# Patient Record
Sex: MALE | Race: WHITE | Employment: OTHER | URBAN - METROPOLITAN AREA
[De-identification: names, ages, dates, MRNs, and addresses within clinical notes are randomized per-mention and may not be internally consistent; named-entity substitution may affect disease eponyms.]

---

## 2019-01-08 ENCOUNTER — APPOINTMENT (OUTPATIENT)
Dept: GENERAL RADIOLOGY | Age: 64
End: 2019-01-08
Payer: COMMERCIAL

## 2019-01-08 ENCOUNTER — HOSPITAL ENCOUNTER (EMERGENCY)
Age: 64
Discharge: LEFT BEFORE DISCHARGE | End: 2019-01-08
Attending: EMERGENCY MEDICINE
Payer: COMMERCIAL

## 2019-01-08 VITALS
TEMPERATURE: 97.9 F | HEART RATE: 67 BPM | SYSTOLIC BLOOD PRESSURE: 103 MMHG | HEIGHT: 73 IN | BODY MASS INDEX: 23.33 KG/M2 | WEIGHT: 176 LBS | RESPIRATION RATE: 15 BRPM | OXYGEN SATURATION: 99 % | DIASTOLIC BLOOD PRESSURE: 77 MMHG

## 2019-01-08 DIAGNOSIS — R07.9 CHEST PAIN, UNSPECIFIED TYPE: Primary | ICD-10-CM

## 2019-01-08 LAB
A/G RATIO: 1.5 (ref 1.1–2.2)
ALBUMIN SERPL-MCNC: 4.5 G/DL (ref 3.4–5)
ALP BLD-CCNC: 52 U/L (ref 40–129)
ALT SERPL-CCNC: 20 U/L (ref 10–40)
ANION GAP SERPL CALCULATED.3IONS-SCNC: 13 MMOL/L (ref 3–16)
AST SERPL-CCNC: 21 U/L (ref 15–37)
BASOPHILS ABSOLUTE: 0 K/UL (ref 0–0.2)
BASOPHILS RELATIVE PERCENT: 0.8 %
BILIRUB SERPL-MCNC: 0.4 MG/DL (ref 0–1)
BUN BLDV-MCNC: 13 MG/DL (ref 7–20)
CALCIUM SERPL-MCNC: 9.5 MG/DL (ref 8.3–10.6)
CHLORIDE BLD-SCNC: 97 MMOL/L (ref 99–110)
CO2: 27 MMOL/L (ref 21–32)
CREAT SERPL-MCNC: 0.6 MG/DL (ref 0.8–1.3)
D DIMER: <200 NG/ML DDU (ref 0–229)
EKG ATRIAL RATE: 68 BPM
EKG DIAGNOSIS: NORMAL
EKG P AXIS: 63 DEGREES
EKG P-R INTERVAL: 136 MS
EKG Q-T INTERVAL: 368 MS
EKG QRS DURATION: 90 MS
EKG QTC CALCULATION (BAZETT): 391 MS
EKG R AXIS: 9 DEGREES
EKG T AXIS: 53 DEGREES
EKG VENTRICULAR RATE: 68 BPM
EOSINOPHILS ABSOLUTE: 0.1 K/UL (ref 0–0.6)
EOSINOPHILS RELATIVE PERCENT: 1 %
GFR AFRICAN AMERICAN: >60
GFR NON-AFRICAN AMERICAN: >60
GLOBULIN: 3.1 G/DL
GLUCOSE BLD-MCNC: 97 MG/DL (ref 70–99)
HCT VFR BLD CALC: 46 % (ref 40.5–52.5)
HEMOGLOBIN: 15.5 G/DL (ref 13.5–17.5)
INR BLD: 1.05 (ref 0.86–1.14)
LYMPHOCYTES ABSOLUTE: 2.2 K/UL (ref 1–5.1)
LYMPHOCYTES RELATIVE PERCENT: 37.3 %
MCH RBC QN AUTO: 32.3 PG (ref 26–34)
MCHC RBC AUTO-ENTMCNC: 33.7 G/DL (ref 31–36)
MCV RBC AUTO: 95.9 FL (ref 80–100)
MONOCYTES ABSOLUTE: 0.6 K/UL (ref 0–1.3)
MONOCYTES RELATIVE PERCENT: 9.4 %
NEUTROPHILS ABSOLUTE: 3.1 K/UL (ref 1.7–7.7)
NEUTROPHILS RELATIVE PERCENT: 51.5 %
PDW BLD-RTO: 12.9 % (ref 12.4–15.4)
PLATELET # BLD: 276 K/UL (ref 135–450)
PMV BLD AUTO: 7.1 FL (ref 5–10.5)
POTASSIUM SERPL-SCNC: 4.2 MMOL/L (ref 3.5–5.1)
PRO-BNP: 10 PG/ML (ref 0–124)
PROTHROMBIN TIME: 12 SEC (ref 9.8–13)
RBC # BLD: 4.8 M/UL (ref 4.2–5.9)
SODIUM BLD-SCNC: 137 MMOL/L (ref 136–145)
TOTAL PROTEIN: 7.6 G/DL (ref 6.4–8.2)
TROPONIN: <0.01 NG/ML
WBC # BLD: 6 K/UL (ref 4–11)

## 2019-01-08 PROCEDURE — 84484 ASSAY OF TROPONIN QUANT: CPT

## 2019-01-08 PROCEDURE — 85025 COMPLETE CBC W/AUTO DIFF WBC: CPT

## 2019-01-08 PROCEDURE — 71046 X-RAY EXAM CHEST 2 VIEWS: CPT

## 2019-01-08 PROCEDURE — 99285 EMERGENCY DEPT VISIT HI MDM: CPT

## 2019-01-08 PROCEDURE — 6370000000 HC RX 637 (ALT 250 FOR IP): Performed by: EMERGENCY MEDICINE

## 2019-01-08 PROCEDURE — 80053 COMPREHEN METABOLIC PANEL: CPT

## 2019-01-08 PROCEDURE — 93010 ELECTROCARDIOGRAM REPORT: CPT | Performed by: INTERNAL MEDICINE

## 2019-01-08 PROCEDURE — 83880 ASSAY OF NATRIURETIC PEPTIDE: CPT

## 2019-01-08 PROCEDURE — 93005 ELECTROCARDIOGRAM TRACING: CPT | Performed by: EMERGENCY MEDICINE

## 2019-01-08 PROCEDURE — 85379 FIBRIN DEGRADATION QUANT: CPT

## 2019-01-08 PROCEDURE — 85610 PROTHROMBIN TIME: CPT

## 2019-01-08 RX ORDER — ASPIRIN 325 MG
325 TABLET ORAL ONCE
Status: COMPLETED | OUTPATIENT
Start: 2019-01-08 | End: 2019-01-08

## 2019-01-08 RX ADMIN — ASPIRIN 325 MG: 325 TABLET ORAL at 11:00

## 2019-01-08 ASSESSMENT — HEART SCORE: ECG: 1

## 2019-01-08 ASSESSMENT — PAIN DESCRIPTION - PAIN TYPE: TYPE: ACUTE PAIN

## 2019-01-08 ASSESSMENT — PAIN SCALES - GENERAL: PAINLEVEL_OUTOF10: 3

## 2019-04-01 ENCOUNTER — APPOINTMENT (OUTPATIENT)
Dept: GENERAL RADIOLOGY | Age: 64
End: 2019-04-01
Payer: COMMERCIAL

## 2019-04-01 ENCOUNTER — HOSPITAL ENCOUNTER (EMERGENCY)
Age: 64
Discharge: HOME | End: 2019-04-01
Attending: EMERGENCY MEDICINE
Payer: COMMERCIAL

## 2019-04-01 VITALS
HEIGHT: 73 IN | HEART RATE: 80 BPM | RESPIRATION RATE: 14 BRPM | WEIGHT: 173 LBS | TEMPERATURE: 98.1 F | OXYGEN SATURATION: 99 % | DIASTOLIC BLOOD PRESSURE: 88 MMHG | BODY MASS INDEX: 22.93 KG/M2 | SYSTOLIC BLOOD PRESSURE: 119 MMHG

## 2019-04-01 DIAGNOSIS — S43.51XA ACROMIOCLAVICULAR SPRAIN, RIGHT, INITIAL ENCOUNTER: Primary | ICD-10-CM

## 2019-04-01 PROCEDURE — 99283 EMERGENCY DEPT VISIT LOW MDM: CPT

## 2019-04-01 PROCEDURE — 6370000000 HC RX 637 (ALT 250 FOR IP): Performed by: EMERGENCY MEDICINE

## 2019-04-01 PROCEDURE — 73030 X-RAY EXAM OF SHOULDER: CPT

## 2019-04-01 RX ORDER — METHOCARBAMOL 750 MG/1
750 TABLET, FILM COATED ORAL 3 TIMES DAILY
Qty: 15 TABLET | Refills: 0 | Status: SHIPPED | OUTPATIENT
Start: 2019-04-01 | End: 2019-04-06

## 2019-04-01 RX ORDER — CLOPIDOGREL BISULFATE 75 MG/1
75 TABLET ORAL DAILY
COMMUNITY
End: 2020-06-30

## 2019-04-01 RX ORDER — MELOXICAM 7.5 MG/1
7.5 TABLET ORAL DAILY
Qty: 14 TABLET | Refills: 1 | Status: SHIPPED | OUTPATIENT
Start: 2019-04-01 | End: 2020-06-30

## 2019-04-01 RX ORDER — METHOCARBAMOL 750 MG/1
750 TABLET, FILM COATED ORAL ONCE
Status: COMPLETED | OUTPATIENT
Start: 2019-04-01 | End: 2019-04-01

## 2019-04-01 RX ADMIN — METHOCARBAMOL TABLETS 750 MG: 750 TABLET, COATED ORAL at 08:34

## 2019-04-01 ASSESSMENT — PAIN SCALES - GENERAL
PAINLEVEL_OUTOF10: 5
PAINLEVEL_OUTOF10: 6

## 2019-04-01 ASSESSMENT — PAIN DESCRIPTION - PAIN TYPE: TYPE: ACUTE PAIN

## 2019-04-01 ASSESSMENT — PAIN DESCRIPTION - ORIENTATION: ORIENTATION: RIGHT

## 2019-04-01 ASSESSMENT — PAIN DESCRIPTION - ONSET: ONSET: ON-GOING

## 2019-04-01 ASSESSMENT — PAIN DESCRIPTION - LOCATION: LOCATION: SHOULDER

## 2019-04-01 ASSESSMENT — PAIN DESCRIPTION - PROGRESSION: CLINICAL_PROGRESSION: NOT CHANGED

## 2019-04-01 ASSESSMENT — PAIN DESCRIPTION - DESCRIPTORS: DESCRIPTORS: ACHING

## 2019-04-01 ASSESSMENT — PAIN DESCRIPTION - FREQUENCY: FREQUENCY: CONTINUOUS

## 2019-04-01 NOTE — ED PROVIDER NOTES
[facility] EMERGENCY DEPT VISIT      Patient Identification  Mora Anaya is a 66 y.o. male. Chief Complaint   Shoulder Injury (Patient fell last evening approx 8pm  stumbled getting out of a chair, no LOC, did not hit head, landed on right shoulder)      History of Present Illness: This is a  66 y.o. male who presents ambulatory  to the ED with complaints of right shoulder pain. He fell getting out of a chair last night around 8pm. Landed on his right shoulder. Did not hit head. No loc. No neck or back pain. No rib pain. Hurts to move shoulder. Feels like burning pain at [facility] joint region. No h/o shoulder problems in past. No numbness or weakness to arm or hand. Past Medical History:   Diagnosis Date    Chest pain     Hyperlipidemia     Hypertension     Wears glasses        Past Surgical History:   Procedure Laterality Date    CARDIAC CATHETERIZATION  7/2008    COLONOSCOPY  5/2009    FINGER TRIGGER RELEASE  3-    left- long finger.  WISDOM TOOTH EXTRACTION         No current facility-administered medications for this encounter. Current Outpatient Medications:     clopidogrel (PLAVIX) 75 MG tablet, Take 75 mg by mouth daily, Disp: , Rfl:     meloxicam (MOBIC) 7.5 MG tablet, Take 1 tablet by mouth daily, Disp: 14 tablet, Rfl: 1    methocarbamol (ROBAXIN-750) 750 MG tablet, Take 1 tablet by mouth 3 times daily for 5 days, Disp: 15 tablet, Rfl: 0    aspirin 325 MG EC tablet, Take 325 mg by mouth daily, Disp: , Rfl:     naproxen (NAPROSYN) 500 MG tablet, Take 1 tablet by mouth 2 times daily for 20 doses, Disp: 20 tablet, Rfl: 0    lisinopril (PRINIVIL;ZESTRIL) 5 MG tablet, Take 5 mg by mouth daily. , Disp: , Rfl:     rosuvastatin (CRESTOR) 20 MG tablet, Take 20 mg by mouth daily. , Disp: , Rfl:     Allergies   Allergen Reactions    Lipitor      myalgia    Tamiflu [Oseltamivir Phosphate] Rash       Social History     Socioeconomic History    Marital status:      Spouse name: Not on file    Number of children: Not on file    Years of education: Not on file    Highest education level: Not on file   Occupational History    Not on file   Social Needs    Financial resource strain: Not on file    Food insecurity:     Worry: Not on file     Inability: Not on file    Transportation needs:     Medical: Not on file     Non-medical: Not on file   Tobacco Use    Smoking status: Former Smoker     Last attempt to quit: 3/9/2011     Years since quittin.0    Smokeless tobacco: Never Used    Tobacco comment: occasional cigar   Substance and Sexual Activity    Alcohol use: Yes     Comment: 3 drinks 3 days per week    Drug use: No    Sexual activity: Not on file   Lifestyle    Physical activity:     Days per week: Not on file     Minutes per session: Not on file    Stress: Not on file   Relationships    Social connections:     Talks on phone: Not on file     Gets together: Not on file     Attends Oriental orthodox service: Not on file     Active member of club or organization: Not on file     Attends meetings of clubs or organizations: Not on file     Relationship status: Not on file    Intimate partner violence:     Fear of current or ex partner: Not on file     Emotionally abused: Not on file     Physically abused: Not on file     Forced sexual activity: Not on file   Other Topics Concern    Not on file   Social History Narrative    Not on file       Nursing Notes Reviewed      ROS:  General: no fever  ENT: no sinus congestion, no sore throat  RESP: no cough, no shortness of breath  GI: no abdominal pain, no vomiting, no diarrhea  Musculoskeletal: + arthralgia, no myalgia, no back pain, no neck pain, no joint swelling  NEURO: no headache, no numbness, no weakness  DERM: no rash, no erythema, no ecchymosis, no wounds      PHYSICAL EXAM:  GENERAL APPEARANCE: Conni Carrel is in no acute respiratory distress. Awake and alert.   VITAL SIGNS:   ED Triage Vitals [19 0733]   [facility] Vitals Group /86      Pulse 84      Resp 14      Temp 98.1 °F (36.7 °C)      Temp Source Oral      SpO2 99 %      Weight 173 lb (78.5 kg)      Height 6' 1\" (1.854 m)      Head Circumference       Peak Flow       Pain Score       Pain Loc       Pain Edu? Excl. in [address]? HEAD: Normocephalic, atraumatic. EYES:  Extraocular muscles are intact. Conjunctivas are pink. Negative scleral icterus. ENT:  Mucous membranes are moist.  Pharynx without erythema or exudates. NECK: Nontender and supple. No cervical spine tenderness  CHEST: Clear to auscultation bilaterally. No rales, rhonchi, or wheezing. HEART:  Regular rate and rhythm. No murmurs. Strong and equal pulses in the upper and lower extremities. ABDOMEN: Soft,  nondistended, positive bowel sounds. abdomen is nontender. MUSCULOSKELETAL:  Active range of motion of the upper and lower extremities. No edema. Tenderness to right AC joint where swelling is present. No tenderness to humeral head. Pain with ROM of shoulder passively and actively both with flexion and abduction. No tenderness to right elbow or wrist. Strong radial pulse  NEUROLOGICAL: Awake, alert and oriented x 3. Power intact in the upper and lower extremities. Sensation intact right arm  DERMATOLOGIC: No petechiae, rashes, or ecchymoses. ED COURSE AND MEDICAL DECISION MAKING:      Radiology:  All plain films have been evaluated by myself. They may have been overread by radiologist as noted in chart. Other radiologic studies (i.e. CT, MRI, ultrasounds, etc ) have been interpreted by radiologist.     XR SHOULDER RIGHT (MIN 2 VIEWS)   Final Result   Moderate degenerative changes at the [facility] joint. No acute osseous abnormality noted. Treatment in the department:  Patient received   Medications   methocarbamol (ROBAXIN) tablet 750 mg (750 mg Oral Given 4/1/19 0834)      while in the ED. Sling and swathe were placed.     Medical decision making:  Patient with right shoulder pain after fall. No signs of fracture or dislocation on xray. Pain over [facility] joint where swelling present but also arthritic change on xray. May have grade 1 separation. Neurovascularly intact. Sling and refer to ortho    I estimate there is LOW risk for FRACTURE, DISLOCATION, COMPARTMENT SYNDROME, DEEP VENOUS THROMBOSIS, SEPTIC ARTHRITIS, OSTEOMYELITIS, TENDON OR NEUROVASCULAR INJURY, thus I consider the discharge disposition reasonable. Mahi Vazquez and I have discussed the diagnosis and risks, and we agree with discharging home to follow-up with their primary doctor or the referral orthopedist. We also discussed returning to the Emergency Department immediately if new or worsening symptoms occur. Clinical Impression:  1. Acromioclavicular sprain, right, initial encounter        Dispo:  Patient will be discharged  at this time. Patient was informed of this decision and agrees with plan. I have discussed lab and xray findings with patient and they understand. Questions were answered to the best of my ability. Discharge vitals:  Blood pressure 138/86, pulse 84, temperature 98.1 °F (36.7 °C), temperature source Oral, resp. rate 14, height 6' 1\" (1.854 m), weight 173 lb (78.5 kg), SpO2 99 %. Prescriptions given:   New Prescriptions    MELOXICAM (MOBIC) 7.5 MG TABLET    Take 1 tablet by mouth daily    METHOCARBAMOL (ROBAXIN-750) 750 MG TABLET    Take 1 tablet by mouth 3 times daily for 5 days         This chart was created using dragon voice recognition software.         Alicia Calloway MD  04/01/19 1141

## 2019-04-01 NOTE — ED NOTES
Fitted and applied sling and swathe to pt's right arm. Pt tolerated well and understood instructions. Family @ bedside.  JUANITA Orourke  04/01/19 4789

## 2020-06-30 ENCOUNTER — HOSPITAL ENCOUNTER (EMERGENCY)
Age: 65
Discharge: HOME | End: 2020-06-30
Payer: COMMERCIAL

## 2020-06-30 VITALS
TEMPERATURE: 98.1 F | WEIGHT: 175 LBS | OXYGEN SATURATION: 98 % | DIASTOLIC BLOOD PRESSURE: 86 MMHG | RESPIRATION RATE: 20 BRPM | HEART RATE: 75 BPM | HEIGHT: 73 IN | SYSTOLIC BLOOD PRESSURE: 126 MMHG | BODY MASS INDEX: 23.19 KG/M2

## 2020-06-30 PROCEDURE — 99283 EMERGENCY DEPT VISIT LOW MDM: CPT

## 2020-06-30 PROCEDURE — 12013 RPR F/E/E/N/L/M 2.6-5.0 CM: CPT

## 2020-06-30 RX ORDER — ASPIRIN 81 MG/1
81 TABLET, CHEWABLE ORAL DAILY
COMMUNITY

## 2020-06-30 ASSESSMENT — PAIN DESCRIPTION - LOCATION: LOCATION: HEAD

## 2020-06-30 ASSESSMENT — PAIN SCALES - GENERAL: PAINLEVEL_OUTOF10: 3

## 2020-06-30 ASSESSMENT — ENCOUNTER SYMPTOMS
COLOR CHANGE: 0
NAUSEA: 0
BACK PAIN: 0
VOMITING: 0
ABDOMINAL PAIN: 0
COUGH: 0
DIARRHEA: 0
SHORTNESS OF BREATH: 0

## 2020-07-01 NOTE — ED PROVIDER NOTES
**[facility]  [facility]  EMERGENCY DEPARTMENT ENCOUNTER      Pt Name: Madhav Lu  WUB:[id number]  [place] 1955  Date of evaluation: 6/30/2020  Provider: LISSET Billy CNP      Chief Complaint:    Chief Complaint   Patient presents with    Laceration     Patient ambulates into ED with c/o getting tangled in some weeds and hitting his head on the corner of the deck. Nursing Notes, Past Medical Hx, Past Surgical Hx, Social Hx, Allergies, and Family Hx were all reviewed and agreed with or any disagreements were addressed in the HPI.    HPI:  (Location, Duration, Timing, Severity, Quality, Assoc Sx, Context, Modifying factors)  This is a  63 y.o. male who presents to the emergency department with a laceration right side of his forehead, approximately 2 cm in length. He states that he was doing yard work and tripped in some weeds and hit head on [employer]. He denies any lightheadedness or dizziness before after the falls, no use of blood thinners. He denies any loss of consciousness. Denies any blurred or lost vision. Patient states he has no additional injuries, reports last tetanus is about 1to 3years old. He complains of mild discomfort where the laceration is located, rates the pain a 3 out of 10. He denies any additional complaints, no additional aggravating or relieving factors. The patient presents awake, alert and in no acute respiratory distress or toxic appearance. PastMedical/Surgical History:      Diagnosis Date    Chest pain     Hyperlipidemia     Hypertension     Wears glasses          Procedure Laterality Date    CARDIAC CATHETERIZATION  7/2008    COLONOSCOPY  5/2009    FINGER TRIGGER RELEASE  3-    left- long finger.     WISDOM TOOTH EXTRACTION         Medications:  Discharge Medication List as of 6/30/2020 10:49 PM      CONTINUE these medications which have NOT CHANGED    Details   aspirin 81 MG chewable tablet Take 81 mg by mouth dailyHistorical Med      naproxen (NAPROSYN) 500 MG tablet Take 1 tablet by mouth 2 times daily for 20 doses, Disp-20 tablet, R-0Print      lisinopril (PRINIVIL;ZESTRIL) 5 MG tablet Take 5 mg by mouth daily. rosuvastatin (CRESTOR) 20 MG tablet Take 20 mg by mouth daily. Review of Systems:  Review of Systems   Constitutional: Negative for chills, fatigue and fever. HENT: Negative for congestion. Respiratory: Negative for cough and shortness of breath. Cardiovascular: Negative for chest pain. Gastrointestinal: Negative for abdominal pain, diarrhea, nausea and vomiting. Musculoskeletal: Negative for back pain. Skin: Positive for wound. Negative for color change. Small laceration to the right side of his forehead, patient states that he hit his head on the deck after falling while doing yard work. No loss of consciousness. Neurological: Negative for dizziness, syncope, speech difficulty, weakness, light-headedness, numbness and headaches. Positives and Pertinent negatives as per HPI. Except as noted above in the ROS, problem specific ROS was completed and is negative. Physical Exam:  Physical Exam  Vitals signs and nursing note reviewed. Constitutional:       Appearance: He is well-developed. He is not diaphoretic. HENT:      Head: Normocephalic. Right Ear: External ear normal.      Left Ear: External ear normal.   Eyes:      General:         Right eye: No discharge. Left eye: No discharge. Neck:      Musculoskeletal: Normal range of motion and neck supple. No pain with movement. Cardiovascular:      Rate and Rhythm: Normal rate. Pulmonary:      Effort: Pulmonary effort is normal. No respiratory distress. Musculoskeletal: Normal range of motion. Skin:     General: Skin is warm. Capillary Refill: Capillary refill takes less than 2 seconds. Coloration: Skin is not pale.       Findings: Laceration present. Comments: Patient has a 3 cm laceration to the right forehead, bleeding is controlled, no surrounding erythema edema or foreign bodies. Neurological:      General: No focal deficit present. Mental Status: He is alert and oriented to person, place, and time. GCS: GCS eye subscore is 4. GCS verbal subscore is 5. GCS motor subscore is 6. Comments: Patient is awake, alert following commands correctly   Psychiatric:         Behavior: Behavior normal.         MEDICAL DECISION MAKING    Vitals:    Vitals:    06/30/20 2130   BP: 126/86   Pulse: 75   Resp: 20   Temp: 98.1 °F (36.7 °C)   TempSrc: Oral   SpO2: 98%   Weight: 175 lb (79.4 kg)   Height: 6' 1\" (1.854 m)       LABS:Labs Reviewed - No data to display     Remainder of labs reviewed and werenegative at this time or not returned at the time of this note. RADIOLOGY:   Non-plain film images such as CT, Ultrasound and MRI are read by the radiologist. Melba VASQUEZ APRN - CNP have directly visualized the radiologic plain film image(s) with the below findings:        Interpretation per the Radiologist below, if available at the time of this note:    No orders to display        No results found. MEDICAL DECISION MAKING / ED COURSE:      PROCEDURES:   Procedures    Laceration Repair Procedure Note    Indication: Laceration    Procedure: The patient was placed in the appropriate position and anesthesia around the laceration was obtained by infiltration using 1% Lidocaine without epinephrine. The area was then cleansed with Shur-Clens and draped in a sterile fashion. The laceration was closed with 5-0 Prolene using interrupted sutures. There were no additional lacerations requiring repair. The wound area was then dressed with bacitracin, gauze and tape. Total repaired wound length: 3 cm. Other Items: Suture count: 5    The patient tolerated the procedure well.     Complications: None      Patient was given: Medications - No data to display    Patient complains of small laceration to the right side of his forehead, patient states that he hit his head on the deck after falling while doing yard work. No loss of consciousness. After evaluation and examination the patient laceration repair was completed, patient tolerated procedure well. Patient's tetanus is already updated. Educated him about wound care, educated have sutures removed in the next 5 days. I estimate there is LOW risk for COMPARTMENT SYNDROME, TENDON OR NEUROVASCULAR INJURY, FOREIGN BODY OR signs of INFECTION thus I consider the discharge disposition reasonable. Therefore, shared medical decision was made between the patient and myself and we agreed the patient could be discharged home with outpatient follow-up. Discharged home with education about wound care, avoid excessive exposure to water as able delay wound healing. Follow-up with the PCP in 5-7 days to have sutures removed. The patient tolerated their visit well. I evaluated the patient. The physician was available for consultation as needed. The patient and / or the family were informed of the results of any tests, a time was given to answer questions, a plan was proposed and they agreed with plan. Patient verbalized understanding of discharge instructions and was discharged from the department in stable condition. CLINICAL IMPRESSION:  1.  Facial laceration, initial encounter        DISPOSITION Decision To Discharge 06/30/2020 10:48:51 PM      PATIENT REFERRED TO:  Rena Aguilar 94  [facility] 19  [phone]    Schedule an appointment as soon as possible for a visit in 1 week  Follow-up with family doctor and have sutures removed in 5 days    [facility]  ED  [address]  Go to   If symptoms worsen      DISCHARGE MEDICATIONS:  Discharge Medication List as of 6/30/2020 10:49 PM DISCONTINUED MEDICATIONS:  Discharge Medication List as of 6/30/2020 10:49 PM      STOP taking these medications       clopidogrel (PLAVIX) 75 MG tablet Comments:   Reason for Stopping:         meloxicam (MOBIC) 7.5 MG tablet Comments:   Reason for Stopping:         aspirin 325 MG EC tablet Comments:   Reason for Stopping:                      (Please note the MDM and HPI sections of this note were completed with a voice recognition program.  Efforts were made to edit the dictations but occasionally words are mis-transcribed.)    Electronically signed, LISSET Fragoso CNP,          LISSET Fragoso CNP  06/30/20 3497

## 2021-01-13 ENCOUNTER — HOSPITAL ENCOUNTER (EMERGENCY)
Age: 66
Discharge: HOME | End: 2021-01-13
Payer: COMMERCIAL

## 2021-01-13 ENCOUNTER — APPOINTMENT (OUTPATIENT)
Dept: GENERAL RADIOLOGY | Age: 66
End: 2021-01-13
Payer: COMMERCIAL

## 2021-01-13 VITALS
TEMPERATURE: 98.1 F | SYSTOLIC BLOOD PRESSURE: 119 MMHG | HEIGHT: 73 IN | BODY MASS INDEX: 22.93 KG/M2 | RESPIRATION RATE: 14 BRPM | HEART RATE: 83 BPM | DIASTOLIC BLOOD PRESSURE: 77 MMHG | WEIGHT: 173 LBS | OXYGEN SATURATION: 99 %

## 2021-01-13 DIAGNOSIS — S70.11XA CONTUSION OF RIGHT THIGH, INITIAL ENCOUNTER: Primary | ICD-10-CM

## 2021-01-13 PROCEDURE — 73552 X-RAY EXAM OF FEMUR 2/>: CPT

## 2021-01-13 PROCEDURE — 99283 EMERGENCY DEPT VISIT LOW MDM: CPT

## 2021-01-13 PROCEDURE — 6370000000 HC RX 637 (ALT 250 FOR IP): Performed by: PHYSICIAN ASSISTANT

## 2021-01-13 RX ORDER — OXYCODONE HYDROCHLORIDE AND ACETAMINOPHEN 5; 325 MG/1; MG/1
1 TABLET ORAL ONCE
Status: COMPLETED | OUTPATIENT
Start: 2021-01-13 | End: 2021-01-13

## 2021-01-13 RX ORDER — OXYCODONE HYDROCHLORIDE AND ACETAMINOPHEN 5; 325 MG/1; MG/1
1 TABLET ORAL EVERY 6 HOURS PRN
Qty: 10 TABLET | Refills: 0 | Status: SHIPPED | OUTPATIENT
Start: 2021-01-13 | End: 2021-01-16

## 2021-01-13 RX ADMIN — OXYCODONE HYDROCHLORIDE AND ACETAMINOPHEN 1 TABLET: 5; 325 TABLET ORAL at 16:02

## 2021-01-13 ASSESSMENT — PAIN DESCRIPTION - FREQUENCY: FREQUENCY: CONTINUOUS

## 2021-01-13 ASSESSMENT — PAIN DESCRIPTION - LOCATION: LOCATION: LEG

## 2021-01-13 ASSESSMENT — PAIN SCALES - GENERAL: PAINLEVEL_OUTOF10: 5

## 2021-01-13 NOTE — ED PROVIDER NOTES
[facility]  ED  eMERGENCY dEPARTMENT eNCOUnter        Pt Name: Lucho Rdz  MRN: [id number]  [place] 1955  Date of evaluation: 2021  Provider: Pilar Whalen PA-C  PCP: Melissa Reddy MD  ED Attending: Viktor Staton MD      History is provided by the patient    CHIEF COMPLAINT:  Leg Injury (right leg; had heavy object drop on it)      HISTORY OF PRESENT ILLNESS:  Lucho Rdz is a 63 y.o. male who presents to the ED via private vehicle with complaints of left leg injury. This patient reports he was attempting to load a 80 to 90 pound tabletop into a dumpster. As he was lifting it up onto the edge of the dumpster it slid back and hit him to the anterior aspect of his right thigh. He states the pain was sudden and severe. He states it has been hard to bear weight since then. He is concerned about an underlying injury to the bone due to the severe pain. No other complaints, modifying factors or associated symptoms. Nursing notes reviewed. Past Medical History:   Diagnosis Date    Chest pain     Hyperlipidemia     Hypertension     Wears glasses      Past Surgical History:   Procedure Laterality Date    CARDIAC CATHETERIZATION  2008    COLONOSCOPY  2009    FINGER TRIGGER RELEASE  3-    left- long finger.  WISDOM TOOTH EXTRACTION       History reviewed. No pertinent family history.   Social History     Socioeconomic History    Marital status:      Spouse name: Not on file    Number of children: Not on file    Years of education: Not on file    Highest education level: Not on file   Occupational History    Not on file   Social Needs    Financial resource strain: Not on file    Food insecurity     Worry: Not on file     Inability: Not on file    Transportation needs     Medical: Not on file     Non-medical: Not on file   Tobacco Use    Smoking status: Former Smoker     Quit date: 3/9/2011     Years since quittin.8    Smokeless tobacco: Never Used    Tobacco comment: occasional cigar   Substance and Sexual Activity    Alcohol use: Yes     Comment: 3 drinks 3 days per week    Drug use: No    Sexual activity: Not on file   Lifestyle    Physical activity     Days per week: Not on file     Minutes per session: Not on file    Stress: Not on file   Relationships    Social connections     Talks on phone: Not on file     Gets together: Not on file     Attends Pentecostalism service: Not on file     Active member of club or organization: Not on file     Attends meetings of clubs or organizations: Not on file     Relationship status: Not on file    Intimate partner violence     Fear of current or ex partner: Not on file     Emotionally abused: Not on file     Physically abused: Not on file     Forced sexual activity: Not on file   Other Topics Concern    Not on file   Social History Narrative    Not on file     No current facility-administered medications for this encounter. Current Outpatient Medications   Medication Sig Dispense Refill    oxyCODONE-acetaminophen (PERCOCET) 5-325 MG per tablet Take 1 tablet by mouth every 6 hours as needed for Pain for up to 3 days. Intended supply: 3 days. Take lowest dose possible to manage pain 10 tablet 0    aspirin 81 MG chewable tablet Take 81 mg by mouth daily      naproxen (NAPROSYN) 500 MG tablet Take 1 tablet by mouth 2 times daily for 20 doses 20 tablet 0    lisinopril (PRINIVIL;ZESTRIL) 5 MG tablet Take 5 mg by mouth daily.  rosuvastatin (CRESTOR) 20 MG tablet Take 20 mg by mouth daily. Allergies   Allergen Reactions    Lipitor      myalgia    Tamiflu [Oseltamivir Phosphate] Rash       REVIEW OF SYSTEMS:  6 systems reviewed, pertinent positives per HPI otherwise noted to be negative. PHYSICAL EXAM:  /77   Pulse 83   Temp 98.1 °F (36.7 °C) (Oral)   Resp 14   Ht 6' 1\" (1.854 m)   Wt 173 lb (78.5 kg)   SpO2 99%   BMI 22.82 kg/m²   CONSTITUTIONAL: Awake and alert. Well-developed. Well-nourished. Non-toxic. Cooperative. No acute distress. HENT: Normocephalic. Atraumatic. External ears normal, without discharge. Nose normal. Mucous membranes moist.  EYES: Conjunctiva non-injected. No scleral icterus. PERRL. EOM's grossly intact. NECK: Supple. Normal ROM. CARDIOVASCULAR: Normal heart rate. Intact distal pulses. PULMONARY/CHEST WALL: Breathing is unlabored. Equal, symmetric chest rise. Speaking comfortably in full sentences. ABDOMEN: Nondistended  MUSKULOSKELETAL: RLE: No acute deformities. Mild swelling anterior thigh. Pain to palpate over anterior thigh/quadriceps. No crepitus. Normal range of motion of proximal hip and distal knee joints. SKIN: Warm and dry. NEUROLOGICAL: Alert and oriented x 3. Strength is 5/5 in all extremities and sensation is intact. PSYCHIATRIC: Normal affect    Labs:    None    RADIOLOGY:    All x-ray studies are viewed/reviewed by me. Formal interpretations per the radiologist are as follows:      Xr Femur Right (min 2 Views)    Result Date: 1/13/2021  EXAMINATION: 5 XRAY VIEWS OF THE RIGHT FEMUR 1/13/2021 4:13 pm COMPARISON: None. HISTORY: ORDERING SYSTEM PROVIDED HISTORY: pain, injury right anterior thigh TECHNOLOGIST PROVIDED HISTORY: Reason for exam:->pain, injury right anterior thigh FINDINGS: There is no evidence of acute fracture. There is normal alignment. No acute joint abnormality. No focal osseous lesion. No focal soft tissue abnormality. Degenerative changes seen in the knee and in the hip. No acute osseous abnormality. Degenerative changes in the knee and in the hip           ED COURSE/MDM:  Patient was given the following medications:  Medications   oxyCODONE-acetaminophen (PERCOCET) 5-325 MG per tablet 1 tablet (1 tablet Oral Given 1/13/21 1602)       I have evaluated this patient here in the ED. Patient reports dropping a tabletop onto his right thigh prior to arrival and having sudden, severe pain.   On exam there is mild swelling as well as tenderness to palpate to the quadricep. Low suspicion for bony injury though x-ray of right femur is still performed. X-ray shows no acute osseous abnormality. Patient is provided with crutches to use due to pain. He is provided with ice pack and is given a Percocet tablet in the ED for pain. Will be given a short course of Percocet upon discharge but I have encouraged frequent icing as well as elevation over the next few days. He is given information on quadricep contusions and he is educated on compartment syndrome. No evidence of that at this time. Patient was given scripts for the following medications. I counseled patient how to take these medications. Discharge Medication List as of 1/13/2021  4:37 PM      START taking these medications    Details   oxyCODONE-acetaminophen (PERCOCET) 5-325 MG per tablet Take 1 tablet by mouth every 6 hours as needed for Pain for up to 3 days. Intended supply: 3 days. Take lowest dose possible to manage pain, Disp-10 tablet, R-0Print           I estimate there is LOW risk for ACUTE FRACTURE, COMPARTMENT SYNDROME, DEEP VENOUS THROMBOSIS, SEPTIC ARTHRITIS, TENDON OR NEUROVASCULAR INJURY, thus I consider the discharge disposition reasonable. Ezequiel Vizcarra and I have discussed the diagnosis and risks, and we agree with discharging home to follow-up with their primary doctor or the referral orthopedist. We also discussed returning to the Emergency Department immediately if new or worsening symptoms occur. We have discussed the symptoms which are most concerning (e.g., changing or worsening pain, numbness, weakness) that necessitate immediate return. CLINICAL IMPRESSION:  1. Contusion of right thigh, initial encounter        Blood pressure 119/77, pulse 83, temperature 98.1 °F (36.7 °C), temperature source Oral, resp. rate 14, height 6' 1\" (1.854 m), weight 173 lb (78.5 kg), SpO2 99 %.           PATIENT REFERRED TO:  Rachel Vásquez Rashel Ewing, [address] [facility], [address]anne [address]  [phone]    Schedule an appointment as soon as possible for a visit   As needed, If symptoms worsen        DISPOSITION  Patient was discharged to home in good condition.           Stephenie Singh, [address]  01/13/21 2955

## 2021-03-16 ENCOUNTER — IMMUNIZATION (OUTPATIENT)
Dept: PRIMARY CARE CLINIC | Age: 66
End: 2021-03-16
Payer: COMMERCIAL

## 2021-03-16 PROCEDURE — 91301 COVID-19, MODERNA VACCINE 100MCG/0.5ML DOSE: CPT | Performed by: FAMILY MEDICINE

## 2021-03-16 PROCEDURE — 0011A PR IMM ADMN SARSCOV2 100 MCG/0.5 ML 1ST DOSE: CPT | Performed by: FAMILY MEDICINE

## 2021-04-13 ENCOUNTER — IMMUNIZATION (OUTPATIENT)
Dept: PRIMARY CARE CLINIC | Age: 66
End: 2021-04-13
Payer: COMMERCIAL

## 2021-04-13 PROCEDURE — 91301 COVID-19, MODERNA VACCINE 100MCG/0.5ML DOSE: CPT | Performed by: FAMILY MEDICINE

## 2021-04-13 PROCEDURE — 0012A COVID-19, MODERNA VACCINE 100MCG/0.5ML DOSE: CPT | Performed by: FAMILY MEDICINE

## 2022-01-19 ENCOUNTER — HOSPITAL ENCOUNTER (OUTPATIENT)
Age: 67
Setting detail: OBSERVATION
Discharge: HOME | End: 2022-01-20
Attending: EMERGENCY MEDICINE | Admitting: HOSPITALIST
Payer: COMMERCIAL

## 2022-01-19 ENCOUNTER — APPOINTMENT (OUTPATIENT)
Dept: GENERAL RADIOLOGY | Age: 67
End: 2022-01-19
Payer: COMMERCIAL

## 2022-01-19 DIAGNOSIS — R07.9 CHEST PAIN, UNSPECIFIED TYPE: Primary | ICD-10-CM

## 2022-01-19 LAB
A/G RATIO: 1.5 (ref 1.1–2.2)
ALBUMIN SERPL-MCNC: 4.6 G/DL (ref 3.4–5)
ALP BLD-CCNC: 59 U/L (ref 40–129)
ALT SERPL-CCNC: 21 U/L (ref 10–40)
ANION GAP SERPL CALCULATED.3IONS-SCNC: 11 MMOL/L (ref 3–16)
AST SERPL-CCNC: 24 U/L (ref 15–37)
BASOPHILS ABSOLUTE: 0.1 K/UL (ref 0–0.2)
BASOPHILS RELATIVE PERCENT: 1.2 %
BILIRUB SERPL-MCNC: 0.5 MG/DL (ref 0–1)
BUN BLDV-MCNC: 13 MG/DL (ref 7–20)
CALCIUM SERPL-MCNC: 9.3 MG/DL (ref 8.3–10.6)
CHLORIDE BLD-SCNC: 99 MMOL/L (ref 99–110)
CO2: 25 MMOL/L (ref 21–32)
CREAT SERPL-MCNC: 0.6 MG/DL (ref 0.8–1.3)
D DIMER: <200 NG/ML DDU (ref 0–229)
EKG ATRIAL RATE: 81 BPM
EKG DIAGNOSIS: NORMAL
EKG P AXIS: 65 DEGREES
EKG P-R INTERVAL: 132 MS
EKG Q-T INTERVAL: 374 MS
EKG QRS DURATION: 84 MS
EKG QTC CALCULATION (BAZETT): 434 MS
EKG R AXIS: 10 DEGREES
EKG T AXIS: 50 DEGREES
EKG VENTRICULAR RATE: 81 BPM
EOSINOPHILS ABSOLUTE: 0.1 K/UL (ref 0–0.6)
EOSINOPHILS RELATIVE PERCENT: 2.2 %
GFR AFRICAN AMERICAN: >60
GFR NON-AFRICAN AMERICAN: >60
GLUCOSE BLD-MCNC: 133 MG/DL (ref 70–99)
HCT VFR BLD CALC: 43.8 % (ref 40.5–52.5)
HEMOGLOBIN: 14.7 G/DL (ref 13.5–17.5)
LV EF: 58 %
LVEF MODALITY: NORMAL
LYMPHOCYTES ABSOLUTE: 2.3 K/UL (ref 1–5.1)
LYMPHOCYTES RELATIVE PERCENT: 38.6 %
MCH RBC QN AUTO: 31.6 PG (ref 26–34)
MCHC RBC AUTO-ENTMCNC: 33.6 G/DL (ref 31–36)
MCV RBC AUTO: 94.2 FL (ref 80–100)
MONOCYTES ABSOLUTE: 0.6 K/UL (ref 0–1.3)
MONOCYTES RELATIVE PERCENT: 9.5 %
NEUTROPHILS ABSOLUTE: 2.9 K/UL (ref 1.7–7.7)
NEUTROPHILS RELATIVE PERCENT: 48.5 %
PDW BLD-RTO: 12.6 % (ref 12.4–15.4)
PLATELET # BLD: 251 K/UL (ref 135–450)
PMV BLD AUTO: 7.1 FL (ref 5–10.5)
POTASSIUM REFLEX MAGNESIUM: 4 MMOL/L (ref 3.5–5.1)
RBC # BLD: 4.65 M/UL (ref 4.2–5.9)
SARS-COV-2, NAAT: NOT DETECTED
SODIUM BLD-SCNC: 135 MMOL/L (ref 136–145)
TOTAL PROTEIN: 7.7 G/DL (ref 6.4–8.2)
TROPONIN: <0.01 NG/ML
WBC # BLD: 5.9 K/UL (ref 4–11)

## 2022-01-19 PROCEDURE — 71045 X-RAY EXAM CHEST 1 VIEW: CPT

## 2022-01-19 PROCEDURE — 93010 ELECTROCARDIOGRAM REPORT: CPT | Performed by: INTERNAL MEDICINE

## 2022-01-19 PROCEDURE — 2580000003 HC RX 258: Performed by: PHYSICIAN ASSISTANT

## 2022-01-19 PROCEDURE — G0378 HOSPITAL OBSERVATION PER HR: HCPCS

## 2022-01-19 PROCEDURE — 96372 THER/PROPH/DIAG INJ SC/IM: CPT

## 2022-01-19 PROCEDURE — 99285 EMERGENCY DEPT VISIT HI MDM: CPT

## 2022-01-19 PROCEDURE — 6360000002 HC RX W HCPCS: Performed by: HOSPITALIST

## 2022-01-19 PROCEDURE — 87635 SARS-COV-2 COVID-19 AMP PRB: CPT

## 2022-01-19 PROCEDURE — 6370000000 HC RX 637 (ALT 250 FOR IP): Performed by: HOSPITALIST

## 2022-01-19 PROCEDURE — 85379 FIBRIN DEGRADATION QUANT: CPT

## 2022-01-19 PROCEDURE — 96374 THER/PROPH/DIAG INJ IV PUSH: CPT

## 2022-01-19 PROCEDURE — 99215 OFFICE O/P EST HI 40 MIN: CPT | Performed by: INTERNAL MEDICINE

## 2022-01-19 PROCEDURE — 6370000000 HC RX 637 (ALT 250 FOR IP): Performed by: PHYSICIAN ASSISTANT

## 2022-01-19 PROCEDURE — C8929 TTE W OR WO FOL WCON,DOPPLER: HCPCS

## 2022-01-19 PROCEDURE — 80053 COMPREHEN METABOLIC PANEL: CPT

## 2022-01-19 PROCEDURE — 36415 COLL VENOUS BLD VENIPUNCTURE: CPT

## 2022-01-19 PROCEDURE — 85025 COMPLETE CBC W/AUTO DIFF WBC: CPT

## 2022-01-19 PROCEDURE — 93005 ELECTROCARDIOGRAM TRACING: CPT | Performed by: EMERGENCY MEDICINE

## 2022-01-19 PROCEDURE — 6360000002 HC RX W HCPCS: Performed by: PHYSICIAN ASSISTANT

## 2022-01-19 PROCEDURE — 84484 ASSAY OF TROPONIN QUANT: CPT

## 2022-01-19 PROCEDURE — 2580000003 HC RX 258: Performed by: HOSPITALIST

## 2022-01-19 RX ORDER — ACETAMINOPHEN 650 MG/1
650 SUPPOSITORY RECTAL EVERY 6 HOURS PRN
Status: DISCONTINUED | OUTPATIENT
Start: 2022-01-19 | End: 2022-01-20 | Stop reason: HOSPADM

## 2022-01-19 RX ORDER — METOPROLOL SUCCINATE 25 MG/1
25 TABLET, EXTENDED RELEASE ORAL DAILY
Status: DISCONTINUED | OUTPATIENT
Start: 2022-01-19 | End: 2022-01-19

## 2022-01-19 RX ORDER — ACETAMINOPHEN 325 MG/1
650 TABLET ORAL EVERY 6 HOURS PRN
Status: DISCONTINUED | OUTPATIENT
Start: 2022-01-19 | End: 2022-01-20 | Stop reason: HOSPADM

## 2022-01-19 RX ORDER — HYDROXYZINE HYDROCHLORIDE 10 MG/1
10-20 TABLET, FILM COATED ORAL 3 TIMES DAILY PRN
COMMUNITY
Start: 2022-01-17

## 2022-01-19 RX ORDER — SODIUM CHLORIDE 9 MG/ML
25 INJECTION, SOLUTION INTRAVENOUS PRN
Status: DISCONTINUED | OUTPATIENT
Start: 2022-01-19 | End: 2022-01-20 | Stop reason: HOSPADM

## 2022-01-19 RX ORDER — EZETIMIBE 10 MG/1
TABLET ORAL
COMMUNITY
Start: 2021-12-22

## 2022-01-19 RX ORDER — ASPIRIN 81 MG/1
324 TABLET, CHEWABLE ORAL ONCE
Status: COMPLETED | OUTPATIENT
Start: 2022-01-19 | End: 2022-01-19

## 2022-01-19 RX ORDER — MORPHINE SULFATE 4 MG/ML
4 INJECTION, SOLUTION INTRAMUSCULAR; INTRAVENOUS ONCE
Status: COMPLETED | OUTPATIENT
Start: 2022-01-19 | End: 2022-01-19

## 2022-01-19 RX ORDER — ROSUVASTATIN CALCIUM 10 MG/1
20 TABLET, COATED ORAL NIGHTLY
Status: DISCONTINUED | OUTPATIENT
Start: 2022-01-19 | End: 2022-01-20 | Stop reason: HOSPADM

## 2022-01-19 RX ORDER — ONDANSETRON 4 MG/1
4 TABLET, ORALLY DISINTEGRATING ORAL EVERY 8 HOURS PRN
Status: DISCONTINUED | OUTPATIENT
Start: 2022-01-19 | End: 2022-01-20 | Stop reason: HOSPADM

## 2022-01-19 RX ORDER — SODIUM CHLORIDE 9 MG/ML
1000 INJECTION, SOLUTION INTRAVENOUS CONTINUOUS
Status: DISCONTINUED | OUTPATIENT
Start: 2022-01-19 | End: 2022-01-20 | Stop reason: HOSPADM

## 2022-01-19 RX ORDER — SODIUM CHLORIDE 0.9 % (FLUSH) 0.9 %
5-40 SYRINGE (ML) INJECTION PRN
Status: DISCONTINUED | OUTPATIENT
Start: 2022-01-19 | End: 2022-01-20 | Stop reason: HOSPADM

## 2022-01-19 RX ORDER — NITROGLYCERIN 0.4 MG/1
0.4 TABLET SUBLINGUAL EVERY 5 MIN PRN
Status: DISCONTINUED | OUTPATIENT
Start: 2022-01-19 | End: 2022-01-20 | Stop reason: HOSPADM

## 2022-01-19 RX ORDER — DIAZEPAM 2 MG/1
TABLET ORAL
COMMUNITY
Start: 2021-12-23

## 2022-01-19 RX ORDER — POLYETHYLENE GLYCOL 3350 17 G/17G
17 POWDER, FOR SOLUTION ORAL DAILY PRN
Status: DISCONTINUED | OUTPATIENT
Start: 2022-01-19 | End: 2022-01-20 | Stop reason: HOSPADM

## 2022-01-19 RX ORDER — LISINOPRIL 5 MG/1
5 TABLET ORAL DAILY
Status: DISCONTINUED | OUTPATIENT
Start: 2022-01-20 | End: 2022-01-20 | Stop reason: HOSPADM

## 2022-01-19 RX ORDER — ASPIRIN 81 MG/1
81 TABLET, CHEWABLE ORAL DAILY
Status: DISCONTINUED | OUTPATIENT
Start: 2022-01-20 | End: 2022-01-20 | Stop reason: HOSPADM

## 2022-01-19 RX ORDER — ONDANSETRON 2 MG/ML
4 INJECTION INTRAMUSCULAR; INTRAVENOUS EVERY 6 HOURS PRN
Status: DISCONTINUED | OUTPATIENT
Start: 2022-01-19 | End: 2022-01-20 | Stop reason: HOSPADM

## 2022-01-19 RX ORDER — SODIUM CHLORIDE 0.9 % (FLUSH) 0.9 %
5-40 SYRINGE (ML) INJECTION EVERY 12 HOURS SCHEDULED
Status: DISCONTINUED | OUTPATIENT
Start: 2022-01-19 | End: 2022-01-20 | Stop reason: HOSPADM

## 2022-01-19 RX ADMIN — ASPIRIN 81 MG 324 MG: 81 TABLET ORAL at 09:41

## 2022-01-19 RX ADMIN — ENOXAPARIN SODIUM 40 MG: 40 INJECTION SUBCUTANEOUS at 15:21

## 2022-01-19 RX ADMIN — NITROGLYCERIN 0.4 MG: 0.4 TABLET, ORALLY DISINTEGRATING SUBLINGUAL at 09:43

## 2022-01-19 RX ADMIN — ROSUVASTATIN CALCIUM 20 MG: 10 TABLET, COATED ORAL at 21:00

## 2022-01-19 RX ADMIN — MORPHINE SULFATE 4 MG: 4 INJECTION INTRAVENOUS at 10:59

## 2022-01-19 RX ADMIN — SODIUM CHLORIDE 1000 ML: 9 INJECTION, SOLUTION INTRAVENOUS at 09:40

## 2022-01-19 RX ADMIN — NITROGLYCERIN 0.4 MG: 0.4 TABLET, ORALLY DISINTEGRATING SUBLINGUAL at 09:50

## 2022-01-19 RX ADMIN — SODIUM CHLORIDE 1000 ML: 9 INJECTION, SOLUTION INTRAVENOUS at 18:22

## 2022-01-19 RX ADMIN — Medication 10 ML: at 21:00

## 2022-01-19 ASSESSMENT — PAIN DESCRIPTION - LOCATION
LOCATION: CHEST

## 2022-01-19 ASSESSMENT — PAIN SCALES - GENERAL
PAINLEVEL_OUTOF10: 0
PAINLEVEL_OUTOF10: 4
PAINLEVEL_OUTOF10: 0
PAINLEVEL_OUTOF10: 3
PAINLEVEL_OUTOF10: 4
PAINLEVEL_OUTOF10: 4
PAINLEVEL_OUTOF10: 3
PAINLEVEL_OUTOF10: 4
PAINLEVEL_OUTOF10: 0
PAINLEVEL_OUTOF10: 4

## 2022-01-19 ASSESSMENT — PAIN DESCRIPTION - PROGRESSION: CLINICAL_PROGRESSION: NOT CHANGED

## 2022-01-19 ASSESSMENT — PAIN DESCRIPTION - FREQUENCY
FREQUENCY: INTERMITTENT
FREQUENCY: INTERMITTENT

## 2022-01-19 ASSESSMENT — PAIN DESCRIPTION - PAIN TYPE
TYPE: ACUTE PAIN
TYPE: ACUTE PAIN

## 2022-01-19 ASSESSMENT — PAIN DESCRIPTION - DESCRIPTORS
DESCRIPTORS: SQUEEZING
DESCRIPTORS: DULL

## 2022-01-19 ASSESSMENT — PAIN DESCRIPTION - ORIENTATION: ORIENTATION: MID

## 2022-01-19 ASSESSMENT — HEART SCORE: ECG: 0

## 2022-01-19 NOTE — ED PROVIDER NOTES
[address] [facility]  ED      CHIEF COMPLAINT  Chest Pain (pain started yesterday. pt hx of one stent. pain present when taking a deep breath )      SHARED SERVICE VISIT  Evaluated by TAVO. My supervising physician was available for consultation. HISTORY OF PRESENT ILLNESS  Dileep Espinoza is a 61 y.o. male hx CAD s/p LYNNETTE LAD (2019), HTN, HLD, Family history of early CAD; presenting to ED for evaluation of chest pain. Patient reports intermittent chest pain and describes it as a squeezing pressure. Has been present over the past few weeks however worsened yesterday and since then has been constant. Patient is having active chest pain states it feels similar to when he had a stent placed back in 2019 however less severe. He also reports that he has pain with inspiration. No prior history of coagulopathy, recent flights, surgery, hemoptysis or unilateral leg swelling. No anticoagulation. Denies any new exertional dyspnea. Of note patient also reports an increased amount of stress in his life including recent death of his mother as well as changes in occupation. Patient has been seeing behavioral health in time to control distress. No other complaints, modifying factors or associated symptoms. Nursing notes reviewed. Past Medical History:   Diagnosis Date    Anxiety     Chest pain     Depression     Hyperlipidemia     Hypertension     Wears glasses      Past Surgical History:   Procedure Laterality Date    CARDIAC CATHETERIZATION  7/2008    COLONOSCOPY  5/2009    CORONARY ANGIOPLASTY WITH STENT PLACEMENT      FINGER TRIGGER RELEASE  3-    left- long finger.  WISDOM TOOTH EXTRACTION       No family history on file.   Social History     Socioeconomic History    Marital status:      Spouse name: Not on file    Number of children: Not on file    Years of education: Not on file    Highest education level: Not on file   Occupational History    Not on file Tobacco Use    Smoking status: Former Smoker     Quit date: 3/9/2011     Years since quitting: 10.8    Smokeless tobacco: Never Used    Tobacco comment: occasional cigar   Vaping Use    Vaping Use: Never used   Substance and Sexual Activity    Alcohol use: Yes     Comment: 3 drinks 3 days per week    Drug use: No    Sexual activity: Not on file   Other Topics Concern    Not on file   Social History Narrative    Not on file     Social Determinants of Health     Financial Resource Strain:     Difficulty of Paying Living Expenses: Not on file   Food Insecurity:     Worried About Running Out of Food in the Last Year: Not on file    Thalia of Food in the Last Year: Not on file   Transportation Needs:     Lack of Transportation (Medical): Not on file    Lack of Transportation (Non-Medical):  Not on file   Physical Activity:     Days of Exercise per Week: Not on file    Minutes of Exercise per Session: Not on file   Stress:     Feeling of Stress : Not on file   Social Connections:     Frequency of Communication with Friends and Family: Not on file    Frequency of Social Gatherings with Friends and Family: Not on file    Attends Christianity Services: Not on file    Active Member of [employer] or Organizations: Not on file    Attends Club or Organization Meetings: Not on file    Marital Status: Not on file   Intimate Partner Violence:     Fear of Current or Ex-Partner: Not on file    Emotionally Abused: Not on file    Physically Abused: Not on file    Sexually Abused: Not on file   Housing Stability:     Unable to Pay for Housing in the Last Year: Not on file    Number of [place] in the Last Year: Not on file    Unstable Housing in the Last Year: Not on file     Current Facility-Administered Medications   Medication Dose Route Frequency Provider Last Rate Last Admin    nitroGLYCERIN (NITROSTAT) SL tablet 0.4 mg  0.4 mg SubLINGual Q5 Min PRN Herb Jimenez PA-C   0.4 mg at 01/19/22 0950    0.9 % sodium chloride infusion  1,000 mL IntraVENous Continuous Donovan Canchola PA-C 100 mL/hr at 01/19/22 0940 1,000 mL at 01/19/22 0940    morphine sulfate (PF) injection 4 mg  4 mg IntraVENous Once Donovan Canchola PA-C         Current Outpatient Medications   Medication Sig Dispense Refill    hydrOXYzine (ATARAX) 10 MG tablet Take 10-20 mg by mouth 3 times daily as needed      aspirin 81 MG chewable tablet Take 81 mg by mouth daily      naproxen (NAPROSYN) 500 MG tablet Take 1 tablet by mouth 2 times daily for 20 doses 20 tablet 0    lisinopril (PRINIVIL;ZESTRIL) 5 MG tablet Take 5 mg by mouth daily.  rosuvastatin (CRESTOR) 20 MG tablet Take 20 mg by mouth daily. Allergies   Allergen Reactions    Lipitor      myalgia    Tamiflu [Oseltamivir Phosphate] Rash       REVIEW OF SYSTEMS  10 systems reviewed, pertinent positives per HPI otherwise noted to be negative    PHYSICAL EXAM  /81   Pulse 97   Temp 98 °F (36.7 °C) (Oral)   Resp 16   Ht 6' 1\" (1.854 m)   Wt 178 lb (80.7 kg)   SpO2 96%   BMI 23.48 kg/m²   GENERAL APPEARANCE: Awake and alert. Cooperative. Anxious and tearful  HEAD: Normocephalic. Atraumatic. EYES: EOM's grossly intact. ENT: Mucous membranes are moist.   NECK: Supple. HEART: RRR. No murmurs. LUNGS: Respirations unlabored. CTAB. Good air exchange. Speaking comfortably in full sentences. ABDOMEN: Soft. Non-distended. Non-tender. No guarding or rebound. No masses. No organomegaly. EXTREMITIES: No peripheral edema. Moves all extremities equally. All extremities neurovascularly intact. No unilateral leg swelling  SKIN: Warm and dry. No acute rashes. NEUROLOGICAL: Alert and oriented. CN's 2-12 intact. No gross facial drooping. Strength 5/5, sensation intact. PSYCHIATRIC: Normal mood and affect. RADIOLOGY  XR CHEST PORTABLE   Final Result   No radiographic evidence of acute pulmonary disease.              LABS  Labs Reviewed   COMPREHENSIVE METABOLIC PANEL W/ REFLEX TO MG FOR LOW K - Abnormal; Notable for the following components:       Result Value    Sodium 135 (*)     Glucose 133 (*)     CREATININE 0.6 (*)     All other components within normal limits    Narrative:     Performed at:  [address], [facility]   Phone [phone]   CBC WITH AUTO DIFFERENTIAL    Narrative:     Performed at:  [address], [facility]   Phone [phone]   TROPONIN    Narrative:     Performed at:  [place] [address] [employer]   Phone [phone]   D-DIMER, QUANTITATIVE    Narrative:     Performed at:  [address], [facility]   Phone [phone]       PROCEDURES  Unless otherwise noted below, none  Procedures       CRITICAL CARE TIME  The total critical care time spent while evaluating and treating this patient was 0 minutes. This excludes time spent doing separately billable procedures. This includes time at the bedside, data interpretation, medication management, obtaining critical history from collateral sources if the patient is unable to provide it directly, and physician consultation. Specifics of interventions taken and potentially life-threatening diagnostic considerations are listed above in the medical decision making. MDM  MDM  62 y/o male history CAD s/p LYNNETTE to LAD 2019, HTN, HLD, family history early cardiac disease presents emergency department for evaluation of chest pain. Intermittent over the past 2 weeks or constant as of yesterday. Patient describes pain as similar to his prior stent placement however less severe. Also does report some pleuritic chest pain with no history of coagulopathy. Patient called his cardiology who works at [employer] recommended from the emergency department.     Arrival to ED patient's blood pressure is 167/100, HR 78, afebrile saturation high percent. No unilateral leg swelling. Lungs are clear to auscultation. EKG was obtained which demonstrates normal sinus rhythm and rate of 81 bpm.  No ST elevation or T wave inversion. We will plan to obtain lab work including a troponin as well as a D-dimer. Will give patient aspirin as well as nitroglycerin tablet. Given patient's history and heart score placing him at a moderate risk for major cardiac event we will request admission to the hospital service for chest pain rule out ACS. D-dimer was negative reducing likelihood of underlying pulm apathy. Troponin within normal limits. No leukocytosis or electrolyte abnormalities. Heart score is calculated as following;  HEART Score for Major Cardiac Events from [employer]  on 1/19/2022  ** All calculations should be rechecked by clinician prior to use **    RESULT SUMMARY:  5 points  Moderate Score (4-6 points)    Risk of MACE of 12-16.6%. INPUTS:  History --> 2 = Highly suspicious  EKG --> 0 = Normal  Age --> 1 = 45-64  Risk factors --> 2 = ?3 risk factors or history of atherosclerotic disease  Initial troponin --> 0 = ?normal limit      DISPOSITION  Requesting admission for chest pain    CLINICAL IMPRESSION  1.  Chest pain, unspecified type            Jr Rizzo PA-C  01/19/22 105

## 2022-01-19 NOTE — PROGRESS NOTES
4 Eyes Skin Assessment     NAME:  Alban Nicholson  YOB: 1955  MEDICAL RECORD NUMBER:  [id number]    The patient is being assess for  Admission    I agree that 2 RN's have performed a thorough Head to Toe Skin Assessment on the patient. ALL assessment sites listed below have been assessed. Areas assessed by both nurses:    Head, Face, Ears, Shoulders, Back, Chest, Arms, Elbows, Hands, Sacrum. Buttock, Coccyx, Ischium and Legs. Feet and Heels        Does the Patient have a Wound?  No noted wound(s)       Rod Prevention initiated:  NA   Wound Care Orders initiated:  NA    Pressure Injury (Stage 3,4, Unstageable, DTI, NWPT, and Complex wounds) if present place consult order under [de-identified] NA    New and Established Ostomies if present place consult order under : NA      Nurse 1 eSignature: Electronically signed by Ashley Valiente RN on 1/19/22 at 5:04 PM EST    **SHARE this note so that the co-signing nurse is able to place an eSignature**    Nurse 2 eSignature: {Esignature:[id number]}

## 2022-01-19 NOTE — ED PROVIDER NOTES
I independently evaluated and obtained a history and physical on [employer]. I personally saw the patient and performed a substantive portion of the visit including all aspects of the medical decision making. All diagnostic, treatment, and disposition assistants were made to myself in conjunction the advanced practice provider. For further details of this patient's emergency department encounter, please see the advanced practice provider's documentation. History: Patient is a 62-year-old male with a history of coronary artery disease, hypertension, hyperlipidemia. Patient reports emergency room for evaluation of his chest pain. He reports intermittent chest squeezing pain. Patient denies any nausea vomiting or diarrhea. Physician Exam: Pleasant middle-aged male in no acute distress. Regular rate and rhythm. Intact distal pulses. MDM:    The Ekg interpreted by me shows  sinus arrhythmia with a rate of 81  Axis is   Normal  QTc is  434ms  Intervals and Durations are unremarkable. ST Segments: no acute change  No significant change from prior EKG dated 1/8/19      I personally saw the patient and performed a substantive portion of the visit including all aspects of the medical decision making. D-dimer undetectable do not suspect PE at this time. Initial troponin undetectable however patient does have coronary artery system and chest pain. He is admitted for further ACS rule out. Patient expresses understanding and agreement with this plan. FINAL IMPRESSION      1.  Chest pain, unspecified type             Rosie Santos MD  01/19/22 0152

## 2022-01-19 NOTE — PROGRESS NOTES
Patient admitted to room from ED. Bedside report received. Patient oriented to room, call light, bed rails, phone, lights and bathroom. Patient instructed about schedule of the day including:  Vital sign, frequency, lab draws, possible tests, frequency of MD and staff rounds. Patient instructed about precribed diet, how/when to call for meal service, and television. Telemetry box in place, patient aware of placement and reason. Bed locked, in lowest position, side rails up 2/4, call light within reach.

## 2022-01-19 NOTE — CONSULTS
Consult placed    [phone] [facility] Cardiology  Date:1/19/2022,  Time:12:50 PM        Electronically signed by James Saucedo on 1/19/2022 at 12:50 PM

## 2022-01-19 NOTE — CARE COORDINATION
CASE MANAGEMENT INITIAL ASSESSMENT      Reviewed chart and completed assessment with patient: and wife  Explained Case Management role/services. yes    Primary contact information:Dunia, wife      Health Care Decision Maker :   Primary Decision Maker: Dunia Hall - Spouse - [phone]          Can this person be reached and be able to respond quickly, such as within a few minutes or hours? Yes     Admit date/status:1/19/2022  Diagnosis:chest pain    Is this a Readmission?:  No      Insurance:BCBS  Precert required for SNF: Yes       3 night stay required: No    Living arrangements, Adls, care needs, prior to admission:from home with wife, independent in ADLs     Transportation:car     Durable Medical Equipment at home:  Walker__Cane__RTS__ BSC__Shower Chair__  02__ HHN__ CPAP__  BiPap__  Hospital Bed__ W/C___ Other__________    Services in the home and/or outpatient, prior to [address] (if applicable)   · Name:  · Address:  · Dialysis Schedule:  · Phone:  · Fax:    PT/OT recs:    Hospital Exemption Notification (HEN):    Barriers to discharge:medical complications     Plan/comments: Referred to pt for discharge planning. Spoke to pt and wife. Pt is a 61year old male admitted for chest pain, patient lives at home with wife. Reports he is independent in ADLs. No needs at this time. Will be d/c to home with wife.   Electronically signed by MIRA Rosas LISW-S on 1/19/2022 at 1:03 PM     ECOC on chart for MD signature

## 2022-01-19 NOTE — CONSULTS
[facility]  [phone]      Attending Physician: Latasha Hyde MD  Reason for Consultation/Chief Complaint: chest pain    Subjective   History of Present Illness:  Sunny Tariq is a 61 y.o. patient who presented to the hospital with complaints of chest pain/pressure in the middle of his chest that he describes as a squeezing sensation. This is been going on for 10 days and worsened today and as result of that, he presented emergency room was admitted due to concerns for unstable angina. Troponin levels were found to be negative. He was given nitroglycerin in the emergency room and he says this had no change on his symptoms. He says his symptoms are worse with taking a deep breath. He continues to have these symptoms after admission to the floors. He says symptoms are somewhat similar to 2019 when he underwent cardiac catheterization and had PCI of the LAD. He follows with Dr. Mirna Zee at [address] [place] [address]. His last office visit there was in June 2021 and he was felt to be stable at that time. 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 Patient does state that a family ember did recently test positive for COVID. He says he has received the COVID-vaccine including the booster and has not had any symptoms of COVID and has not had any positive COVID testing although his last testing was several months ago. Past Medical History:   has a past medical history of Anxiety, Chest pain, Depression, Hyperlipidemia, Hypertension, and Wears glasses. Surgical History:   has a past surgical history that includes Fowler tooth extraction; Colonoscopy (5/2009); Cardiac catheterization (7/2008);  Finger trigger release (3-); and Coronary angioplasty with stent. Social History:   reports that he quit smoking about 10 years ago. He has never used smokeless tobacco. He reports current alcohol use. He reports that he does not use drugs. Family History:    F: MI, exp from MI. Home Medications:  Were reviewed and are listed in nursing record and/or below  Prior to Admission medications    Medication Sig Start Date End Date Taking? Authorizing Provider   hydrOXYzine (ATARAX) 10 MG tablet Take 10-20 mg by mouth 3 times daily as needed 1/17/22  Yes Historical Provider, MD   ezetimibe (ZETIA) 10 MG tablet TAKE 1 TABLET DAILY 12/22/21  Yes Historical Provider, MD   Ascorbic Acid  MG CPCR Take by mouth daily 3/30/21  Yes Historical Provider, MD   aspirin 81 MG chewable tablet Take 81 mg by mouth daily   Yes Historical Provider, MD   lisinopril (PRINIVIL;ZESTRIL) 5 MG tablet Take 5 mg by mouth daily.    Yes Historical Provider, MD   rosuvastatin (CRESTOR) 20 MG tablet Take 40 mg by mouth daily    Yes Historical Provider, MD   diazePAM (VALIUM) 2 MG tablet  12/23/21   Historical Provider, MD   naproxen (NAPROSYN) 500 MG tablet Take 1 tablet by mouth 2 times daily for 20 doses 1/16/18 1/26/18  Shira Dupont, APRN - CNP        CURRENT Medications:  nitroGLYCERIN (NITROSTAT) SL tablet 0.4 mg, Q5 Min PRN  0.9 % sodium chloride infusion, Continuous  [START ON 1/20/2022] lisinopril (PRINIVIL;ZESTRIL) tablet 5 mg, Daily  rosuvastatin (CRESTOR) tablet 20 mg, Nightly  sodium chloride flush 0.9 % injection 5-40 mL, 2 times per day  sodium chloride flush 0.9 % injection 5-40 mL, PRN  0.9 % sodium chloride infusion, PRN  ondansetron (ZOFRAN-ODT) disintegrating tablet 4 mg, Q8H PRN   Or  ondansetron (ZOFRAN) injection 4 mg, Q6H PRN  acetaminophen (TYLENOL) tablet 650 mg, Q6H PRN   Or  acetaminophen (TYLENOL) suppository 650 mg, Q6H PRN  polyethylene glycol (GLYCOLAX) packet 17 g, Daily PRN  [START ON 1/20/2022] aspirin chewable tablet 81 mg, Daily  enoxaparin (LOVENOX) injection 40 mg, Daily  metoprolol succinate (TOPROL XL) extended release tablet 25 mg, Daily  perflutren lipid microspheres (DEFINITY) injection 1.65 mg, ONCE PRN  regadenoson (LEXISCAN) injection 0.4 mg, ONCE PRN        Allergies:  Lipitor and Tamiflu [oseltamivir phosphate]     Review of Systems:   A 14 point review of symptoms completed. Pertinent positives identified in the HPI, all other review of symptoms negative as below.       Objective   PHYSICAL EXAM:    Vitals:    01/19/22 1225   BP: (!) 140/81   Pulse: 69   Resp: 18   Temp: 98 °F (36.7 °C)   SpO2: 98%    Weight: 178 lb (80.7 kg)         General Appearance:  Alert, cooperative, no distress, appears stated age   Head:  Normocephalic, without obvious abnormality, atraumatic   Eyes:  PERRL, conjunctiva/corneas clear   Nose: Nares normal, no drainage or sinus tenderness   Throat: Lips, mucosa, and tongue normal   Neck: Supple, symmetrical, trachea midline, no adenopathy, thyroid: not enlarged, symmetric, no tenderness/mass/nodules, no carotid bruit or JVD   Lungs:   Clear to auscultation bilaterally, respirations unlabored   Chest Wall:  No deformity or tenderness   Heart:  Regular rate and rhythm, S1, S2 normal, no murmur, rub or gallop   Abdomen:   Soft, non-tender, bowel sounds active all four quadrants,  no masses, no organomegaly   Extremities: Extremities normal, atraumatic, no cyanosis or edema   Pulses: 2+ and symmetric   Skin: Skin color, texture, turgor normal, no rashes or lesions   Pysch: Normal mood and affect   Neurologic: Normal gross motor and sensory exam.         Labs   CBC:   Lab Results   Component Value Date    WBC 5.9 01/19/2022    RBC 4.65 01/19/2022    HGB 14.7 01/19/2022    HCT 43.8 01/19/2022    MCV 94.2 01/19/2022    RDW 12.6 01/19/2022     01/19/2022     CMP:  Lab Results   Component Value Date     01/19/2022    K 4.0 01/19/2022    CL 99 01/19/2022    CO2 25 01/19/2022    BUN 13 01/19/2022 CREATININE 0.6 2022    GFRAA >60 2022    AGRATIO 1.5 2022    LABGLOM >60 2022    GLUCOSE 133 2022    PROT 7.7 2022    CALCIUM 9.3 2022    BILITOT 0.5 2022    ALKPHOS 59 2022    AST 24 2022    ALT 21 2022     PT/INR:  No results found for: PTINR  HgBA1c:No results found for: LABA1C  Lab Results   Component Value Date    TROPONINI <0.01 2022         Cardiac Data     Last EKG: nsr, sa    Echo:    Stress Test:    Cath:    2019 at [facility]:    Rishabh Silva MD     2019  9:45 PM   Cardiac Catheterization Report:   [facility])Patient Name: Amor Singh   MRN: [id number] : 1955   Procedure Date: 2019   Procedures:   1) Left Heart Catheterization   2) Coronary Angiogram   3) Left Ventriculogram   4) S/P FFR of the LCX with Comet pressure wire   5) S/P FFR of the LAD with Comet pressure wire   6) S/P PTCA and Synergy 3.0x16 LYNNETTE mLAD (PD 3.5 NCB)   7) Intracoronary nitroglycerin   8) Ultrasound guided right radial artery access   9) Left subclavian angiogram     MD: King Smith MD     Indications: accelerated angina, CAD, abnormal CCTA.  Risks and   benefits explained and informed consent signed. Access: R Radial Artery 6Fr - Sonosite ultrasound guide access   Catheters: see Gabriela for catheters, wires, balloons, stent,   equipment   Estimated blood loss: < 10 ml. Complications: none. Specimens: none.    Moderate sedation given - IV medications:  Versed, Fentanyl,   Ativan, Compazine   Other medications: Radial artery cocktail (Verapamil 2.5 mg IA,   Nitrogylcerin 200 mcg IA), IV heparin 6000 units, DB IV   Integrilin, IV rosy, IC ntg, 600 mg po Plavix     Moderate sedation time supervised by MD above: 60 minutes     Findings:     Ultrasound findings:     Right radial artery: normal caliber without atherosclerosis     Left Ventricle:  EF=65%,  Wall motion: normal (hyperdynamic).     LVEDP=15 mmHg.  No aortic valve gradient seen. Coronary angiogram:   Left Main Trunk (LMT): normal   Left Anterior Descending (LAD): mildly ectatic, diffuse 70%   prox-mid stenosis, mid LAD is intramyocardial with diffuse 20-30%   stenosis and moderate systolic compression   Left Circumflex (LCX): co-dominant, mildly ectatic, diffuse   20-30% OM2 stenosis, focal 50-60% mid LCX stenosis   Right Coronary (RCA): co-dominant, mildly ectatic, diffuse 40%   prox-mid stenosis     FFR mLCX - resting ratio 0.97, dropped to 0.95 post IV adenosine   (negative ischemic response)     FFR prox-mid LAD - resting ration 0.94, dropped to 0.80 with IV   adenosine (positive ischemic response)     PCI prox-mid LAD - Synergy 3.0x16; 70%->0% (PD with 3.5 NCB   proximally); SULAIMAN 3 flow pre and post, SULAIMAN 3 flow into the   second diagonal     Left subclavian artery: patent with 40% proximal left vertebral   artery stenosis and widely patent LIMA     IMPRESSION:   1) Severe single vessel CAD, s/p Synergy LYNNETTE to the prox-mid LAD   2) Abnormal FFR evaluation of the LAD (normal FFR of dLCX)   3) Normal LV function with high normal EDP   4) Mid LAD myocardial bridge with mild atherosclerosis of the   segment and moderate systolic compression   5) Mild to moderate proximal left vertebral artery stenosis   6) Patent LSCA and LIMA   7) Normal right radial artery by ultrasound     PLAN:   1) Overnight admit (bedded opt status)   2) DAPT for a minimum of 1 year   3) Aggressive CRF modification   4) Switch simvastatin to Crestor (cannot tolerate Lipitor, but   tolerated Crestor years ago, but stopped due to cost)   5) Anticipate d/c home in am   6) FU with me as opt as scheduled     Don Abel MD, [facility], [place]       Studies:     cxxr           Impression   No radiographic evidence of acute pulmonary disease.             I have reviewed labs and imaging/xray/diagnostic testing in this note.     Assessment and Plan

## 2022-01-19 NOTE — H&P
Hospital Medicine History & Physical      PCP: Tyrese Carrillo MD    Date of Admission: 1/19/2022    Date of Service: Pt seen/examined on 1/19/21 and  Placed in Observation. Chief Complaint:  Chest pain      History Of Present Illness:      61 y.o. male with history of CAD status post stent, hypertension, hyperlipidemia presented emergency room chest pain. .  Patient reports having chest pain for the past several days, intermittent however has been persistent since yesterday night. .  Describes the pain as a squeezing pressure especially with taking a deep breath. .  Pain is nonradiating. .  Reports increasing life stress following the death of his mother 2 months ago. Jose Dewitt He has been taking Atarax for anxiety and seeing behavioral health counselor. .  In the emergency room, patient was given nitroglycerin with no improvement. .  BP was 160/100, pulse 78, respirations 20, temperature 98, oxygen saturation 90% on room air. .. Past Medical History:          Diagnosis Date    Anxiety     Chest pain     Depression     Hyperlipidemia     Hypertension     Wears glasses        Past Surgical History:          Procedure Laterality Date    CARDIAC CATHETERIZATION  7/2008    COLONOSCOPY  5/2009    CORONARY ANGIOPLASTY WITH STENT PLACEMENT      FINGER TRIGGER RELEASE  3-    left- long finger.  WISDOM TOOTH EXTRACTION         Medications Prior to Admission:      Prior to Admission medications    Medication Sig Start Date End Date Taking?  Authorizing Provider   hydrOXYzine (ATARAX) 10 MG tablet Take 10-20 mg by mouth 3 times daily as needed 1/17/22  Yes Historical Provider, MD   ezetimibe (ZETIA) 10 MG tablet TAKE 1 TABLET DAILY 12/22/21  Yes Historical Provider, MD   Ascorbic Acid  MG CPCR Take by mouth daily 3/30/21  Yes Historical Provider, MD   aspirin 81 MG chewable tablet Take 81 mg by mouth daily   Yes Historical Provider, MD   lisinopril (PRINIVIL;ZESTRIL) 5 MG tablet Take 5 mg by mouth daily. Yes Historical Provider, MD   rosuvastatin (CRESTOR) 20 MG tablet Take 40 mg by mouth daily    Yes Historical Provider, MD   diazePAM (VALIUM) 2 MG tablet  12/23/21   Historical Provider, MD   naproxen (NAPROSYN) 500 MG tablet Take 1 tablet by mouth 2 times daily for 20 doses 1/16/18 1/26/18  Miryam Baker, APRN - CNP       Allergies:  Lipitor and Tamiflu [oseltamivir phosphate]    Social History:      The patient currently lives     TOBACCO:   reports that he quit smoking about 10 years ago. He has never used smokeless tobacco.  ETOH:   reports current alcohol use. Family History:      Reviewed in detail and negative for DM, CAD, Cancer, CVA. Positive as follows:    No family history on file. REVIEW OF SYSTEMS:   Pertinent positives as noted in the HPI. All other systems reviewed and negative. PHYSICAL EXAM:    BP (!) 140/81   Pulse 69   Temp 98 °F (36.7 °C) (Oral)   Resp 18   Ht 6' 1\" (1.854 m)   Wt 180 lb 12.8 oz (82 kg)   SpO2 98%   BMI 23.85 kg/m²     General appearance:  No apparent distress, appears stated age and cooperative. HEENT:  Normal cephalic, atraumatic without obvious deformity. Pupils equal, round, and reactive to light. Extra ocular muscles intact. Conjunctivae/corneas clear. Neck: Supple, with full range of motion. No jugular venous distention. Trachea midline. Respiratory:  Normal respiratory effort. Clear to auscultation, bilaterally without Rales/Wheezes/Rhonchi. Cardiovascular:  Regular rate and rhythm with normal S1/S2 without murmurs, rubs or gallops. Abdomen: Soft, non-tender, non-distended with normal bowel sounds. Musculoskeletal:  No clubbing, cyanosis or edema bilaterally. Full range of motion without deformity. Skin: Skin color, texture, turgor normal.  No rashes or lesions. Neurologic:  Neurovascularly intact without any focal sensory/motor deficits.  Cranial nerves: II-XII intact, grossly non-focal.  Psychiatric:  Alert and oriented, thought content appropriate, normal insight  Capillary Refill: Brisk,< 3 seconds   Peripheral Pulses: +2 palpable, equal bilaterally       CXR:  I have reviewed the CXR with the following interpretation:   EKG:  I have reviewed the EKG with the following interpretation:     Labs:     Recent Labs     01/19/22  0937   WBC 5.9   HGB 14.7   HCT 43.8        Recent Labs     01/19/22  0937   *   K 4.0   CL 99   CO2 25   BUN 13   CREATININE 0.6*   CALCIUM 9.3     Recent Labs     01/19/22  0937   AST 24   ALT 21   BILITOT 0.5   ALKPHOS 59     No results for input(s): INR in the last 72 hours. Recent Labs     01/19/22  0937 01/19/22  1256   TROPONINI <0.01 <0.01       Urinalysis:      Lab Results   Component Value Date    BLOODU negative 05/12/2013    SPECGRAV 1.020 05/12/2013    GLUCOSEU negative 05/12/2013         ASSESSMENT:    -Chest pain/atypical rule out ACS  -CAD status post stent  -Essential hypertension  -Hyperlipidemia  -Anxiety and depression    PLAN:    -Cycle troponins  -Cardiology consult  -Continue antiplatelets, beta-blocker, statin,ace  -Resume chronic home medications    DVT Prophylaxis: lovenox  Diet: ADULT DIET; Regular; Low Fat/Low Chol/High Fiber/2 gm Na; No Caffeine  Diet NPO  Diet NPO  Code Status: Full Code           Darby Syed MD    Thank you Christian Patino MD for the opportunity to be involved in this patient's care. If you have any questions or concerns please feel free to contact me at [phone].

## 2022-01-19 NOTE — ED NOTES
Telephone report given to [facility] SYS WASECA RN on B-3.        Sukhi Shi RN  01/19/22 4710

## 2022-01-20 ENCOUNTER — APPOINTMENT (OUTPATIENT)
Dept: NUCLEAR MEDICINE | Age: 67
End: 2022-01-20
Payer: COMMERCIAL

## 2022-01-20 VITALS
HEIGHT: 73 IN | SYSTOLIC BLOOD PRESSURE: 118 MMHG | TEMPERATURE: 97.8 F | DIASTOLIC BLOOD PRESSURE: 74 MMHG | BODY MASS INDEX: 23.96 KG/M2 | OXYGEN SATURATION: 98 % | RESPIRATION RATE: 18 BRPM | HEART RATE: 68 BPM | WEIGHT: 180.8 LBS

## 2022-01-20 LAB
CHOLESTEROL, TOTAL: 164 MG/DL (ref 0–199)
HCT VFR BLD CALC: 41.1 % (ref 40.5–52.5)
HDLC SERPL-MCNC: 38 MG/DL (ref 40–60)
HEMOGLOBIN: 13.6 G/DL (ref 13.5–17.5)
LDL CHOLESTEROL CALCULATED: 88 MG/DL
LV EF: 60 %
LVEF MODALITY: NORMAL
MCH RBC QN AUTO: 31.7 PG (ref 26–34)
MCHC RBC AUTO-ENTMCNC: 33 G/DL (ref 31–36)
MCV RBC AUTO: 95.9 FL (ref 80–100)
PDW BLD-RTO: 12.7 % (ref 12.4–15.4)
PLATELET # BLD: 217 K/UL (ref 135–450)
PMV BLD AUTO: 6.8 FL (ref 5–10.5)
RBC # BLD: 4.29 M/UL (ref 4.2–5.9)
TRIGL SERPL-MCNC: 190 MG/DL (ref 0–150)
VLDLC SERPL CALC-MCNC: 38 MG/DL
WBC # BLD: 4.9 K/UL (ref 4–11)

## 2022-01-20 PROCEDURE — 6370000000 HC RX 637 (ALT 250 FOR IP): Performed by: HOSPITALIST

## 2022-01-20 PROCEDURE — G0378 HOSPITAL OBSERVATION PER HR: HCPCS

## 2022-01-20 PROCEDURE — 3430000000 HC RX DIAGNOSTIC RADIOPHARMACEUTICAL: Performed by: INTERNAL MEDICINE

## 2022-01-20 PROCEDURE — 93017 CV STRESS TEST TRACING ONLY: CPT

## 2022-01-20 PROCEDURE — 80061 LIPID PANEL: CPT

## 2022-01-20 PROCEDURE — 99215 OFFICE O/P EST HI 40 MIN: CPT | Performed by: INTERNAL MEDICINE

## 2022-01-20 PROCEDURE — 93005 ELECTROCARDIOGRAM TRACING: CPT | Performed by: HOSPITALIST

## 2022-01-20 PROCEDURE — 96372 THER/PROPH/DIAG INJ SC/IM: CPT

## 2022-01-20 PROCEDURE — 85027 COMPLETE CBC AUTOMATED: CPT

## 2022-01-20 PROCEDURE — 36415 COLL VENOUS BLD VENIPUNCTURE: CPT

## 2022-01-20 PROCEDURE — A9502 TC99M TETROFOSMIN: HCPCS | Performed by: INTERNAL MEDICINE

## 2022-01-20 PROCEDURE — 6360000002 HC RX W HCPCS: Performed by: HOSPITALIST

## 2022-01-20 PROCEDURE — 78452 HT MUSCLE IMAGE SPECT MULT: CPT

## 2022-01-20 RX ADMIN — ENOXAPARIN SODIUM 40 MG: 40 INJECTION SUBCUTANEOUS at 08:37

## 2022-01-20 RX ADMIN — ASPIRIN 81 MG 81 MG: 81 TABLET ORAL at 08:35

## 2022-01-20 RX ADMIN — TETROFOSMIN 10.5 MILLICURIE: 1.38 INJECTION, POWDER, LYOPHILIZED, FOR SOLUTION INTRAVENOUS at 09:55

## 2022-01-20 RX ADMIN — LISINOPRIL 5 MG: 5 TABLET ORAL at 08:35

## 2022-01-20 RX ADMIN — TETROFOSMIN 31.3 MILLICURIE: 1.38 INJECTION, POWDER, LYOPHILIZED, FOR SOLUTION INTRAVENOUS at 10:27

## 2022-01-20 ASSESSMENT — PAIN DESCRIPTION - PAIN TYPE
TYPE: ACUTE PAIN
TYPE: ACUTE PAIN;OTHER (COMMENT)

## 2022-01-20 ASSESSMENT — PAIN DESCRIPTION - PROGRESSION: CLINICAL_PROGRESSION: NOT CHANGED

## 2022-01-20 ASSESSMENT — PAIN DESCRIPTION - ORIENTATION: ORIENTATION: LEFT

## 2022-01-20 ASSESSMENT — PAIN SCALES - GENERAL
PAINLEVEL_OUTOF10: 0
PAINLEVEL_OUTOF10: 3
PAINLEVEL_OUTOF10: 3
PAINLEVEL_OUTOF10: 0

## 2022-01-20 ASSESSMENT — PAIN DESCRIPTION - LOCATION: LOCATION: CHEST

## 2022-01-20 ASSESSMENT — PAIN DESCRIPTION - FREQUENCY: FREQUENCY: INTERMITTENT

## 2022-01-20 ASSESSMENT — PAIN DESCRIPTION - DESCRIPTORS: DESCRIPTORS: PRESSURE

## 2022-01-20 NOTE — DISCHARGE SUMMARY
Hospital Discharge Summary    Patient's PCP: Uzma Cristina MD  Admit Date: 1/19/2022   Discharge Date: 1/20/2022    Admitting Physician: Dr. Summer Mendes MD  Discharge Physician: Dr. Summer Mendes MD   Consults: cardiology    Brief HPI:     61 y.o. male with history of CAD status post stent, hypertension, hyperlipidemia presented emergency room chest pain. .  Patient reports having chest pain for the past several days, intermittent however has been persistent since yesterday night. .  Describes the pain as a squeezing pressure especially with taking a deep breath. .  Pain is nonradiating. .  Reports increasing life stress following the death of his mother 2 months ago. tSacey Ortiz He has been taking Atarax for anxiety and seeing behavioral health counselor. .  In the emergency room, patient was given nitroglycerin with no improvement. .  BP was 160/100, pulse 78, respirations 20, temperature 98, oxygen saturation 90% on room air. ..     Brief hospital course:     -Chest pain/atypical--EKG and troponins negative. . Nuclear medicine stress test however showed:      Technically difficult study    Normal LVEF >60%    Difficult to evaluate inferior wall due to extracardiac uptake, cannot    exclude inferior hypokinesis    Fixed inferior defect noted which worsens on AC images. Most likely defect    is due to attenuation artifact, however, ischemia cannot be excluded. This would be considered, abnormal, lower risk, study          Cardiology discussed medical management versus left heart cath. . Patient elected to continue medical management and follow-up with his primary cardiologist      -CAD status post stent 2019-continue statin and aspirin  -Essential hypertension-continue lisinopril  -Hyperlipidemia-continue statin  -Anxiety and depression-stable-continue current treatment    Invasive procedures:  none  Discharge Diagnoses: Active Problems:    Chest pain  Resolved Problems:    * No resolved hospital problems. *      Physical Exam: /74   Pulse 68   Temp 97.8 °F (36.6 °C) (Oral)   Resp 18   Ht 6' 1\" (1.854 m)   Wt 180 lb 12.8 oz (82 kg)   SpO2 98%   BMI 23.85 kg/m²   Gen/overall appearance: Not in acute distress. Alert. Head: Normocephalic, atraumatic  Eyes: EOMI, good acuity  ENT:- Oral mucosa moist  Neck: No JVD, thyromegaly  CVS: Nml S1S2, no MRG, RRR  Pulm: Clear bilaterally. No crackles/wheezes  Gastrointestinal: Soft, NT/ND, +BS  Musculoskeletal: No edema. Warm  Neuro: No focal deficit. Moves extremity spontaneously. Psychiatry: Appropriate affect. Not agitated. Skin: Warm, dry with normal turgor. No rash        Significant Diagnostic Studies:    See above      Treatments: As above. Discharge Medications:     Medication List      CONTINUE taking these medications    Ascorbic Acid  MG Cpcr     aspirin 81 MG chewable tablet     Crestor 20 MG tablet  Generic drug: rosuvastatin     diazePAM 2 MG tablet  Commonly known as: VALIUM     ezetimibe 10 MG tablet  Commonly known as: ZETIA     hydrOXYzine 10 MG tablet  Commonly known as: ATARAX     lisinopril 5 MG tablet  Commonly known as: PRINIVIL;ZESTRIL        STOP taking these medications    naproxen 500 MG tablet  Commonly known as: Naprosyn            Activity: activity as tolerated  Diet: ADULT DIET; Regular      Disposition: home  Discharged Condition: Stable  Follow Up:   Jerson Hill MD    Schedule an appointment as soon as possible for a visit          Code status:  Full Code         Total time spent on discharge, finalizing medications, referrals and arranging outpatient follow up was more than 45 minutes      Thank you Dr. Jerson Hill MD for the opportunity to be involved in this patients care.

## 2022-01-20 NOTE — PROGRESS NOTES
Pt discharged after iv line removed and discharged instructions given. Educated on meds and need for follow up with PCP and wheeled to main entrance.

## 2022-01-20 NOTE — PROGRESS NOTES
[facility]   Daily Cardiovascular Progress Note    Admit Date: 1/19/2022    Chief complaint: Chest pain  HPI:     Patient presented with chest pain, feels better today.  Currently denies chest pain, shortness of breath, swelling, dizziness, lightheadedness or loss of conscious or palpitations      Medications/Labs all Reviewed:  Patient Active Problem List   Diagnosis    Chest pain       Medications:  nitroGLYCERIN (NITROSTAT) SL tablet 0.4 mg, Q5 Min PRN  0.9 % sodium chloride infusion, Continuous  lisinopril (PRINIVIL;ZESTRIL) tablet 5 mg, Daily  rosuvastatin (CRESTOR) tablet 20 mg, Nightly  sodium chloride flush 0.9 % injection 5-40 mL, 2 times per day  sodium chloride flush 0.9 % injection 5-40 mL, PRN  0.9 % sodium chloride infusion, PRN  ondansetron (ZOFRAN-ODT) disintegrating tablet 4 mg, Q8H PRN   Or  ondansetron (ZOFRAN) injection 4 mg, Q6H PRN  acetaminophen (TYLENOL) tablet 650 mg, Q6H PRN   Or  acetaminophen (TYLENOL) suppository 650 mg, Q6H PRN  polyethylene glycol (GLYCOLAX) packet 17 g, Daily PRN  aspirin chewable tablet 81 mg, Daily  enoxaparin (LOVENOX) injection 40 mg, Daily  perflutren lipid microspheres (DEFINITY) injection 1.65 mg, ONCE PRN           PHYSICAL EXAM   /74   Pulse 68   Temp 97.8 °F (36.6 °C) (Oral)   Resp 18   Ht 6' 1\" (1.854 m)   Wt 180 lb 12.8 oz (82 kg)   SpO2 98%   BMI 23.85 kg/m²    Vitals:    01/20/22 0040 01/20/22 0515 01/20/22 0806 01/20/22 1207   BP: 131/65 120/72 118/81 118/74   Pulse: 69 66 71 68   Resp: 18 18 18 18   Temp: 97.9 °F (36.6 °C) 97.8 °F (36.6 °C) 97.8 °F (36.6 °C) 97.8 °F (36.6 °C)   TempSrc: Oral Oral Oral Oral   SpO2: 98% 98% 98% 98%   Weight:       Height:             Intake/Output Summary (Last 24 hours) at 1/20/2022 1308  Last data filed at 1/19/2022 2100  Gross per 24 hour   Intake 973.33 ml   Output --   Net 973.33 ml     Wt Readings from Last 3 Encounters:   01/19/22 180 lb 12.8 oz (82 kg)   01/13/21 173 lb (78.5 kg) discussed with patient the abnormalities on his stress test, did discuss that we can observe this and treat medically with deferral of cardiac catheterization if he does not respond to medical therapy. He would prefer this and would like to defer cardiac catheterization and he would also like to follow-up with his primary cardiac team with Dr. Miguelina Delvalle before considering an invasive strategy. I think this is reasonable and he can be discharged today from cardiac perspective. CAD/ASHD, continue aspirin     Hypertension, continue lisinopril and metoprolol     Hypercholesterolemia, continue statin    We will sign off, please call with questions      Thank you for allowing me to participate in the care of your patient. Please call me with any questions [phone].       Jose Roberto Saavedra MD, [facility]   Interventional Cardiologist  [facility]  [phone] [facility]  [phone] [address] [place] [address]  1/20/2022 1:08 PM

## 2022-01-21 LAB
EKG ATRIAL RATE: 74 BPM
EKG DIAGNOSIS: NORMAL
EKG P AXIS: 81 DEGREES
EKG P-R INTERVAL: 144 MS
EKG Q-T INTERVAL: 360 MS
EKG QRS DURATION: 92 MS
EKG QTC CALCULATION (BAZETT): 399 MS
EKG R AXIS: 64 DEGREES
EKG T AXIS: 65 DEGREES
EKG VENTRICULAR RATE: 74 BPM

## 2022-01-21 PROCEDURE — 93010 ELECTROCARDIOGRAM REPORT: CPT | Performed by: INTERNAL MEDICINE

## 2023-01-25 ENCOUNTER — HOSPITAL ENCOUNTER (EMERGENCY)
Age: 68
Discharge: ANOTHER HOSPITAL | End: 2023-01-25
Attending: EMERGENCY MEDICINE
Payer: COMMERCIAL

## 2023-01-25 ENCOUNTER — APPOINTMENT (OUTPATIENT)
Dept: CT IMAGING | Age: 68
End: 2023-01-25
Payer: COMMERCIAL

## 2023-01-25 ENCOUNTER — APPOINTMENT (OUTPATIENT)
Dept: GENERAL RADIOLOGY | Age: 68
End: 2023-01-25
Payer: COMMERCIAL

## 2023-01-25 ENCOUNTER — HOSPITAL ENCOUNTER (INPATIENT)
Age: 68
LOS: 4 days | Discharge: NURSING, REHAB OR LONG-TERM CARE | DRG: 062 | End: 2023-01-29
Attending: NEUROLOGICAL SURGERY | Admitting: INTERNAL MEDICINE
Payer: MEDICARE

## 2023-01-25 ENCOUNTER — APPOINTMENT (OUTPATIENT)
Dept: INTERVENTIONAL RADIOLOGY/VASCULAR | Age: 68
DRG: 062 | End: 2023-01-25
Attending: NEUROLOGICAL SURGERY
Payer: MEDICARE

## 2023-01-25 VITALS
WEIGHT: 181 LBS | RESPIRATION RATE: 21 BRPM | TEMPERATURE: 98.8 F | HEART RATE: 92 BPM | HEIGHT: 73 IN | BODY MASS INDEX: 23.99 KG/M2 | OXYGEN SATURATION: 100 % | SYSTOLIC BLOOD PRESSURE: 94 MMHG | DIASTOLIC BLOOD PRESSURE: 66 MMHG

## 2023-01-25 DIAGNOSIS — R29.90 STROKE-LIKE EPISODE: ICD-10-CM

## 2023-01-25 DIAGNOSIS — R29.810 FACIAL DROOP: ICD-10-CM

## 2023-01-25 DIAGNOSIS — G83.9 PARALYSIS (HCC): ICD-10-CM

## 2023-01-25 DIAGNOSIS — I63.9 LARGE VESSEL STROKE (HCC): Primary | ICD-10-CM

## 2023-01-25 LAB
A/G RATIO: 1.8 (ref 1.1–2.2)
ALBUMIN SERPL-MCNC: 4.6 G/DL (ref 3.4–5)
ALP BLD-CCNC: 47 U/L (ref 40–129)
ALT SERPL-CCNC: 20 U/L (ref 10–40)
ANION GAP SERPL CALCULATED.3IONS-SCNC: 10 MMOL/L (ref 3–16)
AST SERPL-CCNC: 24 U/L (ref 15–37)
BASOPHILS ABSOLUTE: 0.1 K/UL (ref 0–0.2)
BASOPHILS RELATIVE PERCENT: 0.9 %
BILIRUB SERPL-MCNC: <0.2 MG/DL (ref 0–1)
BUN BLDV-MCNC: 11 MG/DL (ref 7–20)
CALCIUM SERPL-MCNC: 8.8 MG/DL (ref 8.3–10.6)
CHLORIDE BLD-SCNC: 98 MMOL/L (ref 99–110)
CO2: 25 MMOL/L (ref 21–32)
CREAT SERPL-MCNC: 0.7 MG/DL (ref 0.8–1.3)
EOSINOPHILS ABSOLUTE: 0.1 K/UL (ref 0–0.6)
EOSINOPHILS RELATIVE PERCENT: 1.7 %
ETHANOL: 155 MG/DL (ref 0–0.08)
GFR SERPL CREATININE-BSD FRML MDRD: >60 ML/MIN/{1.73_M2}
GLUCOSE BLD-MCNC: 106 MG/DL (ref 70–99)
GLUCOSE BLD-MCNC: 113 MG/DL (ref 70–99)
HCT VFR BLD CALC: 41.3 % (ref 40.5–52.5)
HEMOGLOBIN: 14 G/DL (ref 13.5–17.5)
INR BLD: 1.1 (ref 0.87–1.14)
LYMPHOCYTES ABSOLUTE: 2.8 K/UL (ref 1–5.1)
LYMPHOCYTES RELATIVE PERCENT: 41.3 %
MCH RBC QN AUTO: 32.3 PG (ref 26–34)
MCHC RBC AUTO-ENTMCNC: 33.9 G/DL (ref 31–36)
MCV RBC AUTO: 95.5 FL (ref 80–100)
MONOCYTES ABSOLUTE: 0.6 K/UL (ref 0–1.3)
MONOCYTES RELATIVE PERCENT: 9.2 %
NEUTROPHILS ABSOLUTE: 3.2 K/UL (ref 1.7–7.7)
NEUTROPHILS RELATIVE PERCENT: 46.9 %
PDW BLD-RTO: 13.3 % (ref 12.4–15.4)
PERFORMED ON: ABNORMAL
PLATELET # BLD: 210 K/UL (ref 135–450)
PMV BLD AUTO: 6.9 FL (ref 5–10.5)
POTASSIUM REFLEX MAGNESIUM: 4.1 MMOL/L (ref 3.5–5.1)
PROTHROMBIN TIME: 14.1 SEC (ref 11.7–14.5)
RBC # BLD: 4.32 M/UL (ref 4.2–5.9)
SODIUM BLD-SCNC: 133 MMOL/L (ref 136–145)
TOTAL PROTEIN: 7.2 G/DL (ref 6.4–8.2)
TROPONIN: <0.01 NG/ML
WBC # BLD: 6.8 K/UL (ref 4–11)

## 2023-01-25 PROCEDURE — C1769 GUIDE WIRE: HCPCS

## 2023-01-25 PROCEDURE — 03CG3ZZ EXTIRPATION OF MATTER FROM INTRACRANIAL ARTERY, PERCUTANEOUS APPROACH: ICD-10-PCS | Performed by: NEUROLOGICAL SURGERY

## 2023-01-25 PROCEDURE — C1757 CATH, THROMBECTOMY/EMBOLECT: HCPCS

## 2023-01-25 PROCEDURE — C1887 CATHETER, GUIDING: HCPCS

## 2023-01-25 PROCEDURE — 93005 ELECTROCARDIOGRAM TRACING: CPT | Performed by: EMERGENCY MEDICINE

## 2023-01-25 PROCEDURE — 6360000004 HC RX CONTRAST MEDICATION: Performed by: NEUROLOGICAL SURGERY

## 2023-01-25 PROCEDURE — 71045 X-RAY EXAM CHEST 1 VIEW: CPT

## 2023-01-25 PROCEDURE — B3181ZZ FLUOROSCOPY OF BILATERAL INTERNAL CAROTID ARTERIES USING LOW OSMOLAR CONTRAST: ICD-10-PCS | Performed by: NEUROLOGICAL SURGERY

## 2023-01-25 PROCEDURE — 6360000004 HC RX CONTRAST MEDICATION: Performed by: EMERGENCY MEDICINE

## 2023-01-25 PROCEDURE — 82077 ASSAY SPEC XCP UR&BREATH IA: CPT

## 2023-01-25 PROCEDURE — 36226 PLACE CATH VERTEBRAL ART: CPT

## 2023-01-25 PROCEDURE — 96374 THER/PROPH/DIAG INJ IV PUSH: CPT

## 2023-01-25 PROCEDURE — 2580000003 HC RX 258: Performed by: EMERGENCY MEDICINE

## 2023-01-25 PROCEDURE — 80053 COMPREHEN METABOLIC PANEL: CPT

## 2023-01-25 PROCEDURE — 85610 PROTHROMBIN TIME: CPT

## 2023-01-25 PROCEDURE — C1760 CLOSURE DEV, VASC: HCPCS

## 2023-01-25 PROCEDURE — 36224 PLACE CATH CAROTD ART: CPT

## 2023-01-25 PROCEDURE — B31F1ZZ FLUOROSCOPY OF LEFT VERTEBRAL ARTERY USING LOW OSMOLAR CONTRAST: ICD-10-PCS | Performed by: NEUROLOGICAL SURGERY

## 2023-01-25 PROCEDURE — C1894 INTRO/SHEATH, NON-LASER: HCPCS

## 2023-01-25 PROCEDURE — 99285 EMERGENCY DEPT VISIT HI MDM: CPT

## 2023-01-25 PROCEDURE — 70496 CT ANGIOGRAPHY HEAD: CPT

## 2023-01-25 PROCEDURE — C1725 CATH, TRANSLUMIN NON-LASER: HCPCS

## 2023-01-25 PROCEDURE — 85025 COMPLETE CBC W/AUTO DIFF WBC: CPT

## 2023-01-25 PROCEDURE — 2000000000 HC ICU R&B

## 2023-01-25 PROCEDURE — 84484 ASSAY OF TROPONIN QUANT: CPT

## 2023-01-25 PROCEDURE — 61645 PERQ ART M-THROMBECT &/NFS: CPT

## 2023-01-25 PROCEDURE — 6360000002 HC RX W HCPCS: Performed by: EMERGENCY MEDICINE

## 2023-01-25 PROCEDURE — 70450 CT HEAD/BRAIN W/O DYE: CPT

## 2023-01-25 RX ORDER — ACETAMINOPHEN 325 MG/1
650 TABLET ORAL EVERY 4 HOURS PRN
Status: DISCONTINUED | OUTPATIENT
Start: 2023-01-25 | End: 2023-01-25 | Stop reason: HOSPADM

## 2023-01-25 RX ORDER — SODIUM CHLORIDE 0.9 % (FLUSH) 0.9 %
10 SYRINGE (ML) INJECTION ONCE
Status: COMPLETED | OUTPATIENT
Start: 2023-01-25 | End: 2023-01-25

## 2023-01-25 RX ORDER — HYDROCODONE BITARTRATE AND ACETAMINOPHEN 5; 325 MG/1; MG/1
2 TABLET ORAL EVERY 4 HOURS PRN
Status: DISCONTINUED | OUTPATIENT
Start: 2023-01-25 | End: 2023-01-25 | Stop reason: HOSPADM

## 2023-01-25 RX ORDER — SODIUM CHLORIDE 9 MG/ML
INJECTION, SOLUTION INTRAVENOUS CONTINUOUS
Status: DISCONTINUED | OUTPATIENT
Start: 2023-01-25 | End: 2023-01-25 | Stop reason: HOSPADM

## 2023-01-25 RX ORDER — SODIUM CHLORIDE 0.9 % (FLUSH) 0.9 %
5-40 SYRINGE (ML) INJECTION PRN
Status: DISCONTINUED | OUTPATIENT
Start: 2023-01-25 | End: 2023-01-25 | Stop reason: HOSPADM

## 2023-01-25 RX ORDER — HYDROCODONE BITARTRATE AND ACETAMINOPHEN 5; 325 MG/1; MG/1
1 TABLET ORAL EVERY 4 HOURS PRN
Status: DISCONTINUED | OUTPATIENT
Start: 2023-01-25 | End: 2023-01-25 | Stop reason: HOSPADM

## 2023-01-25 RX ORDER — ONDANSETRON 2 MG/ML
4 INJECTION INTRAMUSCULAR; INTRAVENOUS EVERY 8 HOURS PRN
Status: DISCONTINUED | OUTPATIENT
Start: 2023-01-25 | End: 2023-01-25 | Stop reason: HOSPADM

## 2023-01-25 RX ORDER — DEXTROSE MONOHYDRATE 25 G/50ML
12.5 INJECTION, SOLUTION INTRAVENOUS
Status: DISCONTINUED | OUTPATIENT
Start: 2023-01-25 | End: 2023-01-25 | Stop reason: HOSPADM

## 2023-01-25 RX ORDER — SODIUM CHLORIDE 9 MG/ML
INJECTION, SOLUTION INTRAVENOUS PRN
Status: DISCONTINUED | OUTPATIENT
Start: 2023-01-25 | End: 2023-01-25 | Stop reason: HOSPADM

## 2023-01-25 RX ORDER — SODIUM CHLORIDE 0.9 % (FLUSH) 0.9 %
5-40 SYRINGE (ML) INJECTION EVERY 12 HOURS SCHEDULED
Status: DISCONTINUED | OUTPATIENT
Start: 2023-01-25 | End: 2023-01-25 | Stop reason: HOSPADM

## 2023-01-25 RX ADMIN — IOHEXOL 50 ML: 350 INJECTION, SOLUTION INTRAVENOUS at 22:03

## 2023-01-25 RX ADMIN — SODIUM CHLORIDE, PRESERVATIVE FREE 10 ML: 5 INJECTION INTRAVENOUS at 19:58

## 2023-01-25 RX ADMIN — Medication 21 MG: at 19:51

## 2023-01-25 RX ADMIN — IOPAMIDOL 75 ML: 755 INJECTION, SOLUTION INTRAVENOUS at 19:31

## 2023-01-25 ASSESSMENT — PAIN SCALES - GENERAL
PAINLEVEL_OUTOF10: 0
PAINLEVEL_OUTOF10: 0

## 2023-01-26 ENCOUNTER — APPOINTMENT (OUTPATIENT)
Dept: GENERAL RADIOLOGY | Age: 68
DRG: 062 | End: 2023-01-26
Attending: NEUROLOGICAL SURGERY
Payer: MEDICARE

## 2023-01-26 ENCOUNTER — APPOINTMENT (OUTPATIENT)
Dept: MRI IMAGING | Age: 68
DRG: 062 | End: 2023-01-26
Attending: NEUROLOGICAL SURGERY
Payer: MEDICARE

## 2023-01-26 PROBLEM — I63.9 ISCHEMIC STROKE (HCC): Status: ACTIVE | Noted: 2023-01-26

## 2023-01-26 PROBLEM — I63.411 CEREBROVASCULAR ACCIDENT (CVA) DUE TO EMBOLISM OF RIGHT MIDDLE CEREBRAL ARTERY (HCC): Status: ACTIVE | Noted: 2023-01-25

## 2023-01-26 LAB
ANION GAP SERPL CALCULATED.3IONS-SCNC: 14 MMOL/L (ref 3–16)
BASOPHILS ABSOLUTE: 0 K/UL (ref 0–0.2)
BASOPHILS ABSOLUTE: 0.1 K/UL (ref 0–0.2)
BASOPHILS RELATIVE PERCENT: 0.5 %
BASOPHILS RELATIVE PERCENT: 0.5 %
BUN BLDV-MCNC: 11 MG/DL (ref 7–20)
CALCIUM SERPL-MCNC: 9.2 MG/DL (ref 8.3–10.6)
CHLORIDE BLD-SCNC: 101 MMOL/L (ref 99–110)
CO2: 21 MMOL/L (ref 21–32)
CREAT SERPL-MCNC: 0.6 MG/DL (ref 0.8–1.3)
EKG ATRIAL RATE: 85 BPM
EKG DIAGNOSIS: NORMAL
EKG P AXIS: 71 DEGREES
EKG P-R INTERVAL: 142 MS
EKG Q-T INTERVAL: 380 MS
EKG QRS DURATION: 94 MS
EKG QTC CALCULATION (BAZETT): 452 MS
EKG R AXIS: 13 DEGREES
EKG T AXIS: 56 DEGREES
EKG VENTRICULAR RATE: 85 BPM
EOSINOPHILS ABSOLUTE: 0 K/UL (ref 0–0.6)
EOSINOPHILS ABSOLUTE: 0.1 K/UL (ref 0–0.6)
EOSINOPHILS RELATIVE PERCENT: 0.2 %
EOSINOPHILS RELATIVE PERCENT: 0.7 %
GFR SERPL CREATININE-BSD FRML MDRD: >60 ML/MIN/{1.73_M2}
GLUCOSE BLD-MCNC: 119 MG/DL (ref 70–99)
HCT VFR BLD CALC: 40.7 % (ref 40.5–52.5)
HCT VFR BLD CALC: 41.7 % (ref 40.5–52.5)
HEMOGLOBIN: 13.8 G/DL (ref 13.5–17.5)
HEMOGLOBIN: 14.1 G/DL (ref 13.5–17.5)
LV EF: 58 %
LVEF MODALITY: NORMAL
LYMPHOCYTES ABSOLUTE: 1.8 K/UL (ref 1–5.1)
LYMPHOCYTES ABSOLUTE: 2 K/UL (ref 1–5.1)
LYMPHOCYTES RELATIVE PERCENT: 15.6 %
LYMPHOCYTES RELATIVE PERCENT: 20.6 %
MAGNESIUM: 1.9 MG/DL (ref 1.8–2.4)
MCH RBC QN AUTO: 32.1 PG (ref 26–34)
MCH RBC QN AUTO: 32.7 PG (ref 26–34)
MCHC RBC AUTO-ENTMCNC: 33.8 G/DL (ref 31–36)
MCHC RBC AUTO-ENTMCNC: 34 G/DL (ref 31–36)
MCV RBC AUTO: 94.6 FL (ref 80–100)
MCV RBC AUTO: 96.7 FL (ref 80–100)
MONOCYTES ABSOLUTE: 0.8 K/UL (ref 0–1.3)
MONOCYTES ABSOLUTE: 0.9 K/UL (ref 0–1.3)
MONOCYTES RELATIVE PERCENT: 6.6 %
MONOCYTES RELATIVE PERCENT: 8.8 %
NEUTROPHILS ABSOLUTE: 6.9 K/UL (ref 1.7–7.7)
NEUTROPHILS ABSOLUTE: 8.9 K/UL (ref 1.7–7.7)
NEUTROPHILS RELATIVE PERCENT: 69.4 %
NEUTROPHILS RELATIVE PERCENT: 77.1 %
PDW BLD-RTO: 13.1 % (ref 12.4–15.4)
PDW BLD-RTO: 13.6 % (ref 12.4–15.4)
PLATELET # BLD: 174 K/UL (ref 135–450)
PLATELET # BLD: 204 K/UL (ref 135–450)
PMV BLD AUTO: 7.6 FL (ref 5–10.5)
PMV BLD AUTO: 8 FL (ref 5–10.5)
POTASSIUM SERPL-SCNC: 4.4 MMOL/L (ref 3.5–5.1)
RBC # BLD: 4.3 M/UL (ref 4.2–5.9)
RBC # BLD: 4.32 M/UL (ref 4.2–5.9)
SODIUM BLD-SCNC: 136 MMOL/L (ref 136–145)
WBC # BLD: 11.5 K/UL (ref 4–11)
WBC # BLD: 9.9 K/UL (ref 4–11)

## 2023-01-26 PROCEDURE — 86147 CARDIOLIPIN ANTIBODY EA IG: CPT

## 2023-01-26 PROCEDURE — 85610 PROTHROMBIN TIME: CPT

## 2023-01-26 PROCEDURE — 92610 EVALUATE SWALLOWING FUNCTION: CPT

## 2023-01-26 PROCEDURE — 93010 ELECTROCARDIOGRAM REPORT: CPT | Performed by: INTERNAL MEDICINE

## 2023-01-26 PROCEDURE — 85025 COMPLETE CBC W/AUTO DIFF WBC: CPT

## 2023-01-26 PROCEDURE — 81291 MTHFR GENE: CPT

## 2023-01-26 PROCEDURE — 99223 1ST HOSP IP/OBS HIGH 75: CPT | Performed by: PSYCHIATRY & NEUROLOGY

## 2023-01-26 PROCEDURE — 97162 PT EVAL MOD COMPLEX 30 MIN: CPT

## 2023-01-26 PROCEDURE — 92526 ORAL FUNCTION THERAPY: CPT

## 2023-01-26 PROCEDURE — 4A03X5D MEASUREMENT OF ARTERIAL FLOW, INTRACRANIAL, EXTERNAL APPROACH: ICD-10-PCS

## 2023-01-26 PROCEDURE — 85303 CLOT INHIBIT PROT C ACTIVITY: CPT

## 2023-01-26 PROCEDURE — 3E03317 INTRODUCTION OF OTHER THROMBOLYTIC INTO PERIPHERAL VEIN, PERCUTANEOUS APPROACH: ICD-10-PCS | Performed by: NEUROLOGICAL SURGERY

## 2023-01-26 PROCEDURE — 4A03X5D MEASUREMENT OF ARTERIAL FLOW, INTRACRANIAL, EXTERNAL APPROACH: ICD-10-PCS | Performed by: NEUROLOGICAL SURGERY

## 2023-01-26 PROCEDURE — 74230 X-RAY XM SWLNG FUNCJ C+: CPT

## 2023-01-26 PROCEDURE — 2580000003 HC RX 258

## 2023-01-26 PROCEDURE — 97530 THERAPEUTIC ACTIVITIES: CPT

## 2023-01-26 PROCEDURE — 85306 CLOT INHIBIT PROT S FREE: CPT

## 2023-01-26 PROCEDURE — 85301 ANTITHROMBIN III ANTIGEN: CPT

## 2023-01-26 PROCEDURE — 85613 RUSSELL VIPER VENOM DILUTED: CPT

## 2023-01-26 PROCEDURE — 99222 1ST HOSP IP/OBS MODERATE 55: CPT | Performed by: INTERNAL MEDICINE

## 2023-01-26 PROCEDURE — 83735 ASSAY OF MAGNESIUM: CPT

## 2023-01-26 PROCEDURE — 36415 COLL VENOUS BLD VENIPUNCTURE: CPT

## 2023-01-26 PROCEDURE — 97166 OT EVAL MOD COMPLEX 45 MIN: CPT

## 2023-01-26 PROCEDURE — 85240 CLOT FACTOR VIII AHG 1 STAGE: CPT

## 2023-01-26 PROCEDURE — 80048 BASIC METABOLIC PNL TOTAL CA: CPT

## 2023-01-26 PROCEDURE — 85300 ANTITHROMBIN III ACTIVITY: CPT

## 2023-01-26 PROCEDURE — 99291 CRITICAL CARE FIRST HOUR: CPT

## 2023-01-26 PROCEDURE — 85730 THROMBOPLASTIN TIME PARTIAL: CPT

## 2023-01-26 PROCEDURE — 81241 F5 GENE: CPT

## 2023-01-26 PROCEDURE — 94150 VITAL CAPACITY TEST: CPT

## 2023-01-26 PROCEDURE — 93306 TTE W/DOPPLER COMPLETE: CPT

## 2023-01-26 PROCEDURE — 81240 F2 GENE: CPT

## 2023-01-26 PROCEDURE — 3E03317 INTRODUCTION OF OTHER THROMBOLYTIC INTO PERIPHERAL VEIN, PERCUTANEOUS APPROACH: ICD-10-PCS

## 2023-01-26 PROCEDURE — 97112 NEUROMUSCULAR REEDUCATION: CPT

## 2023-01-26 PROCEDURE — 70551 MRI BRAIN STEM W/O DYE: CPT

## 2023-01-26 PROCEDURE — 2000000000 HC ICU R&B

## 2023-01-26 PROCEDURE — 6370000000 HC RX 637 (ALT 250 FOR IP)

## 2023-01-26 PROCEDURE — 92523 SPEECH SOUND LANG COMPREHEN: CPT

## 2023-01-26 PROCEDURE — 94761 N-INVAS EAR/PLS OXIMETRY MLT: CPT

## 2023-01-26 PROCEDURE — 86146 BETA-2 GLYCOPROTEIN ANTIBODY: CPT

## 2023-01-26 PROCEDURE — 92611 MOTION FLUOROSCOPY/SWALLOW: CPT

## 2023-01-26 RX ORDER — SODIUM CHLORIDE 9 MG/ML
INJECTION, SOLUTION INTRAVENOUS PRN
Status: DISCONTINUED | OUTPATIENT
Start: 2023-01-26 | End: 2023-01-29 | Stop reason: HOSPADM

## 2023-01-26 RX ORDER — POLYETHYLENE GLYCOL 3350 17 G/17G
17 POWDER, FOR SOLUTION ORAL DAILY PRN
Status: DISCONTINUED | OUTPATIENT
Start: 2023-01-26 | End: 2023-01-29 | Stop reason: HOSPADM

## 2023-01-26 RX ORDER — ONDANSETRON 2 MG/ML
4 INJECTION INTRAMUSCULAR; INTRAVENOUS EVERY 6 HOURS PRN
Status: DISCONTINUED | OUTPATIENT
Start: 2023-01-26 | End: 2023-01-29 | Stop reason: HOSPADM

## 2023-01-26 RX ORDER — MULTIVITAMIN WITH IRON
1 TABLET ORAL DAILY
Status: DISCONTINUED | OUTPATIENT
Start: 2023-01-26 | End: 2023-01-26

## 2023-01-26 RX ORDER — ACETAMINOPHEN 650 MG/1
650 SUPPOSITORY RECTAL ONCE
Status: COMPLETED | OUTPATIENT
Start: 2023-01-26 | End: 2023-01-26

## 2023-01-26 RX ORDER — ROSUVASTATIN CALCIUM 20 MG/1
40 TABLET, COATED ORAL DAILY
Status: DISCONTINUED | OUTPATIENT
Start: 2023-01-26 | End: 2023-01-29 | Stop reason: HOSPADM

## 2023-01-26 RX ORDER — SODIUM CHLORIDE 0.9 % (FLUSH) 0.9 %
5-40 SYRINGE (ML) INJECTION PRN
Status: DISCONTINUED | OUTPATIENT
Start: 2023-01-26 | End: 2023-01-29 | Stop reason: HOSPADM

## 2023-01-26 RX ORDER — SODIUM CHLORIDE 0.9 % (FLUSH) 0.9 %
5-40 SYRINGE (ML) INJECTION EVERY 12 HOURS SCHEDULED
Status: DISCONTINUED | OUTPATIENT
Start: 2023-01-26 | End: 2023-01-29 | Stop reason: HOSPADM

## 2023-01-26 RX ORDER — ONDANSETRON 4 MG/1
4 TABLET, ORALLY DISINTEGRATING ORAL EVERY 8 HOURS PRN
Status: DISCONTINUED | OUTPATIENT
Start: 2023-01-26 | End: 2023-01-29 | Stop reason: HOSPADM

## 2023-01-26 RX ORDER — EZETIMIBE 10 MG/1
10 TABLET ORAL DAILY
Status: DISCONTINUED | OUTPATIENT
Start: 2023-01-26 | End: 2023-01-29 | Stop reason: HOSPADM

## 2023-01-26 RX ORDER — GAUZE BANDAGE 2" X 2"
100 BANDAGE TOPICAL DAILY
Status: DISCONTINUED | OUTPATIENT
Start: 2023-01-26 | End: 2023-01-26

## 2023-01-26 RX ADMIN — SODIUM CHLORIDE, PRESERVATIVE FREE 10 ML: 5 INJECTION INTRAVENOUS at 07:45

## 2023-01-26 RX ADMIN — ACETAMINOPHEN 650 MG: 650 SUPPOSITORY RECTAL at 22:04

## 2023-01-26 RX ADMIN — SODIUM CHLORIDE, PRESERVATIVE FREE 10 ML: 5 INJECTION INTRAVENOUS at 19:31

## 2023-01-26 ASSESSMENT — PAIN DESCRIPTION - LOCATION
LOCATION: NOSE
LOCATION: NOSE
LOCATION: HEAD
LOCATION: HEAD
LOCATION: NOSE

## 2023-01-26 ASSESSMENT — PAIN SCALES - GENERAL
PAINLEVEL_OUTOF10: 0
PAINLEVEL_OUTOF10: 3
PAINLEVEL_OUTOF10: 0
PAINLEVEL_OUTOF10: 3
PAINLEVEL_OUTOF10: 4
PAINLEVEL_OUTOF10: 0

## 2023-01-26 ASSESSMENT — PAIN DESCRIPTION - DESCRIPTORS
DESCRIPTORS: ACHING

## 2023-01-26 ASSESSMENT — PAIN DESCRIPTION - ORIENTATION
ORIENTATION: RIGHT
ORIENTATION: RIGHT
ORIENTATION: MID
ORIENTATION: RIGHT
ORIENTATION: MID

## 2023-01-26 ASSESSMENT — PAIN - FUNCTIONAL ASSESSMENT
PAIN_FUNCTIONAL_ASSESSMENT: ACTIVITIES ARE NOT PREVENTED

## 2023-01-26 ASSESSMENT — ENCOUNTER SYMPTOMS
CHEST TIGHTNESS: 0
ABDOMINAL PAIN: 0
SHORTNESS OF BREATH: 0

## 2023-01-26 ASSESSMENT — PAIN DESCRIPTION - PAIN TYPE: TYPE: ACUTE PAIN

## 2023-01-26 NOTE — H&P
ICU HISTORY AND PHYSICAL       Hospital Day: 1  ICU Day: 1                                                         Code:Full Code  Admit Date: 1/25/2023  PCP: Monika Rodriguez MD                                  CC: Left sided weakness    HISTORY OF PRESENT ILLNESS:   Cindy Barbour is a 74year old male with a PMHx of anxiety, CAD (s/p stent 2019), HTN, HLD who presented to [facility] ED with complete left-sided paralysis, left-sided extinction, slurred speech following a fall. Per patient, he was at a bar in the evening and had about 2 pints of beer. After arriving home patient states that he tripped on a rug and hit his head on a bathtub during the fall and began having left sided weakness and facial droop. Per patient, he did not lose consciousness and had no other symptoms. However per chart review patient was vomiting prior to falling. Patient denied any other symptoms. Last known normal was around 6 PM. Patient otherwise denied any headaches, changes in vision, chest pain, shortness of breath, abdominal pain, or dysuria. Patient states that he drinks about 1 bottle of beer daily. He denies any tobacco or drug use. In the ED, was stable and afebrile. Labs were largely unremarkable. Ethanol level of 155. Patient with NIH stroke scale 23. CT head without acute abnormalities. CTA showed right middle cerebral artery M1 segment severe stenosis/occlusion with some reconstitution of flow more distally.  Patient and wife consented to [facility] given at 7:53PM. Patient was transferred to the [facility], [facility]. and underwent emergent cerebral angiogram which showed occlusion of the right middle cerebral artery origin with nearly complete restoration of flow after thrombectomy, but otherwise normal 3 vessel cerebral angiogram    PAST HISTORY:     Past Medical History:   Diagnosis Date    Anxiety     Chest pain     Depression     Hyperlipidemia     Hypertension     Wears glasses      Past Surgical History: Procedure Laterality Date    CARDIAC CATHETERIZATION  7/2008    COLONOSCOPY  5/2009    CORONARY ANGIOPLASTY WITH STENT PLACEMENT      FINGER TRIGGER RELEASE  3-    left- long finger. WISDOM TOOTH EXTRACTION       SocialHistory:   The patient lives at home with his wife. Worked as an . Alcohol: Daily alcohol use, 1 beer per day per patient. Illicit drugs: no use  Tobacco: Denies use    Family History: Mother with prior stroke and history of A. Fib per patient. MEDICATIONS:     No current facility-administered medications on file prior to encounter. Current Outpatient Medications on File Prior to Encounter   Medication Sig Dispense Refill    hydrOXYzine (ATARAX) 10 MG tablet Take 10-20 mg by mouth 3 times daily as needed      ezetimibe (ZETIA) 10 MG tablet TAKE 1 TABLET DAILY      Ascorbic Acid  MG CPCR Take by mouth daily      diazePAM (VALIUM) 2 MG tablet       aspirin 81 MG chewable tablet Take 81 mg by mouth daily      lisinopril (PRINIVIL;ZESTRIL) 5 MG tablet Take 5 mg by mouth daily. rosuvastatin (CRESTOR) 20 MG tablet Take 40 mg by mouth daily        Scheduled Meds:   Continuous Infusions:  PRN Meds:    Allergies: Allergies   Allergen Reactions    Lipitor      myalgia    Tamiflu [Oseltamivir Phosphate] Rash       REVIEW OF SYSTEMS:       History obtained from chart review and the patient    Review of Systems   Constitutional:  Negative for chills and fever. HENT:  Negative for nosebleeds. Nasal pain (s/p fall today)   Eyes:  Positive for visual disturbance. Respiratory:  Negative for chest tightness and shortness of breath. Cardiovascular:  Negative for chest pain, palpitations and leg swelling. Gastrointestinal:  Negative for abdominal pain. Neurological:  Positive for weakness. Negative for dizziness and seizures.      PHYSICAL EXAM:       Vitals: /81   Pulse 79   Temp 97.4 °F (36.3 °C) (Axillary)   Resp 17   Ht 6' 1\" (1.854 m) SpO2 99%   BMI 23.88 kg/m²     I/O:    Intake/Output Summary (Last 24 hours) at 1/26/2023 0223  Last data filed at 1/26/2023 0100  Gross per 24 hour   Intake 0 ml   Output 410 ml   Net -410 ml     I/O this shift: In: 0   Out: 410 [Urine:410]  No intake/output data recorded. Physical Examination:     Physical Exam  Vitals and nursing note reviewed. Constitutional:       General: He is awake. He is not in acute distress. Appearance: Normal appearance. He is not toxic-appearing. HENT:      Head: Normocephalic. Abrasion (Left temple, R forehead) present. Nose: Signs of injury present. No septal deviation or rhinorrhea. Eyes:      General: Lids are normal. Visual field deficit (Left homonymous hemianopsia) present. Cardiovascular:      Rate and Rhythm: Normal rate and regular rhythm. Pulses: Normal pulses. Heart sounds: Normal heart sounds. Pulmonary:      Effort: Pulmonary effort is normal.      Breath sounds: Normal breath sounds and air entry. Abdominal:      General: Abdomen is flat. Bowel sounds are normal.      Palpations: Abdomen is soft. Musculoskeletal:      Right lower leg: No edema. Left lower leg: No edema. Neurological:      Mental Status: He is alert and oriented to person, place, and time. Cranial Nerves: Facial asymmetry (Left sided nasolabial droop) present. Sensory: Sensory deficit (Loss of sensation in LUE, LLE to touch) present. Motor: Weakness (Left hemiplegia) present. Psychiatric:         Behavior: Behavior is cooperative. Access:                                 -Peripheral Access Day#:1                    [place]urst Day#:1    No results for input(s): PHART, CHV6VCT, PO2ART in the last 72 hours.     DATA:         Recent Labs     01/25/23 1939   WBC 6.8   HGB 14.0   HCT 41.3            BMP:   Recent Labs     01/25/23 1939   *   K 4.1   CL 98*   CO2 25   BUN 11   CREATININE 0.7*   GLUCOSE 113*       LFT's:   Recent Labs 01/25/23 1939   AST 24   ALT 20   BILITOT <0.2   ALKPHOS 47       Troponin:   Recent Labs     01/25/23 1939   TROPONINI <0.01       BNP:No results for input(s): BNP in the last 72 hours. ABGs: No results for input(s): PHART, ANN7KQE, PO2ART in the last 72 hours. INR:   Recent Labs     01/25/23 1939   INR 1.10       U/A:No results for input(s): NITRITE, COLORU, PHUR, LABCAST, WBCUA, RBCUA, MUCUS, TRICHOMONAS, YEAST, BACTERIA, CLARITYU, SPECGRAV, LEUKOCYTESUR, UROBILINOGEN, BILIRUBINUR, BLOODU, GLUCOSEU, AMORPHOUS in the last 72 hours. Invalid input(s): KETONESU    IR ANGIOGRAM CAROTID CEREBRAL RIGHT    (Results Pending)   CT head without contrast    (Results Pending)     EKG:   Normal Sinus Rhythm    IMAGING:     CT Head W/O Contrast 1/25/2023  No acute intracranial findings. CT Head Neck W Contrast 1/25/2023  Right middle cerebral artery M1 segment severe stenosis/occlusion with some   reconstitution of flow more distally but overall decreased number of   enhancing right middle cerebral artery branches in comparison to the left. Apparent thrombus in the right middle cerebral artery M1 segment and M2   branches. Critical results were called by Dr. Leon Heart Sessions to Dr. Izabella Noland   on 1/25/2023 at 19:48. This scan was analyzed using Viz. ai contact LVO. Identification of suspected   findings is not for diagnostic use beyond notification. Viz LVO is limited to   analysis of imaging data and should not be used in-lieu of full patient   evaluation or relied upon to make or confirm diagnosis     CXR 1/25/2023  Unremarkable portable exam    ASSESSMENT AND PLAN:   Anabel Hooks is a 74 y.o. male, PMH of previous LAD stent, HTN, anxiety, found to have a R M1 occlusion via CTA on 11/26, now post-op from diagnostic cerebral angiogram, mechanical thrombectomy.     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 Patient received TNK at [facility], transferred to [facility] and underwent emergent cerebral angiogram which showed occlusion of the right middle cerebral artery origin with nearly complete restoration of flow after thrombectomy, but otherwise normal 3 vessel cerebral angiogram  -Consult Neuro-critical care  -Consult neuro-surgery  [facility] neuro checks  -Check lipid Panel  and HbA1c   -24-hour head CT/MRI  -Echo  -Start aspirin and DVT prophylaxis 24 hours after TNK  -Keep SBP <160    Chronic Medical Conditions:  CAD   S/p stent 2019.  Patient is on aspirin at home  -Hold aspirin now, restart 24 hours after TNK    HTN  Patient is on lisinopril 5mg daily at home  -Hold home meds as patient currently normotensive    HLD:  Patient is on Crestor 20mg and ezetimibe 10mg at home daily  -Continue home meds    Anxiety  Per chart, patient is on Valium 2mg at home  -Hold home meds    Code Status:Full Code  FEN: NPO  PPX:  SCDs  DISPO: ICU    This patient has been staffed and discussed with Brittany Schmidt MD.   -----------------------------  Rasta Craig, MS4  1/25/2023  11:10 PM    Dinorah Ventura MD, PGY1  1/26/2023  2:23 AM

## 2023-01-26 NOTE — PROGRESS NOTES
Occupational Therapy  Facility/Department: [facility] ICU  Occupational Therapy Initial Assessment/Treatment    Name: Luke Turner  : 1955  MRN: [id number]  Date of Service: 2023    Discharge Recommendations:   Luke Turner scored a 12/24 on the AM-PAC ADL Inpatient form. Current research shows that an AM-PAC score of 17 or less is typically not associated with a discharge to the patient's home setting. Based on the patient's AM-PAC score and their current ADL deficits, it is recommended that the patient have 5-7 sessions per week of Occupational Therapy at d/c to increase the patient's independence. At this time, this patient demonstrates complex nursing, medical, and rehabilitative needs, and would benefit from intensive rehabilitation services upon discharge from the Inpatient setting. This patient demonstrates the ability to participate in and benefit from an intensive therapy program with a coordinated interdisciplinary team approach to foster frequent, structured, and documented communication among disciplines, who will work together to establish, prioritize, and achieve treatment goals. Please see assessment section for further patient specific details. If patient discharges prior to next session this note will serve as a discharge summary. Please see below for the latest assessment towards goals. OT Equipment Recommendations  Equipment Needed: No  Other: Defer to next level of care. Patient Diagnosis(es): There were no encounter diagnoses. Past Medical History:  has a past medical history of Anxiety, Chest pain, Depression, Encounter for imaging to screen for metal prior to MRI, Hyperlipidemia, Hypertension, and Wears glasses. Past Surgical History:  has a past surgical history that includes [place] tooth extraction; Colonoscopy (2009); Cardiac catheterization (2008); Finger trigger release (3-); and Coronary angioplasty with stent.     Treatment Diagnosis: Impaired ADL, Lft UE function,functional mobility      Assessment   Performance deficits / Impairments: Decreased functional mobility ; Decreased endurance;Decreased ADL status; Decreased ROM; Decreased strength;Decreased sensation;Decreased balance;Decreased vision/visual deficit; Decreased coordination;Decreased posture  Assessment: Pt demonstrates a decline in functional independence. Pt is from home with wife and independent and working. Pt with dense hemiplegia on left side. Pt is currently requiring assist of 2 for mobilityand dependent to max assist with most ADL. Feel pt will benefit from further intense IP OT services to maximize functional independence. Treatment Diagnosis: Impaired ADL, Lft UE function,functional mobility  Prognosis: Good  Decision Making: Medium Complexity  REQUIRES OT FOLLOW-UP: Yes  Activity Tolerance  Activity Tolerance: Patient Tolerated treatment well        Based upon information gathered in this visit, the patient's preadmission Modified Lawrence Score is: 0- No symptoms at all      Plan   Occupational Therapy Plan  Times Per Week: 5-7  Current Treatment Recommendations: Strengthening, ROM, Balance training, Functional mobility training, Endurance training, Self-Care / ADL, Patient/Caregiver education & training, Neuromuscular re-education, Cognitive/Perceptual training     Restrictions  Position Activity Restriction  Other position/activity restrictions: \"bedrest until seen by PT/OT\", seizure precautions    Subjective   General  Chart Reviewed: Yes  Additional Pertinent Hx: Pt [facility] to [facility]25[facility] with complete left-sided paralysis, left-sided extinction, slurred speech following a fall. He had been vomiting after drinking at a bar, and all of a sudden his friends say he slumped over and fell down. He hit his left side of his head on the bathtub. Pt transferred to [facility].        1/25 Diagnostic cerebral angiogram 2. Mechanical thrombectomy for stroke 3. Right common femoral artery Angio-Seal closure arteriotomy. Family / Caregiver Present: Yes (wife, brother)  Referring Practitioner: Dr. Cassandra Pena  Diagnosis: Acute ischemic stroke  Subjective  Subjective: Pt in bed upon entry. Pt agreeable to activity. Social/Functional History  Social/Functional History  Lives With: Spouse  Type of Home: House  Home Layout: Able to Live on Main level with bedroom/bathroom, Multi-level  Home Access: Stairs to enter with rails (2 LYNN)  Bathroom Shower/Tub: Walk-in shower  Bathroom Toilet: Standard (sink next to toilet)  Bathroom Equipment: Built-in shower seat, Hand-held shower (built in shower seat not functional)  Home Equipment: Crutches  Has the patient had two or more falls in the past year or any fall with injury in the past year?:  (had fall PTA but otherwise no other falls)  ADL Assistance: Independent  Ambulation Assistance: Independent  Transfer Assistance: Independent  Active : Yes  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   Leans post and to the Lft. Tends to push with Rt UE. Able to initiate trunk toward midline with cues, but unable to maintain without max assist except for 1 brief period maintained with CGA)  Standing: Impaired  Standing - Static:  (Pt stood (partial stand) 10 sec with max assist of 2 with Lt LE blocked and Rt UE support.   Leans post and to Lft.)     AROM: Grossly decreased, non-functional (Lft, WFL Rt)  PROM: Within functional limits  Strength: Grossly decreased, non-functional (Lft, WFL Rt)  Coordination: Grossly decreased, non-functional (Lft, WFL Rt)  Tone: Abnormal  Sensation: Impaired (Lft)  ADL  LE Dressing: Dependent/Total  Toileting:  (Condom cath in place)        Bed mobility  Supine to Sit: Dependent/Total (max assist x 2)  Sit to Supine: Dependent/Total (max assist x 2)  Transfers  Sit to stand: 2 Person assistance (max eloy of 2)  Stand to sit: 2 Person assistance (max assist of 2)  Vision  Vision: Impaired  Vision Exceptions: Wears glasses at all times (decreased tracking toward L side)  Hearing  Hearing: Within functional limits  Cognition  Overall Cognitive Status: Exceptions  Arousal/Alertness: Appropriate responses to stimuli  Following Commands: Inconsistently follows commands  Insights: Decreased awareness of deficits  Orientation  Overall Orientation Status: Within Functional Limits  Perception  Overall Perceptual Status: Impaired  Unilateral Attention: Cues to attend to left side of body (Pt holds gaze to Rt, but is able to gaze Lft at times)               Education Given To: Patient; Family  Education Provided: Role of Therapy;Plan of Care;Family Education  Education Method: Verbal  Education Outcome: Verbalized understanding;Continued education needed           Hand Dominance  Hand Dominance: Right        Treatment included ADL sitting/standing balance/tolerance. AM-PAC Score        AM-[facility] Inpatient Daily Activity Raw Score: 12 (01/26/23 1341)  AM-PAC Inpatient ADL T-Scale Score : 30.6 (01/26/23 1341)  ADL Inpatient CMS 0-100% Score: 66.57 (01/26/23 1341)  ADL Inpatient CMS G-Code Modifier : CL (01/26/23 1341)      Goals                          No goals met  Short Term Goals  Time Frame for Short Term Goals: Discharge  Short Term Goal 1: Stance with mod assist of 1 for 30 sec x3 to assist with ADL/transfers  Short Term Goal 2: Sit edge of bed with CG x10 min while engaging in simple grooming/hygiene  Short Term Goal 3: Transfer bed to/from 3 in 1 commode/chair with mod assist of 2  Short Term Goal 4: Pt will attend to left visual field with no more than 2 cues.   Patient Goals   Patient goals : Go home.  Be independent.        Therapy Time   Individual Concurrent Group Co-treatment   Time In 2694         Time Out 1255         Minutes 61         Timed Code Treatment Minutes: [address], OTR/L [address]

## 2023-01-26 NOTE — CONSULTS
All IVs in Normal Saline  Drips will be adjusted to normal saline as appropriate based on compatibility, in an effort to avoid fluid shifts, as D5W is osmotically active. The following intermittent IV drips / infusions have been adjusted to saline:  None  If ordered, the following medications must remain in D5W due to incompatibility with normal saline:  Amiodarone Infusion  Amphotericin  Mycophenolate  Nitroprusside  Penicillin G Potassium  Note: Patient may have dextrose ordered as part of hypoglycemia treatment protocol. Total IV fluid delivered to patient over last 24 hrs: 0 ml  Pharmacist will follow daily to ensure all IVPBs / drips are in NS where possible. Thanks for consulting pharmacy!   Deyanira Perales, PharmD  PGY-1 Pharmacy Resident  [facility]  [id number]  1/26/2023   10:37 AM

## 2023-01-26 NOTE — PROGRESS NOTES
Physical Therapy  Facility/Department: [facility] ICU  Physical Therapy Initial Assessment and Treatment    Name: Armin Ramos  : 1955  MRN: [id number]  Date of Service: 2023    Discharge Recommendations:    Armin Ramos scored a 9/24 on the AM-PAC short mobility form. Current research shows that an AM-PAC score of 17 or less is typically not associated with a discharge to the patient's home setting. Based on the patient's AM-PAC score and their current functional mobility deficits, it is recommended that the patient have 5-7 sessions per week of Physical Therapy at d/c to increase the patient's independence. At this time, this patient demonstrates complex nursing, medical, and rehabilitative needs, and would benefit from intensive rehabilitation services upon discharge from the Inpatient setting. This patient demonstrates the ability to participate in and benefit from an intensive therapy program with a coordinated interdisciplinary team approach to foster frequent, structured, and documented communication among disciplines, who will work together to establish, prioritize, and achieve treatment goals. Please see assessment section for further patient specific details. If patient discharges prior to next session this note will serve as a discharge summary. Please see below for the latest assessment towards goals. PT Equipment Recommendations  Equipment Needed:  (defer)      Patient Diagnosis(es): There were no encounter diagnoses. Past Medical History:  has a past medical history of Anxiety, Chest pain, Depression, Encounter for imaging to screen for metal prior to MRI, Hyperlipidemia, Hypertension, and Wears glasses. Past Surgical History:  has a past surgical history that includes [place] tooth extraction; Colonoscopy (2009); Cardiac catheterization (2008); Finger trigger release (3-); and Coronary angioplasty with stent.     Assessment   Body Structures, Functions, Activity Limitations Requiring Skilled Therapeutic Intervention: Decreased functional mobility ; Decreased ROM; Decreased strength;Decreased balance;Decreased coordination;Decreased posture;Decreased sensation  Assessment: Pt is a 74 y.o. male admitted with L sided weakness s/p fall. Found to have multiple large acute ischemic insults in the right MCA territory, most pronounced in the basal ganglia, posterior insula, and temporal lobe with additional scattered areas in the right frontal and parietal lobes. Pt normally independent with mobility, works and drives. Currently with impaired strength and sensation L side. No AROM observed in LUE, minimal AROM in L knee. Needing max assist x 2 for bed mobility and transfers, max assist x 1 to sit EOB and max assist x 2 to maintain partial standing. Pt significantly below baseline but motivated to improve function. Rec cont intensive skilled IP PT to maximize mobility and independence. Treatment Diagnosis: impaired functional mobility 2/2 L sided weakness  Decision Making: Medium Complexity  Requires PT Follow-Up: Yes   Based upon information gathered in this visit, the patient's preadmission Modified [place] Score is: 0- No symptoms at all    [address] [facility] [address]:  (5-7)  Current Treatment Recommendations: Strengthening, Balance training, ROM, Functional mobility training, Neuromuscular re-education, Safety education & training, Patient/Caregiver education & training  Safety Devices  Type of Devices: Call light within reach, Nurse notified, Bed alarm in place, Left in bed     Restrictions  Position Activity Restriction  Other position/activity restrictions: \"bedrest until seen by PT/OT\", seizure precautions     Subjective   General  Chart Reviewed: Yes  Additional Pertinent Hx: Pt is a 74 y.o. male adm 1/25 with acute ischemic stroke. Pt presented to [facility] ED after fall.   Pt with complete left-sided paralysis, left-sided extinction, slurred speech. Head CT: neg. CTA head: Apparent thrombus in the right middle cerebral artery M1 segment and M2  branches. Pt s/p Mechanical thrombectomy for stroke on 1/25. MRI brain:   Multiple large acute ischemic insults in the right MCA territory, most pronounced in the basal ganglia, posterior insula, and temporal lobe with additional scattered areas in the right frontal and parietal lobes. PMH:  HTN, alcohol abuse, anxiety  Family / Caregiver Present: Yes (wife and brother)  Referring Practitioner: Dago Parker MD  Referral Date : 01/26/23  Subjective  Subjective: Pt found supine. Slurred speech. Reports pain in nose where he cut it. Agreeable to PT. \"I think I can stand. \"         Social/Functional History  Social/Functional History  Lives With: Spouse  Type of Home: House  Home Layout: Able to Live on Main level with bedroom/bathroom, Multi-level  Home Access: Stairs to enter with rails (2 LYNN)  Bathroom Shower/Tub: Walk-in shower  Bathroom Toilet: Standard (sink next to toilet)  Bathroom Equipment: Built-in shower seat, Hand-held shower (built in shower seat not functional)  Home Equipment: Crutches  Has the patient had two or more falls in the past year or any fall with injury in the past year?:  (had fall PTA but otherwise no other falls)  ADL Assistance: Independent  Ambulation Assistance: Independent  Transfer Assistance: Independent  Active : Yes  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, construction, repairs  Leisure & Hobbies: projects around house, music  Additional Comments: Wife works outside the home  [address]: Impaired  Vision Exceptions: Wears glasses at all times (decreased tracking toward L side)  Hearing  Hearing: Within functional limits    Cognition   Orientation  Overall Orientation Status: Within Functional Limits     Objective           Gross Assessment  Sensation: Impaired (L side - unable to feel light touch)     AROM RLE (degrees)  RLE AROM: WFL  PROM LLE (degrees)  LLE PROM: WFL  AROM LLE (degrees)  LLE AROM :  (minimal AROM noted in knee, no AROM in hip/ankle)  Strength RLE  Strength RLE: WFL  Strength LLE  Strength LLE:  (hip flex 1/5, knee flex 2/5, knee ext 2/5, DF 0/5)           Bed mobility  Supine to Sit: Dependent/Total (max assist x 2)  Sit to Supine: Dependent/Total (max assist x 2)  Transfers  Sit to Stand: Dependent/Total (max assist x 2, L knee blocked)  Stand to Sit: Dependent/Total (max assist x 2)        Balance  Sitting - Static:  (Sat EOB x 10 minutes with max assist.   Leans post and to the L. Tends to push with RUE. Able to initiate trunk toward midline with cues but unable to maintain without max assist except for 1 brief period maintained with CGA.)  Standing - Static:  (Pt stood (partial stand) with max assist x 2 with LLE blocked and RUE support. Leans post and to L.   Stood for approximately 10 seconds)  Comments: Pt performed weightshifting onto R elbow sittting EOB and came back up to midline with min assist.       Treatment included: bed mobility, transfers, sitting/standing balance, pt education      OutComes Score                                                  AM-PAC Score  -PAC Inpatient Mobility Raw Score : 9 (01/26/23 1304)  AM-PAC Inpatient T-Scale Score : 30.55 (01/26/23 1304)  Mobility Inpatient CMS 0-100% Score: 81.38 (01/26/23 1304)  Mobility Inpatient CMS G-Code Modifier : CM (01/26/23 1304)          [place] Score       Goals  Short Term Goals  Time Frame for Short Term Goals: By discharge  Short Term Goal 1: Sup to sit with mod assist x 1  Short Term Goal 2: Pt will sit EOB with consistent CGA  Short Term Goal 3: Pt will transfer sit to stand with mod assist x 1  Short Term Goal 4: Pt will stand for 1 minute with mod assist x 1 with LRAD       Education  Patient Education  Education Given To: Patient  Education Provided: Role of Therapy;Plan of Care  Education Provided Comments: educated pt on standing on L side to improve pt's attention to L side  Education Method: Verbal  Education Outcome: Verbalized understanding;Continued education needed      Therapy Time   Individual Concurrent Group Co-treatment   Time In 1153         Time Out 1253         Minutes 60             Timed Code Treatment Minutes:  45     Total Treatment Minutes:  [address], PT

## 2023-01-26 NOTE — PROGRESS NOTES
4 Eyes Admission Assessment     I agree as the admission nurse that 2 RN's have performed a thorough Head to Toe Skin Assessment on the patient. ALL assessment sites listed below have been assessed on admission. Areas assessed by both nurses: ***  [x]   Head, Face, and Ears   [x]   Shoulders, Back, and Chest  [x]   Arms, Elbows, and Hands   [x]   Coccyx, Sacrum, and Ischium  [x]   Legs, Feet, and Heels        Does the Patient have Skin Breakdown? No  Abrasion on nose due to fall at home.          Rod Prevention initiated: Yes  Wound Care Orders initiated:  No      [facility] nurse consulted for Pressure Injury (Stage 3,4, Unstageable, DTI, NWPT, and Complex wounds) or Rod score 18 or lower:  No    Nurse 1 eSignature: Electronically signed by Rodríguez Mar RN on 1/26/23 at 12:53 AM EST    **SHARE this note so that the co-signing nurse is able to place an eSignature**    Nurse 2 eSignature: {Esignature:[id number]}

## 2023-01-26 NOTE — ED PROVIDER NOTES
Emergency Department Attending Physician Note  Location: [address] [place] [address]  ED  1/25/2023       Pt Name: Jacky Kay  MRN: [id number]  [place] 1955    Date of evaluation: 1/25/2023  Provider: Brigid Jerry DO  PCP: Leigh Trejo MD    Note Started: 7:18 PM EST 1/25/23    CHIEF COMPLAINT  Chief Complaint   Patient presents with    Extremity Weakness     Last seen normal was 1810; patient was out drinking. Came home and went to the bathroom. Family checked on him and found him down in the bathroom. Patient did fall and hit his head. Patient admits to slipping on rug. Left side flaccid and left side facial droop. BS for EMS was 157. VSS per EMS. Dr. Rafiq Mendez saw patient at  desk immediately upon arrival and patient taken directly to CT. HISTORY OF PRESENT ILLNESS:  History obtained by patient. Limitations to history : Intoxication and Dysarthria. Jacky Kay is a 74 y.o. male with a significant PMHx of anxiety, on no oral anticoagulation, alcohol use, hypertension, presents emergency department today with complete left-sided paralysis, left-sided extinction, slurred speech, and a fall. Around 6:00, he had been vomiting for drinking at a bar, and all of a sudden his friends say he slumped over and fell down. He hit his left side of his head on the bathtub. Then, they could not wake him up. They called EMS, and EMS got him to wake up, but he has complete left-sided paralysis, does not even respond to pain. On my initial evaluation on the EMS cot in the hallway for a stroke eval, patient also has a significant extinction, mostly on the left. I am yelling at him and talking to him, and he has his eyes open but he cannot find to be in space. Slurred speech. No active motion on the left. Is moving his right arm. Stroke alert called mostly for rule out intracranial hemorrhage as he had been vomiting and slumped over and then hit his head.   Otherwise, history is significantly limited secondary to acuity of condition. Nursing Notes were all reviewed and agreed with or any disagreements were addressed in the HPI. MEDICAL HISTORY  Past Medical History:   Diagnosis Date    Anxiety     Chest pain     Depression     Hyperlipidemia     Hypertension     Wears glasses         SURGICAL HISTORY  Past Surgical History:   Procedure Laterality Date    CARDIAC CATHETERIZATION  2008    COLONOSCOPY  2009    CORONARY ANGIOPLASTY WITH STENT PLACEMENT      FINGER TRIGGER RELEASE  3-    left- long finger. WISDOM TOOTH EXTRACTION         CURRENT MEDICATIONS  Previous Medications    ASCORBIC ACID  MG CPCR    Take by mouth daily    ASPIRIN 81 MG CHEWABLE TABLET    Take 81 mg by mouth daily    DIAZEPAM (VALIUM) 2 MG TABLET        EZETIMIBE (ZETIA) 10 MG TABLET    TAKE 1 TABLET DAILY    HYDROXYZINE (ATARAX) 10 MG TABLET    Take 10-20 mg by mouth 3 times daily as needed    LISINOPRIL (PRINIVIL;ZESTRIL) 5 MG TABLET    Take 5 mg by mouth daily. ROSUVASTATIN (CRESTOR) 20 MG TABLET    Take 40 mg by mouth daily        ALLERGIES  Lipitor and Tamiflu [oseltamivir phosphate]    FAMILYHISTORY  History reviewed. No pertinent family history. SOCIAL HISTORY  Social History     Tobacco Use    Smoking status: Former     Types: Cigarettes     Quit date: 3/9/2011     Years since quittin.8    Smokeless tobacco: Never    Tobacco comments:     occasional cigar   Vaping Use    Vaping Use: Never used   Substance Use Topics    Alcohol use: Yes     Comment: 3 drinks 3 days per week    Drug use: No       SCREENINGS  NIH Stroke Scale  Interval: Reassessment  Level of Consciousness (1a): Alert  LOC Questions (1b):  Answers both correctly  LOC Commands (1c): Performs both tasks correctly  Best Gaze (2): (!) Partial gaze palsy  Visual (3): (!) Partial hemianopia  Facial Palsy (4): (!) Complete paralysis of one or both sides  Motor Arm, Left (5a): No movement  Motor Arm, Right (5b): No drift  Motor Leg, Left (6a): No movement  Motor Leg, Right (6b): No drift  Limb Ataxia (7): (!) Present in two limbs  Sensory (8): (!) Mild to Moderate  Best Language (9): Mild to moderate aphasia  Dysarthria (10): Mild to moderate, slurs some words  Extinction and Inattention (11): (!) Visual, tactile, auditory, spatial, or personal inattention  Total: 19 [place] Coma Scale  Eye Opening: Spontaneous  Best Verbal Response: Oriented  Best Motor Response: Obeys commands  [place] Coma Scale Score: 15       CIWA Assessment  BP: 94/66  Heart Rate: 92             REVIEW OF SYSTEMS:    Review of Systems  Positives and Pertinent negatives as per HPI. PHYSICAL EXAM:     Vitals:    01/25/23 1932 01/25/23 1951 01/25/23 2016   BP: 121/84 103/74 94/66   Pulse: 88  92   Resp: 16  21   Temp: 98.8 °F (37.1 °C)     TempSrc: Oral     SpO2: 100%  100%   Weight: 181 lb (82.1 kg)     Height: 6' 1\" (1.854 m)         Physical Exam  Constitutional:       Appearance: Normal appearance. He is normal weight. He is ill-appearing. He is not diaphoretic. Comments: Focal neurological deficits on left face, complete loss of left nasolabial fold, left-sided extinction, left upper and lower extremity paralysis. Slurred speech. HENT:      Head: Normocephalic. Comments: Small abrasion to left cheek. Small abrasion to left chest wall. No significant tenderness to palpation to face, scalp, neck. Right Ear: External ear normal.      Left Ear: External ear normal.      Nose: Nose normal.      Mouth/Throat:      Mouth: Mucous membranes are moist.      Pharynx: Oropharynx is clear. Eyes:      General:         Right eye: No discharge. Left eye: No discharge. Conjunctiva/sclera: Conjunctivae normal.      Pupils: Pupils are equal, round, and reactive to light. Cardiovascular:      Rate and Rhythm: Normal rate and regular rhythm. Pulmonary:      Effort: Pulmonary effort is normal. No respiratory distress.       Breath sounds: Normal breath sounds. No wheezing. Abdominal:      General: Abdomen is flat. Palpations: Abdomen is soft. Musculoskeletal:         General: No swelling or tenderness. Cervical back: Normal range of motion and neck supple. No tenderness. Skin:     General: Skin is warm and dry. Findings: Lesion present. No rash. Neurological:      General: No focal deficit present. Mental Status: He is alert and oriented to person, place, and time. Cranial Nerves: Cranial nerve deficit present. Sensory: Sensory deficit present. Motor: Weakness present. Coordination: Coordination abnormal.   Psychiatric:         Mood and Affect: Mood normal.         Thought Content: Thought content normal.         Kodi Gibson Huseyin's NIH Stroke Scale on initial arrival   Level of Consciousness:  1 - not alert but arousable by minor stimulation to obey, answer or respond    LOC Questions:  0 - answers both questions correctly    LOC Commands:  1 - performs one task correctly    Best Gaze:  2 - forced deviation, or total gaze paresis not overcome by oculocephalic maneuver    Visual Fields:  0 - no visual loss    Facial Palsy:  2 - partial paralysis (total or near total paralysis of the lower face)    Motor-Arm-Left:  4 - no movement    Motor-Leg-Left:  4 - no movement    Motor-Arm-Right:  0 - no drift, limb holds 90 (or 45) degrees for full 10 seconds    Motor-Leg-Right:  0 - no drift; leg holds 30 degree position for full 5 seconds    Limb Ataxia:  1 - present in one limb    Sensory:  2 - severe to total sensory loss; patient is not aware of being touched in face, arm, leg    Best Language:  1 - mild to moderate aphasia; some obvious loss of fluency or facility of comprehension without significant limitation on ideas expressed or form of expression. Reduction of speech and/or comprehension, however, makes conversation about provided materials difficult or impossible.   For example, in conversation about provided materials, examiner can identify picture or naming card content from patient's response. Dysarthria:  1 - mild to moderate, patient slurs at least some words and at worst, can be understood with some difficulty    Extinction and Inattention:  2 - profound florence-inattention or florence- inattention to more than one modality. Does not recognize own hand or orients only to one side of space       DIAGNOSTIC RESULTS:  LABS:    Labs Reviewed   COMPREHENSIVE METABOLIC PANEL W/ REFLEX TO MG FOR LOW K - Abnormal; Notable for the following components:       Result Value    Sodium 133 (*)     Chloride 98 (*)     Glucose 113 (*)     Creatinine 0.7 (*)     All other components within normal limits   POCT GLUCOSE - Abnormal; Notable for the following components:    POC Glucose 106 (*)     All other components within normal limits   CBC WITH AUTO DIFFERENTIAL   TROPONIN   PROTIME-INR   ETHANOL   POCT GLUCOSE       When ordered, only abnormal lab results are displayed. All other labs were within normal range or not returned as of this dictation. EKG:     RADIOLOGY:      Non-plain film images such as CT, Ultrasound and MRI are read by the radiologist. Interpretation per the Radiologist below, if available at the time of this note:  XR CHEST PORTABLE   Final Result   Unremarkable portable exam         CTA HEAD NECK W CONTRAST   Final Result   Right middle cerebral artery M1 segment severe stenosis/occlusion with some   reconstitution of flow more distally but overall decreased number of   enhancing right middle cerebral artery branches in comparison to the left. Apparent thrombus in the right middle cerebral artery M1 segment and M2   branches. Critical results were called by Dr. James Peter to Dr. Noah Goldberg   on 1/25/2023 at 19:48. This scan was analyzed using Viz. ai contact LVO. Identification of suspected   findings is not for diagnostic use beyond notification.  Viz LVO is limited to analysis of imaging data and should not be used in-lieu of full patient   evaluation or relied upon to make or confirm diagnosis. CT HEAD WO CONTRAST   Final Result   No acute intracranial findings. PROCEDURES:  Unless otherwise noted below, none. Procedures      MEDICATIONS:   Medications   sodium chloride flush 0.9 % injection 5-40 mL (has no administration in time range)   sodium chloride flush 0.9 % injection 5-40 mL (has no administration in time range)   0.9 % sodium chloride infusion (has no administration in time range)   dextrose 50 % IV solution (has no administration in time range)   iopamidol (ISOVUE-370) 76 % injection 75 mL (75 mLs IntraVENous Given 1/25/23 1931)   sodium chloride flush 0.9 % injection 10 mL (10 mLs IntraVENous Given 1/25/23 1958)     Followed by   tenecteplase (TNKASE) injection 21 mg (21 mg IntraVENous Given 1/25/23 1951)     Followed by   sodium chloride flush 0.9 % injection 10 mL (10 mLs IntraVENous Given 1/25/23 1958)     _____________________________________    MEDICAL DECISION MAKING:     Patient is a 74 y.o. male with a significant PMHx of previous LAD stent, presenting emergency department today with significant strokelike symptoms; initial NIHSS is at least 21, nursing got 23. Left-sided nasolabial fold flattening, left-sided extinction, left-sided facial droop, left-sided paralysis in both the upper and lower extremity. No response to pain. Has been drinking, some slurred speech, unable to discern most language on initial arrival, however after CT scan, patient is talking better in full sentences. Much more clarity. Still facial droop. Call from radiology, CT head is without intracranial hemorrhage. 7:34 PM EST  Concern for M1 LVO. We will set up telemetry visit at this time, patient does go back from CT scan. Call from radiology, CTA is with M1 occlusion.     7:53 PM EST  Verbal informed consent performed with wife, who is bedside, and patient is a alert and oriented, more conversational.  Both agreed to thrombolytics. TNKase given at minute 38. Dr. Gela Gunter, stroke team  on the line through encounter. Will transfer to [facility] for neurosurgical evaluation and management. Communicating with  stroke team Dr. Tila Barone, and Neurosurgery at [facility] Dr. Chapito Doyle. [facility] for taking [facility]..  They are not flying at this time, will call 911 for Faizan Romp transfer. Otherwise, laboratory evaluation today reveals reassuring CBC, no thrombocytopenia or anemia, CMP without significant renal impairment, reassuring electrolytes. INR normal.  Troponin normal.  Ethanol 155. At the time of transfer for [facility] patient's extinction is improving, he is looking at me and talking to me on the left-hand side. Has not moved left upper extremity or left lower extremity. Speech is somewhat more clear, still remains nasolabial fold flattening on the left. I have done my best to answer questions from the patient's family to the best my ability at this time. Daughter, who is bedside, is quite concerned about the clot being tested for pathology about making sure it is \"natural in origin,\" referencing the COVID-19 vaccine. I have answered all of wife's questions at this time, and she feels very comfortable with her decision. Son is also bedside, who is agreeable with plan as well. Patient is leaving the emergency department after 1 hour and 31 minutes. CONSULTS:   IP CONSULT TO PHARMACY  PHARMACY TO CHANGE BASE FLUIDS  IP CONSULT TO PHARMACY  PHARMACY TO CHANGE BASE FLUIDS  IP CONSULT TO NEUROSURGERY      Is this patient to be included in the SEP-1 Core Measure due to severe sepsis or septic shock? No   Exclusion criteria - the patient is NOT to be included for SEP-1 Core Measure due to:   Infection is not suspected      CLINICAL IMPRESSION:     ICD-10-CM    1. Large vessel stroke (HCC)  I63.9 M1      2. Stroke-like episode  R29.90       3. Facial droop  R29.810       4. Paralysis ([address]  G83.9           DISPOSITION:  I discussed the results, including any incidental findings, with patient and through shared decision making. Disposition today from the ED will be: Transfer to [facility] in guarded condition. Questions answered. They are agreeable to plan and express understanding of plan. Social Determinants : None      CRITICAL CARE:   Total critical care time is 65 minutes, which excludes separately billable procedures and updating family. Time spent is specifically for management of the presenting complaint and symptoms initially, direct bedside care, reevaluation, review of records, and consultation. There was a high probability of clinically significant life-threatening deterioration in the patient's condition, which required my urgent intervention. _____________________________________      Sandra Dumont DO, am the primary attending of record and contributed the majority of evaluation and treatment of emergent care for this encounter. This chart was generated in part by using Dragon Dictation system and may contain errors related to that system including errors in grammar, punctuation, and spelling, as well as words and phrases that may be inappropriate. If there are any questions or concerns please feel free to contact the dictating provider for clarification.      MARVA DUMONT DO (electronically signed)   [address] [facility]  01/25/23 9219

## 2023-01-26 NOTE — PROGRESS NOTES
NAME:  Claudia Webster OF BIRTH:  1955  MEDICAL RECORD NUMBER:  [id number]    TODAYS DATE:  1/25/2023      Last Known Well (date/time): 1800    Consent Obtained: Emergent Consent     Pre-procedure NIHSS: 23    Endovascular Surgeon: Stanton Hardy    Final TICI score: TICI 2C     Post-procedure NIHSS: 17      Patient transported to ICU for post-procedural care and handoff completed, including NIHSS exam at bedside in ICU. Hand off report given to ICU JUANITA Call.       Nurse eSignature: Electronically signed by Kavita Pritchett on 1/25/2023 at 9:45 PM

## 2023-01-26 NOTE — PLAN OF CARE
Problem: Discharge Planning  Goal: Discharge to home or other facility with appropriate resources  Outcome: Progressing  Flowsheets  Taken 1/26/2023 0000  Discharge to home or other facility with appropriate resources: Identify barriers to discharge with patient and caregiver  Taken 1/25/2023 2251  Discharge to home or other facility with appropriate resources: Identify barriers to discharge with patient and caregiver     Problem: Pain  Goal: Verbalizes/displays adequate comfort level or baseline comfort level  Outcome: Progressing  Flowsheets (Taken 1/26/2023 0200)  Verbalizes/displays adequate comfort level or baseline comfort level: Encourage patient to monitor pain and request assistance     Problem: Skin/Tissue Integrity  Goal: Absence of new skin breakdown  Description: 1. Monitor for areas of redness and/or skin breakdown  2. Assess vascular access sites hourly  3. Every 4-6 hours minimum:  Change oxygen saturation probe site  4. Every 4-6 hours:  If on nasal continuous positive airway pressure, respiratory therapy assess nares and determine need for appliance change or resting period.   Outcome: Progressing     Problem: Safety - Adult  Goal: Free from fall injury  Outcome: Progressing     Problem: ABCDS Injury Assessment  Goal: Absence of physical injury  Outcome: Progressing

## 2023-01-26 NOTE — PROGRESS NOTES
NIH Stroke Scale      Interval: post procedure  Time: 10:22 PM  Person Administering Scale: Konstantin Hardy MD        1a  Level of consciousness: 0=alert; keenly responsive   1b. LOC questions:  0=Performs both tasks correctly   1c. LOC commands: 0=Performs both tasks correctly   2. Best Gaze: 1=partial gaze palsy   3. Visual: 2=Complete hemianopia   4. Facial Palsy: 2=Partial paralysis (total or near total paralysis of the lower face)   5a. Motor left arm: 4=No movement   5b. Motor right arm: 0=No drift, limb holds 90 (or 45) degrees for full 10 seconds   6a. motor left leg: 3=No effort against gravity, limb falls   6b  Motor right le=No drift, limb holds 90 (or 45) degrees for full 10 seconds   7. Limb Ataxia: 0=Absent   8. Sensory: 2=Severe to total sensory loss; patient is not aware of being touched in face, arm, leg   9. Best Language:  0=No aphasia, normal   10. Dysarthria: 1=Mild to moderate, patient slurs at least some words and at worst, can be understood with some difficulty   11. Extinction and Inattention: 2=Profound florence-inattention or florence-inattention to more than one modality. Does not recognize own hand or orients only to one side of space   12.  Distal motor function: 0=Normal    Total:   17

## 2023-01-26 NOTE — ED NOTES
Pt transferred to [facility] with [facility] RN en route. EMS was given report.       Moriah Garcia RN  01/25/23 8887

## 2023-01-26 NOTE — CONSULTS
Pharmacy Consult: [address] [place] [address] has been asked by Dr. Peg Herndon to review this patient's medication profile to evaluate IV medications and change all base solutions to 0.9% sodium chloride if possible based on compatibility and product availability. This profile review will include an assessment of total IV fluids being administered to the patient. If a current order exists for continuous infusion of non-hypertonic plain IV fluid, the pharmacist will contact the prescriber to recommend the discontinuation of the IV fluid order. The following intermittent IV drips/infusions have been adjusted to 0.9% sodium chloride:   None    The following medications must remain in D5W due to incompatibility with 0.9% sodium chloride:        Amphotericin    Mycophenolate    Nitroprusside                Penicillin G Potassium    Please be aware that the patient may have Dextrose 10% ordered as part of hypoglycemia orderset. Pharmacy will follow daily to ensure all new IVPBs + infusions are in 0.9% sodium chloride. Please call with questions.   Thank you,    Satya Harper PharmLAVELLE    1/25/2023 7:23 PM

## 2023-01-26 NOTE — PROGRESS NOTES
Progress Note  Admit Date: 1/25/2023       Overnight Events: admitted to ICU      ezetimibe  10 mg Oral Daily    rosuvastatin  40 mg Oral Daily    sodium chloride flush  5-40 mL IntraVENous 2 times per day      PRN Medications: ondansetron **OR** ondansetron, polyethylene glycol, sodium chloride flush, sodium chloride, perflutren lipid microspheres  Diet: Diet NPO  Continuous Infusions:   sodium chloride       PHYSICAL EXAM:  /80   Pulse 80   Temp 98.5 °F (36.9 °C) (Axillary)   Resp 14   Ht 6' 1\" (1.854 m)   Wt 164 lb 14.5 oz (74.8 kg)   SpO2 99%   BMI 21.76 kg/m²   Recent Labs     01/25/23 1948   POCGLU 106*       Intake/Output Summary (Last 24 hours) at 1/26/2023 1537  Last data filed at 1/26/2023 1400  Gross per 24 hour   Intake 25 ml   Output 1060 ml   Net -1035 ml       Review of Systems   Constitutional:  Negative for chills and fever. HENT:  Negative for nosebleeds. Nasal pain (s/p fall today)   Eyes:  Positive for visual disturbance. Respiratory:  Negative for chest tightness and shortness of breath. Cardiovascular:  Negative for chest pain, palpitations and leg swelling. Gastrointestinal:  Negative for abdominal pain. Neurological:  Positive for weakness. Negative for dizziness and seizures. LABS:  Recent Labs     01/25/23 1939 01/26/23  0530   WBC 6.8 11.5*   HGB 14.0 14.1   HCT 41.3 41.7    204                                                                    Recent Labs     01/25/23 1939 01/26/23  0530   * 136   K 4.1 4.4   CL 98* 101   CO2 25 21   BUN 11 11   CREATININE 0.7* 0.6*   GLUCOSE 113* 119*     Recent Labs     01/25/23 1939   AST 24   ALT 20   BILITOT <0.2   ALKPHOS 47     Recent Labs     01/25/23 1939   TROPONINI <0.01     No results for input(s): BNP in the last 72 hours. No results for input(s): CHOL, HDL in the last 72 hours.     Invalid input(s): LDLCALCU  Recent Labs     01/25/23 1939   INR 1.10     Assessment & Plan:    Patient Active Problem List:    Arielle Vela is a 74 y.o. male, PMH of previous LAD stent, HTN, anxiety, found to have a R M1 occlusion via CTA on 11/26, now post-op from diagnostic cerebral angiogram, mechanical thrombectomy. 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 Patient received TNK at [facility], transferred to [facility] and underwent emergent cerebral angiogram which showed occlusion of the right middle cerebral artery origin with nearly complete restoration of flow after thrombectomy, but otherwise normal 3 vessel cerebral angiogram  -Consult Neuro-critical care  -Consult neuro-surgery  [facility] neuro checks  -Check lipid Panel  and HbA1c   -24-hour head CT/MRI  -Echo  -Start aspirin and DVT prophylaxis 24 hours after TNK  -Keep SBP <160     Chronic Medical Conditions:  CAD   S/p stent 2019. Patient is on aspirin at home  -Hold aspirin now, restart 24 hours after TNK     HTN  Patient is on lisinopril 5mg daily at home  -Hold home meds as patient currently normotensive     HLD:  Patient is on Crestor 20mg and ezetimibe 10mg at home daily  -Continue home meds     Anxiety  Per chart, patient is on Valium 2mg at home  -Hold home meds     Code Status:Full Code  FEN: NPO  PPX:  SCDs  DISPO: ICU               Chest pain     Cerebrovascular accident (CVA) due to embolism of right middle cerebral artery ([address]        The patient and / or the family were informed of the results of any tests, a time was given to answer questions, a plan was proposed and they agreed with plan.   Disposition: inpt ICU  Full Code

## 2023-01-26 NOTE — CONSULTS
Neurology / [facility] Consult Note      Chris Price MD is requesting this consult. Reason for Consult: Ischemic stroke   Admission Chief Complaint: Stroke    History of Present Illness     Jarek Mojica is a 74 y.o. y/o male with history significant for HLD, HTN, alcohol abuse, depression and anxiety who presented to [facility] ED  with complete left sided paralysis, left sided extinction and slurred speech after a fall. History provided by: the patient and chart review     Around 1810 yesterday, he was out drinking alcohol with his friends at a [employer], this was his last known well. Patient states he was okay until he got home which is where he went into the bathroom and started vomiting. He fell onto the ground striking the front of his head on the bathtub. He does not know why he fell. His friends/family came to check on him and noticed he was not moving the left side of his body, that his speech was slurred and he was having difficulty staying awake. They called EMS. When EMS arrived they were able to wake him up confirmed that he had complete left sided paralysis and was unresponsive to pain on that side. Upon arrival to the ED around 1915, is initial evaluation showed complete left sided plegia with extinction, slurred speech and decreased LOC. His initial NIH was a 23. A stroke alert was called and he was immediately sent for a head CT and CTA head and neck. The CT head was unremarkable however the CTA showed an apparent thrombus in the Right MCA in the M1 and M2 branches consistent with and LVO. Stroke team recommenced TNK which patient received at 19:51. Dr. Hanna Moon with neuro vascular surgery was contacted who recommenced patient be flown to [facility] for endovascular intervention. Patient arrived to [facility] around 21:23 and immediately underwent a diagnostic cerebral angiogram and thrombectomy.  Nearly complete restoration of flow in the right MCA was obtained and considered to be a TICI 2c. The patient was admitted to the ICU postoperatively in stable condition. He does not smoke. He has been a chronic alcohol use, states he drinks about 5 days out of the week, anywhere to one drink a night to occasionally binging on the weekends and special occasions. He takes Lisinopril for his blood pressure and Crestor for high cholesterol. He takes an 81mg aspirin daily. He has a family history of A-fib and stroke in his mother. He does report remote history of palpitations. Currently, he is sleepy but easy to arouse. His current NIH is 19. He has dense plegia in the left extremities and some profound left sided extinction/neglect. He has limited insight to what has happened to him. REVIEW OF SYSTEMS:   Constitutional- No weight loss or fevers   Neurologic- Denies headache. Denies lateralizing weakness. Denies vision changes. Denies numbness or tingling. Denies trouble swallowing. Past Medical, Surgical, Family, and Social History   PAST MEDICAL HISTORY:  Past Medical History:   Diagnosis Date    Anxiety     Chest pain     Depression     Hyperlipidemia     Hypertension     Wears glasses      SURGICAL HISTORY:  Past Surgical History:   Procedure Laterality Date    CARDIAC CATHETERIZATION  2008    COLONOSCOPY  2009    CORONARY ANGIOPLASTY WITH STENT PLACEMENT      FINGER TRIGGER RELEASE  3-    left- long finger. WISDOM TOOTH EXTRACTION       FAMILY HISTORY & SOCIAL HISTORY:  Family history non-contributory  No family history on file.   Social History     Tobacco Use    Smoking status: Former     Types: Cigarettes     Quit date: 3/9/2011     Years since quittin.8    Smokeless tobacco: Never    Tobacco comments:     occasional cigar   Vaping Use    Vaping Use: Never used   Substance Use Topics    Alcohol use: Yes     Comment: 3 drinks 3 days per week    Drug use: No       Allergies & Outpatient Medications   ALLERGIES:  Allergies   Allergen Reactions Lipitor      myalgia    Tamiflu [Oseltamivir Phosphate] Rash     HOME MEDICATIONS: Zetia 10mg daily, Aspiring 81mg daily, Lisinopril 5mg daily, Crestor 40mg daily     Physical Exam   PHYSICAL EXAM:  Vitals:    01/26/23 0000 01/26/23 0015 01/26/23 0030 01/26/23 0045   BP: 106/78 107/79 109/80 122/81   Pulse: 78 76 75 79   Resp: 19 20 14 17   Temp:       TempSrc:       SpO2: 100% 100% 100% 99%   Height:             General: drowsy, no distress, well-nourished  Neurologic  Mental status:   Orientation: to person. Knows he is at [address] the year is 2023. He knows the president. Attention: intact as able to attend well to the exam    Language: fluent in conversation, no aphasia   Speech: moderate dysarthria and slowing  Comprehension: intact; follows simple commands. Able to do cross body commands. Able to calculate complex coins. GCS:14  NIH: 19    Cranial nerves:   CN2: Left sided hemianopia   CN 3,4,6: Pupils equal and reactive to light. Unable to cross midline to the left   CN5: Facial sensation symmetric   CN7: Left facial weakness  CN8: Hearing symmetric to spoken voice  CN9: Palate elevated symmetrically  CN11: no movement on left side  CN12: Tongue midline with protrusion    Motor Exam:   R  L    Deltoid 5  0   Biceps 5 0   Triceps 5 0   Wrist extension  5 0   Interossei 5 0      R  L    Hip flexion  5  0   Hip extension  5 0   Knee flexion  5 1   Knee extension  5 0   Ankle dorsiflexion  5 0   Ankle plantar flexion  5 0     Deep tendon reflexes:    R  L    Biceps  2  0        Brachioradialis  2 0   Patellar  2 0               Sensory: complete sensory loss in the LUE and LLE extremity, cannot sense being touched. Significant left florence-neglect. Cerebellar/coordination: finger nose finger normal in RUE. Unable to perform with the left. Tone: Flaccid paralysis of the LUE. Normal tone in LLE, RLE and RUE. Gait: did not test for patient safety.     Diagnostic Testing Results   IMAGES:  Images personally reviewed and agree w/ radiology interpretation. Head CT w/o Contrast: 1/25/23; 19:31  Impression   No acute intracranial findings. CTA of Head / Neck w/ Contrast: 1/25/23; 19:22  Impression   Right middle cerebral artery M1 segment severe stenosis/occlusion with some   reconstitution of flow more distally but overall decreased number of   enhancing right middle cerebral artery branches in comparison to the left. Apparent thrombus in the right middle cerebral artery M1 segment and M2   branches. MRI Brain w/o Contrast: ordered  Pending     ECHO with Bubble: ordered  Pending     LABS:  All results below personally reviewed. Pertinent positives & negatives are addressed in Impression & Recommendations below. LABS   Metabolic Panel Recent Labs     01/25/23 1939   *   K 4.1   CL 98*   CO2 25   BUN 11   CREATININE 0.7*   GLUCOSE 113*   CALCIUM 8.8   LABALBU 4.6   ALKPHOS 47   ALT 20   AST 24      CBC / Coags Recent Labs     01/25/23 1939   WBC 6.8   RBC 4.32   HGB 14.0   HCT 41.3      INR 1.10      Other No results for input(s): LABA1C, LDLCALC, TRIG, TSH, IJENVWJO27, FOLATE, LABSALI, COVID19 in the last 72 hours. No results for input(s): PHENYTOIN, KEPPRA, LACOSA, LAMO, VALPROATE, LACTSEPSIS, LACTA in the last 72 hours. CURRENT SCHEDULED MEDICATIONS   Inpatient Medications     ezetimibe, 10 mg, Oral, Daily    rosuvastatin, 40 mg, Oral, Daily    sodium chloride flush, 5-40 mL, IntraVENous, 2 times per day    thiamine, 100 mg, Oral, Daily    multivitamin, 1 tablet, Oral, Daily   Infusions    sodium chloride        Antibiotics   Recent Abx Admin        No antibiotic orders with administrations found.                        IMPRESSION & RECOMMENDATIONS     IMPRESSION:  -This is a 74year old male with a significant history of alcohol abuse, HTN and HLD with a last know well of approximately 18:00 on 1/25 who presented to [facility] ED with a full right MCA syndrome and was found to have an LVO of the right MCA. He received TNK @ 19:51 and underwent thrombectomy with repoerfusion at 21:54 (TICI 2c). The etiology of his stroke is likely cardio embolic in absence of significant carotid stenosis.      -He reports a history of a-fib and stroke in his mother. He does not think he has a-fib but does report a history of palpitations in the past but cannot elaborate. He is currently in NSR on telemetry.      RECOMMENDATIONS:  Stroke Plan s/p IV Tenecteplase @ 19:51 & Thrombectomy @ 21:54  Imaging / Labs:  -Need 24 hour follow up imaging (Safety Scan) - If MRI scheduled +/- 6 hours can discontinue head CT  -Obtain MRI of the brain w/o contrast to evaluate stroke  -Echocardiogram with bubble  -Check lipid panel and hemoglobin A1C if not already completed     Medications:  -High-intensity statin   -Start ASA 81mg 24 hours after TNK/EVT  -SCDs for DVT prophylaxis  -Start DVT chemoprophylaxis 24 after TNK/EVT    Consults / Nursing Care:  -HOB elevated to help prevent aspiration  [facility] Neurologic Exams & Vitals  -NIHSS per guidelines   -Telemetry to monitor for arrhyhtmia   -PT/OT: eval and treat  -PMR consult if rehab recommended   -ST: eval and treat and dysphagia screen  -Nursing bedside swallow prior to any PO intake   -Groin checks per post procedure guidelines  -Blood Pressure Management   -s/p IV Tenecteplase alone - Keep SBP <180   -s/p Thrombectomy - Keep SBP <160    Follow up / Discharge Recommendations:  -If no obvious source of stroke identified will need 30 day event monitor arranged at discharge  -Stroke Education at Discharge  -Follow up w/ Neurology in 3 months      Management and plan discussed with: the patient, bedside nurse and ICU residents     LISSET Davis - CNP   Neurology & Neurocritical Care   1/26/2023 2:07 AM    ICU Patients:   Neurocritical Care Line: [phone]  PerfectServe: [facility] Neurocritical Care  Floor / PCU Patients:  Neurology Line: [phone]  [facility]: [facility] Neurology    Critical Care:  Due to the immediate potential for life-threatening deterioration due to neurological failure, I spent 45 minutes providing critical care. This time excludes time spent performing procedures but includes time spent on direct patient care, history retrieval, review of the chart, and discussions with patient, family, and consultant(s).

## 2023-01-26 NOTE — PROGRESS NOTES
Physical Therapy/Occupational Therapy    Orders received and chart reviewed. Attempted to see pt for PT/OT tyron.  RN present in room asking pt questions for MRI and reports pt will be going to MRI soon. Will f/u later this date as schedule allows. Tony Gomes, PT [id number], [address] [place] [address]

## 2023-01-26 NOTE — H&P
ICU HISTORY AND PHYSICAL       Hospital Day: 1  ICU Day: 1                                                         Code:Full Code  Admit Date: 1/25/2023         PCP: Saúl Ingram MD                                  CC: Left sided weakness    HISTORY OF PRESENT ILLNESS:   Mitzi Navarro is a 74year old male with a PMHx of anxiety, CAD (s/p stent 2019), HTN, HLD who presented to [facility] ED with complete left-sided paralysis, left-sided extinction, slurred speech following a fall. Per patient, he was at a bar in the evening and had about 2 pints of beer. After arriving home patient states that he tripped on a rug and hit his head on a bathtub during the fall and began having left sided weakness and facial droop. Per patient, he did not lose consciousness and had no other symptoms. However per chart review patient was vomiting prior to falling. Patient denied any other symptoms. Last known normal was around 6 PM. Patient otherwise denied any headaches, changes in vision, chest pain, shortness of breath, abdominal pain, or dysuria. Patient states that he drinks about 1 bottle of beer daily. He denies any tobacco or drug use. In the ED, was stable and afebrile. Labs were largely unremarkable. Ethanol level of 155. Patient with NIH stroke scale 23. CT head without acute abnormalities. CTA showed right middle cerebral artery M1 segment severe stenosis/occlusion with some reconstitution of flow more distally.  Patient and wife consented to [facility] given at 7:53PM. Patient was transferred to the [facility], [facility]. and underwent emergent cerebral angiogram which showed occlusion of the right middle cerebral artery origin with nearly complete restoration of flow after thrombectomy, but otherwise normal 3 vessel cerebral angiogram    PAST HISTORY:     Past Medical History:   Diagnosis Date    Anxiety     Chest pain     Depression     Hyperlipidemia     Hypertension     Wears glasses      Past Surgical History: Procedure Laterality Date    CARDIAC CATHETERIZATION  7/2008    COLONOSCOPY  5/2009    CORONARY ANGIOPLASTY WITH STENT PLACEMENT      FINGER TRIGGER RELEASE  3-    left- long finger. WISDOM TOOTH EXTRACTION       SocialHistory:   The patient lives at home with his wife. Worked as an . Alcohol: Daily alcohol use, 1 beer per day per patient. Illicit drugs: no use  Tobacco: Denies use    Family History: Mother with prior stroke and history of A. Fib per patient. MEDICATIONS:     No current facility-administered medications on file prior to encounter. Current Outpatient Medications on File Prior to Encounter   Medication Sig Dispense Refill    hydrOXYzine (ATARAX) 10 MG tablet Take 10-20 mg by mouth 3 times daily as needed      ezetimibe (ZETIA) 10 MG tablet TAKE 1 TABLET DAILY      Ascorbic Acid  MG CPCR Take by mouth daily      diazePAM (VALIUM) 2 MG tablet       aspirin 81 MG chewable tablet Take 81 mg by mouth daily      lisinopril (PRINIVIL;ZESTRIL) 5 MG tablet Take 5 mg by mouth daily. rosuvastatin (CRESTOR) 20 MG tablet Take 40 mg by mouth daily        Scheduled Meds:   Continuous Infusions:  PRN Meds:    Allergies: Allergies   Allergen Reactions    Lipitor      myalgia    Tamiflu [Oseltamivir Phosphate] Rash       REVIEW OF SYSTEMS:       History obtained from chart review and the patient    Review of Systems   Constitutional:  Negative for chills and fever. HENT:  Negative for nosebleeds. Nasal pain (s/p fall today)   Eyes:  Positive for visual disturbance. Respiratory:  Negative for chest tightness and shortness of breath. Cardiovascular:  Negative for chest pain, palpitations and leg swelling. Gastrointestinal:  Negative for abdominal pain. Neurological:  Positive for weakness. Negative for dizziness and seizures.      PHYSICAL EXAM:       Vitals: /78   Pulse 74   Temp 97.6 °F (36.4 °C) (Axillary)   Resp 14   Ht 6' 1\" (1.854 m) Wt 164 lb 14.5 oz (74.8 kg)   SpO2 99%   BMI 21.76 kg/m²     I/O:    Intake/Output Summary (Last 24 hours) at 1/26/2023 0705  Last data filed at 1/26/2023 0600  Gross per 24 hour   Intake 0 ml   Output 610 ml   Net -610 ml     No intake/output data recorded. I/O last 3 completed shifts: In: 0   Out: 56 [Urine:610]    Physical Exam  Vitals and nursing note reviewed. Constitutional:       General: He is awake. He is not in acute distress. Appearance: Normal appearance. He is not toxic-appearing. HENT:      Head: Normocephalic. Abrasion (Left temple, R forehead) present. Nose: Signs of injury present. No septal deviation or rhinorrhea. Eyes:      General: Lids are normal. Visual field deficit (Left homonymous hemianopsia) present. Cardiovascular:      Rate and Rhythm: Normal rate and regular rhythm. Pulses: Normal pulses. Heart sounds: Normal heart sounds. Pulmonary:      Effort: Pulmonary effort is normal.      Breath sounds: Normal breath sounds and air entry. Abdominal:      General: Abdomen is flat. Bowel sounds are normal.      Palpations: Abdomen is soft. Musculoskeletal:      Right lower leg: No edema. Left lower leg: No edema. Neurological:      Mental Status: He is alert and oriented to person, place, and time. Cranial Nerves: Facial asymmetry (Left sided nasolabial droop) present. Sensory: Sensory deficit (Loss of sensation in LUE, LLE to touch) present. Motor: Weakness (Left hemiplegia) present. Psychiatric:         Behavior: Behavior is cooperative. Access:                                 -Peripheral Access Day#:1                    [place] Day#:1    No results for input(s): PHART, UXD7RGO, PO2ART in the last 72 hours.     DATA:         Recent Labs     01/25/23 1939 01/26/23  0530   WBC 6.8 11.5*   HGB 14.0 14.1   HCT 41.3 41.7    204       BMP:   Recent Labs     01/25/23 1939 01/26/23  0530   * 136   K 4.1 4.4   CL 98* 101   CO2 25 21   BUN 11 11   CREATININE 0.7* 0.6*   GLUCOSE 113* 119*     LFT's:   Recent Labs     01/25/23 1939   AST 24   ALT 20   BILITOT <0.2   ALKPHOS 47     Troponin:   Recent Labs     01/25/23 1939   TROPONINI <0.01     BNP:No results for input(s): BNP in the last 72 hours. ABGs: No results for input(s): PHART, VCL8LGU, PO2ART in the last 72 hours. INR:   Recent Labs     01/25/23 1939   INR 1.10     U/A:No results for input(s): NITRITE, COLORU, PHUR, LABCAST, WBCUA, RBCUA, MUCUS, TRICHOMONAS, YEAST, BACTERIA, CLARITYU, SPECGRAV, LEUKOCYTESUR, UROBILINOGEN, BILIRUBINUR, BLOODU, GLUCOSEU, AMORPHOUS in the last 72 hours. Invalid input(s): KETONESU    IR ANGIOGRAM CAROTID CEREBRAL RIGHT    (Results Pending)   CT head without contrast    (Results Pending)   MRI BRAIN WO CONTRAST    (Results Pending)     EKG:   Normal Sinus Rhythm    IMAGING:     CT Head W/O Contrast 1/25/2023  No acute intracranial findings. CT Head Neck W Contrast 1/25/2023  Right middle cerebral artery M1 segment severe stenosis/occlusion with some   reconstitution of flow more distally but overall decreased number of   enhancing right middle cerebral artery branches in comparison to the left. Apparent thrombus in the right middle cerebral artery M1 segment and M2   branches. Critical results were called by Dr. Olmedo Carrier Sessions to Dr. Sariah Alvares   on 1/25/2023 at 19:48. This scan was analyzed using Viz. ai contact LVO. Identification of suspected   findings is not for diagnostic use beyond notification. Viz LVO is limited to   analysis of imaging data and should not be used in-lieu of full patient   evaluation or relied upon to make or confirm diagnosis     CXR 1/25/2023  Unremarkable portable exam    ASSESSMENT AND PLAN:   Brett Downs is a 74 y.o. male, PMH of previous LAD stent, HTN, anxiety, found to have a R M1 occlusion via CTA on 1/26, now post-op from diagnostic cerebral angiogram, mechanical thrombectomy.     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 Patient received TNK at [facility], transferred to [facility] and underwent emergent cerebral angiogram which showed occlusion of the right middle cerebral artery origin with nearly complete restoration of flow after thrombectomy, but otherwise normal 3 vessel cerebral angiogram  -Consult Neuro-critical care  -Consult neuro-surgery  [facility] neuro checks  -Check lipid Panel  and HbA1c   -24-hour head CT/MRI  -Echo  -Start aspirin and DVT prophylaxis 24 hours after TNK  -Keep SBP <160    Chronic Medical Conditions:  CAD   S/p stent 2019.  Patient is on aspirin at home  -Hold aspirin now, restart 24 hours after TNK    HTN  Patient is on lisinopril 5mg daily at home  -Hold home meds as patient currently normotensive    HLD:  Patient is on Crestor 20mg and ezetimibe 10mg at home daily  -Continue home meds    Anxiety  Per chart, patient is on Valium 2mg at home  -Hold home meds    Code Status:Full Code  FEN: NPO  PPX:  SCDs  DISPO: ICU    This patient has been staffed and discussed with Chris Price MD;K*.   -----------------------------  Aan Zuluaga, MS4  1/25/2023  11:10 PM

## 2023-01-26 NOTE — PROGRESS NOTES
RN screened patient's swallowing by administering the [facility] Protocol. The patient consumed 3 ounces of water by cup in sequential swallows with signs/symptoms of aspiration.

## 2023-01-26 NOTE — PROGRESS NOTES
Clinical Pharmacy Consult Note    Pharmacy has been asked by Dr. Ihsan Rojas to adjust all drips to normal saline as appropriate based on compatibility to avoid fluid shifts since D5 is osmotically active. The following intermittent IV drips/infusions have been adjusted to saline:  None to adjust at this time     RPh will follow daily to ensure all new IVPBs + drips are in NS. Please call with questions.   James Davis [facility], PharmD, MS  1/26/2023 1:01 AM

## 2023-01-26 NOTE — PROCEDURES
INSTRUMENTAL SWALLOW REPORT  MODIFIED BARIUM SWALLOW  & Dysphagia TX Note    NAME: Collette Bari   : 1955  MRN: [id number]       Date of Eval: 2023     Ordering Physician: Dr. Nicolas Currie  Radiologist: Dr. Sofiya Dickens     Referring Diagnosis: Dysphagia    Past Medical History:  has a past medical history of Anxiety, Chest pain, Depression, Encounter for imaging to screen for metal prior to MRI, Hyperlipidemia, Hypertension, and Wears glasses. Past Surgical History:  has a past surgical history that includes [place] tooth extraction; Colonoscopy (2009); Cardiac catheterization (2008); Finger trigger release (3-); and Coronary angioplasty with stent. Date of Prior Study: NA  Type of Study: Initial MBS    Recent CXR: 2023  1. Multiple large acute ischemic insults in the right MCA territory, most pronounced in the basal ganglia, posterior insula, and temporal lobe with additional scattered areas in the right frontal and parietal lobes. 2.  Abnormal flow signal in the right M1 and M2 segments. MRI Brain: 2023    Patient Complaints/Reason for Referral:  Collette Bari was referred for a MBS to assess the efficiency of his/her swallow function, assess for aspiration, and to make recommendations regarding safe dietary consistencies, effective compensatory strategies, and safe eating environment. Patient complaints: Did not state    Onset of problem:   Date of Onset: 2023    Per admitting H&P (2022): 'Collette Bari is a 74year old male with a PMHx of anxiety, CAD (s/p stent ), HTN, HLD who presented to [facility] ED with complete left-sided paralysis, left-sided extinction, slurred speech following a fall. Per patient, he was at a bar in the evening and had about 2 pints of beer. After arriving home patient states that he tripped on a rug and hit his head on a bathtub during the fall and began having left sided weakness and facial droop.  Per patient, he did not lose consciousness and had no other symptoms. However per chart review patient was vomiting prior to falling. Patient denied any other symptoms. Last known normal was around 6 PM. Patient otherwise denied any headaches, changes in vision, chest pain, shortness of breath, abdominal pain, or dysuria. Patient states that he drinks about 1 bottle of beer daily. He denies any tobacco or drug use. In the ED, was stable and afebrile. Labs were largely unremarkable. Ethanol level of 155. Patient with NIH stroke scale 23. CT head without acute abnormalities. CTA showed right middle cerebral artery M1 segment severe stenosis/occlusion with some reconstitution of flow more distally. Patient and wife consented to [facility] given at 7:53PM. Patient was transferred to the [facility] and underwent emergent cerebral angiogram which showed occlusion of the right middle cerebral artery origin with nearly complete restoration of flow after thrombectomy, but otherwise normal 3 vessel cerebral angiogram'    Subjective: Received pt awake but with some lethargy, seated up in bed, on room air. RN present. Behavior/Cognition/Vision/Hearing:  Behavior/Cognition: Alert; Cooperative;Pleasant mood  Hearing: Within functional limits    Impressions:  Oral Phase  Mild-moderate dysfunction characterized by incomplete labial seal with anterior spillage, slowed/reduced mastication, mild stasis. Pharyngeal Phase  Moderate dysfunction characterized by impairments in timing, strength and coordination with premature bolus loss, reduced base of tongue retraction, delayed/reduced hyolaryngeal mechanics, and reduced pharyngeal constriction. Resulted in vallecular/pyriform/pharyngeal residue, as well as compromised airway protection with penetration and gross tracheal aspiration of thin and mildly thick liquids; strong reactive cough present but did not fully clear.  Chin tuck strategy did not eliminate aspiration, however cued head turn LEFT with small straw sips was effective . Double swallow helped clear residual. Of note, throughout study pt with tendency to tilt head posteriorly which further exacerbated bolus control; benefited from cues for neutral positioning. Upper Esophageal Screen  Indirectly assessed; appeared unremarkable. Treatment Dx and ICD 10: dysphagia   Patient Position: Lateral and upright    Consistencies Administered: Pureed; Thin teaspoon; Thin straw; Moderately Thickteaspoon; Moderately Thickstraw;Mildly Thick teaspoon;Mildly Thick straw    Dysphagia Outcome Severity Scale: Level 4: Mild moderate dysphagia- Intermittent supervision/cueing. One - two diet consistencies restricted  Penetration-Aspiration Scale (PAS): 7 - Material enters the airway, passes below the vocal folds, and is not ejected from the trachea despite effort    Recommended Diet:  Solid consistency: Pureed  Liquid consistency: Thin  Liquid administration via: Straw    Medication administration: Meds in puree    Safe Swallow Protocol:  Compensatory Swallowing Strategies : Small bites/sips; Alternate solids and liquids    Recommendations/Treatment  Requires SLP Intervention: Yes     D/C Recommendations: Ongoing speech therapy is recommended at next level of care  Postural Changes and/or Swallow Maneuvers: Head turn left;Upright    Recommended Exercises:    Therapeutic Interventions: Therapeutic PO trials with SLP;Oral care;Oral motor exercises; Patient/Family education;Diet tolerance monitoring;Pharyngeal exercises    Education: Images and recommendations were reviewed with the patient following this exam.   Patient Education: Educated pt to purpose of visit, swallow function, diet recommendations. Patient Education Response: Verbalizes understanding    Duration/Frequency of Treatment  Duration of Treatment: 1-2 wks or LOS  Frequency of Treatment: 3-5x/wk for LOS  Safety Devices  Safety Devices in place: Yes  Type of devices:  All fall risk precautions in place; Other (comment) (RN bedside)    Goals:    Dysphagia  Goal 1: Patient will participate in further assessment of swallow function as appropriate. Goal 2: Patient/caregiver will demonstrate understanding of swallowing concerns/recommendations. 1/26: Trained patient re: swallow strategies (namely head turn left, small sips); pt able to verbally recall head turn direction with mod cues. Recommend ongoing direct supervision however with all PO. Cont goal    Pain   Pain Level: 0  Pain Type: Acute pain  Pain Location: Nose    Therapy Time:  0045-3606 (25 minutes)    Plan  Diet Recommendations: Puree / thin liquids via small straw sips + head turn LEFT / meds in puree  - 1:1 assist feed  - upright position  - head turn LEFT  - small bites / sips  - slow rate  - check for pocketing on left  *oral hygiene 2-3x daily*    Discharge Plan:  TBD  Discussed with RNAntwon. Needs within reach. Electronically Signed by:  Brenda Pablo  Pager #[phone]    This document will serve as a discharge summary if pt discharges before next treatment.

## 2023-01-26 NOTE — PROGRESS NOTES
Speech Language Pathology  Facility/Department: [facility] ICU   Clinical Bedside Swallow Evaluation  & Speech-Language Assessment    NAME: Luke Turner  : 1955  MRN: [id number]    ADMISSION DATE: 2023  ADMITTING DIAGNOSIS: has Chest pain; Acute ischemic stroke [facility]); and Ischemic stroke ([facility] [address] on their problem list.  ONSET DATE: 2023    Recent Chest Xray 2023  Unremarkable portable exam    MRI Brain 2023  1. Multiple large acute ischemic insults in the right MCA territory, most pronounced in the basal ganglia, posterior insula, and temporal lobe with additional scattered areas in the right frontal and parietal lobes. 2.  Abnormal flow signal in the right M1 and M2 segments. Date of Eval: 2023  Evaluating Therapist: TIGRE Ochoa    Current Diet level:  Current Diet : NPO    Primary Complaint  Patient Complaint: Thirsty    Pain:  Pain Assessment  Pain Assessment: 0-10  Pain Level: 0  Patient's Stated Pain Goal: 3  Pain Location: Nose  Pain Orientation: Mid  Pain Descriptors: Aching  Functional Pain Assessment: Activities are not prevented  Pain Type: Acute pain  Non-Pharmaceutical Pain Intervention(s): Emotional support  Response to Pain Intervention: Patient satisfied  Side Effects: No reported side effects    Reason for Referral  Luke Turner was referred for a bedside swallow evaluation to assess the efficiency of his swallow function, identify signs and symptoms of aspiration and make recommendations regarding safe dietary consistencies, effective compensatory strategies, and safe eating environment. Dysphagia Impression  Dysphagia Diagnosis: Suspected needs further assessment  Dysphagia Impression : Suspect oropharyngeal dysphagia with aspiration; needs further assessment. Oral motor exam significant for left sided facial droop and lingual/labial weakness.  With patient seated up in bed and on room air, trialed ice chips, thins via tsp/straw; prolonged reactive cough. Recommend continue NPO, with further assessment via instrumental.  Dysphagia Outcome Severity Scale: Level 1: Severe dysphagia- NPO. Unable to tolerate any PO safely     Speech/Language Impression  Mild-moderate dysarthria (~70% intelligibility) characterized by imprecise articulation with blended word boundaries, and increased rate. No apraxia or aphasia appreciated. Treatment Plan  Requires SLP Intervention: Yes  Duration of Treatment: 1-2 wks or LOS  D/C Recommendations: Ongoing speech therapy is recommended at next level of care    Recommended Diet and Intervention  Recommended Form of Meds: Via alternative means of nutrition  Recommendations: NPO;Consider ice chips PRN;Consider alternative nutrition;Dysphagia treatment  Therapeutic Interventions: Therapeutic PO trials with SLP;Oral care;Oral motor exercises; Patient/Family education;Diet tolerance monitoring    Compensatory Swallowing Strategies  TBD    Treatment/Goals  Short-term Goals  Timeframe for Short-term Goals: 1-2 wks or LOS  Dysphagia  Goal 1: Patient will participate in further assessment of swallow function as appropriate. Goal 2: Patient/caregiver will demonstrate understanding of swallowing concerns/recommendations. Speech-Language  Goal 1: Patient will recall and utilize strategies to improve speech clarity to 90% at the conversation level with min cues. Goal 2: Patient will participate in further assessment of cognitive-communication skills as appropriate. General  Chart Reviewed: Yes    Per admitting H&P (01/25/2023): Mattie Nuñez is a 74year old male with a PMHx of anxiety, CAD (s/p stent 2019), HTN, HLD who presented to [facility] ED with complete left-sided paralysis, left-sided extinction, slurred speech following a fall. Per patient, he was at a bar in the evening and had about 2 pints of beer.  After arriving home patient states that he tripped on a rug and hit his head on a bathtub during the fall and began having left sided weakness and facial droop. Per patient, he did not lose consciousness and had no other symptoms. However per chart review patient was vomiting prior to falling. Patient denied any other symptoms. Last known normal was around 6 PM. Patient otherwise denied any headaches, changes in vision, chest pain, shortness of breath, abdominal pain, or dysuria. Patient states that he drinks about 1 bottle of beer daily. He denies any tobacco or drug use. In the ED, was stable and afebrile. Labs were largely unremarkable. Ethanol level of 155. Patient with NIH stroke scale 23. CT head without acute abnormalities. CTA showed right middle cerebral artery M1 segment severe stenosis/occlusion with some reconstitution of flow more distally. Patient and wife consented to [facility] given at 7:53PM. Patient was transferred to the [facility] and underwent emergent cerebral angiogram which showed occlusion of the right middle cerebral artery origin with nearly complete restoration of flow after thrombectomy, but otherwise normal 3 vessel cerebral angiogram.'    Subjective: Received pt awake, alert, resting in bed, on room air. Behavior/Cognition: Alert; Cooperative;Pleasant mood  Respiratory Status: O2 via nasual cannula  O2 Device: Nasal cannula  Liters of Oxygen: 2 L  Communication Observation: Functional  Follows Directions: Simple  Dentition: Adequate  Patient Positioning: Upright in bed  Baseline Vocal Quality: Normal  Volitional Cough: Strong  Prior Dysphagia History: None found in chart review    Vision/Hearing  Hearing  Hearing: Within functional limits    Oral Motor Deficits  Dentition / oral hygiene WNL  Left-sided facial droop, and lingual/labial weakness    Prognosis  Individuals consulted  Consulted and agree with results and recommendations: RN;Family member  Family member consulted: wife and son    Education  Patient Education: Educated pt to purpose of visit, swallow function, diet recommendations. Patient Education Response: Verbalizes understanding  Safety Devices in place: Yes  Type of devices: All fall risk precautions in place; Left in bed;Bed alarm in place;Call light within reach;Nurse notified       Therapy Time  SLP Individual Minutes  Time In: 1000  Time Out: 1030  Minutes: 30     SLP Total Treatment Time  Timed Code Treatment Minutes: 0 Minutes  Total Treatment Time: 30    Plan  Diet Recommendations: NPO; MBS planned for today    Ongoing speech therapy to address dysphagia/dysarthria  Discharge Plan:  TBD  Discussed with RN, Han Hancock. Needs within reach. Electronically Signed by:  Brenda Nino  Pager #[phone]    This document will serve as a discharge summary if pt discharges before next treatment.

## 2023-01-26 NOTE — PLAN OF CARE
Brief Note    Mr. Eliza Goodwin is a 73 y/o man who presented overnight with right MCA syndrome (dense hemiparesis and dysarthria) found to have RM1 occlusion. Given TNK at 19:51 1/25, with subsequent thrombectomy with TICI2c reperfusion. Patient seen and examined. No complaints overnight.  mmHg. Exam improved since examined by [facility] NP overnight - more movement in left leg. Alert, oriented fully. Follows commands briskly. EOMI - no forced gaze   Right side full strength  LUE with minimal movement  LLE flexes at the knee with encouragement  No ataxia in RUE, NGUYỄN in LUE given weakness    Plan  Imaging / Labs:  - Stability scan ordered for 00:15 1/27  - MRI brain ordered to determine pattern of ischemia  - Echocardiogram, lipids and A1c pending   - Continue Crestor for LDL goal less than 70 mg/dL  - Start ASA 81mg 24 hours after TNK/EVT if head CT shows no hemorrhage   - SCDs for DVT prophylaxis. Start DVT chemoprophylaxis 24 after TNK/EVT if head CT shows no hemorrhage  -HOB elevated to help prevent aspiration  [facility] Neurologic Exams & Vitals  -NIHSS per guidelines   -Telemetry to monitor for arrhythmia.  Called cardiac nurse navigator to set up 30 day event monitor on 1/26  -PT/OT/SLP/Rehab   -Nursing bedside swallow prior to any PO intake   -Groin checks per post procedure guidelines  -Blood Pressure Management              -s/p Thrombectomy - Keep SBP <160  -Stroke Education at Discharge  -Follow up w/ Neurology in 3 months      Heide Becerra NP  Neurology

## 2023-01-26 NOTE — CARE COORDINATION
Case management is following for needs at discharge. The chart was reviewed. Mr. Davin Barton is from home with his spouse. He is independent with self care and functional mobility at baseline. Will reassess for disposition and service needs once he is more medically stable.     Electronically signed by CORINA Dubois RN-[facility]  Case Management  [phone]

## 2023-01-26 NOTE — PROGRESS NOTES
Stroke Admission    I agree as the admission nurse that I have completed a thorough stroke assessment and completed the admission on the patient. ALL assessment areas listed below have been addressed and completed. Presentation: Ischemic    Handoff assessment completed with Cath Lab nurse [address] [facility] ,RN. Current NIHSS 18.     [x]   Education Assessment  [x]   Individualized Stroke/TIA Education template added, including patient specific risk factors: High Cholesterol, Excessive use of alcohol, and Family history of heart disease  [x]   Individualized Stroke/TIA Care Plan template added  [x]   Bedside swallow screen completed using the [employer] Protocol, and documented PRIOR to any PO meds, food or drink: Fail  [x]   VTE Prophylaxis: SCDs ordered/addressed; SCDs: On           (As a reminder, ASA, Plavix and TPA are not VTE prophylaxis.)  [x]   Stroke education booklet given, and education initiated with patient and/or caregiver      Nurse eSignature: Electronically signed by Sera Griggs RN on 1/26/23 at 12:49 AM EST

## 2023-01-26 NOTE — PLAN OF CARE
Problem: SLP Adult - Impaired Swallowing  Goal: By Discharge: Advance to least restrictive diet without signs or symptoms of aspiration for planned discharge setting. See evaluation for individualized goals. Outcome: Progressing     Problem: SLP Adult - Impaired Communication  Goal: By Discharge: Demonstrates communication skills at highest level of function for planned discharge setting. See evaluation for individualized goals.   Outcome: Progressing

## 2023-01-26 NOTE — PLAN OF CARE
Problem: Discharge Planning  Goal: Discharge to home or other facility with appropriate resources  1/26/2023 1244 by Gab Tejada RN  Outcome: Progressing  Flowsheets  Taken 1/26/2023 0600 by Tiesha Harris RN  Discharge to home or other facility with appropriate resources: Identify barriers to discharge with patient and caregiver  Taken 1/26/2023 0400 by Tiesha Harris RN  Discharge to home or other facility with appropriate resources: Identify barriers to discharge with patient and caregiver     Problem: Pain  Goal: Verbalizes/displays adequate comfort level or baseline comfort level  1/26/2023 1244 by Gab Tejada RN  Outcome: Progressing  Flowsheets  Taken 1/26/2023 0600 by Tiesha Harris RN  Verbalizes/displays adequate comfort level or baseline comfort level: Encourage patient to monitor pain and request assistance  Taken 1/26/2023 0400 by Tiesha Harris RN  Verbalizes/displays adequate comfort level or baseline comfort level: Encourage patient to monitor pain and request assistance     Problem: Skin/Tissue Integrity  Goal: Absence of new skin breakdown  Description: 1. Monitor for areas of redness and/or skin breakdown  2. Assess vascular access sites hourly  3. Every 4-6 hours minimum:  Change oxygen saturation probe site  4. Every 4-6 hours:  If on nasal continuous positive airway pressure, respiratory therapy assess nares and determine need for appliance change or resting period.   1/26/2023 1244 by Gab Tejada RN  Outcome: Progressing     Problem: Safety - Adult  Goal: Free from fall injury  1/26/2023 1244 by Gab Tejada RN  Outcome: Progressing     Problem: ABCDS Injury Assessment  Goal: Absence of physical injury  1/26/2023 1244 by Gab Tejada RN  Outcome: Progressing     Problem: Chronic Conditions and Co-morbidities  Goal: Patient's chronic conditions and co-morbidity symptoms are monitored and maintained or improved  Outcome: Progressing

## 2023-01-26 NOTE — CONSULTS
ICU HISTORY AND PHYSICAL       Hospital Day: 1  ICU Day: 1                                                         Code:Full Code  Admit Date: 1/25/2023  PCP: Darlene Milligan MD                                  CC: Left sided Weakness    HISTORY OF PRESENT ILLNESS:   Christopher Hi is a 74year old male with a PMHx of anxiety, CAD (s/p stent 2019), HTN, HLD who presented to [facility] ED with complete left-sided paralysis, left-sided extinction, slurred speech following a fall. Per patient, he was at a bar in the evening and had about 2 pints of beer. After arriving home patient states that he tripped on a rug and hit his head on a bathtub during the fall and began having left sided weakness and facial droop. Per patient, he did not lose consciousness and had no other symptoms. However per chart review patient was vomiting prior to falling. Patient denied any other symptoms. Last known normal was around 6 PM. Patient otherwise denied any headaches, changes in vision, chest pain, shortness of breath, abdominal pain, or dysuria. Patient states that he drinks about 1 bottle of beer daily. He denies any tobacco or drug use. In the ED, was stable and afebrile. Labs were largely unremarkable. Ethanol level of 155. Patient with NIH stroke scale 23. CT head without acute abnormalities. CTA showed right middle cerebral artery M1 segment severe stenosis/occlusion with some reconstitution of flow more distally.  Patient and wife consented to [facility] given at 7:53PM. Patient was transferred to the [facility]. and underwent emergent cerebral angiogram which showed occlusion of the right middle cerebral artery origin with nearly complete restoration of flow after thrombectomy, but otherwise normal 3 vessel cerebral angiogram    PAST HISTORY:     Past Medical History:   Diagnosis Date    Anxiety     Chest pain     Depression     Hyperlipidemia     Hypertension     Wears glasses        Past Surgical History: Procedure Laterality Date    CARDIAC CATHETERIZATION  7/2008    COLONOSCOPY  5/2009    CORONARY ANGIOPLASTY WITH STENT PLACEMENT      FINGER TRIGGER RELEASE  3-    left- long finger. WISDOM TOOTH EXTRACTION       SocialHistory:   The patient lives at home with his wife. Worked as an . Alcohol: Daily alcohol use, 1 beer per day per patient. Illicit drugs: no use  Tobacco: Denies use     Family History: Mother with prior stroke and history of A. Fib per patient. MEDICATIONS:     No current facility-administered medications on file prior to encounter. Current Outpatient Medications on File Prior to Encounter   Medication Sig Dispense Refill    hydrOXYzine (ATARAX) 10 MG tablet Take 10-20 mg by mouth 3 times daily as needed      ezetimibe (ZETIA) 10 MG tablet TAKE 1 TABLET DAILY      Ascorbic Acid  MG CPCR Take by mouth daily      diazePAM (VALIUM) 2 MG tablet       aspirin 81 MG chewable tablet Take 81 mg by mouth daily      lisinopril (PRINIVIL;ZESTRIL) 5 MG tablet Take 5 mg by mouth daily. rosuvastatin (CRESTOR) 20 MG tablet Take 40 mg by mouth daily            Scheduled Meds:   ezetimibe  10 mg Oral Daily    rosuvastatin  40 mg Oral Daily    sodium chloride flush  5-40 mL IntraVENous 2 times per day      Continuous Infusions:   sodium chloride       PRN Meds:ondansetron **OR** ondansetron, polyethylene glycol, sodium chloride flush, sodium chloride, perflutren lipid microspheres    Allergies:    Allergies   Allergen Reactions    Lipitor      myalgia    Tamiflu [Oseltamivir Phosphate] Rash       REVIEW OF SYSTEMS:       History obtained from chart review and the patient    Review of Systems    PHYSICAL EXAM:       Vitals: BP (!) 142/72   Pulse 82   Temp 97.6 °F (36.4 °C) (Axillary)   Resp 24   Ht 6' 1\" (1.854 m)   Wt 164 lb 14.5 oz (74.8 kg)   SpO2 100%   BMI 21.76 kg/m²     I/O:    Intake/Output Summary (Last 24 hours) at 1/26/2023 7746  Last data filed at 1/26/2023 0600  Gross per 24 hour   Intake 0 ml   Output 610 ml   Net -610 ml     No intake/output data recorded. I/O last 3 completed shifts: In: 0   Out: 56 [Urine:610]    Physical Examination:     Physical Exam    Access:                               -Peripheral Access Day#:1                            Hurst Day#:1    No results for input(s): PHART, TJS6VGE, PO2ART in the last 72 hours. DATA:       Labs:  CBC:   Recent Labs     01/25/23 1939 01/26/23  0530   WBC 6.8 11.5*   HGB 14.0 14.1   HCT 41.3 41.7    204       BMP:   Recent Labs     01/25/23 1939 01/26/23  0530   * 136   K 4.1 4.4   CL 98* 101   CO2 25 21   BUN 11 11   CREATININE 0.7* 0.6*   GLUCOSE 113* 119*     LFT's:   Recent Labs     01/25/23 1939   AST 24   ALT 20   BILITOT <0.2   ALKPHOS 47     Troponin:   Recent Labs     01/25/23 1939   TROPONINI <0.01     BNP:No results for input(s): BNP in the last 72 hours. ABGs: No results for input(s): PHART, RWD3UGP, PO2ART in the last 72 hours. INR:   Recent Labs     01/25/23 1939   INR 1.10       U/A:No results for input(s): NITRITE, COLORU, PHUR, LABCAST, WBCUA, RBCUA, MUCUS, TRICHOMONAS, YEAST, BACTERIA, CLARITYU, SPECGRAV, LEUKOCYTESUR, UROBILINOGEN, BILIRUBINUR, BLOODU, GLUCOSEU, AMORPHOUS in the last 72 hours. Invalid input(s): Yuval Wu    IR ANGIOGRAM CAROTID CEREBRAL RIGHT    (Results Pending)   CT head without contrast    (Results Pending)   MRI BRAIN WO CONTRAST    (Results Pending)     IMAGING:      CT Head W/O Contrast 1/25/2023  No acute intracranial findings. CT Head Neck W Contrast 1/25/2023  Right middle cerebral artery M1 segment severe stenosis/occlusion with some   reconstitution of flow more distally but overall decreased number of   enhancing right middle cerebral artery branches in comparison to the left. Apparent thrombus in the right middle cerebral artery M1 segment and M2   branches.        Critical results were called by Dr. Estela Kennedy Sessions to  Ricky Chirinos   on 1/25/2023 at 19:48. This scan was analyzed using Viz. ai contact LVO. Identification of suspected   findings is not for diagnostic use beyond notification. Viz LVO is limited to   analysis of imaging data and should not be used in-lieu of full patient   evaluation or relied upon to make or confirm diagnosis      CXR 1/25/2023  Unremarkable portable exam      ASSESSMENT AND PLAN:   Jarek Mojica is a 74 y.o. male, PMH of previous LAD stent, HTN, anxiety, found to have a R M1 occlusion via CTA on 11/26, now post-op from diagnostic cerebral angiogram, mechanical thrombectomy. 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 Patient received TNK at [facility], transferred to [facility] and underwent emergent cerebral angiogram which showed occlusion of the right middle cerebral artery origin with nearly complete restoration of flow after thrombectomy, but otherwise normal 3 vessel cerebral angiogram  -Consult Neuro-critical care  -Consult neuro-surgery  [facility] neuro checks  -Check lipid Panel  and HbA1c   -24-hour head CT/MRI  -Echo  -Start aspirin and DVT prophylaxis 24 hours after TNK  -Keep SBP <160     Chronic Medical Conditions:  CAD   S/p stent 2019.  Patient is on aspirin at home  -Hold aspirin now, restart 24 hours after TNK     HTN  Patient is on lisinopril 5mg daily at home  -Hold home meds as patient currently normotensive     HLD:  Patient is on Crestor 20mg and ezetimibe 10mg at home daily  -Continue home meds     Anxiety  Per chart, patient is on Valium 2mg at home  -Hold home meds     Code Status:Full Code  FEN: NPO  PPX:  SCDs  DISPO: ICU    This patient has been staffed and discussed with Rupal Aragon Md  -----------------------------  Ana Zuluaga, MS4  1/26/2023  8:26 Issa Graham MD, PGY1    Patient seen, examined and discussed with the resident and I agree with the assessment and plan. Briefly, this is a 74 y.o. male with acute right sided CVA from L MCA thrombus. Patient received both TNK and thrombectomy yesterday but still has significant left sided deficits. Received his interventions at 745 pm.  Plan for CT and MRI today. SLP today. Will get PT/OT to see. Echo with bubble.     Will stay in ICU until he has a stable safety head CT      Bhavik Sprague MD

## 2023-01-26 NOTE — CONSULTS
1397- CT called and ready  1915- Called  stroke team  Per Gil Lee  RE complete left side paralysis [address] returned page

## 2023-01-26 NOTE — CONSULTS
Vascular Neurology Telemedicine Consult    Date of visit: 2023  Patient Name: Maurice Johnson  MRN:  [id number]  :  1955     STROKE TIMELINE  Last Known Well: 18  ED arrival time:   Thrombolytic bolus time:   DTN (minutes): 40  Most Recent NIH Stroke Scale: 23    Abbreviated History of Present Illness     Briefly, Maurice Johnson is a 74 y.o. male w/ pertinent pmhx of CAD, HLD, HTN who was out drinking with friends when he started to vomit and then collapsed and developed left sided plegia and neglect at ~1800. Home Medications      No anticoagulation per wife or chart review    Allergies     Lipitor and Tamiflu [oseltamivir phosphate]      Vitals     Vitals:    23   BP: 121/84 103/74   Pulse: 88    Resp: 16    Temp: 98.8 °F (37.1 °C)    TempSrc: Oral    SpO2: 100%    Weight: 181 lb (82.1 kg)    Height: 6' 1\" (1.854 m)        Neurologic Exam     NIH Stroke Scale:                                 Time of Exam:     1a  Level of consciousness: 2=not alert, requires repeated stimulation to attend, or is obtunded and requires strong or painful stimulation to make movements (not stereotyped)   1b. LOC questions:  1=Performs one task correctly   1c. LOC commands: 1=Performs one task correctly   2. Best Gaze: 2=forced deviation, or total gaze paresis not overcome by oculocephalic maneuver   3. Visual: 2=Complete hemianopia   4. Facial Palsy: 2=Partial paralysis (total or near total paralysis of the lower face)   5a. Motor left arm: 4=No movement   5b. Motor right arm: 0=No drift, limb holds 90 (or 45) degrees for full 10 seconds   6a. motor left le=No movement   6b  Motor right le=No drift, limb holds 90 (or 45) degrees for full 10 seconds   7. Limb Ataxia: 0=Absent   8. Sensory: 2=Severe to total sensory loss; patient is not aware of being touched in face, arm, leg   9. Best Language:  0=No aphasia, normal   10.  Dysarthria: 1=Mild to moderate, patient slurs at least some words and at worst, can be understood with some difficulty   11. Extinction and Inattention: 2=Profound florence-inattention or florence-inattention to more than one modality. Does not recognize own hand or orients only to one side of space    Total:   23       Labs     I personally reviewed all recent labs. The pertinent labs which related to this visit are as follows:     Glu - 106      Imaging and Diagnostic Studies     I personally reviewed the following imaging - my impression is as follows:    CTH demonstrates no hemorrhage or obvious acute ischemia (ASPECTS 10). Notably there is a hyperdense R MCA  CTA demonstrates an occlusion vs near occlusion of R M1    Assessment & Plan/Recommendations      This is an overall healthy 75 yo male who collapsed and developed a R MCA syndrome at 1800. He has occlusion vs near occlusion of the R M1 branch which correlates well w/ his sx. In this setting I discussed the risks and benefits of both thrombolysis w/ tenecteplase and endovascular therapy with his wife who agreed with both. Recommendations:  - S/p TNK at [address] discussed with Vin Esqueda - Dr. Angelita Ramires.  Plan is to fly to [facility] for anticipated endovascular therapy  - Please keep blood pressure <180/105 though will defer to KAMILA for ultimate BP goals  - Do not give antiplatelets or anticoagulants until 24 hours and a safety scan has been completed  - Recommend ICU level of care, ideally with q1h neuro checks x 24 hours  - Please keep patient NPO until passing bedside dysphagia screen or formal swallow evaluation  - Recommend MRI/CT at 24 hours as a safety scan  - Local Neurology consult    Signed,  Kelvin Sidhu,    Stroke Team

## 2023-01-26 NOTE — PROCEDURES
Date of Procedure: 1/25/2023    History and indications: The patient is a 72-year-old man who presented to [facility] with acute onset stroke symptoms. The was shown on CT angiography to have evidence of right MCA occlusion consistent with the stroke symptoms. The patient is transferred as an emergency for interventional stroke management. Patient last seen normal: 1800  Patient Arrived to [facility]: 2123  Arrival to angiography: 2123  Time of groin puncture: 2134  Time of first thrombectomy attempt:2151  Time of Reperfusion: 2154  Reperfusion grade: TICI 2c    Procedure:  1. Diagnostic cerebral angiogram  2. Mechanical thrombectomy for stroke  3. Right common femoral artery Angio-Seal closure arteriotomy    Vessels catheterized  1. Superselective catheterization of right middle cerebral artery for thrombectomy   2. Right internal carotid artery  3. Left internal carotid artery  4. Left vertebral artery      Consent: The risks and benefits of the procedure were discussed with the patient's family who understands and agrees to proceed. Attending physician: Chanelle Hardy      Anesthesia: Prior to the procedure, the patient's personal and family history were reviewed for any adverse reactions to anesthesia and no pertinent concerns were raised. Local with conscious sedation administered by the nursing staff under the supervision of the attending physician for 45 minutes. Medications given: Fentanyl and Versed, heparin    ASA thgthrthathdtheth:th th5th Mallampati: II  EBL: 50 mL    Devices used:  1. 5 Brenda Cordial Select catheter  2. 7 Thai shuttle sheath  3. Penumbra ACE 60 aspiration catheter  4. 3 Max microcatheter  5. Transcend 0.014 inch wire  6. 8 Thai AngioSeal closure device    Details of procedure: The patient was brought to the neuro angiography suite where the right groin was shaved prepped and draped in the usual sterile fashion.  The right common femoral artery was accessed percutaneously under local anesthetic using a  modified Seldinger technique. A 7 Western Yajaira Shuttle sheath was inserted over a wire. A 5 Western Yajaira selection catheter was advanced over a Glidewire into the aortic arch under fluoroscopic guidance and used to selectively catheterized the target vessel listed above. The vessel origin was visualized fluoroscopically by injection of contrast prior to selective catheterization. After reviewing the images the slip catheter was removed. We chose to proceed with mechanical thrombectomy. MECHANICAL THROMBECTOMY: After securing the guidecatheter position proximally, the delivery catheter was advanced over Transcend wire beyond the level of occlusion in coaxial fashion thru the Penumbra catheter. I positioned the aspiration catheter just proximal to the occlusion site and the microcatheter just distal to the occlusion site. The aspiration catheter was then advanced over the microcatheter while under suction and deployed within the occluded segment of the artery. The microcatheter was immediately removed. The device was left in place for 5 minutes to engage the clot prior to retrieval. Aspiration to the Penumbra catheter was maintained continuously during withdrawal until the aspiration catheter showed rapid blood return. The Penumbra catheter was continually aspirated afterwards to ensure no clot was retained within the catheter. Control angiography was then completed. This showed nearly complete restoration of normal perfusion, but the parietal M4 branches remained occluded. I felt this was excellent reperfusion and chose not to risk further intervention. After completion of the revascularization procedure, I completed diagnostic cerebral angiography using the Select catheter. The vessels listed above were selected under fluoroscopic guidance taking care to visualize the origins with contrast prior to final catheter placement. Upon completion of the imaging, the catheter was removed.     The groin sheath was exchanged over a wire for an AngioSeal closure device and the arteriotomy was closed without difficulty. Procedure Time: 45  minutes  Fluoroscopy Time: 7.5 minutes  Contrast: 50  mL Optiray 320    Supervision and interpretation:  performed the entire procedure. Dr. Luis Angel Cabrales then reviewed all films in the neuroradiology reading room at a later time. Comparison: CTA 1/25/23    RIGHT CAROTID, INTRACRANIAL: The right internal carotid artery has normal caliber over its entire course. The fetal posterior and anterior cerebral arteries fill normally. The middle cerebral artery is occluded immediately after its origin     Thrombectomy:  First control angiogram demonstrates the microcatheter beyond the occlusion. Second control angiogram was performed after withdrawal of the aspiration catheter. There has been nearly complete restoration of flow. Third control angiogram demonstrates persistent occlusion of the posterior parietal M4 branches with retrograde filling. Followup angiogram after mechanical thrombectomy demonstrates TICI 2c reperfusion    LEFT CAROTID, INTRACRANIAL:  Adequate contrast enhancement of the carotid artery is seen. The ophthalmic artery is adequately patent. The carotid terminus is normal with adequate caliber on both A1 and M1 segments. The capillary and venous phases are within normal limits. There are no signs of fistulas, dissections or de sidney aneurysms. LEFT VERTEBRAL, INTRACRANIAL: Normal contrast enhancement of the vertebral artery is noted. It demonstrates a normal contour and caliber. The left posterior inferior cerebellar artery is adequately seen. The basilar artery appears normal course and caliber. There is normal filling of bilateral superior cerebellar arteries and bilateral posterior cerebral arteries. Complications: None    IMPRESSION:  1. Occlusion of the right middle cerebral artery origin, consistent with his stroke syndrome.   2. Nearly complete restoration of flow after thrombectomy, TICI 2c.  3. Otherwise normal 3 vessel cerebral angiogram

## 2023-01-26 NOTE — PROCEDURES
Date of Procedure: 1/25/2023    History and indications: The patient is a 82-year-old man who presented to [facility] with acute onset stroke symptoms. The was shown on CT angiography to have evidence of right MCA occlusion consistent with the stroke symptoms. The patient is transferred as an emergency for interventional stroke management. Patient last seen normal: 1800  Patient Arrived to [facility]: 2123  Arrival to angiography: 2123  Time of groin puncture: 2134  Time of first thrombectomy attempt:2151  Time of Reperfusion: 2154  Reperfusion grade: TICI 2c    Procedure:  1. Diagnostic cerebral angiogram  2. Mechanical thrombectomy for stroke  3. Right common femoral artery Angio-Seal closure arteriotomy    Vessels catheterized  1. Superselective catheterization of right middle cerebral artery for thrombectomy   2. Right internal carotid artery  3. Left internal carotid artery  4. Left vertebral artery      Consent: The risks and benefits of the procedure were discussed with the patient's family who understands and agrees to proceed. Attending physician: Aniket Hardy      Anesthesia: Prior to the procedure, the patient's personal and family history were reviewed for any adverse reactions to anesthesia and no pertinent concerns were raised. Local with conscious sedation administered by the nursing staff under the supervision of the attending physician for 45 minutes. Medications given: Fentanyl and Versed, heparin    ASA thgthrthathdtheth:th th5th Mallampati: II  EBL: 50 mL    Devices used:  1. 5 Western Yajaira Select catheter  2. 7 Armenian shuttle sheath  3. Penumbra ACE 60 aspiration catheter  4. 3 Max microcatheter  5. Transcend 0.014 inch wire  6. 8 Armenian AngioSeal closure device    Details of procedure: The patient was brought to the neuro angiography suite where the right groin was shaved prepped and draped in the usual sterile fashion.  The right common femoral artery was accessed percutaneously under local anesthetic using a  modified Seldinger technique. A 7 Western Yajaira Shuttle sheath was inserted over a wire. A 5 Western Yajaira selection catheter was advanced over a Glidewire into the aortic arch under fluoroscopic guidance and used to selectively catheterized the target vessel listed above. The vessel origin was visualized fluoroscopically by injection of contrast prior to selective catheterization. After reviewing the images the slip catheter was removed. We chose to proceed with mechanical thrombectomy. MECHANICAL THROMBECTOMY: After securing the guidecatheter position proximally, the delivery catheter was advanced over Transcend wire beyond the level of occlusion in coaxial fashion thru the Penumbra catheter. I positioned the aspiration catheter just proximal to the occlusion site and the microcatheter just distal to the occlusion site. The aspiration catheter was then advanced over the microcatheter while under suction and deployed within the occluded segment of the artery. The microcatheter was immediately removed. The device was left in place for 5 minutes to engage the clot prior to retrieval. Aspiration to the Penumbra catheter was maintained continuously during withdrawal until the aspiration catheter showed rapid blood return. The Penumbra catheter was continually aspirated afterwards to ensure no clot was retained within the catheter. Control angiography was then completed. This showed nearly complete restoration of normal perfusion, but the parietal M4 branches remained occluded. I felt this was excellent reperfusion and chose not to risk further intervention. After completion of the revascularization procedure, I completed diagnostic cerebral angiography using the Select catheter. The vessels listed above were selected under fluoroscopic guidance taking care to visualize the origins with contrast prior to final catheter placement. Upon completion of the imaging, the catheter was removed.     The groin sheath was exchanged over a wire for an AngioSeal closure device and the arteriotomy was closed without difficulty. Procedure Time: 45  minutes  Fluoroscopy Time: 7.5 minutes  Contrast: 50  mL Optiray 320    Supervision and interpretation:  performed the entire procedure. Dr. Anselmo Miranda then reviewed all films in the neuroradiology reading room at a later time. Comparison: CTA 1/25/23    RIGHT CAROTID, INTRACRANIAL: The right internal carotid artery has normal caliber over its entire course. The fetal posterior and anterior cerebral arteries fill normally. The middle cerebral artery is occluded immediately after its origin     Thrombectomy:  First control angiogram demonstrates the microcatheter beyond the occlusion. Second control angiogram was performed after withdrawal of the aspiration catheter. There has been nearly complete restoration of flow. Third control angiogram demonstrates persistent occlusion of the posterior parietal M4 branches with retrograde filling. Followup angiogram after mechanical thrombectomy demonstrates TICI 2c reperfusion    LEFT CAROTID, INTRACRANIAL:  Adequate contrast enhancement of the carotid artery is seen. The ophthalmic artery is adequately patent. The carotid terminus is normal with adequate caliber on both A1 and M1 segments. The capillary and venous phases are within normal limits. There are no signs of fistulas, dissections or de sidney aneurysms. LEFT VERTEBRAL, INTRACRANIAL: Normal contrast enhancement of the vertebral artery is noted. It demonstrates a normal contour and caliber. The left posterior inferior cerebellar artery is adequately seen. The basilar artery appears normal course and caliber. There is normal filling of bilateral superior cerebellar arteries and bilateral posterior cerebral arteries. Complications: None    IMPRESSION:  1. Occlusion of the right middle cerebral artery origin, consistent with his stroke syndrome.   2. Nearly complete restoration of flow after thrombectomy, TICI 2c.  3. Otherwise normal 3 vessel cerebral angiogram

## 2023-01-26 NOTE — CONSULTS
1956- Alayna from [facility] started transfer to [facility] for Neurosurgery  [facility] spoke to Dr. Stanton Hardy ( Neurosurgery) Per Neuro surgeon, patient needs to go to CATH LAB, Will be ICU admit later,  (Hospitalist ) made aware.  2015- Per Brittani RN, AIR CARE/ FLIGHT not available due to weather. Soonest Ground transport 1 hour or more. Call 911 for stat transport to [facility].  2016- 911 Called, [facility] jakub.  2024- [facility] arrived to transport patient.

## 2023-01-26 NOTE — PROGRESS NOTES
Pt to and from [facility]. .. pt also to and from [address] [place] [address]. Uneventful transfer. All safety precautions in place per unit protocol. Will monitor for changes.

## 2023-01-26 NOTE — CONSULTS
STROKE / INTRACRANIAL HEMORRHAGE HISTORY and PHYSICAL    Admitting Physician:   Primary Care Physician: Main Thornton MD    Chief Complaint: Acute left hemiplegia    HPI: 74 y.o. male w/ pertinent pmhx of CAD, HLD, HTN who was out drinking with friends when he started to vomit and then collapsed and developed left sided plegia and neglect at ~1800    Time last seen well: 1800    Time of arrival:     PMHx:  Past Medical History:   Diagnosis Date    Anxiety     Chest pain     Depression     Hyperlipidemia     Hypertension     Wears glasses         SURGHx:  Past Surgical History:   Procedure Laterality Date    CARDIAC CATHETERIZATION  2008    COLONOSCOPY  2009    CORONARY ANGIOPLASTY WITH STENT PLACEMENT      FINGER TRIGGER RELEASE  3-    left- long finger. WISDOM TOOTH EXTRACTION          FAMHx: History reviewed. No pertinent family history. SOCHx:   Social History     Tobacco Use    Smoking status: Former     Types: Cigarettes     Quit date: 3/9/2011     Years since quittin.8    Smokeless tobacco: Never    Tobacco comments:     occasional cigar   Substance Use Topics    Alcohol use: Yes     Comment: 3 drinks 3 days per week       ALLERGIES:Lipitor and Tamiflu [oseltamivir phosphate]     MEDS:  Prior to Admission medications    Medication Sig Start Date End Date Taking? Authorizing Provider   hydrOXYzine (ATARAX) 10 MG tablet Take 10-20 mg by mouth 3 times daily as needed 22   Historical Provider, MD   ezetimibe (ZETIA) 10 MG tablet TAKE 1 TABLET DAILY 21   Historical Provider, MD   Ascorbic Acid  MG CPCR Take by mouth daily 3/30/21   Historical Provider, MD   diazePAM (VALIUM) 2 MG tablet  21   Historical Provider, MD   aspirin 81 MG chewable tablet Take 81 mg by mouth daily    Historical Provider, MD   lisinopril (PRINIVIL;ZESTRIL) 5 MG tablet Take 5 mg by mouth daily.     Historical Provider, MD   rosuvastatin (CRESTOR) 20 MG tablet Take 40 mg by mouth daily     Historical Provider, MD        ROS:  pertinent findings in HPI    EXAM       GEN: lying on stretcher, cooperative    HEENT: NCAT    MUSCULOSKELETAL: normal bulk and tone       NIH Stroke Scale    Interval: pre-procedure  HOSP [facility] Stroke Scale:                                 Time of Exam:      1a  Level of consciousness: 2=not alert, requires repeated stimulation to attend, or is obtunded and requires strong or painful stimulation to make movements (not stereotyped)   1b. LOC questions:  1=Performs one task correctly   1c. LOC commands: 1=Performs one task correctly   2. Best Gaze: 2=forced deviation, or total gaze paresis not overcome by oculocephalic maneuver   3. Visual: 2=Complete hemianopia   4. Facial Palsy: 2=Partial paralysis (total or near total paralysis of the lower face)   5a. Motor left arm: 4=No movement   5b. Motor right arm: 0=No drift, limb holds 90 (or 45) degrees for full 10 seconds   6a. motor left le=No movement   6b  Motor right le=No drift, limb holds 90 (or 45) degrees for full 10 seconds   7. Limb Ataxia: 0=Absent   8. Sensory: 2=Severe to total sensory loss; patient is not aware of being touched in face, arm, leg   9. Best Language:  0=No aphasia, normal   10. Dysarthria: 1=Mild to moderate, patient slurs at least some words and at worst, can be understood with some difficulty   11. Extinction and Inattention: 2=Profound florence-inattention or florence-inattention to more than one modality.  Does not recognize own hand or orients only to one side of space     Total:   23       ICH Score:   Age (>80 =1)   GCS (13-14=1; 5-12=2; 3-4=3)   Post Fossa (yes=1)   Vol (>30 mL =1)   IVH? (yes=1)    WFNS:            IMAGING: Time CT obtained  and time initially reviewed   XR CHEST PORTABLE   Final Result   Unremarkable portable exam         CTA HEAD NECK W CONTRAST   Final Result   Right middle cerebral artery M1 segment severe stenosis/occlusion with some   reconstitution of flow more distally but overall decreased number of   enhancing right middle cerebral artery branches in comparison to the left. Apparent thrombus in the right middle cerebral artery M1 segment and M2   branches. Critical results were called by Dr. Sabino Cody Sessions to Dr. Analy An   on 1/25/2023 at 19:48. This scan was analyzed using Viz. ai contact LVO. Identification of suspected   findings is not for diagnostic use beyond notification. Viz LVO is limited to   analysis of imaging data and should not be used in-lieu of full patient   evaluation or relied upon to make or confirm diagnosis. CT HEAD WO CONTRAST   Final Result   Addendum (preliminary) 1 of 1   ADDENDUM:   Findings were communicated to Dr. Jennifer Gallegso by the CORE team on    1/25/2023   at 7:27 pm.         Final   No acute intracranial findings.                 A/P: 74year old man with acute onset stroke symptoms, hi NIHSS and LVO on CTA   -TNK candidate as in window, received   -thrombectomy candidate as hi NIHSS, LVO on imaging and in window    Trista Doe MD

## 2023-01-27 ENCOUNTER — TELEPHONE (OUTPATIENT)
Dept: CARDIOLOGY CLINIC | Age: 68
End: 2023-01-27

## 2023-01-27 ENCOUNTER — APPOINTMENT (OUTPATIENT)
Dept: CT IMAGING | Age: 68
DRG: 062 | End: 2023-01-27
Attending: NEUROLOGICAL SURGERY
Payer: MEDICARE

## 2023-01-27 PROBLEM — R79.89 ELEVATED TROPONIN: Status: ACTIVE | Noted: 2023-01-27

## 2023-01-27 PROBLEM — R77.8 ELEVATED TROPONIN: Status: ACTIVE | Noted: 2023-01-27

## 2023-01-27 PROBLEM — I25.10 CORONARY ARTERY DISEASE INVOLVING NATIVE CORONARY ARTERY OF NATIVE HEART WITHOUT ANGINA PECTORIS: Status: ACTIVE | Noted: 2023-01-27

## 2023-01-27 LAB
ANION GAP SERPL CALCULATED.3IONS-SCNC: 9 MMOL/L (ref 3–16)
BASOPHILS ABSOLUTE: 0 K/UL (ref 0–0.2)
BASOPHILS RELATIVE PERCENT: 0.4 %
BUN BLDV-MCNC: 13 MG/DL (ref 7–20)
CALCIUM SERPL-MCNC: 9.1 MG/DL (ref 8.3–10.6)
CHLORIDE BLD-SCNC: 102 MMOL/L (ref 99–110)
CHOLESTEROL, TOTAL: 140 MG/DL (ref 0–199)
CO2: 27 MMOL/L (ref 21–32)
CREAT SERPL-MCNC: 0.6 MG/DL (ref 0.8–1.3)
EKG ATRIAL RATE: 83 BPM
EKG DIAGNOSIS: NORMAL
EKG P AXIS: 73 DEGREES
EKG P-R INTERVAL: 130 MS
EKG Q-T INTERVAL: 378 MS
EKG QRS DURATION: 92 MS
EKG QTC CALCULATION (BAZETT): 444 MS
EKG R AXIS: 14 DEGREES
EKG T AXIS: 56 DEGREES
EKG VENTRICULAR RATE: 83 BPM
EOSINOPHILS ABSOLUTE: 0.1 K/UL (ref 0–0.6)
EOSINOPHILS RELATIVE PERCENT: 0.7 %
FACTOR VIII ACTIVITY: 158 % (ref 50–150)
GFR SERPL CREATININE-BSD FRML MDRD: >60 ML/MIN/{1.73_M2}
GLUCOSE BLD-MCNC: 123 MG/DL (ref 70–99)
HCT VFR BLD CALC: 39.6 % (ref 40.5–52.5)
HDLC SERPL-MCNC: 50 MG/DL (ref 40–60)
HEMOGLOBIN: 13.4 G/DL (ref 13.5–17.5)
LDL CHOLESTEROL CALCULATED: 71 MG/DL
LYMPHOCYTES ABSOLUTE: 1.3 K/UL (ref 1–5.1)
LYMPHOCYTES RELATIVE PERCENT: 15.6 %
MAGNESIUM: 2.1 MG/DL (ref 1.8–2.4)
MCH RBC QN AUTO: 32.5 PG (ref 26–34)
MCHC RBC AUTO-ENTMCNC: 33.9 G/DL (ref 31–36)
MCV RBC AUTO: 95.8 FL (ref 80–100)
MONOCYTES ABSOLUTE: 0.7 K/UL (ref 0–1.3)
MONOCYTES RELATIVE PERCENT: 8.6 %
NEUTROPHILS ABSOLUTE: 6.4 K/UL (ref 1.7–7.7)
NEUTROPHILS RELATIVE PERCENT: 74.7 %
PDW BLD-RTO: 13.2 % (ref 12.4–15.4)
PLATELET # BLD: 213 K/UL (ref 135–450)
PMV BLD AUTO: 7.6 FL (ref 5–10.5)
POTASSIUM SERPL-SCNC: 3.9 MMOL/L (ref 3.5–5.1)
RBC # BLD: 4.13 M/UL (ref 4.2–5.9)
SODIUM BLD-SCNC: 138 MMOL/L (ref 136–145)
TRIGL SERPL-MCNC: 97 MG/DL (ref 0–150)
TROPONIN: 0.02 NG/ML
TROPONIN: 0.05 NG/ML
VLDLC SERPL CALC-MCNC: 19 MG/DL
WBC # BLD: 8.6 K/UL (ref 4–11)

## 2023-01-27 PROCEDURE — 99222 1ST HOSP IP/OBS MODERATE 55: CPT | Performed by: INTERNAL MEDICINE

## 2023-01-27 PROCEDURE — 6360000002 HC RX W HCPCS

## 2023-01-27 PROCEDURE — 93005 ELECTROCARDIOGRAM TRACING: CPT

## 2023-01-27 PROCEDURE — 80061 LIPID PANEL: CPT

## 2023-01-27 PROCEDURE — 2580000003 HC RX 258

## 2023-01-27 PROCEDURE — 92526 ORAL FUNCTION THERAPY: CPT

## 2023-01-27 PROCEDURE — 36415 COLL VENOUS BLD VENIPUNCTURE: CPT

## 2023-01-27 PROCEDURE — 6370000000 HC RX 637 (ALT 250 FOR IP)

## 2023-01-27 PROCEDURE — 97112 NEUROMUSCULAR REEDUCATION: CPT

## 2023-01-27 PROCEDURE — 97530 THERAPEUTIC ACTIVITIES: CPT

## 2023-01-27 PROCEDURE — 99223 1ST HOSP IP/OBS HIGH 75: CPT | Performed by: PHYSICAL MEDICINE & REHABILITATION

## 2023-01-27 PROCEDURE — 70450 CT HEAD/BRAIN W/O DYE: CPT

## 2023-01-27 PROCEDURE — 94761 N-INVAS EAR/PLS OXIMETRY MLT: CPT

## 2023-01-27 PROCEDURE — 97110 THERAPEUTIC EXERCISES: CPT

## 2023-01-27 PROCEDURE — 80048 BASIC METABOLIC PNL TOTAL CA: CPT

## 2023-01-27 PROCEDURE — 84484 ASSAY OF TROPONIN QUANT: CPT

## 2023-01-27 PROCEDURE — 83036 HEMOGLOBIN GLYCOSYLATED A1C: CPT

## 2023-01-27 PROCEDURE — 99231 SBSQ HOSP IP/OBS SF/LOW 25: CPT

## 2023-01-27 PROCEDURE — 83735 ASSAY OF MAGNESIUM: CPT

## 2023-01-27 PROCEDURE — 2060000000 HC ICU INTERMEDIATE R&B

## 2023-01-27 PROCEDURE — 99232 SBSQ HOSP IP/OBS MODERATE 35: CPT | Performed by: INTERNAL MEDICINE

## 2023-01-27 PROCEDURE — 85025 COMPLETE CBC W/AUTO DIFF WBC: CPT

## 2023-01-27 PROCEDURE — 93010 ELECTROCARDIOGRAM REPORT: CPT | Performed by: INTERNAL MEDICINE

## 2023-01-27 PROCEDURE — 92507 TX SP LANG VOICE COMM INDIV: CPT

## 2023-01-27 RX ORDER — ACETAMINOPHEN 325 MG/1
650 TABLET ORAL EVERY 4 HOURS PRN
Status: CANCELLED | OUTPATIENT
Start: 2023-01-27

## 2023-01-27 RX ORDER — ASPIRIN 81 MG/1
81 TABLET, CHEWABLE ORAL DAILY
Status: DISCONTINUED | OUTPATIENT
Start: 2023-01-27 | End: 2023-01-29 | Stop reason: HOSPADM

## 2023-01-27 RX ORDER — ENOXAPARIN SODIUM 100 MG/ML
40 INJECTION SUBCUTANEOUS DAILY
Status: CANCELLED | OUTPATIENT
Start: 2023-01-27

## 2023-01-27 RX ORDER — ONDANSETRON 2 MG/ML
4 INJECTION INTRAMUSCULAR; INTRAVENOUS EVERY 6 HOURS PRN
Status: CANCELLED | OUTPATIENT
Start: 2023-01-27

## 2023-01-27 RX ORDER — ROSUVASTATIN CALCIUM 20 MG/1
40 TABLET, COATED ORAL DAILY
Status: CANCELLED | OUTPATIENT
Start: 2023-01-28

## 2023-01-27 RX ORDER — SODIUM CHLORIDE 0.9 % (FLUSH) 0.9 %
5-40 SYRINGE (ML) INJECTION EVERY 12 HOURS SCHEDULED
Status: CANCELLED | OUTPATIENT
Start: 2023-01-27

## 2023-01-27 RX ORDER — POLYETHYLENE GLYCOL 3350 17 G/17G
17 POWDER, FOR SOLUTION ORAL DAILY PRN
Status: CANCELLED | OUTPATIENT
Start: 2023-01-27

## 2023-01-27 RX ORDER — ASPIRIN 81 MG/1
81 TABLET, CHEWABLE ORAL DAILY
Status: CANCELLED | OUTPATIENT
Start: 2023-01-28

## 2023-01-27 RX ORDER — LORAZEPAM 2 MG/ML
0.5 INJECTION INTRAMUSCULAR ONCE
Status: COMPLETED | OUTPATIENT
Start: 2023-01-27 | End: 2023-01-27

## 2023-01-27 RX ORDER — EZETIMIBE 10 MG/1
10 TABLET ORAL DAILY
Status: CANCELLED | OUTPATIENT
Start: 2023-01-28

## 2023-01-27 RX ORDER — ONDANSETRON 4 MG/1
4 TABLET, ORALLY DISINTEGRATING ORAL EVERY 8 HOURS PRN
Status: CANCELLED | OUTPATIENT
Start: 2023-01-27

## 2023-01-27 RX ORDER — SODIUM CHLORIDE 0.9 % (FLUSH) 0.9 %
5-40 SYRINGE (ML) INJECTION PRN
Status: CANCELLED | OUTPATIENT
Start: 2023-01-27

## 2023-01-27 RX ORDER — ENOXAPARIN SODIUM 100 MG/ML
40 INJECTION SUBCUTANEOUS DAILY
Status: DISCONTINUED | OUTPATIENT
Start: 2023-01-27 | End: 2023-01-29 | Stop reason: HOSPADM

## 2023-01-27 RX ADMIN — SODIUM CHLORIDE, PRESERVATIVE FREE 10 ML: 5 INJECTION INTRAVENOUS at 08:52

## 2023-01-27 RX ADMIN — ENOXAPARIN SODIUM 40 MG: 100 INJECTION SUBCUTANEOUS at 12:55

## 2023-01-27 RX ADMIN — ROSUVASTATIN CALCIUM 40 MG: 20 TABLET, COATED ORAL at 12:54

## 2023-01-27 RX ADMIN — SODIUM CHLORIDE, PRESERVATIVE FREE 10 ML: 5 INJECTION INTRAVENOUS at 20:58

## 2023-01-27 RX ADMIN — EZETIMIBE 10 MG: 10 TABLET ORAL at 12:55

## 2023-01-27 RX ADMIN — LORAZEPAM 0.5 MG: 2 INJECTION INTRAMUSCULAR; INTRAVENOUS at 04:29

## 2023-01-27 RX ADMIN — ASPIRIN 81 MG 81 MG: 81 TABLET ORAL at 12:55

## 2023-01-27 ASSESSMENT — ENCOUNTER SYMPTOMS
SHORTNESS OF BREATH: 0
WHEEZING: 0
NAUSEA: 0
DIARRHEA: 0
ABDOMINAL PAIN: 0
CONSTIPATION: 0
CHEST TIGHTNESS: 1
VOMITING: 0
RHINORRHEA: 0
COUGH: 0

## 2023-01-27 ASSESSMENT — PAIN SCALES - GENERAL
PAINLEVEL_OUTOF10: 0

## 2023-01-27 NOTE — DISCHARGE INSTRUCTIONS
Patient instructions for CAM monitor: You will need to wear 2 monitors, each for 2 weeks, for a total of 28 days. We will place your initial 14 day/2 week monitor today, Friday January 27th before you leave the hospital.      On February 10,  REMOVE the monitor and return it the same day, if possible, to the [employer]  between 8:30 am and 4:30 pm.     ON February 10TH, PLACE THE 2ND 14 day/2 week monitor . On  February 24th, REMOVE the 2nd monitor and return it the same day, if possible, to the [employer] [address] [address]  between 8:30 am and 4:30 pm.     Remove date: 1st monitor February 10  Remove date: 2nd monitor February 24      [address]  [facility] [employer] [id number] [facility], [address] [place] [address]  PHONE: [phone]         Make sure to save box as you will place monitor back in the box when you return it to the office. After removing monitor stick it to template provided, place both your log and monitor in box . If monitor comes off but has been in place at least 7 days place in box return it to the office. If it comes off sooner than 7 days you will have to call office and return to have it replaced. Avoid excess sweating to maximize wear time. You are able to shower after 24 hours, however have majority of water hitting back and not directly on monitor. Do not submerge in bath. If you experience any symptoms while wearing monitor push button and record in booklet. For questions about monitor call: Customer Service [phone]      Secondary Stroke Prevention Goals:      Blood Pressure:  Goal - BP < 140/90  Take your medication as prescribed. Take your blood pressure at home regularly (at least once a day for the first few weeks). Write the numbers down so your primary care provider can adjust medications as needed. Cholesterol:   Continue to take your cholesterol medication to help prevent a stroke.       Diabetes Mellitus:   Goal - HbA1c < 7%  Visit this web page for more information on managing diabetes:   [employer]     Alcohol Consumption:  Men - two or fewer drinks per day    Physical Activity:  Goal - at least 30 minutes of moderate intensity physical exercise 1-3 times per week  Okay to start at any level and work your way up to goal. Walking even 5-10 minutes a day is better than no walking. Starting with small goals and working your way up is the best way to be successful. Visit this web page for more information on moving more and living healthy:   [employer]     Diet:  Low-fat, low-sodium, and Mediterranean or Dietary Approaches to Stop Hypertension (DASH) or Diabetic Diet when applicable.    Visit this web page for more information on ways to improve your diet:   http://www.[employer].com/     Obesity:  Goal BMI 18.5-25 kg/m2

## 2023-01-27 NOTE — PROGRESS NOTES
Occupational Therapy  Daily Treatment Note  Patient Name: Christopher Hi  MRN: [id number]    Chart Reviewed: Yes     Assessment: Pt s/p acute stroke. Pt presents significantly below his independent baseline. Pt demo demo significantly impaired sitting and standing balance with L and posterior lean, pushing with R UE. Pt demo L neglect and L UE hemiparesis, some flexor tone emerging in L UE. Pt requires assist of 2 for transfers and for brief standing in [place]. Requires assist of 1-2 for most ADLs. Recommend intensive OT tx at d/c      Discharge Recommendations:   Christopher Hi scored a 11/24 on the AM-PAC ADL Inpatient form. Current research shows that an AM-PAC score of 17 or less is typically not associated with a discharge to the patient's home setting. Based on the patient's AM-PAC score and their current ADL deficits, it is recommended that the patient have 5-7 sessions per week of Occupational Therapy at d/c to increase the patient's independence. At this time, this patient demonstrates complex nursing, medical, and rehabilitative needs, and would benefit from intensive rehabilitation services upon discharge from the Inpatient setting. This patient demonstrates the ability to participate in and benefit from an intensive therapy program with a coordinated interdisciplinary team approach to foster frequent, structured, and documented communication among disciplines, who will work together to establish, prioritize, and achieve treatment goals. Please see assessment section for further patient specific details. If patient discharges prior to next session this note will serve as a discharge summary. Please see below for the latest assessment towards goals. Equipment Needs:  defer to d/c setting      Other position/activity restrictions: \"bedrest until seen by PT/OT\", seizure precautions     Additional Pertinent Hx: Pt is a 74 y.o. male adm 1/25 with acute ischemic stroke.   Pt presented to [address] [place] [address] [facility] after fall. Pt with complete left-sided paralysis, left-sided extinction, slurred speech. Head CT: neg. CTA head: Apparent thrombus in the right middle cerebral artery M1 segment and M2  branches. Pt s/p Mechanical thrombectomy for stroke on 1/25. MRI brain:   Multiple large acute ischemic insults in the right MCA territory, most pronounced in the basal ganglia, posterior insula, and temporal lobe with additional scattered areas in the right frontal and parietal lobes. PMH:  HTN, alcohol abuse, anxiety      Diagnosis: Acute ischemic stroke  Treatment Diagnosis: Impaired ADL, Lft UE function,functional mobility    Subjective: Pt in bed upon entry, agreeable to treatment    Pain: Denied      Objective:    Cognition/Orientation: oriented x4. Demo emerging insight into current deficits. Bed mobility   Rolling Left: Minimal assist  Rolling Right: Maximum assist  Supine to sit: Dependent (max A of 2)  Sit to Supine: dependent (Max A of 2)  Scooting: Dependent (Max A of 2)    Functional Mobility   Sit to Stand: Dependent (Maximum assist of 2 from bed to [facility])  Stand to Sit: Dependent (Maximum assist of 2 to bed from stedy)  Bed to Chair Transfer: Dependent via ceiling lift  Comment: Pt not able to safely sit in LAHTI stedy seat for safe transfer to chair. Pt returned to supine and used ceiling lift for transfer    Balance  Sitting: Max assist at EOB (heavy L and posterior lean , pushes from R)  Standing Balance: Dependent (max A of 2 static stance in LAHTI stedy x3 reps. Stood 1 min each rep. Facilitation provided to L knee and hip to improve WB through L LE. Pt primarily WB through R with L lean). Support provided to L UE 2/2 weakness. Neuromuscular Ed  Completed supine bridges x10 with mod A to stabilize LEs; supine trunk rotation L/R with mod A of 2 x2 reps each direction. Sat EOB x25 minutes working on balance and midline awareness.  Completed propping on L elbow with facilitation for WB through L LE. WB on R forearm x2 reps. Pt progressed to maintain midline for 2-3 sec with cga before loosing balance L again    ADLs   Feeding: pt too fatigued to eat breakfast end of session. SLP to address  LB Dressing: dependent (don socks)  Toileting: condom catheter    Perception: L visual neglect. Unable to track L of midline or locate items on L without significant VC    UE Exercises : Completed PROM L UE all joints. Instructed wife on PROM for pt with direction to avoid L shoulder flexion overhead to prevent injury. Noted mild flexor tone at elbow and trace movement shoulder. No active movement L hand. Activity Tolerance: Pt tolerated session without negative event. Pt fatigued following      Patient Education: d/c planning, safe transfers, UE ROM, visual scanning strategies    Safety Devices in Place: Pt in recliner with LEs elevated, needs in reach, alarm engaged, RN aware and wife present      Goals:  Short Term Goals  Time Frame for Short Term Goals: Discharge- goals ongoing  Short Term Goal 1: Stance with mod assist of 1 for 30 sec x3 to assist with ADL/transfers  Short Term Goal 2: Sit edge of bed with CG x10 min while engaging in simple grooming/hygiene  Short Term Goal 3: Transfer bed to/from 3 in 1 commode/chair with mod assist of 2  Short Term Goal 4: Pt will attend to left visual field with no more than 2 cues.          Plan:      Times Per Week: 5-7          Therapy Time   Individual Concurrent Group Co-treatment   Time In 7677         Time Out 1100         Minutes 70            Timed Code Treatment Minutes:   Kana LALR/L, 1005

## 2023-01-27 NOTE — PROGRESS NOTES
NEUROLOGY / NEUROCRITICAL CARE PROGRESS NOTE       Patient Name: Anton Coker YOB: 1955   Sex: Male Age: 74 yrs     CC / Reason for Consult: ischemic stroke    Interval Hx / Changes over last 24 hours:   BP well controlled  Safety CT scan completed overnight, read as having no hemorrhage  Incidence of chest pain overnight, EKG normal, troponin . 05 but wife reports history of anxiety attacks leading to some chest pain and this improved after ativan. Repeating troponin level this morning    ROS: continues to have L sided weakness, but significant neglect and reports feeling well    HISTORY   Admission HPI:   Anton Coker is a 74 y.o. y/o male with history significant for HLD, HTN, alcohol abuse, depression and anxiety who presented to [facility] ED  with complete left sided paralysis, left sided extinction and slurred speech after a fall. History provided by: the patient and chart review      Around 1810 yesterday, he was out drinking alcohol with his friends at a [employer], this was his last known well. Patient states he was okay until he got home which is where he went into the bathroom and started vomiting. He fell onto the ground striking the front of his head on the bathtub. He does not know why he fell. His friends/family came to check on him and noticed he was not moving the left side of his body, that his speech was slurred and he was having difficulty staying awake. They called EMS. When EMS arrived they were able to wake him up confirmed that he had complete left sided paralysis and was unresponsive to pain on that side. Upon arrival to the ED around 1915, is initial evaluation showed complete left sided plegia with extinction, slurred speech and decreased LOC. His initial NIH was a 23. A stroke alert was called and he was immediately sent for a head CT and CTA head and neck.  The CT head was unremarkable however the CTA showed an apparent thrombus in the Right MCA in the M1 and M2 branches consistent with and LVO. Stroke team recommenced TNK which patient received at 19:51. Dr. Johan Franks with neuro vascular surgery was contacted who recommenced patient be flown to [facility] for endovascular intervention. Patient arrived to [facility] around 21:23 and immediately underwent a diagnostic cerebral angiogram and thrombectomy. Nearly complete restoration of flow in the right MCA was obtained and considered to be a TICI 2c. The patient was admitted to the ICU postoperatively in stable condition. He does not smoke. He has been a chronic alcohol use, states he drinks about 5 days out of the week, anywhere to one drink a night to occasionally binging on the weekends and special occasions. He takes Lisinopril for his blood pressure and Crestor for high cholesterol. He takes an 81mg aspirin daily. He has a family history of A-fib and stroke in his mother. He does report remote history of palpitations. Currently, he is sleepy but easy to arouse. His current NIH is 19. He has dense plegia in the left extremities and some profound left sided extinction/neglect. He has limited insight to what has happened to him. [facility] Past Medical History:   Diagnosis Date    Anxiety     Chest pain     Depression     Encounter for imaging to screen for metal prior to MRI 01/26/2023    CT Head cleared for metal, no foreign body--ok for MRI per     Hyperlipidemia     Hypertension     Wears glasses       Allergies Allergies   Allergen Reactions    Lipitor      myalgia    Tamiflu [Oseltamivir Phosphate] Rash      Diet ADULT DIET;  Dysphagia - Pureed   Isolation No active isolations     CURRENT SCHEDULED MEDICATIONS   Inpatient Medications     aspirin, 81 mg, Oral, Daily    enoxaparin, 30 mg, SubCUTAneous, Daily    ezetimibe, 10 mg, Oral, Daily    rosuvastatin, 40 mg, Oral, Daily    sodium chloride flush, 5-40 mL, IntraVENous, 2 times per day   Infusions    sodium chloride Antibiotics   Recent Abx Admin        No antibiotic orders with administrations found. LABS   Metabolic Panel Recent Labs     01/25/23 1939 01/26/23  0530 01/27/23  0552   * 136 138   K 4.1 4.4 3.9   CL 98* 101 102   CO2 25 21 27   BUN 11 11 13   CREATININE 0.7* 0.6* 0.6*   GLUCOSE 113* 119* 123*   CALCIUM 8.8 9.2 9.1   LABALBU 4.6  --   --    MG  --  1.90 2.10   ALKPHOS 47  --   --    ALT 20  --   --    AST 24  --   --       CBC / Coags Recent Labs     01/25/23 1939 01/26/23  0530 01/26/23  1519 01/27/23  0552   WBC 6.8 11.5* 9.9 8.6   RBC 4.32 4.32 4.30 4.13*   HGB 14.0 14.1 13.8 13.4*   HCT 41.3 41.7 40.7 39.6*    204 174 213   INR 1.10  --   --   --       Other Antithrombin panel - inprocess  Factor 8: in process  Lupus anticoagulant: in process  Protein C - in process  Protein S - in process  MTHFR - in process  Prothrombin gene mutation - in process  Beta - 2 glycoprotein - in process  Cardiolipin antibodies - in process  Factor 5 - in process     DIAGNOSTICS   IMAGES:  Images personally reviewed and agree w/ radiology interpretation. Head CT w/o Contrast:  Impression       1. No intracranial hemorrhage status post treatment   2. Acute infarctions involving the right cerebral hemisphere corresponding to the areas of acute infarction demonstrated on MRI yesterday. My read: areas of hypodensity consistent with areas of stroke seen on MRI completed prior to safety scan. No definite areas of hemorrhage seen    ECHO with bubble study:  Conclusions      Summary   Normal left ventricle size, wall thickness, and systolic function with an   estimated ejection fraction of 55-60%. No regional wall motion abnormalities   are seen. The right ventricle is normal in size and function   Trivial mitral regurgitation. There is trace tricuspid valve regurgitation. A bubble study was performed and fails to show evidence of shunting.    IVC size is normal (<2.1cm) and collapses > 50% with respiration consistent   with normal RA pressure (3mmHg). Estimated pulmonary artery systolic pressure is normal at 21 mmHg assuming a   right atrial pressure of 3 mmHg. Previous Imaging:  Head CT w/o Contrast: 1/25/23; 19:31  Impression   No acute intracranial findings. CTA of Head / Neck w/ Contrast: 1/25/23; 19:22  Impression   Right middle cerebral artery M1 segment severe stenosis/occlusion with some   reconstitution of flow more distally but overall decreased number of   enhancing right middle cerebral artery branches in comparison to the left. Apparent thrombus in the right middle cerebral artery M1 segment and M2   branches. MRI Brain WO Contrast:  Final Read:   1. Multiple large acute ischemic insults in the right MCA territory, most pronounced in the basal ganglia, posterior insula, and temporal lobe with additional scattered areas in the right frontal and parietal lobes. 2.  Abnormal flow signal in the right M1 and M2 segments.        PHYSICAL EXAMINATION     PHYSICAL EXAM:  Vitals:    01/27/23 0600 01/27/23 0700 01/27/23 0800 01/27/23 0900   BP: 122/81 121/72 125/78 125/78   Pulse: 67 61 80 66   Resp: 18 11 17 15   Temp:   (P) 98.5 °F (36.9 °C)    TempSrc:   Oral    SpO2: 99% 99% 99% 100%   Weight:       Height:             General: Alert, no distress, well-nourished  Neurologic  Mental status:   orientation to person, place, time, situation   Attention intact as able to attend well to the exam     Language fluent in conversation, mild dysarthria   Comprehension intact; follows simple commands    Cranial nerves:   CN2: L hemianopia  Fundoscopic Exam: unable to visualize fundi  CN 3,4,6: Pupils equal and reactive to light, R gaze preference but able to overcome and look to L  CN5: Facial sensation symmetric, but L side with extinction to bilateral stim  CN7: L facial weakness  CN8: Hearing symmetric to spoken voice  CN9: Palate elevated symmetrically  CN11: Shoulder shrug intact on R, no movement on the L  CN12: Tongue midline with protrusion    Motor Exam:   R  L    Deltoid 5  1   Biceps 5 1   Triceps 5 0   Wrist extension  5 0   Interossei 5 0      R  L    Hip flexion  5  1   Hip extension  5 1   Knee flexion  5 2   Knee extension  5 2   Ankle dorsiflexion  5 0   Ankle plantar flexion  5 0       Sensory: Unable to feel light touch on L side, intact on R side  Cerebellar/coordination: finger nose finger normal without ataxia on R, unable to perform on L  Tone: decreased in LUE  Gait: held for patient safety    OTHER SYSTEMS:  Cardiovascular: Warm, appears well perfused, NSR on monitor  Respiratory: Easy, non-labored respiratory pattern   Abdominal: Abdomen is without distention   Extremities: Upper and lower extremities are atraumatic in appearance without deformity. No swelling or erythema. ASSESSMENT & RECOMMENDATIONS   Assessment:  -This is a 74year old male with a significant history of alcohol abuse, HTN and HLD with a last know well of approximately 18:00 on 1/25 who presented to [facility] ED with a full right MCA syndrome and was found to have an LVO of the right MCA. He received TNK @ 19:51 and underwent thrombectomy with reperfusion at 21:54 (TICI 2c). The etiology of his stroke is likely cardio embolic given LVO in absence of significant carotid stenosis.       -He reports a history of a-fib and stroke in his mother. He does not think he has a-fib but does report a history of palpitations in the past but cannot elaborate. He is currently in NSR on telemetry.         Plan:  Imaging / Labs:  -CT head reviewed  -Echocardiogram with bubble  -Check lipid panel and hemoglobin A1C if not already completed      Medications:  -Continue statin  -Initiate ASA 81mg 24 hours after TNK/EVT  -SCDs for DVT prophylaxis  -Start DVT chemoprophylaxis 24 after TNK/EVT     Consults / Nursing Care:  -HOB elevated to help prevent aspiration  -Q4H Neurologic Exams & Vitals  -NIHSS per guidelines -Telemetry to monitor for arrhythmia   -PT/OT/ST  -PMR consult if rehab recommended   -Nursing bedside swallow prior to any PO intake   -Groin checks per post procedure guidelines  - Trending troponin, management per primary team  -Blood Pressure Management              -s/p Thrombectomy - Keep SBP <160     Follow up / Discharge Recommendations:  -Will need 30 day event monitor arranged at discharge, discussed with nurse navigator. If monitor unrevealing, may need loop recorder  - If origin of stroke remains unclear may need to consider JESUS, follow up with his established cardiologist placed in discharge instructions  -Stroke Education at Discharge  -Follow up w/ Neurology in 1 month  - If he discharges prior to hypercoagulable work up fully resulting, follow up with PCP to see if outpatient hematology follow up is necessary    Case discussed with Dr. Shanon Meneses, hospitalist, and ICU team    LISSET Menendez - CNP   Neurology & Neurocritical Care   1/27/2023 9:31 AM      ICU Patients:   Neurocritical Care Line: [phone]  [employer]: [address]      I spent 25 minutes in the care of this patient. Over 50% of that time was in face-to-face counseling regarding disease process, diagnostic testing, preventative measures, and answering patient and family questions.

## 2023-01-27 NOTE — TELEPHONE ENCOUNTER
Per TRAVIS Valdez, Neuro, 30 day CAM at discharge, stroke, ck for arrhythmia. All CAM Instructions reviewed with patient and wife. WifeTavon given 2nd 14 day monitor to place as below. Initial 14 Day CAM ID#: [id number]    2nd 14 Day CAM ID#: [id number] (wife Tavon Geller will return 1st monitor to office and place 2nd monitor, then will return 2nd monitor to office as well)      On February 10,  REMOVE the initial monitor   (placed 1/27/23) and return it the same day, if possible, to the [employer]  between 8:30 am and 4:30 pm.     ON February 10TH, PLACE THE 2ND 14 day/2 week monitor . On  February 24th, REMOVE the 2nd monitor and return it the same day, if possible, to the [employer]  between 8:30 am and 4:30 pm.     Remove date: 1st monitor February 10  Remove date: 2nd monitor February 24    Copy instructions given to wifeTavon. Verb understanding of all.

## 2023-01-27 NOTE — CONSULTS
[facility]  Cardiology Inpatient Consult Service                                                                                          Pt Name: Blanca Wayne  Age: 74 y.o. Sex: male  : 1955  Location: [facility]3/4503-01    Referring Physician: Melina Vargas MD    Reason for Consult:     Reason for Consultation/Chief Complaint: chest pressure with mild troponin elevation    HPI:      Blanca Wayne is a 74 y.o. male with a past medical history of anxiety, CAD (s/p stent ), HTN, HLD who presented to [facility] ED with complete left-sided paralysis, left-sided extinction, slurred speech following a fall. CTA in the ED showed right middle cerebral artery M1 segment severe stenosis/occlusion with some reconstitution of flow more distally. Pt got TNKase on 23, tx to [facility], underwent emergent cerebral angiogram which showed occlusion of the right middle cerebral artery origin with nearly complete restoration of flow after thrombectomy. Presented on 22 with similar chest pain. At that time cardiology offered heart cath vs conservative management, pt chose medical management. Saw pt at bedside, resting comfortable. No acute complaints. Reports of squeezing like sensation on his chest which started yesterday night  Pt was resting in his bed at time of onset  No radiation  Episode lasted for 1 hour  Pt was given ativan which resolved the pain  Per patient, he has anxiety, and he does get similar sx when anxiety is acting up   Pt is anxious about his paralyzed left side  In , his symptoms of CP with exertion  His stress test in 2022 was unequivocal    Histories     Past Medical History:   has a past medical history of Anxiety, Chest pain, Depression, Encounter for imaging to screen for metal prior to MRI, Hyperlipidemia, Hypertension, and Wears glasses.     Surgical History:   has a past surgical history that includes [place] tooth extraction; Colonoscopy (5/2009); Cardiac catheterization (7/2008); Finger trigger release (3-); and Coronary angioplasty with stent. Social History:   reports that he quit smoking about 11 years ago. He has never used smokeless tobacco. He reports current alcohol use. He reports that he does not use drugs. Family History:  No evidence for sudden cardiac death or premature CAD    Medications:     Home Medications  Were reviewed and are listed in nursing record. and/or listed below  Prior to Admission medications    Medication Sig Start Date End Date Taking? Authorizing Provider   hydrOXYzine (ATARAX) 10 MG tablet Take 10-20 mg by mouth 3 times daily as needed 1/17/22   Historical Provider, MD   ezetimibe (ZETIA) 10 MG tablet TAKE 1 TABLET DAILY 12/22/21   Historical Provider, MD   Ascorbic Acid  MG CPCR Take by mouth daily 3/30/21   Historical Provider, MD   diazePAM (VALIUM) 2 MG tablet  12/23/21   Historical Provider, MD   aspirin 81 MG chewable tablet Take 81 mg by mouth daily    Historical Provider, MD   lisinopril (PRINIVIL;ZESTRIL) 5 MG tablet Take 5 mg by mouth daily. Historical Provider, MD   rosuvastatin (CRESTOR) 20 MG tablet Take 40 mg by mouth daily     Historical Provider, MD      Inpatient Medications:   aspirin  81 mg Oral Daily    enoxaparin  40 mg SubCUTAneous Daily    ezetimibe  10 mg Oral Daily    rosuvastatin  40 mg Oral Daily    sodium chloride flush  5-40 mL IntraVENous 2 times per day     IV drips:   sodium chloride       PRN:  ondansetron **OR** ondansetron, polyethylene glycol, sodium chloride flush, sodium chloride, perflutren lipid microspheres    Allergy:     Lipitor and Tamiflu [oseltamivir phosphate]     Review of Systems:     All 12 point review of symptoms completed. Pertinent positives identified in the HPI, all other review of symptoms negative as below.     Review of Systems   Constitutional:  Negative for activity change, appetite change, chills, fatigue, fever and unexpected weight change. HENT:  Negative for congestion and rhinorrhea. Respiratory:  Positive for chest tightness. Negative for cough, shortness of breath and wheezing. Cardiovascular:  Negative for chest pain, palpitations and leg swelling. Gastrointestinal:  Negative for abdominal pain, constipation, diarrhea, nausea and vomiting. Endocrine: Negative for polydipsia and polyuria. Genitourinary:  Negative for difficulty urinating, dysuria and frequency. Neurological:  Positive for weakness. Negative for dizziness, tremors, seizures and headaches. Physical Examination:     Vitals:    01/27/23 1200 01/27/23 1300 01/27/23 1400 01/27/23 1530   BP: 125/75  125/86 132/79   Pulse: 75 80 79 88   Resp: 17 17 20 16   Temp: 98.6 °F (37 °C)   98 °F (36.7 °C)   TempSrc: Oral      SpO2: 99%  99% 100%   Weight:       Height:         Wt Readings from Last 3 Encounters:   01/26/23 164 lb 14.5 oz (74.8 kg)   01/25/23 181 lb (82.1 kg)   01/19/22 180 lb 12.8 oz (82 kg)     Objective:  General Appearance:  Comfortable. Vital signs: (most recent): Blood pressure 132/79, pulse 88, temperature 98 °F (36.7 °C), resp. rate 16, height 6' 1\" (1.854 m), weight 164 lb 14.5 oz (74.8 kg), SpO2 100 %. No fever. Lungs:  Normal effort. He is not in respiratory distress. Heart: Normal rate. Regular rhythm. S1 normal and S2 normal.  No murmur. Chest: Symmetric chest wall expansion. Abdomen: Abdomen is non-distended. There is no abdominal tenderness. Extremities: Normal range of motion. Pulses: Distal pulses are intact. Neurological: Patient is oriented to person, place and time. Pupils:  Pupils are equal, round, and reactive to light. Skin:  Warm and dry.       Labs:     Recent Labs     01/25/23  1939 01/26/23  0530 01/27/23  0552   * 136 138   K 4.1 4.4 3.9   BUN 11 11 13   CREATININE 0.7* 0.6* 0.6*   CL 98* 101 102   CO2 25 21 27   GLUCOSE 113* 119* 123*   CALCIUM 8.8 9.2 9.1   MG  --  1.90 2.10     Recent Labs     01/25/23 1939 01/26/23  0530 01/26/23  1519 01/27/23  0552   WBC 6.8 11.5* 9.9 8.6   HGB 14.0 14.1 13.8 13.4*   HCT 41.3 41.7 40.7 39.6*    204 174 213   MCV 95.5 96.7 94.6 95.8     No results for input(s): CHOLTOT, TRIG, HDL, CHOLHDL, LDL in the last 72 hours. Invalid input(s): [address] [facility] [address] [address]  Recent Labs     01/25/23 1939   INR 1.10     Recent Labs     01/25/23 1939 01/27/23  0356 01/27/23  1123   TROPONINI <0.01 0.05* 0.02*     No results for input(s): BNP in the last 72 hours. No results for input(s): TSH in the last 72 hours. No results for input(s): CHOL, HDL, LDLCALC, TRIG in the last 72 hours.]    Lab Results   Component Value Date    TROPONINI 0.02 (H) 01/27/2023     Imaging:     I personally reviewed imaging studies including CXR, Stress test, TTE/JESUS. Last ECG (if available) -personally interpreted EKG:  I have reviewed EKG with the following interpretation  Incomplete RBBB, NSR, no ST-T changes. Telemetry: Personally interpreted  NSR    Last Stress (if available):  01/20/22 Stress test  Technically difficult study    Normal LVEF >60%    Difficult to evaluate inferior wall due to extracardiac uptake, cannot    exclude inferior hypokinesis    Fixed inferior defect noted which worsens on AC images. Most likely defect    is due to attenuation artifact, however, ischemia cannot be excluded     Last TTE/JESUS(if available):  01/25/23 TTE   Normal left ventricle size, wall thickness, and systolic function with an   estimated ejection fraction of 55-60%. No regional wall motion abnormalities   are seen. The right ventricle is normal in size and function   Trivial mitral regurgitation. There is trace tricuspid valve regurgitation. A bubble study was performed and fails to show evidence of shunting. IVC size is normal (<2.1cm) and collapses > 50% with respiration consistent   with normal RA pressure (3mmHg).    Estimated pulmonary artery systolic pressure is normal at 21 mmHg assuming a   right atrial pressure of 3 mmHg. Last Cath (if available):  Procedure Date: February 22, 2019   IMPRESSION:   1) Severe single vessel CAD, s/p Synergy LYNNETTE to the prox-mid LAD   2) Abnormal FFR evaluation of the LAD (normal FFR of dLCX)   3) Normal LV function with high normal EDP   4) Mid LAD myocardial bridge with mild atherosclerosis of the   segment and moderate systolic compression   5) Mild to moderate proximal left vertebral artery stenosis   6) Patent LSCA and LIMA   7) Normal right radial artery by ultrasound     Last CMR  (if available):    Assessment / Plan:     Atypical CP  Symptoms sounds more akin to anxiety. Troponin trended down. EKG non ischemic. Symptoms resolved with ativan   Will not pursue any invasive cardiac interventions for now  Continue to monitor    Tobacco use was discussed with the patient and educated on the negative effects. I have asked the patient to not utilize these agents. Thank you for allowing to us to participate in the care or [facility]. Further evaluation will be based upon the patient's clinical course and testing results.     Will discuss with Dr Taryn Penaloza MD  [id number]

## 2023-01-27 NOTE — PROGRESS NOTES
ICU Progress Note    Admit Date: 1/25/2023  Day: 2  Vent Day: None  IV Access:Peripheral  IV Fluids:None  Vasopressors:None                Antibiotics: None  Diet: Diet NPO    CC: Left sided weakness    Interval history: Patient reported having chest pain onset around 3 am last night. He describes the pain as a pressure sensation to the left side of his chest, unable to rate the pain. He reports associated dyspnea with the pain. Lasted 30 minutes before dissipating after he received 0.5mg Ativan. He denies any complaints this morning. EKG overnight without ST changes. HPI: Anton Coker is a 74year old male with a PMHx of anxiety, CAD (s/p stent 2019), HTN, HLD who presented to [facility] ED with complete left-sided paralysis, left-sided extinction, slurred speech following a fall. Per patient, he was at a bar in the evening and had about 2 pints of beer. After arriving home patient states that he tripped on a rug and hit his head on a bathtub during the fall and began having left sided weakness and facial droop. Per patient, he did not lose consciousness and had no other symptoms. However per chart review patient was vomiting prior to falling. Patient denied any other symptoms. Last known normal was around 6 PM. Patient otherwise denied any headaches, changes in vision, chest pain, shortness of breath, abdominal pain, or dysuria. Patient states that he drinks about 1 bottle of beer daily. He denies any tobacco or drug use. In the ED, was stable and afebrile. Labs were largely unremarkable. Ethanol level of 155. Patient with NIH stroke scale 23. CT head without acute abnormalities. CTA showed right middle cerebral artery M1 segment severe stenosis/occlusion with some reconstitution of flow more distally.  Patient and wife consented to [facility] given at 7:53PM. Patient was transferred to the [facility]. and underwent emergent cerebral angiogram which showed occlusion of the right middle cerebral artery origin with nearly complete restoration of flow after thrombectomy, but otherwise normal 3 vessel cerebral angiogram    Medications:     Scheduled Meds:   ezetimibe  10 mg Oral Daily    rosuvastatin  40 mg Oral Daily    sodium chloride flush  5-40 mL IntraVENous 2 times per day     Continuous Infusions:   sodium chloride       PRN Meds:ondansetron **OR** ondansetron, polyethylene glycol, sodium chloride flush, sodium chloride, perflutren lipid microspheres    Objective:   Vitals:   T-max:  Patient Vitals for the past 8 hrs:   BP Temp Temp src Pulse Resp SpO2   01/27/23 0700 121/72 -- -- 61 11 99 %   01/27/23 0600 122/81 -- -- 67 18 99 %   01/27/23 0500 133/80 -- -- 73 16 96 %   01/27/23 0400 (!) 151/101 97.8 °F (36.6 °C) Oral 83 29 97 %   01/27/23 0300 132/85 -- -- 66 18 98 %   01/27/23 0200 113/74 -- -- 63 13 97 %   01/27/23 0100 122/81 -- -- 73 16 99 %       Intake/Output Summary (Last 24 hours) at 1/27/2023 0800  Last data filed at 1/27/2023 0600  Gross per 24 hour   Intake 475 ml   Output 790 ml   Net -315 ml       Review of Systems   Constitutional:  Negative for chills and fever. HENT:  Negative for nosebleeds. Nasal pain   Respiratory:  Negative for shortness of breath. Cardiovascular:  Negative for chest pain and palpitations. Gastrointestinal:  Negative for abdominal pain. Neurological:  Positive for weakness (Left arm, left leg (improved)). Psychiatric/Behavioral:  Negative for agitation. Physical Exam  Constitutional:       Appearance: Normal appearance. HENT:      Head: Normocephalic and atraumatic. Nose: Signs of injury present. No septal deviation or laceration. Eyes:      General: Visual field deficit (Left peripheral vision) present. Cardiovascular:      Rate and Rhythm: Normal rate and regular rhythm.       Heart sounds: Normal heart sounds, S1 normal and S2 normal.   Pulmonary:      Effort: Pulmonary effort is normal.      Breath sounds: Normal breath sounds and air entry. Chest:      Chest wall: No tenderness. Abdominal:      General: Abdomen is flat. Palpations: Abdomen is soft. Neurological:      Mental Status: He is alert and oriented to person, place, and time. Cranial Nerves: Facial asymmetry (Left facial droop) present. Motor: Weakness (0/5 Strength LUE. 4/5 L knee flexion. 3/5 L hip flexion. Unable to wiggle left toes. ) present. LABS:  CBC:   Recent Labs     01/26/23 0530 01/26/23  1519 01/27/23  0552   WBC 11.5* 9.9 8.6   HGB 14.1 13.8 13.4*   HCT 41.7 40.7 39.6*    174 213   MCV 96.7 94.6 95.8     Renal:    Recent Labs     01/25/23 1939 01/26/23  0530 01/27/23  0552   * 136 138   K 4.1 4.4 3.9   CL 98* 101 102   CO2 25 21 27   BUN 11 11 13   CREATININE 0.7* 0.6* 0.6*   GLUCOSE 113* 119* 123*   CALCIUM 8.8 9.2 9.1   MG  --  1.90 2.10   ANIONGAP 10 14 9     Hepatic:   Recent Labs     01/25/23 1939   AST 24   ALT 20   BILITOT <0.2   PROT 7.2   LABALBU 4.6   ALKPHOS 47     Troponin:   Recent Labs     01/25/23 1939 01/27/23  0356   TROPONINI <0.01 0.05*     BNP: No results for input(s): BNP in the last 72 hours. Lipids: No results for input(s): CHOL, HDL in the last 72 hours. Invalid input(s): LDLCALCU, TRIGLYCERIDE  ABGs:  No results for input(s): PHART, OJY5WJT, PO2ART, CUJ1RHE, BEART, THGBART, O3DSAXBV, KTB9KPE in the last 72 hours. INR:   Recent Labs     01/25/23 1939   INR 1.10     Lactate: No results for input(s): LACTATE in the last 72 hours. Cultures:  -----------------------------------------------------------------  RAD:   CT head without contrast   Final Result      1. No intracranial hemorrhage status post treatment   2. Acute infarctions involving the right cerebral hemisphere corresponding to the areas of acute infarction demonstrated on MRI yesterday. FL MODIFIED BARIUM SWALLOW W VIDEO   Final Result      1. Laryngeal penetration with thin liquid consistency by spoon and straw.    2. Aspiration with nectar consistency by spoon and straw. MRI BRAIN WO CONTRAST   Final Result      1. Multiple large acute ischemic insults in the right MCA territory, most pronounced in the basal ganglia, posterior insula, and temporal lobe with additional scattered areas in the right frontal and parietal lobes. 2.  Abnormal flow signal in the right M1 and M2 segments. IR ANGIOGRAM CAROTID CEREBRAL RIGHT    (Results Pending)     Echo w/ bubble study:   Normal left ventricle size, wall thickness, and systolic function with an   estimated ejection fraction of 55-60%. No regional wall motion abnormalities   are seen. The right ventricle is normal in size and function   Trivial mitral regurgitation. There is trace tricuspid valve regurgitation. A bubble study was performed and fails to show evidence of shunting. IVC size is normal (<2.1cm) and collapses > 50% with respiration consistent   with normal RA pressure (3mmHg). Estimated pulmonary artery systolic pressure is normal at 21 mmHg assuming a   right atrial pressure of 3 mmHg. Assessment/Plan:   Wayne Varner is a 74 y.o. male, PMH of previous LAD stent, HTN, anxiety, found to have a R M1 occlusion via CTA on 11/26, now post-op from diagnostic cerebral angiogram, mechanical thrombectomy. 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 Patient received TNK at [place], transferred to [facility] and underwent emergent cerebral angiogram which showed occlusion of the right middle cerebral artery origin with nearly complete restoration of flow after thrombectomy, but otherwise normal 3 vessel cerebral angiogram. Echo w/ bubble performed yesterday showing no evidence of shunting with EF 55-60%. CT head w/o contrast without intracranial hemorrhage post TNKase.  MRI showing multiple large ischemic insults in the R MCA territory.  -Neuro-critical care following  -Neuro-surgery consulted  -Q1H neuro checks  -Awaiting lipid Panel  and HbA1c   -Check Anti-phospholipid/protein C&S antibodies  -Likely start Aspirin and DVT prophylaxis today  -Keep SBP <160    Chest Pain  Patient with chest pain overnight. EKG order which was unremarkable without ST changes. Troponin 0.05. No new oxygen requirement. Pain now resolved this AM.   -Awaiting f/u tropon     Chronic Medical Conditions:  CAD   S/p stent 2019. Patient is on aspirin at home  -Hold aspirin now, restart 24 hours after TNK     HTN  Patient is on lisinopril 5mg daily at home  -Hold home meds as patient currently normotensive     HLD:  Patient is on Crestor 20mg and ezetimibe 10mg at home daily  -Continue home meds     Anxiety  Per chart, patient is on Valium 2mg at home. Possible could be contributor to his chest pain yesterday, notes he felt anxious. Denies taking Valium at home, however it is listed as a home med. Ativan 0.5mg once overnight   -Hold home meds  -Pharmacy med recc     Code Status:Full Code  FEN: NPO  PPX:  SCDs  DISPO: ICU    [facility], MS4  01/27/23  8:00 AM    This patient has been staffed and discussed with Dr Mary Ann Patterson MD, PGY1     Patient seen, examined and discussed with the resident and I agree with the assessment and plan as edited above. Transitioning more to the rehab side of his stroke. CT and MRI were stable, so can likely back off of the neurochecks. Consulting ARU. If neurology is agreeable he can Can transfer out of ICU today to  or ARU.      Cande Angulo MD

## 2023-01-27 NOTE — PLAN OF CARE
Care plan reviewed and implemented during shift  Problem: Discharge Planning  Goal: Discharge to home or other facility with appropriate resources  Outcome: Progressing  Flowsheets  Taken 1/27/2023 0800 by Zaynab Barnard RN  Discharge to home or other facility with appropriate resources:   Identify barriers to discharge with patient and caregiver   Arrange for needed discharge resources and transportation as appropriate   Identify discharge learning needs (meds, wound care, etc)  Taken 1/27/2023 0400 by Apolonia Fletcher RN  Discharge to home or other facility with appropriate resources: Identify barriers to discharge with patient and caregiver     Problem: Pain  Goal: Verbalizes/displays adequate comfort level or baseline comfort level  Outcome: Progressing  Flowsheets  Taken 1/27/2023 0800 by Zaynab Barnard RN  Verbalizes/displays adequate comfort level or baseline comfort level:   Encourage patient to monitor pain and request assistance   Assess pain using appropriate pain scale   Implement non-pharmacological measures as appropriate and evaluate response  Taken 1/27/2023 0600 by Apolonia Fletcher RN  Verbalizes/displays adequate comfort level or baseline comfort level: Encourage patient to monitor pain and request assistance     Problem: Chronic Conditions and Co-morbidities  Goal: Patient's chronic conditions and co-morbidity symptoms are monitored and maintained or improved  Outcome: Progressing  Flowsheets  Taken 1/27/2023 0800 by Zaynab Barnard RN  Care Plan - Patient's Chronic Conditions and Co-Morbidity Symptoms are Monitored and Maintained or Improved:   Monitor and assess patient's chronic conditions and comorbid symptoms for stability, deterioration, or improvement   Collaborate with multidisciplinary team to address chronic and comorbid conditions and prevent exacerbation or deterioration   Update acute care plan with appropriate goals if chronic or comorbid symptoms are exacerbated and prevent overall improvement and discharge  Taken 1/27/2023 0400 by Mike Rao RN  Care Plan - Patient's Chronic Conditions and Co-Morbidity Symptoms are Monitored and Maintained or Improved: Monitor and assess patient's chronic conditions and comorbid symptoms for stability, deterioration, or improvement     Problem: Neurosensory - Adult  Goal: Achieves stable or improved neurological status  Outcome: Progressing  Flowsheets  Taken 1/27/2023 0800 by Charles Porras RN  Achieves stable or improved neurological status:   Assess for and report changes in neurological status   Initiate measures to prevent increased intracranial pressure   Maintain blood pressure and fluid volume within ordered parameters to optimize cerebral perfusion and minimize risk of hemorrhage   Monitor temperature, glucose, and sodium.  Initiate appropriate interventions as ordered  Taken 1/27/2023 0400 by Mike Rao RN  Achieves stable or improved neurological status: Assess for and report changes in neurological status  Taken 1/27/2023 0200 by Mike Rao RN  Achieves stable or improved neurological status: Assess for and report changes in neurological status  Taken 1/27/2023 0100 by Mike Rao RN  Achieves stable or improved neurological status: Assess for and report changes in neurological status     Problem: Neurosensory - Adult  Goal: Achieves maximal functionality and self care  Outcome: Progressing  Flowsheets  Taken 1/27/2023 0800 by Charles Porras RN  Achieves maximal functionality and self care:   Monitor swallowing and airway patency with patient fatigue and changes in neurological status   Encourage and assist patient to increase activity and self care with guidance from physical therapy/occupational therapy   Encourage visually impaired, hearing impaired and aphasic patients to use assistive/communication devices  Taken 1/27/2023 0400 by Mike Rao RN  Achieves maximal functionality and self care: Monitor swallowing and airway patency with patient fatigue and changes in neurological status  Taken 1/27/2023 0200 by Aris De Leon RN  Achieves maximal functionality and self care: Monitor swallowing and airway patency with patient fatigue and changes in neurological status  Taken 1/27/2023 0100 by Aris De Leon RN  Achieves maximal functionality and self care: Monitor swallowing and airway patency with patient fatigue and changes in neurological status

## 2023-01-27 NOTE — CONSULTS
Consult Note  Physical Medicine and Rehabilitation    Patient: Luke Turner  [id number]  Date: 2023      Chief Complaint:     History of Present Illness/Hospital Course:  Luke Turner is a 74year old male with a PMHx of anxiety, CAD (s/p stent ), HTN, HLD who presented to [place] ED with complete left-sided paralysis, left-sided extinction, slurred speech following a fall. CTA showed right middle cerebral artery M1 segment severe stenosis/occlusion with some reconstitution of flow more distally. He underwent emergent cerebral angiogram which showed occlusion of the right middle cerebral artery origin with nearly complete restoration of flow after thrombectomy, but otherwise normal 3 vessel cerebral angiogram.     Prior Level of Function:  Independent for mobility, ADLs, and IADLs    Current Level of Function:  Mod assist     Pertinent Social History:  Support: Wife at home      Past Medical History:   Diagnosis Date    Anxiety     Chest pain     Depression     Encounter for imaging to screen for metal prior to MRI 2023    CT Head cleared for metal, no foreign body--ok for MRI per     Hyperlipidemia     Hypertension     Wears glasses        Past Surgical History:   Procedure Laterality Date    CARDIAC CATHETERIZATION  2008    COLONOSCOPY  2009    CORONARY ANGIOPLASTY WITH STENT PLACEMENT      FINGER TRIGGER RELEASE  3-    left- long finger. WISDOM TOOTH EXTRACTION         History reviewed. No pertinent family history.     Social History     Socioeconomic History    Marital status:      Spouse name: None    Number of children: None    Years of education: None    Highest education level: None   Tobacco Use    Smoking status: Former     Types: Cigarettes     Quit date: 3/9/2011     Years since quittin.8    Smokeless tobacco: Never    Tobacco comments:     occasional cigar   Vaping Use    Vaping Use: Never used   Substance and Sexual Activity    Alcohol use: Yes     Comment: 3 drinks 3 days per week    Drug use: No           REVIEW OF SYSTEMS:   CONSTITUTIONAL: negative for fevers, chills, diaphoresis, appetite change, night sweats, unexpected weight change, fatigue  EYES: negative for blurred vision, eye discharge, visual disturbance and icterus  HEENT: negative for hearing loss, tinnitus, ear drainage, sinus pressure, nasal congestion, epistaxis and snoring  RESPIRATORY: Negative for hemoptysis, cough, sputum production  CARDIOVASCULAR: negative for chest pain, palpitations, exertional chest pressure/discomfort, syncope, edema  GASTROINTESTINAL: negative for nausea, vomiting, diarrhea, blood in stool, abdominal pain, constipation  GENITOURINARY: negative for frequency, dysuria, urinary incontinence, decreased urine volume, and hematuria  HEMATOLOGIC/LYMPHATIC: negative for easy bruising, bleeding and lymphadenopathy  ALLERGIC/IMMUNOLOGIC: negative for recurrent infections, angioedema, anaphylaxis and drug reactions  ENDOCRINE: negative for weight changes and diabetic symptoms including polyuria, polydipsia and polyphagia  MUSCULOSKELETAL: negative for pain, joint swelling, decreased range of motion  NEUROLOGICAL: negative for headaches, Positive for slurred speech and unilateral weakness  PSYCHIATRIC/BEHAVIORAL: negative for hallucinations, behavioral problems, confusion and agitation.      Physical Examination:  Vitals: Patient Vitals for the past 24 hrs:   BP Temp Temp src Pulse Resp SpO2   01/27/23 1000 (!) 141/117 -- -- 81 17 100 %   01/27/23 0900 125/78 -- -- 66 15 100 %   01/27/23 0800 125/78 (P) 98.5 °F (36.9 °C) Oral 80 17 99 %   01/27/23 0700 121/72 -- -- 61 11 99 %   01/27/23 0600 122/81 -- -- 67 18 99 %   01/27/23 0500 133/80 -- -- 73 16 96 %   01/27/23 0400 (!) 151/101 97.8 °F (36.6 °C) Oral 83 29 97 %   01/27/23 0300 132/85 -- -- 66 18 98 %   01/27/23 0200 113/74 -- -- 63 13 97 %   01/27/23 0100 122/81 -- -- 73 16 99 %   01/27/23 0000 120/82 98.1 °F (36.7 °C) Oral 70 13 97 %   01/26/23 2300 139/89 -- -- 99 19 98 %   01/26/23 2200 133/86 -- -- 79 30 98 %   01/26/23 2100 125/75 -- -- 74 16 93 %   01/26/23 2000 134/80 98 °F (36.7 °C) Oral 83 21 97 %   01/26/23 1900 133/83 -- -- 75 19 98 %   01/26/23 1800 130/81 -- -- 70 16 99 %   01/26/23 1700 124/80 -- -- 71 17 98 %   01/26/23 1600 121/82 99 °F (37.2 °C) Axillary 87 17 100 %   01/26/23 1530 123/76 -- -- 75 18 100 %   01/26/23 1527 -- -- -- 80 17 100 %   01/26/23 1500 121/80 -- -- 88 21 --   01/26/23 1400 124/80 -- -- 80 14 99 %   01/26/23 1300 138/83 -- -- 67 16 100 %   01/26/23 1250 -- -- -- 75 -- 100 %   01/26/23 1200 135/76 98.5 °F (36.9 °C) Axillary 69 16 100 %   01/26/23 1100 124/77 -- -- 73 21 99 %     Const: Alert. WDWN. No distress  Eyes: Conjunctiva noninjected, no icterus noted; pupils equal, round, and reactive to light. HENT: Atraumatic, normocephalic; Oral mucosa moist  Neck: Trachea midline, neck supple. No thyromegaly noted. CV: Regular rate and rhythm, no murmur rub or gallop noted  Resp: Lungs clear to auscultation bilaterally, no rales wheezes or ronchi, no retractions. Respirations unlabored. GI: Soft, nontender, nondistended. Normal bowel sounds. No palpable masses. Skin: Normal temperature and turgor. No rashes or breakdown noted. Ext: No significant edema appreciated. No varicosities. MSK: No joint tenderness, erythema, warmth noted. AROM intact. Neuro: Alert, oriented, appropriate. No cranial nerve deficits appreciated. Sensation intact to light touch. Motor examination reveals normal strength in right side with 0/5 strength in left UE and 3/5 strength in left LE.    Psych: Stable mood, normal judgement, normal affect     Lab Results   Component Value Date    WBC 8.6 01/27/2023    HGB 13.4 (L) 01/27/2023    HCT 39.6 (L) 01/27/2023    MCV 95.8 01/27/2023     01/27/2023     Lab Results   Component Value Date    INR 1.10 01/25/2023    INR 1.05 01/08/2019    PROTIME 14.1 01/25/2023    PROTIME 12.0 01/08/2019     Lab Results   Component Value Date    CREATININE 0.6 (L) 01/27/2023    BUN 13 01/27/2023     01/27/2023    K 3.9 01/27/2023     01/27/2023    CO2 27 01/27/2023     Lab Results   Component Value Date    ALT 20 01/25/2023    AST 24 01/25/2023    ALKPHOS 47 01/25/2023    BILITOT <0.2 01/25/2023           CT head without contrast   Final Result      1. No intracranial hemorrhage status post treatment   2. Acute infarctions involving the right cerebral hemisphere corresponding to the areas of acute infarction demonstrated on MRI yesterday. FL MODIFIED BARIUM SWALLOW W VIDEO   Final Result      1. Laryngeal penetration with thin liquid consistency by spoon and straw. 2. Aspiration with nectar consistency by spoon and straw. MRI BRAIN WO CONTRAST   Final Result      1. Multiple large acute ischemic insults in the right MCA territory, most pronounced in the basal ganglia, posterior insula, and temporal lobe with additional scattered areas in the right frontal and parietal lobes. 2.  Abnormal flow signal in the right M1 and M2 segments. IR ANGIOGRAM CAROTID CEREBRAL RIGHT    (Results Pending)         Assessment:  R MCA Occlusion s/p TNK and thrombectomy  - PT/OT/SLP  -Keep SBP <160  - Will require ARU admit        CAD   S/p stent 2019. Patient is on aspirin at home       HTN  Patient is on lisinopril 5mg daily at home  - monitor      HLD:  Patient is on Crestor 20mg and ezetimibe 10mg at home daily  -Continue home meds     Anxiety  Per chart, patient is on Valium 2mg at home. Possible could be contributor to his chest pain yesterday, notes he felt anxious. Denies taking Valium at home, however it is listed as a home med. Ativan 0.5mg once overnight   -Hold home meds  -Pharmacy med recc     Impairments- Decreased functional mobility, Decreased ADLs    Recommendations:  ARU Admit when medically ready.     Patient with new functional deficits and ongoing medical complexity. Demonstrates ability to tolerate 3 hours therapy/day. He is a good candidate for acute inpatient rehab when medically appropriate. Thank you for this consult. Please contact me with any questions or concerns.      Eriberto Zelaya D.O. M.P.H  PM&R  1/27/2023  10:55 AM

## 2023-01-27 NOTE — PROGRESS NOTES
Cardiology consulted/ signing off/no intervention / ok for rehab/ per Keith Jose RN Charge/ notified Guicho Norman NP/ neuro/ ok to proceed with Rehab orders/ Perfect serve attending Dr Debbie Oviedo with this information pending response    1800 DC orders/ pending bed from ARU

## 2023-01-27 NOTE — DISCHARGE INSTR - COC
Continuity of Care Form    Patient Name: Brett Downs   :  1955  MRN:  [id number]    Admit date:  2023  Discharge date:  2023    Code Status Order: Full Code   Advance Directives:     Admitting Physician:  Gail Proctor MD  PCP: Chencho Dong MD    Discharging Nurse: [facility]/Room#: 5569/6735-22  Discharging Unit Phone Number:     Emergency Contact:   Extended Emergency Contact Information  Primary Emergency Contact: Dunia Hall  Address: [address] [facility] [address] Phone: [phone]  Relation: Spouse  Secondary Emergency Contact: [facility] [address] Phone: [phone]  Relation: Child    Past Surgical History:  Past Surgical History:   Procedure Laterality Date    CARDIAC CATHETERIZATION  2008    COLONOSCOPY  2009    CORONARY ANGIOPLASTY WITH STENT PLACEMENT      FINGER TRIGGER RELEASE  3-    left- long finger.     WISDOM TOOTH EXTRACTION         Immunization History:   Immunization History   Administered Date(s) Administered    COVID-19, MODERNA BLUE border, Primary or Immunocompromised, (age 12y+), IM, 100 mcg/0.5mL 2021, 2021    Tdap (Boostrix, Adacel) 2016       Active Problems:  Patient Active Problem List   Diagnosis Code    Chest pain R07.9    Cerebrovascular accident (CVA) due to embolism of right middle cerebral artery ([facility] [address] I63.411    Elevated troponin R77.8    Coronary artery disease involving native coronary artery of native heart without angina pectoris I25.10       Isolation/Infection:   Isolation            No Isolation          Patient Infection Status       Infection Onset Added Last Indicated Last Indicated By Review Planned Expiration Resolved Resolved By    None active    Resolved    COVID-19 (Rule Out) 22 COVID-19, Rapid (Ordered)   22 Rule-Out Test Resulted            Nurse Assessment:  Last Vital Signs: /79   Pulse 88   Temp 98 °F (36.7 °C)   Resp 16   Ht 6' 1\" (1.854 m)   Wt 164 lb 14.5 oz (74.8 kg)   SpO2 100%   BMI 21.76 kg/m²     Last documented pain score (0-10 scale): Pain Level: 0  Last Weight:   Wt Readings from Last 1 Encounters:   01/26/23 164 lb 14.5 oz (74.8 kg)     Mental Status:  oriented and alert    IV Access:  - None    Nursing Mobility/ADLs:  Walking   Assisted  Transfer  Assisted  Bathing  Assisted  Dressing  Assisted  Toileting  Assisted  Feeding  Assisted  Med Admin  Assisted  Med Delivery   whole    Wound Care Documentation and Therapy:  Incision 03/14/11 Finger (Comment which one) (Active)   Number of days: 8909        Elimination:  Continence: Bowel: Yes  Bladder: intermittent incontinence  Urinary Catheter: None   Colostomy/Ileostomy/Ileal Conduit: n/a       Date of Last BM: 1/25/2023    Intake/Output Summary (Last 24 hours) at 1/27/2023 1746  Last data filed at 1/27/2023 1400  Gross per 24 hour   Intake 570 ml   Output 690 ml   Net -120 ml     I/O last 3 completed shifts: In: 1 [P.O.:475]  Out: 1400 [Urine:1400]    Safety Concerns:     History of Falls (last 30 days)    Impairments/Disabilities:      Left sided impairment/ acute from stroke R MCA/ received TPA protocol + thrombectomy    Nutrition Therapy:  Current Nutrition Therapy:   - Oral Diet:  General    Routes of Feeding: Oral  Liquids: Thin Liquids  Daily Fluid Restriction: no  Last Modified Barium Swallow with Video (Video Swallowing Test): not done    Treatments at the Time of Hospital Discharge:   Respiratory Treatments: n/a    Oxygen Therapy:  is not on home oxygen therapy.   Ventilator:    - No ventilator support    Rehab Therapies: Physical Therapy, Occupational Therapy, and Speech/Language Therapy  Weight Bearing Status/Restrictions: No weight bearing restrictions  Other Medical Equipment (for information only, NOT a DME order):  to be determined by ARU  Other Treatments: ARU Patient's personal belongings (please select all that are sent with patient):      RN SIGNATURE:  waleska      CASE MANAGEMENT/SOCIAL WORK SECTION    Inpatient Status Date: ***    Readmission Risk Assessment Score:  Readmission Risk              Risk of Unplanned Readmission:  9           Discharging to Facility/ Agency   Name:   Address:  Phone:  Fax:    Dialysis Facility (if applicable)   Name:  Address:  Dialysis Schedule:  Phone:  Fax:    / signature: {Esignature:[id number]}    PHYSICIAN SECTION    Prognosis: Good    Condition at Discharge: Stable    Rehab Potential (if transferring to Rehab): Good    Recommended Labs or Other Treatments After Discharge: PT/OT/St    Physician Certification: I certify the above information and transfer of Rubens Snow  is necessary for the continuing treatment of the diagnosis listed and that he requires Acute Rehab for greater 30 days. Update Admission H&P: Changes in H&P as follows -      R MCA Occlusion s/p TNK and thrombectomy  Patient presented with complete left-sided paralysis, left-sided extinction, slurred speech. Patient received TNK at [facility], transferred to [facility] and underwent emergent cerebral angiogram which showed occlusion of the right middle cerebral artery origin with nearly complete restoration of flow after thrombectomy, but otherwise normal 3 vessel cerebral angiogram  -Started on aspirin, continue zetic and crestor. Follow with neurology outpatient.  -discharge with cardiac monitor.   -follow with PCP for hypercoag workup result. If needed, will need a referral to hematology     Chronic Medical Conditions:  CAD   S/p stent 2019. Patient is on aspirin at home  -resume aspirin and crestor      HTN  Patient was on lisinopril 5mg daily at home. Hold home meds as patient currently normotensive. Need to follow with PCP outpatient to monitor BP.      HLD:  - on Crestor 20mg and ezetimibe 10mg at home daily     Anxiety  -patient is not on Valium 2mg at home.  Follow with PCP         PHYSICIAN SIGNATURE:  Electronically signed by Wilma Maradiaga MD on 1/27/23 at 5:48 PM EST

## 2023-01-27 NOTE — PROGRESS NOTES
Speech Language Pathology  Facility/Department: [facility] ICU  Dysphagia & Speech Daily Treatment Note    NAME: Alicia Lambert  : 1955  MRN: [id number]    Patient Diagnosis(es):   Patient Active Problem List    Diagnosis Date Noted    Cerebrovascular accident (CVA) due to embolism of right middle cerebral artery ([facility] [address] 2023    Chest pain 2022     Allergies: Allergies   Allergen Reactions    Lipitor      myalgia    Tamiflu [Oseltamivir Phosphate] Rash     Onset Date: 2023    CXR 2023  Unremarkable portable exam    Recent MRI Brain 2023  1. Multiple large acute ischemic insults in the right MCA territory, most pronounced in the basal ganglia, posterior insula, and temporal lobe with additional scattered areas in the right frontal and parietal lobes. 2.  Abnormal flow signal in the right M1 and M2 segments. Chart reviewed. Medical Diagnosis: Acute ischemic stroke [facility]) [I63.9]  Ischemic stroke ([facility] [I63.9]   Treatment Diagnosis: Dysphagia    BSE Impression (2023)-  Dysphagia Impression : Suspect oropharyngeal dysphagia with aspiration; needs further assessment. Oral motor exam significant for left sided facial droop and lingual/labial weakness. With patient seated up in bed and on room air, trialed ice chips, thins via tsp/straw; prolonged reactive cough. Recommend continue NPO, with further assessment via instrumental.  Dysphagia Diagnosis: Suspected needs further assessment    MBS results (2023)-  Oral Phase  Mild-moderate dysfunction characterized by incomplete labial seal with anterior spillage, slowed/reduced mastication, mild stasis. Pharyngeal Phase  Moderate dysfunction characterized by impairments in timing, strength and coordination with premature bolus loss, reduced base of tongue retraction, delayed/reduced hyolaryngeal mechanics, and reduced pharyngeal constriction.  Resulted in vallecular/pyriform/pharyngeal residue, as well as compromised airway protection with penetration and gross tracheal aspiration of thin and mildly thick liquids; strong reactive cough present but did not fully clear. Chin tuck strategy did not eliminate aspiration, however cued head turn LEFT with small straw sips was effective . Double swallow helped clear residual. Of note, throughout study pt with tendency to tilt head posteriorly which further exacerbated bolus control; benefited from cues for neutral positioning. Upper Esophageal Screen  Indirectly assessed; appeared unremarkable. Initial Speech Eval (01/26/2023)  Mild-moderate dysarthria (~70% intelligibility) characterized by imprecise articulation with blended word boundaries, and increased rate. No apraxia or aphasia appreciated. Pain: none indicated by any means    Current Diet : ADULT DIET; Dysphagia - Pureed   Recommended Form of Meds: Via alternative means of nutrition  Compensatory Swallowing Strategies : Small bites/sips, Alternate solids and liquids     Treatment/Goals  Short-term Goals  Timeframe for Short-term Goals: 1-2 wks or LOS  Dysphagia  Goal 1: Patient will participate in further assessment of swallow function as appropriate. 1/27: Goal met via MBS  Goal 2: Patient/caregiver will demonstrate understanding of swallowing concerns/recommendations. 1/26: Trained patient re: swallow strategies (namely head turn left, small sips); pt able to verbally recall head turn direction with mod cues. Recommend ongoing direct supervision however with all PO. Cont goal  1/27: Reviewed results of yesterday's MBS and recommendations with patient and family. Pt required intermittent mod cues for left head turn during meal. Family stated good understanding. Cont goal  Goal 3: Patient will tolerate least restrictive diet with adequate oral prep and without overt signs of aspiration or associated decline in respiratory status.   1/27: Patient seen awake and alert but fatigued (finished PT/OT session earlier). Family bedside. Assessed tolerance thins via straw and puree; pt requiring intermittent mod cues for left head turn, min cues for bolus size. Intermittent cough occurred when pt incorrectly coordinated left head turn. Also attempted advanced solid (minced/moist); pt with slowed oral prep and increased left sided stasis; cued lingual sweep/re-swallow to clear. Recommend continue puree this date. Cont goal    Speech-Language  Goal 1: Patient will recall and utilize strategies to improve speech clarity to 90% at the conversation level with min cues. 1/27: Speech intelligibility somewhat improved this date (~80%). Demonstrating good volume, but ongoing imprecise articulation. Reviewed speech strategies (loud, slow, overarticulate). Cont goal  Goal 2: Patient will participate in further assessment of cognitive-communication skills as appropriate. Patient/Family/Caregiver Education:  Educated patient and family to [facility] results, recommendations, strategies, ongoing therapy. Compensatory Strategies:  Compensatory Swallowing Strategies : Small bites/sips, Alternate solids and liquids      Plan:  Continue dysphagia treatment with goals per plan of care. Diet Recommendations: Puree / thin liquids via small straw sips + head turn LEFT / meds in puree  - 1:1 assist feed  - upright position  - head turn LEFT  - small bites / sips  - slow rate  - check for pocketing on left  *oral hygiene 2-3x daily*    DC recommendation: TBD  Treatment: 35 minutes  D/W nursing, Quinn  Needs met prior to leaving room, call button in reach. Sullivan Krabbe, [place], [id number]  Pg.  # [phone]    If patient is discharged prior to next treatment, this note will serve as the discharge summary

## 2023-01-27 NOTE — PROGRESS NOTES
Pt had C/O of numbness on right and left side of his face w/ chest tightness without pain. EKG and troponin order and neuro consulted. Residents and neuro bedside. EKG showed NSR. Pt expressed concern over having another stroke. 0.5 mg of  Ativan given. Pt has calmed is now sleeping.

## 2023-01-27 NOTE — PROGRESS NOTES
Latest Reference Range & Units 1/27/23 03:56   Troponin <0.01 ng/mL 0.05 (H)   (H): Data is abnormally high

## 2023-01-27 NOTE — CARE COORDINATION
Case Management Assessment            Discharge Note                    Date / Time of Note: 1/27/2023 11:35 AM                  Discharge Note Completed by: Yumiko Doe RN    Patient Name: Alicia Lambert   YOB: 1955  Diagnosis: Acute ischemic stroke [facility]) [I63.9]  Ischemic stroke [facility]) [I63.9]   Date / Time: 1/25/2023  9:03 PM    Current PCP: Jackie Lema MD    Advance Directives:  FULL CODE    Financial:  Payor: Mg Come / Plan: MEDICARE PART A AND B / Product Type: *No Product type* /      Pharmacy:    [id number] [facility], [address] [place] [address] [address]  Phone: [phone] Fax: [phone]    ADLS:  Current PT AM-PAC Score: 9 /24  Current OT AM-PAC Score: 12 /24    [facility]    IMM Completed:   Not Indicated    Transportation:  Intrahospital transportation    The Patient and/or patient representative Jada Zamora and his family were provided with a choice of provider and agrees with the discharge plan Yes    Freedom of choice list was provided with basic dialogue that supports the patient's individualized plan of care/goals and shares the quality data associated with the providers.  Yes      Yumiko Doe RN  [facility]  Case Management Department  [phone]

## 2023-01-27 NOTE — CARE COORDINATION
Case management is following for discharge planning. The chart was reviewed. Therapy worked with Mr. Lloyd Files yesterday. They have recommended acute rehab. Me has traditional Medicare, so pre-cert would be waived. PMR consult needed.     PT AM-PAC: 9 / 24 per last evaluation on: 1/26    OT AM-PAC: 12 / 24 per last evaluation on: 1/26    Tabitha Zamorano RN  [facility]  Case Management Department  [phone]

## 2023-01-27 NOTE — PROGRESS NOTES
Physical Therapy  Facility/Department: [facility] ICU  Physical Therapy Treatment    Name: Farzana Colindres  : 1955  MRN: [id number]  Date of Service: 2023    Discharge Recommendations: Farzana Colindres scored a 9/24 on the AM-PAC short mobility form. Current research shows that an AM-PAC score of 17 or less is typically not associated with a discharge to the patient's home setting. Based on the patient's AM-PAC score and their current functional mobility deficits, it is recommended that the patient have 5-7 sessions per week of Physical Therapy at d/c to increase the patient's independence. At this time, this patient demonstrates complex nursing, medical, and rehabilitative needs, and would benefit from intensive rehabilitation services upon discharge from the Inpatient setting. This patient demonstrates the ability to participate in and benefit from an intensive therapy program with a coordinated interdisciplinary team approach to foster frequent, structured, and documented communication among disciplines, who will work together to establish, prioritize, and achieve treatment goals. Please see assessment section for further patient specific details. If patient discharges prior to next session this note will serve as a discharge summary. Please see below for the latest assessment towards goals. PT Equipment Recommendations  Equipment Needed:  (defer)      Patient Diagnosis(es): There were no encounter diagnoses. Past Medical History:  has a past medical history of Anxiety, Chest pain, Depression, Encounter for imaging to screen for metal prior to MRI, Hyperlipidemia, Hypertension, and Wears glasses. Past Surgical History:  has a past surgical history that includes [place] tooth extraction; Colonoscopy (2009); Cardiac catheterization (2008); Finger trigger release (3-); and Coronary angioplasty with stent.     Assessment   Assessment: Pt is a 74 y.o. male admitted with L sided weakness s/p fall. Found to have multiple large acute ischemic insults in the right MCA territory, most pronounced in the basal ganglia, posterior insula, and temporal lobe with additional scattered areas in the right frontal and parietal lobes. Pt alert & cooperative in therapy today. Demo significantly impaired sitting & standing balance with notable L & post lean. Tends to push with RUE in sitting & standing. Currently requiring max A of 2 for all mobility & is well below his independent baseline. LLE is weak & demo no active movement in L ankle. [facility] Pt hopes to transfer to ARU soon. Treatment Diagnosis: impaired functional mobility 2/2 L sided weakness  Therapy Prognosis: Guarded  Requires PT Follow-Up: Yes  Activity Tolerance  Activity Tolerance: Patient limited by fatigue;Patient tolerated treatment well  Activity Tolerance Comments: closing eyes towards end of session- reports tired     Plan   Physcial Therapy Plan  General Plan:  (5-7)  Current Treatment Recommendations: Strengthening, Balance training, ROM, Functional mobility training, Neuromuscular re-education, Safety education & training, Patient/Caregiver education & training  Safety Devices  Type of Devices: Call light within reach, Chair alarm in place, Left in chair, Nurse notified (pillows propped on L side & under LUE)     Restrictions  Position Activity Restriction  Other position/activity restrictions: \"bedrest until seen by PT/OT\", seizure precautions     Subjective   General  Chart Reviewed: Yes  Additional Pertinent Hx: Pt is a 74 y.o. male adm 1/25 with acute ischemic stroke. Pt presented to [facility] ED after fall. Pt with complete left-sided paralysis, left-sided extinction, slurred speech. Head CT: neg. CTA head: Apparent thrombus in the right middle cerebral artery M1 segment and M2  branches. Pt s/p Mechanical thrombectomy for stroke on 1/25.    MRI brain:   Multiple large acute ischemic insults in the right MCA territory, most pronounced in the basal ganglia, posterior insula, and temporal lobe with additional scattered areas in the right frontal and parietal lobes. PMH:  HTN, alcohol abuse, anxiety  Family / Caregiver Present: Yes  Referring Practitioner: Gilbert Ha MD  Follows Commands: Within Functional Limits  Subjective  Subjective: Found in bed, agreeable to PT. Alert & oriented. Making jokes at times. Family present & very attentive.          Social/Functional History  Social/Functional History  Lives With: Spouse  Type of Home: House  Home Layout: Able to Live on Main level with bedroom/bathroom, Multi-level  Home Access: Stairs to enter with rails (2 LYNN)  Bathroom Shower/Tub: Walk-in shower  Bathroom Toilet: Standard (sink next to toilet)  Bathroom Equipment: Built-in shower seat, Hand-held shower (built in shower seat not functional)  Home Equipment: Crutches  Has the patient had two or more falls in the past year or any fall with injury in the past year?:  (had fall PTA but otherwise no other falls)  ADL Assistance: Independent  Ambulation Assistance: Independent  Transfer Assistance: Independent  Active : Yes  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, construction, repairs  Leisure & Hobbies: projects around house, music  Additional Comments: Wife works outside the home       Cognition -WFL        Objective                          Bed mobility  Bridgin Person assistance (MOD + MIN A for LE positioning only; pt able to lift buttocks off bed on his own x 10 reps)  Rolling to Left: Minimal assistance  Rolling to Right: Maximum assistance  Supine to Sit: 2 Person assistance;Maximum assistance  Sit to Supine: 2 Person assistance;Maximum assistance  Scootin Person assistance;Maximal assistance  Transfers  Sit to Stand: 2 Person Assistance;Maximum Assistance (within [address])  Stand to Sit: 2 Person Assistance;Maximum Assistance  Bed to Chair: Dependent/Total (via ceiling lift)        Balance  Standing - Static:  (Stood 3x within riky stedy with max A of 2 for approx 1 min each time. Worked on lateral weight shifting. PT had to facilitate L knee & hip into extension. Pt placing majority of weight on RLE but leaning hard to the L.)  Comments: EOB sitting x 25 min with max A majority of time due to post & L lean. Worked continuously on weight shifting R, ant lean, shoulder retraction. Trunk elongation performed 3x on R forearm with pt needing min/mod A to push up to midline. Trunk elongation to L 1x with max A to return to midline. Head turns indep while sitting. Pt wanting to push with RUE & had to keep in his lap most of time. Able to improve balance towards end to very brief CGA periods (2-3 sec at a time) before needing increased A & leaning L/post.  Exercise Treatment: Educated wife on passive L ankle ROM- wife verbalized & demo understanding. PT performed PROM to L ankle with HC stretch. AAROM to L hip & knee. Pt able to assist with knee-chest exercises in supine. Attempted LAQ sitting EOB but pt showing only trace movement. Bridging x 10 supine with mod A for LLE placement & min A for RLE. Trunk rotation with knees flexed in supine with mod A.                                                   AM-PAC Score  AM-PAC Inpatient Mobility Raw Score : 9 (01/27/23 1212)  AM-PAC Inpatient T-Scale Score : 30.55 (01/27/23 1212)  Mobility Inpatient CMS 0-100% Score: 81.38 (01/27/23 1212)  Mobility Inpatient CMS G-Code Modifier : CM (01/27/23 1212)           Goals  Short Term Goals  Time Frame for Short Term Goals: By discharge  Short Term Goal 1: Sup to sit with mod assist x 1  Short Term Goal 2: Pt will sit EOB with consistent CGA  Short Term Goal 3: Pt will transfer sit to stand with mod assist x 1  Short Term Goal 4: Pt will stand for 1 minute with mod assist x 1 with LRAD       Education  Patient Education  Education Given To: Patient; Family  Education Provided: Role of Therapy;Plan of Care  Education Provided Comments: family to passively stretch L ankle  Education Method: Demonstration;Verbal  Barriers to Learning: None  Education Outcome: Verbalized understanding;Continued education needed;Demonstrated understanding      Therapy Time   Individual Concurrent Group Co-treatment   Time In 0950         Time Out 1100         Minutes 70          Timed Code Treatment Minutes:   70    Total Treatment Minutes:  [address], [address] [facility]

## 2023-01-27 NOTE — PROGRESS NOTES
Hospitalist Progress Note      PCP: Saúl Ingram MD    Date of Admission: 1/25/2023    Chief Complaint: left side weakness    Hospital Course:     Mitzi Navarro is a 74 y.o. male, PMH of previous LAD stent, HTN, anxiety, found to have a R M1 occlusion via CTA on 11/26, now post-op from diagnostic cerebral angiogram and mechanical thrombectomy. Subjective:     Patient was seen with family at bedside. He reports feeling ok. Family states he worked with PT earlier. Patient reports chest pressure earlier lasting for 30 min accompanied by SOB. Trop early in the morning was 0.05 and then 0.02. Medications:  Reviewed    Infusion Medications    sodium chloride       Scheduled Medications    aspirin  81 mg Oral Daily    enoxaparin  40 mg SubCUTAneous Daily    ezetimibe  10 mg Oral Daily    rosuvastatin  40 mg Oral Daily    sodium chloride flush  5-40 mL IntraVENous 2 times per day     PRN Meds: ondansetron **OR** ondansetron, polyethylene glycol, sodium chloride flush, sodium chloride, perflutren lipid microspheres      Intake/Output Summary (Last 24 hours) at 1/27/2023 1700  Last data filed at 1/27/2023 1400  Gross per 24 hour   Intake 570 ml   Output 690 ml   Net -120 ml       Physical Exam Performed:    /79   Pulse 88   Temp 98 °F (36.7 °C)   Resp 16   Ht 6' 1\" (1.854 m)   Wt 164 lb 14.5 oz (74.8 kg)   SpO2 100%   BMI 21.76 kg/m²     General appearance: No apparent distress, appears stated age and cooperative. HEENT: left facial droop  Respiratory:  Normal respiratory effort. Clear to auscultation, bilaterally without Rales/Wheezes/Rhonchi. Cardiovascular: Regular rate and rhythm with normal S1/S2 without murmurs, rubs or gallops. Abdomen: Soft, non-tender, non-distended with normal bowel sounds.   Musculoskeletal: no edema  Neurologic: left side hemiparesis   Psychiatric: Alert and oriented, thought content appropriate, normal insight         Labs:   Recent Labs 01/26/23  0530 01/26/23  1519 01/27/23  0552   WBC 11.5* 9.9 8.6   HGB 14.1 13.8 13.4*   HCT 41.7 40.7 39.6*    174 213     Recent Labs     01/25/23  1939 01/26/23  0530 01/27/23  0552   * 136 138   K 4.1 4.4 3.9   CL 98* 101 102   CO2 25 21 27   BUN 11 11 13   CREATININE 0.7* 0.6* 0.6*   CALCIUM 8.8 9.2 9.1     Recent Labs     01/25/23 1939   AST 24   ALT 20   BILITOT <0.2   ALKPHOS 47     Recent Labs     01/25/23 1939   INR 1.10     Recent Labs     01/25/23  1939 01/27/23  0356 01/27/23  1123   TROPONINI <0.01 0.05* 0.02*       Urinalysis:      Lab Results   Component Value Date/Time    BLOODU negative 05/12/2013 04:37 PM    SPECGRAV 1.020 05/12/2013 04:37 PM    GLUCOSEU negative 05/12/2013 04:37 PM       Radiology:  CT head without contrast   Final Result      1. No intracranial hemorrhage status post treatment   2. Acute infarctions involving the right cerebral hemisphere corresponding to the areas of acute infarction demonstrated on MRI yesterday. FL MODIFIED BARIUM SWALLOW W VIDEO   Final Result      1. Laryngeal penetration with thin liquid consistency by spoon and straw. 2. Aspiration with nectar consistency by spoon and straw. MRI BRAIN WO CONTRAST   Final Result      1. Multiple large acute ischemic insults in the right MCA territory, most pronounced in the basal ganglia, posterior insula, and temporal lobe with additional scattered areas in the right frontal and parietal lobes. 2.  Abnormal flow signal in the right M1 and M2 segments.       IR ANGIOGRAM CAROTID CEREBRAL RIGHT    (Results Pending)       IP CONSULT TO PHARMACY  PHARMACY TO CHANGE BASE FLUIDS  IP CONSULT TO NEUROCRITICAL CARE  IP CONSULT TO NEUROSURGERY  IP CONSULT TO CRITICAL CARE  IP CONSULT TO PHYSICAL MEDICINE REHAB  IP CONSULT TO CARDIOLOGY  IP CONSULT TO CASE MANAGEMENT  IP CONSULT TO DIETITIAN    Assessment/Plan:    Active Hospital Problems    Diagnosis     Cerebrovascular accident (CVA) due to embolism of right middle cerebral artery ([facility] [address] [I63.411]      Priority: Medium     R MCA Occlusion s/p TNK and thrombectomy  Patient presented with complete left-sided paralysis, left-sided extinction, slurred speech. Patient received TNK at [facility], transferred to [facility] and underwent emergent cerebral angiogram which showed occlusion of the right middle cerebral artery origin with nearly complete restoration of flow after thrombectomy, but otherwise normal 3 vessel cerebral angiogram  [facility] neuro checks  -echo showed normal EF no shunting.   -Initiate ASA 81mg and DVT ppx when ok with neurosurgery     Chest pressure with elevated troponin   Hx of CAD s/p stent in 2019  -echo showed normal EF. Trop trending down. Neurosurgery is concerned. Consult Cardiology      HTN  Patient is on lisinopril 5mg daily at home  -Hold home meds as patient currently normotensive     HLD:  Patient is on Crestor 20mg and ezetimibe 10mg at home daily  -Continue home meds     Anxiety  Per chart, patient is on Valium 2mg at home  -Hold home meds      DVT Prophylaxis: SCD  Diet: ADULT DIET;  Dysphagia - Pureed  Code Status: Full Code  PT/OT Eval Status: ordered    Dispo - AR when accepted    Kenrick Goodwin MD

## 2023-01-27 NOTE — PROGRESS NOTES
Addend 1/29 : Pt to admit to ARU today. DC order in. CM aware. Yvette Miller M.A., [place]   [facility]- [facility]   (X): [phone]  Brijesh Camila): [phone]    [facility] [employer] [facility]   After review, this patient is felt to be:       [x]  Appropriate for Acute Inpatient Rehab- Admit today pending neurology medical clearance        []  Appropriate for Acute Inpatient Rehab Pending Insurance Authorization    []  Not appropriate for Acute Inpatient Rehab    []  Referral received and ARU reviewing patient       [facility] evaluated pt for ARU admission. Will notify CM/FUAD with further updates. Thank you for the referral.      Simona Roman RN, BSN.   ARU Clinical Liaison  [facility]  Phone: [phone]  Fax: [phone]

## 2023-01-27 NOTE — PLAN OF CARE
Problem: Discharge Planning  Goal: Discharge to home or other facility with appropriate resources  1/27/2023 0003 by Fely Karimi RN  Outcome: Progressing  Flowsheets  Taken 1/27/2023 0000  Discharge to home or other facility with appropriate resources: Identify barriers to discharge with patient and caregiver  Taken 1/26/2023 2000  Discharge to home or other facility with appropriate resources: Identify barriers to discharge with patient and caregiver  1/26/2023 1244 by Clary Medeiros RN  Outcome: Progressing  Flowsheets  Taken 1/26/2023 0600 by Fely Karimi RN  Discharge to home or other facility with appropriate resources: Identify barriers to discharge with patient and caregiver  Taken 1/26/2023 0400 by Fely Karimi RN  Discharge to home or other facility with appropriate resources: Identify barriers to discharge with patient and caregiver     Problem: Pain  Goal: Verbalizes/displays adequate comfort level or baseline comfort level  1/27/2023 0003 by Fley Karimi RN  Outcome: Progressing  Flowsheets (Taken 1/26/2023 2000)  Verbalizes/displays adequate comfort level or baseline comfort level: Encourage patient to monitor pain and request assistance  1/26/2023 1244 by Clary Medeiros RN  Outcome: Progressing  Flowsheets  Taken 1/26/2023 0600 by Fely Karimi RN  Verbalizes/displays adequate comfort level or baseline comfort level: Encourage patient to monitor pain and request assistance  Taken 1/26/2023 0400 by Fely Karimi RN  Verbalizes/displays adequate comfort level or baseline comfort level: Encourage patient to monitor pain and request assistance     Problem: Skin/Tissue Integrity  Goal: Absence of new skin breakdown  Description: 1. Monitor for areas of redness and/or skin breakdown  2. Assess vascular access sites hourly  3. Every 4-6 hours minimum:  Change oxygen saturation probe site  4.   Every 4-6 hours:  If on nasal continuous positive airway pressure, respiratory therapy assess nares and determine need for appliance change or resting period. 1/27/2023 0003 by Maxx Weiss RN  Outcome: Progressing  1/26/2023 1244 by Nola Erazo RN  Outcome: Progressing     Problem: Safety - Adult  Goal: Free from fall injury  1/27/2023 0003 by Maxx Weiss RN  Outcome: Progressing  1/26/2023 1244 by Nola Erazo RN  Outcome: Progressing     Problem: ABCDS Injury Assessment  Goal: Absence of physical injury  1/27/2023 0003 by Maxx Weiss RN  Outcome: Progressing  1/26/2023 1244 by Nola Erazo RN  Outcome: Progressing     Problem: Chronic Conditions and Co-morbidities  Goal: Patient's chronic conditions and co-morbidity symptoms are monitored and maintained or improved  1/27/2023 0003 by Maxx Weiss RN  Outcome: Progressing  Flowsheets  Taken 1/27/2023 0000  Care Plan - Patient's Chronic Conditions and Co-Morbidity Symptoms are Monitored and Maintained or Improved: Monitor and assess patient's chronic conditions and comorbid symptoms for stability, deterioration, or improvement  Taken 1/26/2023 2000  Care Plan - Patient's Chronic Conditions and Co-Morbidity Symptoms are Monitored and Maintained or Improved: Monitor and assess patient's chronic conditions and comorbid symptoms for stability, deterioration, or improvement  1/26/2023 1244 by Nola Erazo RN  Outcome: Progressing     Problem: SLP Adult - Impaired Swallowing  Goal: By Discharge: Advance to least restrictive diet without signs or symptoms of aspiration for planned discharge setting. See evaluation for individualized goals. 1/26/2023 1100 by TIGRE Goff  Outcome: Progressing     Problem: SLP Adult - Impaired Communication  Goal: By Discharge: Demonstrates communication skills at highest level of function for planned discharge setting. See evaluation for individualized goals.   1/26/2023 1100 by TIGRE Goff  Outcome: Progressing     Problem: Neurosensory - Adult  Goal: Achieves stable or improved neurological status  Outcome: Progressing  Flowsheets  Taken 1/27/2023 0000  Achieves stable or improved neurological status: Assess for and report changes in neurological status  Taken 1/26/2023 2000  Achieves stable or improved neurological status: Assess for and report changes in neurological status  Goal: Achieves maximal functionality and self care  Outcome: Progressing  Flowsheets  Taken 1/27/2023 0000  Achieves maximal functionality and self care: Monitor swallowing and airway patency with patient fatigue and changes in neurological status  Taken 1/26/2023 2000  Achieves maximal functionality and self care: Monitor swallowing and airway patency with patient fatigue and changes in neurological status

## 2023-01-28 LAB
ANION GAP SERPL CALCULATED.3IONS-SCNC: 12 MMOL/L (ref 3–16)
ANTICARDIOLIPIN IGA ANTIBODY: <10 APL
ANTICARDIOLIPIN IGG ANTIBODY: <10 GPL
BASOPHILS ABSOLUTE: 0.1 K/UL (ref 0–0.2)
BASOPHILS RELATIVE PERCENT: 0.7 %
BETA-2 GLYCOPROTEIN 1 IGG ANTIBODY: <10 SGU
BETA-2 GLYCOPROTEIN 1 IGM ANTIBODY: <10 SMU
BUN BLDV-MCNC: 11 MG/DL (ref 7–20)
CALCIUM SERPL-MCNC: 9.4 MG/DL (ref 8.3–10.6)
CARDIOLIPIN AB IGM: <10 MPL
CHLORIDE BLD-SCNC: 99 MMOL/L (ref 99–110)
CO2: 26 MMOL/L (ref 21–32)
CREAT SERPL-MCNC: 0.7 MG/DL (ref 0.8–1.3)
EOSINOPHILS ABSOLUTE: 0 K/UL (ref 0–0.6)
EOSINOPHILS RELATIVE PERCENT: 0.2 %
ESTIMATED AVERAGE GLUCOSE: 111.2 MG/DL
GFR SERPL CREATININE-BSD FRML MDRD: >60 ML/MIN/{1.73_M2}
GLUCOSE BLD-MCNC: 132 MG/DL (ref 70–99)
HBA1C MFR BLD: 5.5 %
HCT VFR BLD CALC: 41.8 % (ref 40.5–52.5)
HEMOGLOBIN: 14 G/DL (ref 13.5–17.5)
LYMPHOCYTES ABSOLUTE: 2.1 K/UL (ref 1–5.1)
LYMPHOCYTES RELATIVE PERCENT: 19 %
MAGNESIUM: 1.9 MG/DL (ref 1.8–2.4)
MCH RBC QN AUTO: 32 PG (ref 26–34)
MCHC RBC AUTO-ENTMCNC: 33.4 G/DL (ref 31–36)
MCV RBC AUTO: 95.9 FL (ref 80–100)
MONOCYTES ABSOLUTE: 0.9 K/UL (ref 0–1.3)
MONOCYTES RELATIVE PERCENT: 8.2 %
NEUTROPHILS ABSOLUTE: 7.8 K/UL (ref 1.7–7.7)
NEUTROPHILS RELATIVE PERCENT: 71.9 %
PDW BLD-RTO: 13.1 % (ref 12.4–15.4)
PLATELET # BLD: 223 K/UL (ref 135–450)
PMV BLD AUTO: 7.7 FL (ref 5–10.5)
POTASSIUM SERPL-SCNC: 4.1 MMOL/L (ref 3.5–5.1)
RBC # BLD: 4.36 M/UL (ref 4.2–5.9)
SODIUM BLD-SCNC: 137 MMOL/L (ref 136–145)
WBC # BLD: 10.9 K/UL (ref 4–11)

## 2023-01-28 PROCEDURE — 2580000003 HC RX 258

## 2023-01-28 PROCEDURE — 83735 ASSAY OF MAGNESIUM: CPT

## 2023-01-28 PROCEDURE — 2060000000 HC ICU INTERMEDIATE R&B

## 2023-01-28 PROCEDURE — 99231 SBSQ HOSP IP/OBS SF/LOW 25: CPT | Performed by: PHYSICAL MEDICINE & REHABILITATION

## 2023-01-28 PROCEDURE — 92526 ORAL FUNCTION THERAPY: CPT

## 2023-01-28 PROCEDURE — 36415 COLL VENOUS BLD VENIPUNCTURE: CPT

## 2023-01-28 PROCEDURE — 6360000002 HC RX W HCPCS

## 2023-01-28 PROCEDURE — 6370000000 HC RX 637 (ALT 250 FOR IP)

## 2023-01-28 PROCEDURE — 94761 N-INVAS EAR/PLS OXIMETRY MLT: CPT

## 2023-01-28 PROCEDURE — 6370000000 HC RX 637 (ALT 250 FOR IP): Performed by: INTERNAL MEDICINE

## 2023-01-28 PROCEDURE — 92507 TX SP LANG VOICE COMM INDIV: CPT

## 2023-01-28 PROCEDURE — 80048 BASIC METABOLIC PNL TOTAL CA: CPT

## 2023-01-28 PROCEDURE — 85025 COMPLETE CBC W/AUTO DIFF WBC: CPT

## 2023-01-28 RX ORDER — MECOBALAMIN 5000 MCG
5 TABLET,DISINTEGRATING ORAL NIGHTLY
Status: DISCONTINUED | OUTPATIENT
Start: 2023-01-28 | End: 2023-01-29 | Stop reason: HOSPADM

## 2023-01-28 RX ORDER — ACETAMINOPHEN 325 MG/1
650 TABLET ORAL EVERY 6 HOURS PRN
Status: DISCONTINUED | OUTPATIENT
Start: 2023-01-28 | End: 2023-01-29 | Stop reason: HOSPADM

## 2023-01-28 RX ADMIN — SODIUM CHLORIDE, PRESERVATIVE FREE 10 ML: 5 INJECTION INTRAVENOUS at 21:00

## 2023-01-28 RX ADMIN — ROSUVASTATIN CALCIUM 40 MG: 20 TABLET, COATED ORAL at 08:12

## 2023-01-28 RX ADMIN — ACETAMINOPHEN 650 MG: 325 TABLET ORAL at 22:04

## 2023-01-28 RX ADMIN — EZETIMIBE 10 MG: 10 TABLET ORAL at 08:13

## 2023-01-28 RX ADMIN — ACETAMINOPHEN 650 MG: 325 TABLET ORAL at 12:09

## 2023-01-28 RX ADMIN — Medication 5 MG: at 22:05

## 2023-01-28 RX ADMIN — ASPIRIN 81 MG 81 MG: 81 TABLET ORAL at 08:13

## 2023-01-28 RX ADMIN — ONDANSETRON 4 MG: 2 INJECTION INTRAMUSCULAR; INTRAVENOUS at 03:41

## 2023-01-28 RX ADMIN — SODIUM CHLORIDE, PRESERVATIVE FREE 10 ML: 5 INJECTION INTRAVENOUS at 08:13

## 2023-01-28 RX ADMIN — ENOXAPARIN SODIUM 40 MG: 100 INJECTION SUBCUTANEOUS at 08:12

## 2023-01-28 ASSESSMENT — PAIN - FUNCTIONAL ASSESSMENT: PAIN_FUNCTIONAL_ASSESSMENT: ACTIVITIES ARE NOT PREVENTED

## 2023-01-28 ASSESSMENT — PAIN SCALES - GENERAL
PAINLEVEL_OUTOF10: 3
PAINLEVEL_OUTOF10: 0
PAINLEVEL_OUTOF10: 0
PAINLEVEL_OUTOF10: 3

## 2023-01-28 ASSESSMENT — PAIN DESCRIPTION - LOCATION
LOCATION: FACE;NOSE
LOCATION: HEAD

## 2023-01-28 ASSESSMENT — PAIN DESCRIPTION - DESCRIPTORS: DESCRIPTORS: ACHING

## 2023-01-28 NOTE — PROGRESS NOTES
Family at bedside looking forward to rehab placement/ pt has been DCd/ pending ARU  bed/ Neuro unchanged/ hemodynamically stable/ pt OOB to chair using lift/ see flowsheet    1230 ARU charge nurse called plan for pt to move to ARU tomorrow per Dr Maldonado/ relayed to family

## 2023-01-28 NOTE — PROGRESS NOTES
Progress Note  Physical Medicine and Rehabilitation    Patient: Rubens Snow  [id number]  Date: 2023      Chief Complaint:     History of Present Illness/Hospital Course:  Rubens Snow is a 74year old male with a PMHx of anxiety, CAD (s/p stent ), HTN, HLD who presented to [facility] ED with complete left-sided paralysis, left-sided extinction, slurred speech following a fall. CTA showed right middle cerebral artery M1 segment severe stenosis/occlusion with some reconstitution of flow more distally. He underwent emergent cerebral angiogram which showed occlusion of the right middle cerebral artery origin with nearly complete restoration of flow after thrombectomy, but otherwise normal 3 vessel cerebral angiogram.     Interval history: Held from ARU yesterday due to chest pain. Cardiology has cleared. Will plan for admit to ARU tomorrow. Prior Level of Function:  Independent for mobility, ADLs, and IADLs    Current Level of Function:  Mod assist     Pertinent Social History:  Support: Wife at home      Past Medical History:   Diagnosis Date    Anxiety     Chest pain     Depression     Encounter for imaging to screen for metal prior to MRI 2023    CT Head cleared for metal, no foreign body--ok for MRI per     Hyperlipidemia     Hypertension     Wears glasses        Past Surgical History:   Procedure Laterality Date    CARDIAC CATHETERIZATION  2008    COLONOSCOPY  2009    CORONARY ANGIOPLASTY WITH STENT PLACEMENT      FINGER TRIGGER RELEASE  3-    left- long finger. WISDOM TOOTH EXTRACTION         History reviewed. No pertinent family history.     Social History     Socioeconomic History    Marital status:      Spouse name: None    Number of children: None    Years of education: None    Highest education level: None   Tobacco Use    Smoking status: Former     Types: Cigarettes     Quit date: 3/9/2011     Years since quittin.8    Smokeless tobacco: Never    Tobacco comments:     occasional cigar   Vaping Use    Vaping Use: Never used   Substance and Sexual Activity    Alcohol use: Yes     Comment: 3 drinks 3 days per week    Drug use: No           REVIEW OF SYSTEMS:   CONSTITUTIONAL: negative for fevers, chills, diaphoresis, appetite change, night sweats, unexpected weight change, fatigue  EYES: negative for blurred vision, eye discharge, visual disturbance and icterus  HEENT: negative for hearing loss, tinnitus, ear drainage, sinus pressure, nasal congestion, epistaxis and snoring  RESPIRATORY: Negative for hemoptysis, cough, sputum production  CARDIOVASCULAR: negative for chest pain, palpitations, exertional chest pressure/discomfort, syncope, edema  GASTROINTESTINAL: negative for nausea, vomiting, diarrhea, blood in stool, abdominal pain, constipation  GENITOURINARY: negative for frequency, dysuria, urinary incontinence, decreased urine volume, and hematuria  HEMATOLOGIC/LYMPHATIC: negative for easy bruising, bleeding and lymphadenopathy  ALLERGIC/IMMUNOLOGIC: negative for recurrent infections, angioedema, anaphylaxis and drug reactions  ENDOCRINE: negative for weight changes and diabetic symptoms including polyuria, polydipsia and polyphagia  MUSCULOSKELETAL: negative for pain, joint swelling, decreased range of motion  NEUROLOGICAL: negative for headaches, Positive for slurred speech and unilateral weakness  PSYCHIATRIC/BEHAVIORAL: negative for hallucinations, behavioral problems, confusion and agitation.      Physical Examination:  Vitals: Patient Vitals for the past 24 hrs:   BP Temp Temp src Pulse Resp SpO2   01/28/23 1159 -- -- -- 78 20 97 %   01/28/23 0700 139/84 98 °F (36.7 °C) Oral 83 14 96 %   01/28/23 0400 126/79 98.1 °F (36.7 °C) Oral 75 16 96 %   01/28/23 0300 (!) 133/92 -- -- 86 20 97 %   01/28/23 0200 136/86 -- -- 84 27 98 %   01/28/23 0100 133/84 -- -- 89 23 99 %   01/28/23 0000 125/78 98.2 °F (36.8 °C) Oral 75 19 98 %   01/27/23 2300 127/85 -- -- 84 23 99 %   01/27/23 2200 122/85 -- -- 72 17 98 %   01/27/23 2100 (!) 143/89 -- -- 88 29 98 %   01/27/23 2000 136/86 98.1 °F (36.7 °C) Oral 83 21 99 %   01/27/23 1900 135/79 -- -- 76 18 97 %   01/27/23 1600 117/78 -- -- 74 16 --   01/27/23 1530 132/79 98 °F (36.7 °C) -- 88 16 100 %   01/27/23 1400 125/86 -- -- 79 20 99 %   01/27/23 1300 -- -- -- 80 17 --       Const: Alert. WDWN. No distress  Eyes: Conjunctiva noninjected, no icterus noted; pupils equal, round, and reactive to light. HENT: Atraumatic, normocephalic; Oral mucosa moist  Neck: Trachea midline, neck supple. No thyromegaly noted. CV: Regular rate and rhythm, no murmur rub or gallop noted  Resp: Lungs clear to auscultation bilaterally, no rales wheezes or ronchi, no retractions. Respirations unlabored. GI: Soft, nontender, nondistended. Normal bowel sounds. No palpable masses. Skin: Normal temperature and turgor. No rashes or breakdown noted. Ext: No significant edema appreciated. No varicosities. MSK: No joint tenderness, erythema, warmth noted. AROM intact. Neuro: Alert, oriented, appropriate. No cranial nerve deficits appreciated. Sensation intact to light touch. Motor examination reveals normal strength in right side with 0/5 strength in left UE and 3/5 strength in left LE.    Psych: Stable mood, normal judgement, normal affect     Lab Results   Component Value Date    WBC 10.9 01/28/2023    HGB 14.0 01/28/2023    HCT 41.8 01/28/2023    MCV 95.9 01/28/2023     01/28/2023     Lab Results   Component Value Date    INR 1.10 01/25/2023    INR 1.05 01/08/2019    PROTIME 14.1 01/25/2023    PROTIME 12.0 01/08/2019     Lab Results   Component Value Date    CREATININE 0.7 (L) 01/28/2023    BUN 11 01/28/2023     01/28/2023    K 4.1 01/28/2023    CL 99 01/28/2023    CO2 26 01/28/2023     Lab Results   Component Value Date    ALT 20 01/25/2023    AST 24 01/25/2023    ALKPHOS 47 01/25/2023    BILITOT <0.2 01/25/2023           CT head without contrast   Final Result      1. No intracranial hemorrhage status post treatment   2. Acute infarctions involving the right cerebral hemisphere corresponding to the areas of acute infarction demonstrated on MRI yesterday. FL MODIFIED BARIUM SWALLOW W VIDEO   Final Result      1. Laryngeal penetration with thin liquid consistency by spoon and straw. 2. Aspiration with nectar consistency by spoon and straw. MRI BRAIN WO CONTRAST   Final Result      1. Multiple large acute ischemic insults in the right MCA territory, most pronounced in the basal ganglia, posterior insula, and temporal lobe with additional scattered areas in the right frontal and parietal lobes. 2.  Abnormal flow signal in the right M1 and M2 segments. IR ANGIOGRAM CAROTID CEREBRAL RIGHT    (Results Pending)         Assessment:  R MCA Occlusion s/p TNK and thrombectomy  - PT/OT/SLP  -Keep SBP <160  - Will require ARU admit        CAD   S/p stent 2019. Patient is on aspirin at home       HTN  Patient is on lisinopril 5mg daily at home  - monitor      HLD:  Patient is on Crestor 20mg and ezetimibe 10mg at home daily  -Continue home meds     Anxiety  Per chart, patient is on Valium 2mg at home. Possible could be contributor to his chest pain yesterday, notes he felt anxious. Denies taking Valium at home, however it is listed as a home med. Ativan 0.5mg once overnight   -Hold home meds  -Pharmacy med recc     Impairments- Decreased functional mobility, Decreased ADLs    Recommendations:  Plan for ARU admit on Sunday     Patient with new functional deficits and ongoing medical complexity. Demonstrates ability to tolerate 3 hours therapy/day. He is a good candidate for acute inpatient rehab when medically appropriate. Thank you for this consult. Please contact me with any questions or concerns.      BRAN Austin.P.H  PM&R  1/28/2023  12:27 PM

## 2023-01-28 NOTE — PLAN OF CARE
Problem: Discharge Planning  Goal: Discharge to home or other facility with appropriate resources  Outcome: Progressing     Problem: Skin/Tissue Integrity  Goal: Absence of new skin breakdown  Description: 1. Monitor for areas of redness and/or skin breakdown  2. Assess vascular access sites hourly  3. Every 4-6 hours minimum:  Change oxygen saturation probe site  4. Every 4-6 hours:  If on nasal continuous positive airway pressure, respiratory therapy assess nares and determine need for appliance change or resting period.   Outcome: Progressing     Problem: ABCDS Injury Assessment  Goal: Absence of physical injury  Outcome: Progressing  Flowsheets (Taken 1/27/2023 2146)  Absence of Physical Injury: Implement safety measures based on patient assessment     Problem: Neurosensory - Adult  Goal: Achieves stable or improved neurological status  Outcome: Progressing  Flowsheets (Taken 1/27/2023 2000)  Achieves stable or improved neurological status:   Assess for and report changes in neurological status   Initiate measures to prevent increased intracranial pressure   Maintain blood pressure and fluid volume within ordered parameters to optimize cerebral perfusion and minimize risk of hemorrhage

## 2023-01-28 NOTE — PROGRESS NOTES
Pt neuro exam remains the same. Flaccid in left side. Pt not reporting pain. Pt waiting to for bed on ARU.      Hannah Ramirez RN

## 2023-01-28 NOTE — PLAN OF CARE
Chart reviewed. Patient discharged to rehab yesterday but waiting on bed. Exam unchanged. Majority of Hypercoaguable work up in process still, Factor VIII elevated but this is non specific. Impression:  -This is a 74year old male with a significant history of alcohol abuse, HTN and HLD with a last know well of approximately 18:00 on 1/25 who presented to [facility] ED with a full right MCA syndrome and was found to have an LVO of the right MCA. He received TNK @ 19:51 and underwent thrombectomy with reperfusion at 21:54 (TICI 2c). The etiology of his stroke is likely cardio embolic given LVO in absence of significant carotid stenosis.       -He reports a history of a-fib and stroke in his mother. He does not think he has a-fib but does report a history of palpitations in the past but cannot elaborate. He is currently in NSR on telemetry. Plan:  - Continue ASA and statin  - SCDs and lovenox for DVT prophylaxis  - Q4 hour neuro checks - HOB elevated to help prevent aspiration  - Telemetry while inpatient  - Blood pressure well controlled, < 160  - Has long term cardiac monitor in place. If monitor unrevealing, may need loop recorder  - If origin of stroke remains unclear may need to consider JESUS, follow up with his established cardiologist placed in discharge instructions  -Stroke Education at Discharge  -Follow up w/ Neurology in 1 month  - If he discharges prior to hypercoagulable work up fully resulting, follow up with PCP to see if outpatient hematology follow up is necessary     Case discussed with Dr. Shannen Abreu and bedside nurse    LISSET Escobar-CNP  Neurology & Neurocritical Care   Neurology Line: [phone]  [employer]: [facility] Neurology & Neuro Critical Care NPs

## 2023-01-28 NOTE — PLAN OF CARE
Problem: Discharge Planning  Goal: Discharge to home or other facility with appropriate resources  1/28/2023 1331 by Jorden Messer RN  Outcome: Progressing  1/28/2023 0502 by Marlene Chatman RN  Outcome: Progressing     Problem: Pain  Goal: Verbalizes/displays adequate comfort level or baseline comfort level  1/28/2023 1331 by Jorden Messer RN  Outcome: Progressing  Flowsheets (Taken 1/28/2023 1209)  Verbalizes/displays adequate comfort level or baseline comfort level: Encourage patient to monitor pain and request assistance  1/28/2023 0502 by Marlene Chatman RN  Outcome: Progressing     Problem: Skin/Tissue Integrity  Goal: Absence of new skin breakdown  Description: 1. Monitor for areas of redness and/or skin breakdown  2. Assess vascular access sites hourly  3. Every 4-6 hours minimum:  Change oxygen saturation probe site  4. Every 4-6 hours:  If on nasal continuous positive airway pressure, respiratory therapy assess nares and determine need for appliance change or resting period.   1/28/2023 1331 by Jorden Messer RN  Outcome: Progressing  1/28/2023 0502 by Marlene Chatman RN  Outcome: Progressing     Problem: Safety - Adult  Goal: Free from fall injury  1/28/2023 1331 by Jorden Messer RN  Outcome: Progressing  1/28/2023 0502 by Marlene Chatman RN  Outcome: Progressing     Problem: ABCDS Injury Assessment  Goal: Absence of physical injury  1/28/2023 1331 by Jorden Messer RN  Outcome: Progressing  1/28/2023 0502 by Marlene Chatman RN  Outcome: Progressing  Flowsheets (Taken 1/27/2023 2146)  Absence of Physical Injury: Implement safety measures based on patient assessment     Problem: Chronic Conditions and Co-morbidities  Goal: Patient's chronic conditions and co-morbidity symptoms are monitored and maintained or improved  1/28/2023 1331 by Jorden Messer RN  Outcome: Progressing  1/28/2023 0502 by Marlene Chatman RN  Outcome: Progressing     Problem: Neurosensory - Adult  Goal: Achieves stable or improved neurological status  1/28/2023 1331 by Natasha Kevin RN  Outcome: Progressing  Flowsheets (Taken 1/28/2023 0800)  Achieves stable or improved neurological status: Assess for and report changes in neurological status  1/28/2023 0502 by Sravani Leyva RN  Outcome: Progressing  Flowsheets (Taken 1/27/2023 2000)  Achieves stable or improved neurological status:   Assess for and report changes in neurological status   Initiate measures to prevent increased intracranial pressure   Maintain blood pressure and fluid volume within ordered parameters to optimize cerebral perfusion and minimize risk of hemorrhage  Goal: Achieves maximal functionality and self care  1/28/2023 1331 by Natasha Kevin RN  Outcome: Progressing  Flowsheets (Taken 1/28/2023 0800)  Achieves maximal functionality and self care:   Monitor swallowing and airway patency with patient fatigue and changes in neurological status   Encourage and assist patient to increase activity and self care with guidance from physical therapy/occupational therapy  1/28/2023 0502 by Sravani Leyva RN  Outcome: Progressing  Flowsheets (Taken 1/27/2023 2000)  Achieves maximal functionality and self care:   Monitor swallowing and airway patency with patient fatigue and changes in neurological status   Encourage and assist patient to increase activity and self care with guidance from physical therapy/occupational therapy

## 2023-01-28 NOTE — PROGRESS NOTES
Clinical Pharmacy Progress Note    All IVs in NS - Management by Pharmacy    Consult Date(s): 1/26/23  Consulting Provider(s): Dr. Judson Cardona / Plan  LVO of R MCA - All IVs in Normal Saline  Drips will be adjusted to normal saline as appropriate based on compatibility, in an effort to avoid fluid shifts, as D5W is osmotically active (hypotonic solution)  In patients with stroke or elevated intracranial pressures, avoidance of hypotonic fluids is advised given their potential to cause extravascular fluid shifts which may in turn cause or exacerbate cerebral edema and brain injury  The following intermittent IV drips / infusions have been adjusted to saline:  None at this time  If ordered, the following medications must remain in D5W due to incompatibility with normal saline:  None at this time  Note: Patient may have dextrose ordered as part of hypoglycemia treatment protocol  Total IV fluid delivered to patient over last 24 hrs: None, IV flushes only  Pharmacist will follow daily to ensure all IVPBs / drips are in NS where possible    Thank you for consulting Pharmacy!     Marc Clifton, PharmD, [facility]  Clinical Pharmacist - Emergency Dept  Wireless: [id number]  1/28/2023 2:52 PM

## 2023-01-28 NOTE — PROGRESS NOTES
Speech Language Pathology  Facility/Department: [facility] ICU  Dysphagia & Speech Daily Treatment Note    NAME: Franco Sample  : 1955  MRN: [id number]    Patient Diagnosis(es):   Patient Active Problem List    Diagnosis Date Noted    Elevated troponin 2023    Coronary artery disease involving native coronary artery of native heart without angina pectoris 2023    Cerebrovascular accident (CVA) due to embolism of right middle cerebral artery ([facility] [address] 2023    Chest pain 2022     Allergies: Allergies   Allergen Reactions    Lipitor      myalgia    Tamiflu [Oseltamivir Phosphate] Rash     Onset Date: 2023    CXR 2023  Unremarkable portable exam    Recent MRI Brain 2023  1. Multiple large acute ischemic insults in the right MCA territory, most pronounced in the basal ganglia, posterior insula, and temporal lobe with additional scattered areas in the right frontal and parietal lobes. 2.  Abnormal flow signal in the right M1 and M2 segments. Chart reviewed. Medical Diagnosis: Acute ischemic stroke [facility]) [I63.9]  Ischem[facility] stroke ([facility] [I63.9]   Treatment Diagnosis: Dysphagia    BSE Impression (2023)-  Dysphagia Impression : Suspect oropharyngeal dysphagia with aspiration; needs further assessment. Oral motor exam significant for left sided facial droop and lingual/labial weakness. With patient seated up in bed and on room air, trialed ice chips, thins via tsp/straw; prolonged reactive cough. Recommend continue NPO, with further assessment via instrumental.  Dysphagia Diagnosis: Suspected needs further assessment    MBS results (2023)-  Oral Phase  Mild-moderate dysfunction characterized by incomplete labial seal with anterior spillage, slowed/reduced mastication, mild stasis.    Pharyngeal Phase  Moderate dysfunction characterized by impairments in timing, strength and coordination with premature bolus loss, reduced base of tongue retraction, delayed/reduced hyolaryngeal mechanics, and reduced pharyngeal constriction. Resulted in vallecular/pyriform/pharyngeal residue, as well as compromised airway protection with penetration and gross tracheal aspiration of thin and mildly thick liquids; strong reactive cough present but did not fully clear. Chin tuck strategy did not eliminate aspiration, however cued head turn LEFT with small straw sips was effective . Double swallow helped clear residual. Of note, throughout study pt with tendency to tilt head posteriorly which further exacerbated bolus control; benefited from cues for neutral positioning. Upper Esophageal Screen  Indirectly assessed; appeared unremarkable. Initial Speech Eval (01/26/2023)  Mild-moderate dysarthria (~70% intelligibility) characterized by imprecise articulation with blended word boundaries, and increased rate. No apraxia or aphasia appreciated. Pain: none indicated by any means    Current Diet : ADULT DIET; Dysphagia - Pureed   Recommended Form of Meds: Via alternative means of nutrition  Compensatory Swallowing Strategies : Small bites/sips, Alternate solids and liquids     Treatment/Goals  Short-term Goals  Timeframe for Short-term Goals: 1-2 wks or LOS  Dysphagia  Goal 1: Patient will participate in further assessment of swallow function as appropriate. 1/27: Goal met via MBS    Goal 2: Patient/caregiver will demonstrate understanding of swallowing concerns/recommendations. 1/26: Trained patient re: swallow strategies (namely head turn left, small sips); pt able to verbally recall head turn direction with mod cues. Recommend ongoing direct supervision however with all PO. Cont goal  1/27: Reviewed results of yesterday's MBS and recommendations with patient and family. Pt required intermittent mod cues for left head turn during meal. Family stated good understanding.   Cont goal  1/28- the pt and wife were educated to the purpose of the visit, anatomy and physiology of the swallow, results of the MBS, swallowing strategies and rational for strategies. Pt and wife stated comprehension. Pt able to demonstrate comprehension as pt able to do utilize Left head turn with min cues. Con't goal     Goal 3: Patient will tolerate least restrictive diet with adequate oral prep and without overt signs of aspiration or associated decline in respiratory status. 1/27: Patient seen awake and alert but fatigued (finished PT/OT session earlier). Family bedside. Assessed tolerance thins via straw and puree; pt requiring intermittent mod cues for left head turn, min cues for bolus size. Intermittent cough occurred when pt incorrectly coordinated left head turn. Also attempted advanced solid (minced/moist); pt with slowed oral prep and increased left sided stasis; cued lingual sweep/re-swallow to clear. Recommend continue puree this date. Cont goal  1/28-  pt fairly alert, sitting up in chair and agreeable to therapy, despite being tired. Pt analyzed with trials of water by straw, pudding and janna crackers crushed in pudding. Pt utilized head turn to the left with min cues. Pt with no difficulty with pudding and minimal difficulty with mastication with crackers in pudding. Pt with no s/s aspiration with any trial. Recommend upgrading diet to Dysphagia II/minced and moist with thin liquids by straw with head turn left. Con't goal     Speech-Language  Goal 1: Patient will recall and utilize strategies to improve speech clarity to 90% at the conversation level with min cues. 1/27: Speech intelligibility somewhat improved this date (~80%). Demonstrating good volume, but ongoing imprecise articulation. Reviewed speech strategies (loud, slow, overarticulate). Cont goal  1/28- pt able to state 3 strategies to improve intelligibility. Pt was 90% intelligible at the start of the session but became fatigued as the session progressed with intelligibility dropping to 80%.  Pt benefited from strategy of increasing volume and over articulating speech. Con't goal     Goal 2: Patient will participate in further assessment of cognitive-communication skills as appropriate. 1/28- not formally addressed this session due to lethargy. Pt was able to recall information from previous session and RN and wife reported pt recalls using head turn 90% of the time. Con't goal     Patient/Family/Caregiver Education:  Educated patient and family to [facility] results, recommendations, strategies, ongoing therapy. pt and wife stated comprehension     Compensatory Strategies:  Compensatory Swallowing Strategies : Small bites/sips, Alternate solids and liquids      Plan:  Continue dysphagia treatment with goals per plan of care. Diet Recommendations: recommend upgrading diet to Dysphagia II/Minced and Moist with thin liquids  via small straw sips + head turn LEFT / meds in puree  - 1:1 assist feed  - upright position  - head turn LEFT  - small bites / sips  - slow rate  - check for pocketing on left  *oral hygiene 2-3x daily*    DC recommendation: ongoing Speech Therapy at the next level of care   Treatment: 15 minutes Speech Therapy, 20 min dysphagia tx   D/W nursing, Tami Stanley   Needs met prior to leaving room, call button in reach.     Richelle Lopez, Arturo Simpson  Speech-Language Pathologist  Pager [phone]      If patient is discharged prior to next treatment, this note will serve as the discharge summary

## 2023-01-28 NOTE — PROGRESS NOTES
Hospitalist Progress Note      PCP: Daniella Gaming MD    Date of Admission: 1/25/2023    Chief Complaint: left side weakness    Hospital Course:     Jose Gibson is a 74 y.o. male, PMH of previous LAD stent, HTN, anxiety, found to have a R M1 occlusion via CTA on 11/26, now post-op from diagnostic cerebral angiogram and mechanical thrombectomy. Subjective:     Patient was seen with family at bedside. He reports feeling ok. Family states he did not sleep last night. He is very tired today. Medications:  Reviewed    Infusion Medications    sodium chloride       Scheduled Medications    aspirin  81 mg Oral Daily    enoxaparin  40 mg SubCUTAneous Daily    ezetimibe  10 mg Oral Daily    rosuvastatin  40 mg Oral Daily    sodium chloride flush  5-40 mL IntraVENous 2 times per day     PRN Meds: acetaminophen, ondansetron **OR** ondansetron, polyethylene glycol, sodium chloride flush, sodium chloride, perflutren lipid microspheres      Intake/Output Summary (Last 24 hours) at 1/28/2023 1258  Last data filed at 1/28/2023 0900  Gross per 24 hour   Intake 590 ml   Output 550 ml   Net 40 ml       Physical Exam Performed:    /84   Pulse 78   Temp 98 °F (36.7 °C) (Oral)   Resp 20   Ht 6' 1\" (1.854 m)   Wt 164 lb 14.5 oz (74.8 kg)   SpO2 97%   BMI 21.76 kg/m²     General appearance: No apparent distress, appears stated age and cooperative. HEENT: left facial droop  Respiratory:  Normal respiratory effort. Clear to auscultation, bilaterally without Rales/Wheezes/Rhonchi. Cardiovascular: Regular rate and rhythm with normal S1/S2 without murmurs, rubs or gallops. Abdomen: Soft, non-tender, non-distended with normal bowel sounds.   Musculoskeletal: no edema  Neurologic: left side hemiparesis   Psychiatric: sleepy but arousable      Labs:   Recent Labs     01/26/23  1519 01/27/23  0552 01/28/23  0533   WBC 9.9 8.6 10.9   HGB 13.8 13.4* 14.0   HCT 40.7 39.6* 41.8    213 223     Recent Labs 01/26/23  0530 01/27/23  0552 01/28/23  0533    138 137   K 4.4 3.9 4.1    102 99   CO2 21 27 26   BUN 11 13 11   CREATININE 0.6* 0.6* 0.7*   CALCIUM 9.2 9.1 9.4     Recent Labs     01/25/23 1939   AST 24   ALT 20   BILITOT <0.2   ALKPHOS 47     Recent Labs     01/25/23 1939   INR 1.10     Recent Labs     01/25/23  1939 01/27/23  0356 01/27/23  1123   TROPONINI <0.01 0.05* 0.02*       Urinalysis:      Lab Results   Component Value Date/Time    BLOODU negative 05/12/2013 04:37 PM    SPECGRAV 1.020 05/12/2013 04:37 PM    GLUCOSEU negative 05/12/2013 04:37 PM       Radiology:  CT head without contrast   Final Result      1. No intracranial hemorrhage status post treatment   2. Acute infarctions involving the right cerebral hemisphere corresponding to the areas of acute infarction demonstrated on MRI yesterday. FL MODIFIED BARIUM SWALLOW W VIDEO   Final Result      1. Laryngeal penetration with thin liquid consistency by spoon and straw. 2. Aspiration with nectar consistency by spoon and straw. MRI BRAIN WO CONTRAST   Final Result      1. Multiple large acute ischemic insults in the right MCA territory, most pronounced in the basal ganglia, posterior insula, and temporal lobe with additional scattered areas in the right frontal and parietal lobes. 2.  Abnormal flow signal in the right M1 and M2 segments.       IR ANGIOGRAM CAROTID CEREBRAL RIGHT    (Results Pending)       IP CONSULT TO PHARMACY  PHARMACY TO CHANGE BASE FLUIDS  IP CONSULT TO NEUROCRITICAL CARE  IP CONSULT TO NEUROSURGERY  IP CONSULT TO CRITICAL CARE  IP CONSULT TO PHYSICAL MEDICINE REHAB  IP CONSULT TO CARDIOLOGY  IP CONSULT TO CASE MANAGEMENT  IP CONSULT TO DIETITIAN    Assessment/Plan:    Active Hospital Problems    Diagnosis     Elevated troponin [R77.8]      Priority: Medium    Coronary artery disease involving native coronary artery of native heart without angina pectoris [I25.10]      Priority: Medium Cerebrovascular accident (CVA) due to embolism of right middle cerebral artery ([address] [I63.411]      Priority: Medium     R MCA Occlusion s/p TNK and thrombectomy  Patient presented with complete left-sided paralysis, left-sided extinction, slurred speech. Patient received TNK at [place], transferred to [facility] and underwent emergent cerebral angiogram which showed occlusion of the right middle cerebral artery origin with nearly complete restoration of flow after thrombectomy, but otherwise normal 3 vessel cerebral angiogram  [facility] neuro checks  -echo showed normal EF no shunting.   -Initiate ASA 81mg and DVT ppx when ok with neurosurgery     Chest pressure with elevated troponin   Hx of CAD s/p stent in 2019  -echo showed normal EF. Trop trending down. Cardiology recommended no further testing and signed off. HTN  Patient is on lisinopril 5mg daily at home  -Hold home meds as patient currently normotensive     HLD:  Patient is on Crestor 20mg and ezetimibe 10mg at home daily  -Continue home meds     Anxiety  Per chart, patient is on Valium 2mg at home  -Hold home meds      DVT Prophylaxis: SCD  Diet: ADULT DIET;  Dysphagia - Pureed  Code Status: Full Code  PT/OT Eval Status: ordered    Dispo - discharge to [address] [place] [address] on Sunday     Alphonso Shanks MD

## 2023-01-29 ENCOUNTER — HOSPITAL ENCOUNTER (INPATIENT)
Age: 68
DRG: 057 | End: 2023-01-29
Attending: PHYSICAL MEDICINE & REHABILITATION | Admitting: PHYSICAL MEDICINE & REHABILITATION
Payer: MEDICARE

## 2023-01-29 VITALS
RESPIRATION RATE: 14 BRPM | WEIGHT: 164.9 LBS | SYSTOLIC BLOOD PRESSURE: 108 MMHG | BODY MASS INDEX: 21.86 KG/M2 | HEART RATE: 70 BPM | OXYGEN SATURATION: 97 % | HEIGHT: 73 IN | TEMPERATURE: 98.2 F | DIASTOLIC BLOOD PRESSURE: 73 MMHG

## 2023-01-29 DIAGNOSIS — I63.9 ACUTE CVA (CEREBROVASCULAR ACCIDENT) (HCC): Primary | ICD-10-CM

## 2023-01-29 LAB
ANION GAP SERPL CALCULATED.3IONS-SCNC: 10 MMOL/L (ref 3–16)
ANTITHROMBIN ACTIVITY: 93 % (ref 76–128)
ANTITHROMBIN ANTIGEN: 89 % (ref 82–136)
BASOPHILS ABSOLUTE: 0.1 K/UL (ref 0–0.2)
BASOPHILS RELATIVE PERCENT: 0.7 %
BUN BLDV-MCNC: 11 MG/DL (ref 7–20)
CALCIUM SERPL-MCNC: 9.2 MG/DL (ref 8.3–10.6)
CHLORIDE BLD-SCNC: 100 MMOL/L (ref 99–110)
CO2: 28 MMOL/L (ref 21–32)
CREAT SERPL-MCNC: 0.6 MG/DL (ref 0.8–1.3)
EOSINOPHILS ABSOLUTE: 0.1 K/UL (ref 0–0.6)
EOSINOPHILS RELATIVE PERCENT: 1 %
GFR SERPL CREATININE-BSD FRML MDRD: >60 ML/MIN/{1.73_M2}
GLUCOSE BLD-MCNC: 115 MG/DL (ref 70–99)
HCT VFR BLD CALC: 42.8 % (ref 40.5–52.5)
HEMOGLOBIN: 14.4 G/DL (ref 13.5–17.5)
LYMPHOCYTES ABSOLUTE: 2.3 K/UL (ref 1–5.1)
LYMPHOCYTES RELATIVE PERCENT: 24 %
MAGNESIUM: 2 MG/DL (ref 1.8–2.4)
MCH RBC QN AUTO: 32.6 PG (ref 26–34)
MCHC RBC AUTO-ENTMCNC: 33.7 G/DL (ref 31–36)
MCV RBC AUTO: 96.8 FL (ref 80–100)
MONOCYTES ABSOLUTE: 0.8 K/UL (ref 0–1.3)
MONOCYTES RELATIVE PERCENT: 8.7 %
NEUTROPHILS ABSOLUTE: 6.3 K/UL (ref 1.7–7.7)
NEUTROPHILS RELATIVE PERCENT: 65.6 %
PDW BLD-RTO: 13.3 % (ref 12.4–15.4)
PLATELET # BLD: 204 K/UL (ref 135–450)
PMV BLD AUTO: 7.5 FL (ref 5–10.5)
POTASSIUM SERPL-SCNC: 3.8 MMOL/L (ref 3.5–5.1)
RBC # BLD: 4.42 M/UL (ref 4.2–5.9)
SODIUM BLD-SCNC: 138 MMOL/L (ref 136–145)
WBC # BLD: 9.6 K/UL (ref 4–11)

## 2023-01-29 PROCEDURE — 94761 N-INVAS EAR/PLS OXIMETRY MLT: CPT

## 2023-01-29 PROCEDURE — 2580000003 HC RX 258

## 2023-01-29 PROCEDURE — 97530 THERAPEUTIC ACTIVITIES: CPT

## 2023-01-29 PROCEDURE — 80048 BASIC METABOLIC PNL TOTAL CA: CPT

## 2023-01-29 PROCEDURE — 6370000000 HC RX 637 (ALT 250 FOR IP)

## 2023-01-29 PROCEDURE — 99232 SBSQ HOSP IP/OBS MODERATE 35: CPT | Performed by: PHYSICAL MEDICINE & REHABILITATION

## 2023-01-29 PROCEDURE — 6360000002 HC RX W HCPCS

## 2023-01-29 PROCEDURE — 36415 COLL VENOUS BLD VENIPUNCTURE: CPT

## 2023-01-29 PROCEDURE — 83735 ASSAY OF MAGNESIUM: CPT

## 2023-01-29 PROCEDURE — 6370000000 HC RX 637 (ALT 250 FOR IP): Performed by: PHYSICAL MEDICINE & REHABILITATION

## 2023-01-29 PROCEDURE — 1280000000 HC REHAB R&B

## 2023-01-29 PROCEDURE — 97535 SELF CARE MNGMENT TRAINING: CPT

## 2023-01-29 PROCEDURE — 85025 COMPLETE CBC W/AUTO DIFF WBC: CPT

## 2023-01-29 RX ORDER — SODIUM CHLORIDE 0.9 % (FLUSH) 0.9 %
5-40 SYRINGE (ML) INJECTION PRN
Status: DISCONTINUED | OUTPATIENT
Start: 2023-01-29 | End: 2023-02-24 | Stop reason: HOSPADM

## 2023-01-29 RX ORDER — SODIUM CHLORIDE 0.9 % (FLUSH) 0.9 %
5-40 SYRINGE (ML) INJECTION EVERY 12 HOURS SCHEDULED
Status: DISCONTINUED | OUTPATIENT
Start: 2023-01-29 | End: 2023-01-31

## 2023-01-29 RX ORDER — MECOBALAMIN 5000 MCG
5 TABLET,DISINTEGRATING ORAL NIGHTLY
Status: CANCELLED | OUTPATIENT
Start: 2023-01-29

## 2023-01-29 RX ORDER — SENNA PLUS 8.6 MG/1
1 TABLET ORAL DAILY
Qty: 30 TABLET | Refills: 0 | Status: ON HOLD
Start: 2023-01-29 | End: 2023-02-28

## 2023-01-29 RX ORDER — ONDANSETRON 2 MG/ML
4 INJECTION INTRAMUSCULAR; INTRAVENOUS EVERY 6 HOURS PRN
Status: DISCONTINUED | OUTPATIENT
Start: 2023-01-29 | End: 2023-02-24 | Stop reason: HOSPADM

## 2023-01-29 RX ORDER — MECOBALAMIN 5000 MCG
5 TABLET,DISINTEGRATING ORAL NIGHTLY
Status: DISCONTINUED | OUTPATIENT
Start: 2023-01-29 | End: 2023-02-24 | Stop reason: HOSPADM

## 2023-01-29 RX ORDER — ROSUVASTATIN CALCIUM 20 MG/1
40 TABLET, COATED ORAL DAILY
Status: DISCONTINUED | OUTPATIENT
Start: 2023-01-30 | End: 2023-02-24 | Stop reason: HOSPADM

## 2023-01-29 RX ORDER — ONDANSETRON 4 MG/1
4 TABLET, ORALLY DISINTEGRATING ORAL EVERY 8 HOURS PRN
Status: DISCONTINUED | OUTPATIENT
Start: 2023-01-29 | End: 2023-02-24 | Stop reason: HOSPADM

## 2023-01-29 RX ORDER — ENOXAPARIN SODIUM 100 MG/ML
40 INJECTION SUBCUTANEOUS DAILY
Status: DISCONTINUED | OUTPATIENT
Start: 2023-01-30 | End: 2023-02-24 | Stop reason: HOSPADM

## 2023-01-29 RX ORDER — EZETIMIBE 10 MG/1
10 TABLET ORAL DAILY
Status: DISCONTINUED | OUTPATIENT
Start: 2023-01-30 | End: 2023-02-24 | Stop reason: HOSPADM

## 2023-01-29 RX ORDER — ACETAMINOPHEN 325 MG/1
650 TABLET ORAL EVERY 4 HOURS PRN
Status: DISCONTINUED | OUTPATIENT
Start: 2023-01-29 | End: 2023-02-24 | Stop reason: HOSPADM

## 2023-01-29 RX ORDER — POLYETHYLENE GLYCOL 3350 17 G/17G
17 POWDER, FOR SOLUTION ORAL DAILY PRN
Status: DISCONTINUED | OUTPATIENT
Start: 2023-01-29 | End: 2023-02-24 | Stop reason: HOSPADM

## 2023-01-29 RX ORDER — ASPIRIN 81 MG/1
81 TABLET, CHEWABLE ORAL DAILY
Status: DISCONTINUED | OUTPATIENT
Start: 2023-01-30 | End: 2023-02-24 | Stop reason: HOSPADM

## 2023-01-29 RX ADMIN — ROSUVASTATIN CALCIUM 40 MG: 20 TABLET, COATED ORAL at 07:58

## 2023-01-29 RX ADMIN — Medication 5 MG: at 22:13

## 2023-01-29 RX ADMIN — ACETAMINOPHEN 650 MG: 325 TABLET, FILM COATED ORAL at 22:13

## 2023-01-29 RX ADMIN — EZETIMIBE 10 MG: 10 TABLET ORAL at 07:58

## 2023-01-29 RX ADMIN — ENOXAPARIN SODIUM 40 MG: 100 INJECTION SUBCUTANEOUS at 07:58

## 2023-01-29 RX ADMIN — SODIUM CHLORIDE, PRESERVATIVE FREE 10 ML: 5 INJECTION INTRAVENOUS at 08:00

## 2023-01-29 RX ADMIN — ASPIRIN 81 MG 81 MG: 81 TABLET ORAL at 07:58

## 2023-01-29 ASSESSMENT — PAIN DESCRIPTION - ORIENTATION: ORIENTATION: ANTERIOR

## 2023-01-29 ASSESSMENT — PAIN SCALES - GENERAL
PAINLEVEL_OUTOF10: 0
PAINLEVEL_OUTOF10: 0
PAINLEVEL_OUTOF10: 3
PAINLEVEL_OUTOF10: 0

## 2023-01-29 ASSESSMENT — PAIN DESCRIPTION - LOCATION: LOCATION: HEAD

## 2023-01-29 ASSESSMENT — PAIN DESCRIPTION - DESCRIPTORS: DESCRIPTORS: ACHING;DISCOMFORT

## 2023-01-29 ASSESSMENT — PAIN - FUNCTIONAL ASSESSMENT: PAIN_FUNCTIONAL_ASSESSMENT: PREVENTS OR INTERFERES SOME ACTIVE ACTIVITIES AND ADLS

## 2023-01-29 NOTE — PLAN OF CARE
Problem: Pain  Goal: Verbalizes/displays adequate comfort level or baseline comfort level  1/29/2023 0815 by Debora Mcbride RN  Outcome: Progressing     Problem: Skin/Tissue Integrity  Goal: Absence of new skin breakdown  Description: 1. Monitor for areas of redness and/or skin breakdown  2. Assess vascular access sites hourly  3. Every 4-6 hours minimum:  Change oxygen saturation probe site  4. Every 4-6 hours:  If on nasal continuous positive airway pressure, respiratory therapy assess nares and determine need for appliance change or resting period.   1/29/2023 0815 by Debora Mcbride RN  Outcome: Progressing     Problem: Safety - Adult  Goal: Free from fall injury  1/29/2023 0815 by Debora Mcbride RN  Outcome: Progressing

## 2023-01-29 NOTE — PROGRESS NOTES
Report called to ARU RN. Pt transported to 3102 via bed. Wife at bedside. All noted belongings with patient. AVS printed, taken with patient.

## 2023-01-29 NOTE — DISCHARGE SUMMARY
Hospital Medicine Discharge Summary    Patient ID: Blanca Wayne      Patient's PCP: Radu Aguilar MD    Admit Date: 1/25/2023     Discharge Date:     Admitting Physician: Snow Khan MD     Discharge Physician: Melina Vargas MD     Discharge Diagnoses: Active Hospital Problems    Diagnosis Date Noted    Elevated troponin [R77.8] 01/27/2023     Priority: Medium    Coronary artery disease involving native coronary artery of native heart without angina pectoris [I25.10] 01/27/2023     Priority: Medium    Cerebrovascular accident (CVA) due to embolism of right middle cerebral artery [facility]) [I63.411] 01/25/2023     Priority: Medium       The patient was seen and examined on day of discharge and this discharge summary is in conjunction with any daily progress note from day of discharge. Condition at discharge - stable    Hospital Course:   44-year-old male with a significant past medical history of CAD, hypertension and hyperlipidemia who was transferred from [facility] for further evaluation and treatment of a acute right middle cerebral artery thrombosis. Patient presented to [facility] with sudden onset of left sided paralysis after a nonsyncopal fall in the bathroom hitting his face against the bathtub. In emergency room he received TNK after a negative CT of the head without contrast.  CTA showed right middle cerebral artery M1 segment severe stenosis/occlusion. Patient had from diagnostic cerebral angiogram and mechanical thrombectomy. 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  Patient received TNK at [facility], transferred to [facility] and underwent emergent cerebral angiogram which showed occlusion of the right middle cerebral artery origin with nearly complete restoration of flow after thrombectomy, but otherwise normal 3 vessel cerebral angiogram  -Started on aspirin, continue zetic and crestor. Follow with neurology outpatient.  -discharge with cardiac monitor.   -follow with PCP for hypercoag workup result. If needed, will need a referral to hematology       Chronic Medical Conditions:  CAD   S/p stent 2019. Patient is on aspirin at home  -resume aspirin and crestor      HTN  Patient was on lisinopril 5mg daily at home. Hold home meds as patient currently normotensive. Need to follow with PCP outpatient to monitor BP. HLD:  - on Crestor 20mg and ezetimibe 10mg at home daily    Anxiety  -patient is not on Valium 2mg at home. Follow with PCP    Insomnia - melatonin prn    Exam:     /73   Pulse 70   Temp 98.2 °F (36.8 °C) (Oral)   Resp 14   Ht 6' 1\" (1.854 m)   Wt 164 lb 14.5 oz (74.8 kg)   SpO2 97%   BMI 21.76 kg/m²     General appearance: No apparent distress    Respiratory:  Normal respiratory effort. Clear to auscultation, bilaterally without Rales/Wheezes/Rhonchi. Cardiovascular: Regular rate and rhythm with normal S1/S2 without murmurs, rubs or gallops. Abdomen: Soft, non-tender, non-distended, normal bowel sounds. Musculoskelatal: No clubbing, cyanosis or edema bilaterally. Neurologic: left arm paralysis. Left LE strength 2-3  Psychiatric: Alert and oriented, normal mood    Consults:     IP CONSULT TO NEUROCRITICAL CARE  IP CONSULT TO NEUROSURGERY  IP CONSULT TO CRITICAL CARE  IP CONSULT TO PHYSICAL MEDICINE REHAB  IP CONSULT TO CARDIOLOGY  IP CONSULT TO PHARMACY  PHARMACY TO CHANGE BASE FLUIDS  IP CONSULT TO CASE MANAGEMENT  IP CONSULT TO DIETITIAN      Code Status:  Full Code    Activity: activity as tolerated    Labs:  For convenience and continuity at follow-up the following most recent labs are provided:      CBC:    Lab Results   Component Value Date/Time    WBC 9.6 01/29/2023 04:40 AM    HGB 14.4 01/29/2023 04:40 AM    HCT 42.8 01/29/2023 04:40 AM     01/29/2023 04:40 AM       Renal:    Lab Results   Component Value Date/Time     01/29/2023 04:40 AM    K 3.8 01/29/2023 04:40 AM    K 4.1 01/25/2023 07:39 PM     01/29/2023 04:40 AM    CO2 28 01/29/2023 04:40 AM    BUN 11 01/29/2023 04:40 AM    CREATININE 0.6 01/29/2023 04:40 AM    CALCIUM 9.2 01/29/2023 04:40 AM       Discharge Medications:     Current Discharge Medication List             Details   senna (SENOKOT) 8.6 MG tablet Take 1 tablet by mouth daily  Qty: 30 tablet, Refills: 0      Melatonin 5 MG CAPS Take 5 mg by mouth nightly as needed (insomnia)  Qty: 30 capsule, Refills: 0                Details   ezetimibe (ZETIA) 10 MG tablet TAKE 1 TABLET DAILY      Ascorbic Acid  MG CPCR Take by mouth daily      aspirin 81 MG chewable tablet Take 81 mg by mouth daily      rosuvastatin (CRESTOR) 20 MG tablet Take 40 mg by mouth daily              Time Spent on discharge is 36 mints in the examination, evaluation, counseling and review of medications and discharge plan and coordination of care with staff. Signed:    Melina Vargas MD   1/29/2023      Thank you Radu Aguilar MD for the opportunity to be involved in this patient's care. If you have any questions or concerns please feel free to contact me at [phone].

## 2023-01-29 NOTE — PROGRESS NOTES
Occupational Therapy  Facility/Department: [facility] ICU  Daily Treatment Note  NAME: Bryan Price  : 1955  MRN: [id number]    Date of Service: 2023    Discharge Recommendations: Bryan Price scored a 10/24 on the AM-PAC ADL Inpatient form. Current research shows that an AM-PAC score of 17 or less is typically not associated with a discharge to the patient's home setting. Based on the patient's AM-PAC score and their current ADL deficits, it is recommended that the patient have 5-7 sessions per week of Occupational Therapy at d/c to increase the patient's independence. At this time, this patient demonstrates complex nursing, medical, and rehabilitative needs, and would benefit from intensive rehabilitation services upon discharge from the Inpatient setting. This patient demonstrates the ability to participate in and benefit from an intensive therapy program with a coordinated interdisciplinary team approach to foster frequent, structured, and documented communication among disciplines, who will work together to establish, prioritize, and achieve treatment goals. Please see assessment section for further patient specific details. If patient discharges prior to next session this note will serve as a discharge summary. Please see below for the latest assessment towards goals. OT Equipment Recommendations  Other: Defer to next level of care. Patient Diagnosis(es): There were no encounter diagnoses. Assessment      Assessment: Pt demonstrated improved participation in OT today as evident by pt ability to self correct L lateral lean w/ improvements noted and ability to transfer from bed to the chair w/ use of riky stedy today. Pt is greatly limited by poor midline orientation, L sided visual impairments, LUE/LLE weakness, and impaired cognition. Pt is highly motivated and has great family support. Pt is hopeful to d/c to ARU today to continue to maximize functional independence.  Cont OT while inpt. Activity Tolerance: Patient tolerated treatment well (Simultaneous filing. User may not have seen previous data.)  Other: Defer to next level of care. Plan   Occupational Therapy Plan  Times Per Week: 5-7  Current Treatment Recommendations: Strengthening;ROM;Balance training;Functional mobility training; Endurance training;Self-Care / ADL; Patient/Caregiver education & training;Neuromuscular re-education;Cognitive/Perceptual training     Restrictions    Restrictions/Precautions  Restrictions/Precautions: Fall Risk (high fall risk)  Position Activity Restriction  Other position/activity restrictions: \"bedrest until seen by PT/OT\", seizure precautions, adult diet - dysphagia pureed    Subjective     Subjective  Subjective: Pt was semi supine in bed upon arrival w/ family present. Pt was pleasant and agreeable to OT  Orientation  Overall Orientation Status: Within Functional Limits  Orientation Level: Oriented to place;Oriented to situation;Oriented to person (able to state month and year, cues for day of week)    Cognition  Overall Cognitive Status: Exceptions  Arousal/Alertness: Appropriate responses to stimuli  Following Commands: Inconsistently follows commands (requires cues, redirection, repetition)  Attention Span: Attends with cues to redirect; Difficulty attending to directions  Memory: Appears intact  Safety Judgement: Decreased awareness of need for assistance;Decreased awareness of need for safety  Problem Solving: Assistance required to generate solutions  Insights: Decreased awareness of deficits  Initiation: Requires cues for some  Sequencing: Requires cues for some  Cognition Comment: Pt is talkative and requires VCs for re-direction to task. Pt w/ mod difficulty following commands due to impaired attention. Decreased L sided attention noted. Objective    Vitals     Bed Mobility Training  Bed Mobility Training: Yes  Supine to Sit: Assist X2 (Max Ax1 + Min Ax1.  VCs and TCs needed to reach RUE for bed rail. Pt able to advance LLE minimally but required assistance. Pt w/ difficulty following commands to complete.)  Scooting: Maximum assistance (max A to scoot R hip EOB)  Balance  Sitting: Impaired (Pt maintained seated balance for ~10 mins w/ Max A progressing to min A. Pt w/ strong L lateral lean and completed several bouts of R elbow propping to correct. TCs provided at RUE to encourage pt to the R. Pt also w/ posterior lean at times.)  Standing: Impaired (Pt maintained stance in stedy for ~5 mins w/ max A progressing to min A at times. Pt w/ strong L lateral lean. Pt assisted in [facility] stedy w/ assist x2 from bed to chair)  Transfer Training  Transfer Training: Yes  Sit to Stand: Assist X2 (Max Ax1 + Min Ax1 from [facility] [place] stedy. VCs needed to complete. Assistance needed to support LUE on stedy)  Stand to Sit: Assist X2 (stedy to recliner chair)          OT Exercises  A/AROM Exercises: Pt w/ no observed active movement to LUE. OT encouraged pt to attempt shoulder shrugs but pt could not elicit movement. OT educated family to have pt attempt exercises and to provide tactile input to LUE. OT educated about prevention of sublux and supporting LUE when seated and in bed     Safety Devices  Type of Devices: Chair alarm in place;Call light within reach; Left in chair;Nurse notified       Patient Education  Education Given To: Patient; Family  Education Provided: Role of Therapy;Plan of Care;Family Education;Precautions  Education Provided Comments: seated posture, LUE/LLE exercises  Education Method: Verbal;Demonstration  Barriers to Learning: Cognition  Education Outcome: Verbalized understanding;Continued education needed    Goals  Short Term Goals  Time Frame for Short Term Goals: Discharge- goals ongoing  Short Term Goal 1: Stance with mod assist of 1 for 30 sec x3 to assist with ADL/transfers  Short Term Goal 2: Sit edge of bed with CG x10 min while engaging in simple grooming/hygiene  Short Term Goal 3: Transfer bed to/from 3 in 1 commode/chair with mod assist of 2  Short Term Goal 4: Pt will attend to left visual field with no more than 2 cues. Patient Goals   Patient goals : Go home. Be independent. Therapy Time   Individual Concurrent Group Co-treatment   Time In 0902         Time Out 0941         Minutes 39         Timed Code Treatment Minutes: [facility], OT  If patient discharges prior to next treatment, this note will serve as discharge summary. WiIll continue per plan of care if patient does not discharge.

## 2023-01-29 NOTE — PROGRESS NOTES
ARU PATIENT TREATMENT PLAN  [address], [address] [place] [address]  [phone]    Anthony Coffey    : 1955  Acct #: [de-identified]  MRN: [id number]  PHYSICIAN:  Dayna Polk DO  Primary Problem    Patient Active Problem List   Diagnosis    Chest pain    Cerebrovascular accident (CVA) due to embolism of right middle cerebral artery (HCC)    Elevated troponin    Coronary artery disease involving native coronary artery of native heart without angina pectoris    Acute CVA (cerebrovascular accident) [facility])       Rehabilitation Diagnosis:  {ambiguous abbreviation:[id number]::\"Stroke, 1.1, Left Body (R Brain)\",\"Stroke, 1.2, Right Body (L Brain)\",\"Stroke, 1.3, Bilateral Involvement\",\"Stroke, 1.4, No Paresis\",\"Stroke, 1.9, Other Stroke\",\"Brain, 2.1, Non-Traumatic\",\"Brain, 2.21, Traumatic, open injury\",\"2.22, Traumatic, closed injury\",\"Neurologic, 3.1, Multiple Sclerosis\",\"Neurologic, 3.2, Parkinsonism\",\"Neurologic, 3.3, Polyneuropathy\",\"Neurologic, 3.4, Guillain[place] Syndrome\",\"Neurologic, 3.5, Cerebral Palsy\",\"Neurologic, 3.8, Neuromuscular Disorders, e.g. Critical Illness Myopathy, Other Myopathy\",\"Neurologic, 3.9, Other Neurologic, Other Demyelinating disease of CNS\",\"Spinal Cord Dysfunction - Non-traumatic, 4.110, Paraplegia, unspecified\",\"Spinal Cord Dysfunction - Non-traumatic, 4.111, Paraplegia, incomplete\",\"Spinal Cord Dysfunction - Non-traumatic, 4.112, Paraplegia, complete\",\"Spinal Cord Dysfunction - Non-traumatic, 4.120, Quadriplegia, unspecified\",\"Spinal Cord Dysfunction - Non-traumatic, 4.1211, Quadriplegia, Incomplete C1-4\",\"Spinal Cord Dysfunction - Non-traumatic, 4.1212, Quadriplegia, Incomplete C5-8\",\"Spinal Cord Dysfunction - Non-traumatic, 4.1221, Quadriplegia, Complete C1-4\",\"Spinal Cord Dysfunction - Non-traumatic, 4.1222, Quadriplegia, Complete C5-8\",\"Spinal Cord Dysfunction - Non-traumatic, 4.130, Other NTSCI\",\"Spinal Cord Dysfunction - Traumatic, 4.210, Paraplegia, Unspecified\",\"Spinal Cord Dysfunction - Traumatic, 4.211, Paraplegia, Incomplete\",\"Spinal Cord Dysfunction - Traumatic, 4.212, Paraplegia, Complete\",\"Spinal Cord Dysfunction - Traumatic, 4.220, Quadriplegia, Unspecified\",\"Spinal Cord Dysfunction - Traumatic, 4.2211, Quadriplegia, Incomplete C1-4\",\"Spinal Cord Dysfunction - Traumatic, 4.2212, Quadriplegia, Incomplete C5-8\",\"Spinal Cord Dysfunction - Traumatic, 4.2221, Quadriplegia, Complete C1-4\",\"Spinal Cord Dysfunction - Traumatic, 4.2222, Quadriplegia, Complete C5-8\",\"Spinal Cord Dysfunction - Traumatic, 4.230, Other, TSCI\",\"Amputation, 5.1, Unilateral Upper Limb, Above Elbow\",\"Amputation, 5.2, Unilateral Upper Limb, Below Elbow\",\"Amputation, 5.3, Unilateral Lower Limb, AKA\",\"Amputation, 5.4, Unilateral Lower Limb, BKA\",\"Amputation, 5.5, Bilateral Lower Limb, AKA\",\"Amputation, 5.6, Unilateral Lower Limb, AKA/BKA\",\"Amputation, 5.7, Bilateral Lower Limb, BKA\",\"Amputation, 5.8, Other Amputation, Upper Extremity\",\"Arthritis, 6.1, Rheumatoid Arthritis\",\"Arthritis, 6.2, Osteoarthritis\",\"6.9, Arthritis, Other Arthritis\",\"Pain Syndromes, 7.1, Neck\",\"Pain Syndromes, 7.2, Back\",\"Pain Syndromes, 7.3, Extremity\",\"Pain Syndromes, 7.9, Other\",\"Orthopedic, 8.11, Unilateral Hip Fracture\",\"Orthopedic, 8.12, Bilateral Hip Fracture\",\"Orthopedic, 8.2, Femur Shaft Fracture\",\"Orthopedic, 8.3, Pelvic Fracture\",\"Orthopedic, 8.4, Major Multiple Fractures\",\"Orthopedic, 8.51, Unilateral Hip Replacement\",\"Orthopedic, 8.52, Bilateral Hip Replacement\",\"Orthopedic, 8.61, Unilateral Knee Replacement\",\"Orthopedic, 8.62, Bilateral Knee Replacement\",\"Orthopedic, 8.71, Knee and Hip replacement (same side)\",\"Orthopedic, 8.72, Knee and Hip replacement (different sides)\",\"Orthopedic, 8.9, Other Orthopedic\",\"Cardiac, 9.0, Cardiac\",\"Pulmonary, 10.1, COPD\",\"10.9, Pulmonary, Other Pulmonary\",\"Burns, 11.0, Burns\",\"Congenital Deformities, 12.1, Spina Bifida\",\"12.9, Other Congenital Deformities\",\"13.0, Other Disabling Impairments\",\"Major Multiple Trauma, 14.1, Brain + Spinal Cord\",\"14.2, Brain + Multiple Fracture/Amputation\",\"14.3, Spinal Cord + Multiple Fracture/Amputation\",\"Other Multiple Trauma (multi-system no BI or SCI\",\"15.0, Developmental Disability\",\"16.0, Debility (non-cardiac, non-pulmonary\"}  ADMIT DATE:1/29/2023    Patient Goals: ***  Admitting Impairments: ***  Activity Restrictions: ***  Participation Limitations: ***   CARE PLAN   NURSING:  Connie Wills while on this unit will:  [] Be continent of bowel and bladder     [x] Have an adequate number of bowel movements  [x] Urinate with no urinary retention >300ml in bladder  [] Complete bladder protocol with barker removal  [x] Maintain O2 SATs at _90__%  [x] Have pain managed while on ARU       [] Be pain free by discharge   [x] Have no skin breakdown while on ARU  [] Have improved skin integrity via wound measurements  [x] Have no signs/symptoms of infection at the wound site  [x] Be free from injury during hospitalization   [x] Complete education with patient/family with understanding demonstrated for:  [] Adjustment   [] Other:     Nursing Interventions will include:  [x] bowel/bladder training   [x] education for medical assistive devices   [x] medication education   [] O2 saturation management   [x] energy conservation   [x] stress management techniques   [x] fall prevention   [x] alarms protocol   [x] seating and positioning   [x] skin/wound care   [x] pressure relief instruction   [x] dressing changes     [x] infection protection   [x] DVT prophylaxis  [] assistance with in room safety with transfers to bed, toilet, wheelchair, shower   [x] bathroom activities and hygiene  [] Other:    Patient/Caregiver Education for:  [x] Disease/sustained injury/management     [x] Medication Use  [] Surgical intervention  [x] Safety  [x] Body mechanics and or joint protection  [x] Health maintenance     [] Other:     PHYSICAL THERAPY:  Goals: These goals were reviewed with this patient at the time of assessment and Farzana Colindres is in agreement. Plan of Care: Pt to be seen 5 out of 7 days per week per ARU protocol (*** minutes with PT)                       OCCUPATIONAL THERAPY:  Goals:               :    :  These goals were reviewed with this patient at the time of assessment and Farzana Colindres is in agreement    Plan of Care:  Pt to be seen 5 out of 7 days per week per ARU protocol (*** minutes with OT)       SPEECH THERAPY: Goals will be left blank if speech is not following this patient. Goals:                                                                             Plan of Care:  Pt to be seen 5 out of 7 days per week per ARU protocol (*** minutes with SLP)    Therapy Treatments will include:  []  therapeutic exercises    []  gait training     []  neuromuscular re-ed                            []  transfer training             [] community reintegration    [] bed mobility                          []  w/c mobility and training  []  self care    []home mgmt    []  cognitive training            []  energy conservation        []  dysphagia tx    []  speech/language/communication therapy   []  group therapy    []  patient/family education    [] Other:    CASE MANAGEMENT:  Goals: Assist patient/family with discharge planning, patient/family counseling, and coordination with insurance during ARU stay.     Admission Period/Goal QM SCORES  QM Admit/Goal Score   Eating   /     Oral Hygiene   /     Shower/Bathing   /     UB Dressing   /     LB Dressing   /     Putting on/off Footwear   /     Toileting Hygiene   /     Bladder Continence      Bowel Continence      Toilet Transfers   /     Shower/Bathe Self    /     Rolling Left and Right   /     Sit to Lying   /     Lying to Sitting on Bedside   /     Sit to Stand   /     Chair/Bed to Chair Transfer   /     Car Transfers   /     Walk 10 Feet   / Walk 50 Feet with Two Turns   /     Walk 150 Feet   /     Walk 10 Feet on Uneven Surfaces   /     1 Step (Curb)   /     4 Steps   /     12 Steps   /     Picking up Object from Floor   /     Wheel 50 Feet with 2 Turns   /     Type         [] Manual        [] Motorized        [] N/A   Wheel 150 Feet   /     Type         [] Manual        [] Motorized        [] N/A        Anton Coker will be seen a minimum of 3 hours of therapy per day, a minimum of 5 out of 7 days per week (please see above for specific treatment plan per PT/OT/SLP). [] In this rare instance due to the nature of this patient's medical involvement, this patient will be seen 15 hours per week (900 minutes within a 7day period). In addition, dietician/nutritionist may monitor calorie count as well as intake and collaboratively work with SLP on dietary upgrades. Neuropsychology/Psychology may evaluate and provide necessary support. Medical issues being managed closely and that require 24hour availability of a physician:  [] Swallowing Precautions  [] Bowel/Bladder Fx  [] Weight bearing precautions  [] Wound Care    [] Pain Mgmt   [] Infection Protection  [] DVT Prophylaxis   [] Fall Precautions  [] Fluid/Electrolyte/Nutrition Balance  [] Voice Protection   [] Respiratory  [] Other:    Medical Prognosis: [] Good  [] Fair    [] Guarded   Total expected IRF days ***  Anticipated discharge destination: ***  [] Home Independently   [] Home Modified Independent  [] Home with supervision    []SNF     [] Other                                           Physician anticipated functional outcomes:***     IPOC brief synthesis: ***    This initial ARU patient treatment plan of care, together with the IPOC & the Education plan, form the foundation for the patient's plan of care. Weekly patient care conferences are held to evaluate progress towards the initial treatment plan & goals.     I have reviewed this initial plan of care and agree with its contents:    Title   Name    Date    Time    Physician:     Case Mgmt:    OT:     PT:    RN: JOE Funk RN 1/29/23 8944    ST:    ARU Supervisor:    Other:

## 2023-01-29 NOTE — PROGRESS NOTES
Physical Therapy  Facility/Department: [facility] ICU  Physical Therapy Treatment    Name: Anabel Hooks  : 1955  MRN: [id number]  Date of Service: 2023    Discharge Recommendations:  Anabel Hooks scored a 7/24 on the AM-PAC short mobility form. Current research shows that an AM-PAC score of 17 or less is typically not associated with a discharge to the patient's home setting. Based on the patient's AM-PAC score and their current functional mobility deficits, it is recommended that the patient have 5-7 sessions per week of Physical Therapy at d/c to increase the patient's independence. At this time, this patient demonstrates complex nursing, medical, and rehabilitative needs, and would benefit from intensive rehabilitation services upon discharge from the Inpatient setting. This patient demonstrates the ability to participate in and benefit from an intensive therapy program with a coordinated interdisciplinary team approach to foster frequent, structured, and documented communication among disciplines, who will work together to establish, prioritize, and achieve treatment goals. Please see assessment section for further patient specific details. If patient discharges prior to next session this note will serve as a discharge summary. Please see below for the latest assessment towards goals. PT Equipment Recommendations  Other: plan to continue to assess and likely defer recommendations to the next level of care      Patient Diagnosis(es): There were no encounter diagnoses. Past Medical History:  has a past medical history of Anxiety, Chest pain, Depression, Encounter for imaging to screen for metal prior to MRI, Hyperlipidemia, Hypertension, and Wears glasses. Past Surgical History:  has a past surgical history that includes [place] tooth extraction; Colonoscopy (2009); Cardiac catheterization (2008); Finger trigger release (3-); and Coronary angioplasty with stent.     Assessment Body Structures, Functions, Activity Limitations Requiring Skilled Therapeutic Intervention: Decreased functional mobility ; Decreased ROM; Decreased strength;Decreased balance;Decreased coordination;Decreased posture;Decreased sensation;Decreased ADL status; Decreased safe awareness;Decreased endurance  Assessment: Patient demonstrates impaired functional mobility related to right MCA CVA and fall. Patient cooperative and motiviated - eager to progress and to facilitate his (I). Patient leans left with poor awareness with max cues for midline and upright. Noted intermittent left knee extension (2-/5) and initiating left hip flexion supine in bed although unable to demonstrate active foot/ankle movement. Patient will continue to benefit from additional skilled PT intervention to facilitate safe mobility and to optimize (I) to promote return to prior level of function. Treatment Diagnosis: impaired functional mobility  Therapy Prognosis: Good  Barriers to Learning: decreased safety awareness  Requires PT Follow-Up: Yes     Plan   Physcial Therapy Plan  General Plan: 5-7 times per week  Current Treatment Recommendations: Strengthening, Balance training, ROM, Functional mobility training, Neuromuscular re-education, Safety education & training, Patient/Caregiver education & training, Transfer training, Endurance training, Home exercise program, Gait training, Equipment evaluation, education, & procurement, Positioning, Therapeutic activities  Safety Devices  Type of Devices: Chair alarm in place, Call light within reach, Left in chair, Nurse notified, Gait belt     Restrictions  Restrictions/Precautions  Restrictions/Precautions: Fall Risk (high fall risk)  Position Activity Restriction  Other position/activity restrictions: \"bedrest until seen by PT/OT\", seizure precautions, adult diet - dysphagia pureed     Subjective   General  Chart Reviewed:  Yes  Additional Pertinent Hx: Pt is a 74 y.o. male adm 1/25 with acute ischemic stroke. Pt presented to [facility] ED after fall. Pt with complete left-sided paralysis, left-sided extinction, slurred speech. Head CT: neg. CTA head: Apparent thrombus in the right middle cerebral artery M1 segment and M2  branches. Pt s/p Mechanical thrombectomy for stroke on 1/25. MRI brain:   Multiple large acute ischemic insults in the right MCA territory, most pronounced in the basal ganglia, posterior insula, and temporal lobe with additional scattered areas in the right frontal and parietal lobes. PMH:  HTN, alcohol abuse, anxiety  Family / Caregiver Present: Yes (wife, son, daugther-in-law)  Follows Commands: Impaired  Other (Comment): with repetition, redirection, cues  General Comment  Comments: Patient supine in bed upon arrival - agreeable to PT.  RN approval obtained prior to PT entry. Subjective  Subjective: Patient denies pain, reports left UE and LE \"heavy\" - family present and very attentive. Cognition   Orientation  Overall Orientation Status: Within Functional Limits  Orientation Level: Oriented to place;Oriented to situation;Oriented to person (able to state month and year, cues for day of week)  Cognition  Arousal/Alertness: Appropriate responses to stimuli  Following Commands: Inconsistently follows commands (requires cues, redirection, repetition)  Attention Span: Attends with cues to redirect; Difficulty attending to directions  Memory: Appears intact  Safety Judgement: Decreased awareness of need for assistance;Decreased awareness of need for safety  Problem Solving: Assistance required to generate solutions  Insights: Decreased awareness of deficits  Initiation: Requires cues for some  Sequencing: Requires cues for some     Objective      Bed Mobility Training  Bed Mobility Training: Yes  Supine to Sit: Assist X2 (Max Ax1 + Min Ax1. VCs and TCs needed to reach RUE for bed rail. Pt able to advance LLE minimally but required assistance.  Pt w/ difficulty following commands to complete.)  Scooting: Maximum assistance (max A to scoot R hip EOB)  Balance  Sitting: Impaired (Pt maintained seated balance for ~10 mins w/ Max A progressing to min A. Pt w/ strong L lateral lean and completed several bouts of R elbow propping to correct. TCs provided at [facility] to encourage pt to the R. Pt also w/ posterior lean at times.)  Standing: Impaired (Pt maintained stance in stedy for ~5 mins w/ max A progressing to min A at times. Pt w/ strong L lateral lean. Pt assisted in [address] [place] [address] ste w/ assist x2 from bed to chair)  Transfer Training  Transfer Training: Yes  Sit to Stand: Assist X2 (Max Ax1 + Min Ax1 from [facility]. VCs needed to complete. Assistance needed to support LUE on stedy)  Stand to Sit: Assist X2 (stedy to recliner chair)  Bed mobility  Supine to Sit: Maximum assistance; Moderate assistance;2 Person assistance (max A of 1 with mod A of 1 with elevated head of bed, cues, use of rail, increased time to perform)  Scooting: Maximal assistance;2 Person assistance (seated at edge of bed, seated in recliner)  Transfers  Sit to Stand: Maximum Assistance;2 Person Assistance;Minimal Assistance (max A of 1 with min A of 1 to [place])  Stand to Sit: Maximum Assistance;Minimal Assistance;2 Person Assistance (max A of 1 with min A of 1 - cues for sequencing and transition to chair)  Bed to Chair: Dependent/Total;2 Person Assistance (via [place])  Comment: strong left lateral during sitting and standing attempts with min A of 1 to max A of 1 to maintain upright/midline; right elbow propping x 5 reps with min to mod A for balance awareness; max A of 1 secondary to left lateral lean in [place] - max cues for right weight shift to target        Balance  Posture: Poor (forward flexed, rounded shoulders, mod to max A to maintain midline with significant cues and repetition to maintain)  Sitting - Static: Poor  Sitting - Dynamic: Poor  Standing - Static: Poor  Standing - Dynamic: Poor       AM-PAC Score  AM-PAC Inpatient Mobility Raw Score : 7 (01/29/23 1009)  AM-PAC Inpatient T-Scale Score : 26.42 (01/29/23 1009)  Mobility Inpatient CMS 0-100% Score: 92.36 (01/29/23 1009)  Mobility Inpatient CMS G-Code Modifier : CM (01/29/23 1009)      Goals  Short Term Goals  Time Frame for Short Term Goals: By discharge - all goals ongoing 1/29  Short Term Goal 1: Sup to sit with mod assist x 1  Short Term Goal 2: Pt will sit EOB with consistent CGA  Short Term Goal 3: Pt will transfer sit to stand with mod assist x 1  Short Term Goal 4: Pt will stand for 1 minute with mod assist x 1 with LRAD  Patient Goals   Patient Goals : per wife, \"to rehab today\"       Education  Patient Education  Education Given To: Patient; Family  Education Provided: Role of Therapy;Plan of Care  Education Provided Comments: use of call light, PT recommendations, left sided awareness and movement attempts  Education Method: Demonstration;Verbal  Barriers to Learning:  (decreased safety awareness)  Education Outcome: Verbalized understanding;Continued education needed      Therapy Time   Individual Concurrent Group Co-treatment   Time In 0902         Time Out 0941         Minutes 39              Timed Code Treatment Minutes: 39 minutes    Total Treatment Minutes: 44 Minutes    If patient discharges prior to next treatment, this note will serve as discharge summary.     Elmira Toussaint PT, DPT #[id number]

## 2023-01-29 NOTE — PROGRESS NOTES
NURSING ASSESSMENT: ARU ADMISSION  [facility] [address] Dx/Hx: Acute CVA (cerebrovascular accident) [facility]) [I63.9]   Chaim Osorio  LQF:[id number]  Date of Admit: 1/29/2023  Room #: 3102/3102-01    Subjective:   Patient admitted to roomROOMBED@ from ICU via bed. Alert and oriented x4. Oriented to room and call light system. Oriented to rehab routine and therapy schedules. Informed about care conferences and ordering of meals. Is this a stroke patient?: Yes [x]    No []      If yes, has the PATH-S instrument been given to a family caregiver? Yes [x]No []        Drug / Medication Review:   Medications were reviewed by RN at time of admission  [x]  No potential or actual clinically significant medication issues were noted.      []   Yes, a clinically significant medication issue was identified                 []  Adverse Drug Event:                  []  Allergy:                  []  Side Effect:                  []  Ineffective Therapy:                  []  Drug Interaction:                 []  Duplicated Therapy:                 []  Untreated Indication:                  []  Non-adherence:                 []  Other:                  Nursing/Pharmacy contacted the physician:     Date:              Time:                  Actions recommended by physician were completed:   Date :            Time:    4 Eyes Skin Assessment   The patient is being assessed for: Admission     I agree that 2 RN's have performed a thorough Head to Toe Skin Assessment on the patient. ALL assessment sites listed below have been assessed. Patient has bruise on nose, scab on left shoulder, bruising R groin from trhombectomy. Areas assessed by both nurses:   [x]   Head, Face, and Ears   [x]   Shoulders, Back, and Chest, Abdomen  [x]   Arms, Elbows, and Hands   [x]   Coccyx, Sacrum, and Ischium  [x]   Legs, Feet, and Heel     Does the Patient have Skin Breakdown?  Erinn Velasco Prevention initiated:  Yes  Wound Care Orders initiated:  Not Applicable      [facility] nurse consulted for Pressure Injury (Stage 3,4, Unstageable, DTI, NWPT, Complex wounds)and New or Established Ostomies:  Not Applicable    Primary Nurse eSignature: Sybil Chaney RN  Co-signer eSignature:   Deloris Jacinto

## 2023-01-29 NOTE — PROGRESS NOTES
ARU Admission Assessment    Ethnicity  \"Are you of , /a, or Gabonese origin? \"  Check all that apply:  [x] A. No, not of , /a, or [place] Origin  [] B.  Yes, [place], [place] American, Chicano/a  [] C.  Yes, [address] [place] [address]  [] D.  Yes, [place]  [] E.  Yes, another , , or [place]onese origin  [] X. Patient unable to respond  [] Y. Patient declines to respond    Race  \"What is your race? \"  Check all that apply: [x]  A. White  [] B. Black or   [] C. American [place] [facility] or [place] Native  [] D.  [place] [facility]  [] E. [place]  [] F. Spanish  [] G. [place]  [] Hayde Root  [] I. [place]  [] J.  Other   [] K.   [] L. Libyan or [place]  [] M. Russian  [] N. Other [place]  [] X. Patient unable to respond  [] Y. Patient declines to respond  [] Z. None of the above    Language  A. \"What is your preferred language? \"   English    B. \"Do you need or want an  to communicate with a doctor or health care staff? \"  Check only one:  [x] 0. No  [] 1. Yes  [] 9. Unable to determine    Transportation  \"Has lack of transportation kept you from medical appointments, meetings, work, or from getting things needed for daily living? \"Check all that apply:  [] A.  Yes, it has kept me from medical appointments or from getting my medications  [] B.  Yes, it has kept me from non-medical meetings, appointments, work, or from getting things that I need  [x] C.  No  [] X. Patient unable to respond  [] Y. Patient declines to respond    Hearing  Ability to hear (with hearing aid or hearing appliances if normally used)  [x]  0. Adequate - no difficulty in normal conversation, social interaction, listening to TV  []  1. Minimal difficulty - difficulty in some environments (e.g. when person speaks softly or setting is noisy)  []  2. Moderate difficulty - speaker has to increase volume and speak distinctly   []  3.   Highly impaired - absence of useful hearing    Vision  Ability to see in adequate light (with glasses or other visual appliances)  [x]  0. Adequate - sees fine detail, such as regular print in newspapers/books  []  1. Impaired - sees large print, but not regular print in newspapers/books  []  2. Moderately impaired - limited vision; not able to see newspaper headlines but can identify objects  []  3. Highly impaired - object identification in question, but eyes appear to follow objects  []  4. Severely impaired - no vision or sees only light, colors, or shapes; eyes do not appear to follow objects    Health Literacy  \"How often do you need to have someone help you when you read instructions, pamphlets, or other written material from your doctor or pharmacy? \"  []  0. Never  [x]  1. Rarely  []  2. Sometimes  []  3. Often  []  4. Always  []  8. Patient unable to respond    BIMS - **Must be completed in the flowsheet at admission prior to proceeding with Delirium Assessment**  [x] BIMS completed in flowsheet at admission    Signs and Symptoms of Delirium  A. Acute Onset Mental Status Change - Is there evidence of an acute change in mental status from the patient's baseline? [x] 0. No  [] 1. Yes    B. Inattention - Did the patient have difficulty focusing attention, for example being easily distractible or having difficulty keeping track of what was being said? [x]  0. Behavior not present  []  1. Behavior continuously present, does not fluctuate  []  2. Behavior present, fluctuates (comes and goes, changes in severity)    C. Disorganized thinking - Was the patient's thinking disorganized or incoherent (rambling or irrelevant conversation, unclear or illogical flow of ideas, or unpredictable switching from subject to subject)? [x]  0. Behavior not present  []  1. Behavior continuously present, does not fluctuate  []  2. Behavior present, fluctuates (comes and goes, changes in severity)    D.   Altered level of consciousness - Did the patient have altered level of consciousness as indicated by any of the following criteria? Vigilant - startled easily to any sound or touch  Lethargic - repeatedly dozed off while being asked questions, but responded to voice or touch  Stuporous - very difficulty to arouse and keep aroused for the interview  Comatose - could not be aroused  [x]  0. Behavior not present  []  1. Behavior continuously present, does not fluctuate  []  2. Behavior present, fluctuates (comes and goes, changes in severity)    Mood    \"Over the last 2 weeks, have you been bothered by any of the following problems?\" 1. Symptom Presence    0 = No  1 = Yes  9 = No Response 2. Symptom Frequency    0 = Never or 1 day  1 = 2-6 days (several days)  2 = 7-11 days (half or more of the days)  3 = 12-14 days (nearly every day)  **Leave blank if 'No Reponse'**      Enter scores in boxes    Column 1 Column 2   Little interest or pleasure in doing things   0   0   Feeling down, depressed, or hopeless   0 0   **If either A or B in column 2 is coded 2 or 3, CONTINUE asking the questions below. If not, END the interview. **     Trouble falling or staying asleep, or sleeping too much       Feeling tired or having little energy       Poor appetite or overeating       Feeling bad about yourself - or that you are a failure or have let yourself or your family down       Trouble concentrating on things, such as reading the newspaper or watching television       Moving or speaking so slowly that other people could have noticed. Or the opposite- being so fidgety or restless that you have been moving around a lot more than usual.       Thoughts that you would be better off dead, or of hurting yourself in some way. Total Severity: Add scores for all frequency responses in column 2 (possible score 0-27, or enter 99 if unable to complete (if symptom frequency (column 2) is blank for 3 or more items).         Social Isolation  \"How often do you feel lonely or isolated from those around you? \"  [x] 0. Never  [] 1. Rarely  [] 2. Sometimes  [] 3. Often  [] 4. Always  [] 7. Patient declines to respond  [] 8. Patient unable to respond    Pain Effect on Sleep  \"Over the past 5 days, how much of the time has pain made it hard for you to sleep at night? \"  []  0. Does not apply - I have not had any pain or hurting in the past 5 days  [x]  1. Rarely or not at all  []  2. Occasionally  []  3. Frequently  []  4. Almost constantly  []  8. Unable to answer    **If the patient answers \"0. Does not apply\" to this question, skip the next two \"Pain Effect. Author Nichole Author Nichole \" questions**    Pain Interference with Therapy Activities  \"Over the past 5 days, how often have you limited your participation in rehabilitation therapy sessions due to pain? \"  []  0. Does not apply - I have not received rehabilitation therapy in the past 5 days  [x]  1. Rarely or not at all  []  2. Occasionally  []  3. Frequently  []  4. Almost constantly  []  8. Unable to answer    Pain Interference with Day-to-Day Activities: \"Over the past 5 days, how often have you limited your day-to-day activities (excluding rehabilitation therapy session)? \"  [x]  1. Rarely or not at all  []  2. Occasionally  []  3. Frequently  []  4. Almost constantly  []  8. Unable to answer    Nutritional Approaches  Check all of the following nutritional approaches that apply on admission:  []  A. Parenteral/IV feeding (including IV fluids if needed for hydration, but not as part of dialysis/chemo)  []  B. Feeding tube (e.g., nasogastric or abdominal (PEG))  []  C. Mechanically altered diet - requires change in texture of food or liquids (e.g., pureed food, thickened liquids)  []  D. Therapeutic diet (e.g., low salt, diabetic, low cholesterol)  [x]  Z.   None of the above    High Risk Drug Classes:  Use and Indication    Is taking: Check if the pt is taking any medications by pharmacological classification, not how it is used, in the following classes  Indication noted: If column 1 is checked, check if there is an indication noted for all meds in the drug class Is taking  (check all that apply) Indication noted (check all that apply)   Antipsychotic [] []   Anticoagulant [x] [x]   Antibiotic [] []   Opioid [] []   Antiplatelet [] []   Hypoglycemic (including insulin) [] []   None of the above []     Special Treatments, Procedures, and Programs    Check all of the following treatments, procedures, and programs that apply on admission. On admission (check all that apply)   Cancer Treatments   A1. Chemotherapy []           A2. IV []           A3. Oral []           A10. Other []   B1. Radiation []   Respiratory Therapies   C1. Oxygen Therapy [x]           C2. Continuous (continuously for at least 14 hours per day) []           C3. Intermittent [x]           C4. High-concentration []   D1. Suctioning (Does not include oral suctioning) []           D2. Scheduled []           D3. As needed []   E1. Tracheostomy Care []   F1. Invasive Mechanical Ventilator (ventilator or respirator) []   G1. Non-invasive Mechanical Ventilator []           G2. BiPAP []           G3. CPAP []   Other   H1. IV Medications (Do not include sub Q pumps, flushes, Dextrose 50% or lactated ringers) []           H2. Vasoactive medications []           H3. Antibiotics []           H4. Anticoagulation []           H10. Other []   I1. Transfusions []   J1. Dialysis []           J2. Hemodialysis []           J3. Peritoneal dialysis []   O1. IV access (including a catheter in a vein) []           O2. Peripheral []           O3. Midline []           O4. Central (PICC, tunneled, port) []      None of the above (select if no Cancer, Respiratory, or Other boxes are checked) []     The above items have been reviewed and updated as necessary, and are accurate for the admission assessment period.     Assessing/Reviewing RN: Sybil Chaney RN    Assessing/Reviewing RN: Sachin Melgoza RN 1/29/23 2908

## 2023-01-29 NOTE — PLAN OF CARE
Problem: Discharge Planning  Goal: Discharge to home or other facility with appropriate resources  Outcome: Progressing     Problem: Pain  Goal: Verbalizes/displays adequate comfort level or baseline comfort level  Outcome: Progressing     Problem: Skin/Tissue Integrity  Goal: Absence of new skin breakdown  Description: 1. Monitor for areas of redness and/or skin breakdown  2. Assess vascular access sites hourly  3. Every 4-6 hours minimum:  Change oxygen saturation probe site  4. Every 4-6 hours:  If on nasal continuous positive airway pressure, respiratory therapy assess nares and determine need for appliance change or resting period.   Outcome: Progressing     Problem: Safety - Adult  Goal: Free from fall injury  Outcome: Progressing     Problem: ABCDS Injury Assessment  Goal: Absence of physical injury  Outcome: Progressing     Problem: Chronic Conditions and Co-morbidities  Goal: Patient's chronic conditions and co-morbidity symptoms are monitored and maintained or improved  Outcome: Progressing     Problem: Neurosensory - Adult  Goal: Achieves stable or improved neurological status  Outcome: Progressing  Goal: Achieves maximal functionality and self care  Outcome: Progressing

## 2023-01-29 NOTE — CONSULTS
Clinical Pharmacy Progress Note    All IVs in NS - Management by Pharmacy    Consult Date(s): 1/26/23  Consulting Provider(s): Dr. Debi Mendez / Plan  LVO of R MCA - All IVs in Normal Saline  Drips will be adjusted to normal saline as appropriate based on compatibility, in an effort to avoid fluid shifts, as D5W is osmotically active (hypotonic solution)  In patients with stroke or elevated intracranial pressures, avoidance of hypotonic fluids is advised given their potential to cause extravascular fluid shifts which may in turn cause or exacerbate cerebral edema and brain injury  The following intermittent IV drips / infusions have been adjusted to saline:  None at this time  If ordered, the following medications must remain in D5W due to incompatibility with normal saline:  None at this time  Note: Patient may have dextrose ordered as part of hypoglycemia treatment protocol  As patient is not being treated for pituitary tumor resection or requiring hypertonic saline (defined as >0.9% NaCl), pharmacy will sign off of IV review consult, per protocol. Thank you for consulting Pharmacy!     Moises Rodriguez PharmD., BCPS   1/29/2023 9:23 AM  Wireless: [phone]

## 2023-01-29 NOTE — PROGRESS NOTES
Pt A/Ox4, following commands. Pupils equal and reactive. Flaccid in LUE/LLE with L sided facial droop. NSR, HR 70s. Afebrile. Denies pain. Room air. External catheter in place. Thin liquids, sips with scheduled morning medications. Call light within reach. Bed alarm on, bed locked/ in lowest position.

## 2023-01-29 NOTE — PROGRESS NOTES
Progress Note  Physical Medicine and Rehabilitation    Patient: Trudy Bryan  [id number]  Date: 2023      Chief Complaint:     History of Present Illness/Hospital Course:  Trudy Bryan is a 74year old male with a PMHx of anxiety, CAD (s/p stent ), HTN, HLD who presented to [facility] ED with complete left-sided paralysis, left-sided extinction, slurred speech following a fall. CTA showed right middle cerebral artery M1 segment severe stenosis/occlusion with some reconstitution of flow more distally. He underwent emergent cerebral angiogram which showed occlusion of the right middle cerebral artery origin with nearly complete restoration of flow after thrombectomy, but otherwise normal 3 vessel cerebral angiogram.     Interval history: Doing well. Plan to admit to ARU today. Prior Level of Function:  Independent for mobility, ADLs, and IADLs    Current Level of Function:  Mod assist     Pertinent Social History:  Support: Wife at home      Past Medical History:   Diagnosis Date    Anxiety     Chest pain     Depression     Encounter for imaging to screen for metal prior to MRI 2023    CT Head cleared for metal, no foreign body--ok for MRI per     Hyperlipidemia     Hypertension     Wears glasses        Past Surgical History:   Procedure Laterality Date    CARDIAC CATHETERIZATION  2008    COLONOSCOPY  2009    CORONARY ANGIOPLASTY WITH STENT PLACEMENT      FINGER TRIGGER RELEASE  3-    left- long finger. WISDOM TOOTH EXTRACTION         History reviewed. No pertinent family history.     Social History     Socioeconomic History    Marital status:      Spouse name: None    Number of children: None    Years of education: None    Highest education level: None   Tobacco Use    Smoking status: Former     Types: Cigarettes     Quit date: 3/9/2011     Years since quittin.9    Smokeless tobacco: Never    Tobacco comments:     occasional cigar   Vaping Use Vaping Use: Never used   Substance and Sexual Activity    Alcohol use: Yes     Comment: 3 drinks 3 days per week    Drug use: No           REVIEW OF SYSTEMS:   CONSTITUTIONAL: negative for fevers, chills, diaphoresis, appetite change, night sweats, unexpected weight change, fatigue  EYES: negative for blurred vision, eye discharge, visual disturbance and icterus  HEENT: negative for hearing loss, tinnitus, ear drainage, sinus pressure, nasal congestion, epistaxis and snoring  RESPIRATORY: Negative for hemoptysis, cough, sputum production  CARDIOVASCULAR: negative for chest pain, palpitations, exertional chest pressure/discomfort, syncope, edema  GASTROINTESTINAL: negative for nausea, vomiting, diarrhea, blood in stool, abdominal pain, constipation  GENITOURINARY: negative for frequency, dysuria, urinary incontinence, decreased urine volume, and hematuria  HEMATOLOGIC/LYMPHATIC: negative for easy bruising, bleeding and lymphadenopathy  ALLERGIC/IMMUNOLOGIC: negative for recurrent infections, angioedema, anaphylaxis and drug reactions  ENDOCRINE: negative for weight changes and diabetic symptoms including polyuria, polydipsia and polyphagia  MUSCULOSKELETAL: negative for pain, joint swelling, decreased range of motion  NEUROLOGICAL: negative for headaches, Positive for slurred speech and unilateral weakness  PSYCHIATRIC/BEHAVIORAL: negative for hallucinations, behavioral problems, confusion and agitation.      Physical Examination:  Vitals: Patient Vitals for the past 24 hrs:   BP Temp Temp src Pulse Resp SpO2   01/29/23 0824 -- -- -- 77 17 98 %   01/29/23 0800 130/80 98.2 °F (36.8 °C) Oral 66 19 98 %   01/29/23 0630 -- -- -- 78 24 97 %   01/29/23 0615 -- -- -- 77 22 98 %   01/29/23 0600 -- -- -- 84 19 99 %   01/29/23 0545 -- -- -- 69 17 98 %   01/29/23 0530 -- -- -- 78 19 99 %   01/29/23 0515 -- -- -- 81 19 98 %   01/29/23 0500 -- -- -- 70 17 98 %   01/29/23 0445 -- -- -- 75 17 98 %   01/29/23 0430 -- -- -- 97 18 100 %   01/29/23 0415 -- -- -- 70 16 99 %   01/29/23 0400 130/71 100 °F (37.8 °C) Temporal 72 16 98 %   01/29/23 0345 -- -- -- 63 15 98 %   01/29/23 0330 -- -- -- 75 15 100 %   01/29/23 0315 -- -- -- 77 15 98 %   01/29/23 0300 -- -- -- 73 16 100 %   01/29/23 0245 -- -- -- 73 20 99 %   01/29/23 0230 -- -- -- 77 21 99 %   01/29/23 0215 -- -- -- 74 19 100 %   01/29/23 0200 -- -- -- 73 19 99 %   01/29/23 0145 -- -- -- 79 21 99 %   01/29/23 0130 -- -- -- 63 16 97 %   01/29/23 0115 -- -- -- 65 15 98 %   01/29/23 0100 -- -- -- 63 15 97 %   01/29/23 0045 -- -- -- 60 14 97 %   01/29/23 0030 -- -- -- 84 18 97 %   01/29/23 0015 -- -- -- 70 15 96 %   01/29/23 0000 (!) 96/50 100.1 °F (37.8 °C) Temporal 70 15 96 %   01/28/23 2345 -- -- -- 70 16 98 %   01/28/23 2330 -- -- -- 70 16 98 %   01/28/23 2315 -- -- -- 80 15 96 %   01/28/23 2300 -- -- -- 79 16 96 %   01/28/23 2245 -- -- -- 75 16 96 %   01/28/23 2230 -- -- -- 71 18 96 %   01/28/23 2215 -- -- -- 87 23 --   01/28/23 2200 -- -- -- 85 24 --   01/28/23 2145 -- -- -- 92 21 97 %   01/28/23 2130 -- -- -- 84 24 98 %   01/28/23 2115 -- -- -- 83 21 --   01/28/23 2100 -- -- -- 85 17 --   01/28/23 2045 -- -- -- 87 18 --   01/28/23 2030 -- -- -- 82 19 --   01/28/23 2015 -- -- -- 79 17 --   01/28/23 2000 -- 100.3 °F (37.9 °C) Temporal 79 19 --   01/28/23 1945 -- -- -- 79 19 --   01/28/23 1930 -- -- -- 89 23 --   01/28/23 1915 -- -- -- 83 18 --   01/28/23 1900 -- -- -- 80 16 --   01/28/23 1845 -- -- -- 80 24 --   01/28/23 1830 -- -- -- 83 27 --   01/28/23 1815 -- -- -- 79 22 --   01/28/23 1800 -- -- -- 79 24 --   01/28/23 1745 -- -- -- 72 16 --   01/28/23 1730 -- -- -- 70 17 --   01/28/23 1715 -- -- -- 71 16 --   01/28/23 1700 -- -- -- 72 18 --   01/28/23 1649 122/80 99 °F (37.2 °C) Temporal 83 16 97 %   01/28/23 1645 -- -- -- 81 20 --   01/28/23 1630 -- -- -- 72 15 --   01/28/23 1615 -- -- -- 69 15 --   01/28/23 1600 -- -- -- 72 17 --   01/28/23 1545 -- -- -- 78 17 --   01/28/23 1530 -- -- -- 69 18 --   01/28/23 1515 -- -- -- 80 21 --   01/28/23 1500 -- -- -- 91 29 --   01/28/23 1445 -- -- -- 76 17 --   01/28/23 1430 -- -- -- 85 21 --   01/28/23 1415 -- -- -- 78 19 --   01/28/23 1400 -- -- -- 90 23 --   01/28/23 1345 -- -- -- 73 19 --   01/28/23 1330 -- -- -- 68 16 --   01/28/23 1315 -- -- -- 81 20 --   01/28/23 1300 -- -- -- 67 13 --   01/28/23 1245 -- -- -- 76 19 --   01/28/23 1230 -- -- -- 73 17 --   01/28/23 1215 -- -- -- 100 22 --   01/28/23 1209 (!) 153/84 98.9 °F (37.2 °C) Temporal 86 18 98 %   01/28/23 1200 -- -- -- 85 22 100 %   01/28/23 1159 -- -- -- 78 20 97 %   01/28/23 1145 -- -- -- 90 19 100 %       Const: Alert. WDWN. No distress  Eyes: Conjunctiva noninjected, no icterus noted; pupils equal, round, and reactive to light. HENT: Atraumatic, normocephalic; Oral mucosa moist  Neck: Trachea midline, neck supple. No thyromegaly noted. CV: Regular rate and rhythm, no murmur rub or gallop noted  Resp: Lungs clear to auscultation bilaterally, no rales wheezes or ronchi, no retractions. Respirations unlabored. GI: Soft, nontender, nondistended. Normal bowel sounds. No palpable masses. Skin: Normal temperature and turgor. No rashes or breakdown noted. Ext: No significant edema appreciated. No varicosities. MSK: No joint tenderness, erythema, warmth noted. AROM intact. Neuro: Alert, oriented, appropriate. No cranial nerve deficits appreciated. Sensation intact to light touch. Motor examination reveals normal strength in right side with 0/5 strength in left UE and 3/5 strength in left LE.    Psych: Stable mood, normal judgement, normal affect     Lab Results   Component Value Date    WBC 9.6 01/29/2023    HGB 14.4 01/29/2023    HCT 42.8 01/29/2023    MCV 96.8 01/29/2023     01/29/2023     Lab Results   Component Value Date    INR 1.10 01/25/2023    INR 1.05 01/08/2019    PROTIME 14.1 01/25/2023    PROTIME 12.0 01/08/2019     Lab Results   Component Value Date    CREATININE 0.6 (L) 01/29/2023    BUN 11 01/29/2023     01/29/2023    K 3.8 01/29/2023     01/29/2023    CO2 28 01/29/2023     Lab Results   Component Value Date    ALT 20 01/25/2023    AST 24 01/25/2023    ALKPHOS 47 01/25/2023    BILITOT <0.2 01/25/2023           CT head without contrast   Final Result      1. No intracranial hemorrhage status post treatment   2. Acute infarctions involving the right cerebral hemisphere corresponding to the areas of acute infarction demonstrated on MRI yesterday. FL MODIFIED BARIUM SWALLOW W VIDEO   Final Result      1. Laryngeal penetration with thin liquid consistency by spoon and straw. 2. Aspiration with nectar consistency by spoon and straw. MRI BRAIN WO CONTRAST   Final Result      1. Multiple large acute ischemic insults in the right MCA territory, most pronounced in the basal ganglia, posterior insula, and temporal lobe with additional scattered areas in the right frontal and parietal lobes. 2.  Abnormal flow signal in the right M1 and M2 segments. IR ANGIOGRAM CAROTID CEREBRAL RIGHT    (Results Pending)         Assessment:  R MCA Occlusion s/p TNK and thrombectomy  - PT/OT/SLP  -Keep SBP <160  - Will require ARU admit        CAD   S/p stent 2019. Patient is on aspirin at home       HTN  Patient is on lisinopril 5mg daily at home  - monitor      HLD:  Patient is on Crestor 20mg and ezetimibe 10mg at home daily  -Continue home meds     Anxiety  Per chart, patient is on Valium 2mg at home. Possible could be contributor to his chest pain yesterday, notes he felt anxious. Denies taking Valium at home, however it is listed as a home med. Ativan 0.5mg once overnight   -Hold home meds  -Pharmacy med recc     Impairments- Decreased functional mobility, Decreased ADLs    Recommendations:  ARU admit     Patient with new functional deficits and ongoing medical complexity. Demonstrates ability to tolerate 3 hours therapy/day.  He is a good candidate for acute inpatient rehab when medically appropriate. Thank you for this consult. Please contact me with any questions or concerns.      Laura Whitt D.O. M.P.H  PM&R  1/29/2023  11:30 AM

## 2023-01-30 LAB
ANION GAP SERPL CALCULATED.3IONS-SCNC: 13 MMOL/L (ref 3–16)
BASOPHILS ABSOLUTE: 0.1 K/UL (ref 0–0.2)
BASOPHILS RELATIVE PERCENT: 0.7 %
BUN BLDV-MCNC: 15 MG/DL (ref 7–20)
CALCIUM SERPL-MCNC: 9 MG/DL (ref 8.3–10.6)
CHLORIDE BLD-SCNC: 99 MMOL/L (ref 99–110)
CO2: 25 MMOL/L (ref 21–32)
CREAT SERPL-MCNC: 0.6 MG/DL (ref 0.8–1.3)
EOSINOPHILS ABSOLUTE: 0.2 K/UL (ref 0–0.6)
EOSINOPHILS RELATIVE PERCENT: 1.9 %
GFR SERPL CREATININE-BSD FRML MDRD: >60 ML/MIN/{1.73_M2}
GLUCOSE BLD-MCNC: 115 MG/DL (ref 70–99)
HCT VFR BLD CALC: 38.8 % (ref 40.5–52.5)
HEMOGLOBIN: 13.4 G/DL (ref 13.5–17.5)
LYMPHOCYTES ABSOLUTE: 2.1 K/UL (ref 1–5.1)
LYMPHOCYTES RELATIVE PERCENT: 26.1 %
MCH RBC QN AUTO: 32.3 PG (ref 26–34)
MCHC RBC AUTO-ENTMCNC: 34.5 G/DL (ref 31–36)
MCV RBC AUTO: 93.6 FL (ref 80–100)
MONOCYTES ABSOLUTE: 0.8 K/UL (ref 0–1.3)
MONOCYTES RELATIVE PERCENT: 9.5 %
NEUTROPHILS ABSOLUTE: 5 K/UL (ref 1.7–7.7)
NEUTROPHILS RELATIVE PERCENT: 61.8 %
PDW BLD-RTO: 12.9 % (ref 12.4–15.4)
PLATELET # BLD: 220 K/UL (ref 135–450)
PMV BLD AUTO: 7.5 FL (ref 5–10.5)
POTASSIUM REFLEX MAGNESIUM: 3.7 MMOL/L (ref 3.5–5.1)
PROTEIN C FUNCTIONAL: 147 % (ref 83–168)
PROTEIN S, FUNCTIONAL: 66 % (ref 66–143)
RBC # BLD: 4.14 M/UL (ref 4.2–5.9)
SODIUM BLD-SCNC: 137 MMOL/L (ref 136–145)
WBC # BLD: 8.1 K/UL (ref 4–11)

## 2023-01-30 PROCEDURE — 97162 PT EVAL MOD COMPLEX 30 MIN: CPT

## 2023-01-30 PROCEDURE — 97530 THERAPEUTIC ACTIVITIES: CPT

## 2023-01-30 PROCEDURE — 1280000000 HC REHAB R&B

## 2023-01-30 PROCEDURE — 92526 ORAL FUNCTION THERAPY: CPT

## 2023-01-30 PROCEDURE — 97535 SELF CARE MNGMENT TRAINING: CPT

## 2023-01-30 PROCEDURE — 92523 SPEECH SOUND LANG COMPREHEN: CPT

## 2023-01-30 PROCEDURE — 92610 EVALUATE SWALLOWING FUNCTION: CPT

## 2023-01-30 PROCEDURE — 6360000002 HC RX W HCPCS: Performed by: PHYSICAL MEDICINE & REHABILITATION

## 2023-01-30 PROCEDURE — 51798 US URINE CAPACITY MEASURE: CPT

## 2023-01-30 PROCEDURE — 99222 1ST HOSP IP/OBS MODERATE 55: CPT | Performed by: PHYSICAL MEDICINE & REHABILITATION

## 2023-01-30 PROCEDURE — 36415 COLL VENOUS BLD VENIPUNCTURE: CPT

## 2023-01-30 PROCEDURE — 6370000000 HC RX 637 (ALT 250 FOR IP): Performed by: PHYSICAL MEDICINE & REHABILITATION

## 2023-01-30 PROCEDURE — 97129 THER IVNTJ 1ST 15 MIN: CPT

## 2023-01-30 PROCEDURE — 97166 OT EVAL MOD COMPLEX 45 MIN: CPT

## 2023-01-30 PROCEDURE — 85025 COMPLETE CBC W/AUTO DIFF WBC: CPT

## 2023-01-30 PROCEDURE — 97130 THER IVNTJ EA ADDL 15 MIN: CPT

## 2023-01-30 PROCEDURE — 80048 BASIC METABOLIC PNL TOTAL CA: CPT

## 2023-01-30 RX ORDER — PANTOPRAZOLE SODIUM 40 MG/1
40 TABLET, DELAYED RELEASE ORAL
Status: DISCONTINUED | OUTPATIENT
Start: 2023-01-31 | End: 2023-02-24 | Stop reason: HOSPADM

## 2023-01-30 RX ORDER — TRAMADOL HYDROCHLORIDE 50 MG/1
25 TABLET ORAL EVERY 6 HOURS PRN
Status: DISCONTINUED | OUTPATIENT
Start: 2023-01-30 | End: 2023-02-24 | Stop reason: HOSPADM

## 2023-01-30 RX ADMIN — ACETAMINOPHEN 650 MG: 325 TABLET, FILM COATED ORAL at 18:29

## 2023-01-30 RX ADMIN — ENOXAPARIN SODIUM 40 MG: 100 INJECTION SUBCUTANEOUS at 10:05

## 2023-01-30 RX ADMIN — ACETAMINOPHEN 650 MG: 325 TABLET, FILM COATED ORAL at 14:34

## 2023-01-30 RX ADMIN — ASPIRIN 81 MG 81 MG: 81 TABLET ORAL at 10:05

## 2023-01-30 RX ADMIN — TRAMADOL HYDROCHLORIDE 25 MG: 50 TABLET, COATED ORAL at 15:37

## 2023-01-30 RX ADMIN — TRAMADOL HYDROCHLORIDE 25 MG: 50 TABLET, COATED ORAL at 21:47

## 2023-01-30 RX ADMIN — BISACODYL 5 MG: 5 TABLET, COATED ORAL at 10:05

## 2023-01-30 RX ADMIN — EZETIMIBE 10 MG: 10 TABLET ORAL at 10:06

## 2023-01-30 RX ADMIN — NYSTATIN 500000 UNITS: 100000 SUSPENSION ORAL at 14:36

## 2023-01-30 RX ADMIN — Medication 5 MG: at 21:39

## 2023-01-30 RX ADMIN — ROSUVASTATIN CALCIUM 40 MG: 20 TABLET, FILM COATED ORAL at 10:06

## 2023-01-30 RX ADMIN — NYSTATIN 500000 UNITS: 100000 SUSPENSION ORAL at 21:38

## 2023-01-30 RX ADMIN — MAGNESIUM HYDROXIDE 30 ML: 400 SUSPENSION ORAL at 18:29

## 2023-01-30 RX ADMIN — NYSTATIN 500000 UNITS: 100000 SUSPENSION ORAL at 18:29

## 2023-01-30 ASSESSMENT — PAIN DESCRIPTION - ORIENTATION
ORIENTATION: LEFT

## 2023-01-30 ASSESSMENT — PAIN SCALES - GENERAL
PAINLEVEL_OUTOF10: 6
PAINLEVEL_OUTOF10: 7
PAINLEVEL_OUTOF10: 6
PAINLEVEL_OUTOF10: 6
PAINLEVEL_OUTOF10: 0

## 2023-01-30 ASSESSMENT — PAIN - FUNCTIONAL ASSESSMENT
PAIN_FUNCTIONAL_ASSESSMENT: PREVENTS OR INTERFERES SOME ACTIVE ACTIVITIES AND ADLS
PAIN_FUNCTIONAL_ASSESSMENT: PREVENTS OR INTERFERES SOME ACTIVE ACTIVITIES AND ADLS
PAIN_FUNCTIONAL_ASSESSMENT: ACTIVITIES ARE NOT PREVENTED
PAIN_FUNCTIONAL_ASSESSMENT: PREVENTS OR INTERFERES SOME ACTIVE ACTIVITIES AND ADLS

## 2023-01-30 ASSESSMENT — PAIN DESCRIPTION - DESCRIPTORS
DESCRIPTORS: ACHING;DISCOMFORT
DESCRIPTORS: ACHING

## 2023-01-30 ASSESSMENT — PAIN DESCRIPTION - LOCATION
LOCATION: HIP
LOCATION: HIP
LOCATION: BACK
LOCATION: HIP

## 2023-01-30 NOTE — PROGRESS NOTES
Physical Therapy  Facility/Department: [facility]  Rehabilitation Physical Therapy Initial Assessment & Treatment     NAME: Arielle Vela  : 1955 (74 y.o.)  MRN: [id number]  CODE STATUS: Full Code    Date of Service: 23      Past Medical History:   Diagnosis Date    Anxiety     Chest pain     Depression     Encounter for imaging to screen for metal prior to MRI 2023    CT Head cleared for metal, no foreign body--ok for MRI per     Hyperlipidemia     Hypertension     Wears glasses      Past Surgical History:   Procedure Laterality Date    CARDIAC CATHETERIZATION  2008    COLONOSCOPY  2009    CORONARY ANGIOPLASTY WITH STENT PLACEMENT      FINGER TRIGGER RELEASE  3-    left- long finger. WISDOM TOOTH EXTRACTION         Chart Reviewed: Yes  Patient assessed for rehabilitation services?: Yes  Additional Pertinent Hx: 73 y/o M presented to hospital with L sided paralysis. Imagining + for thrombus in the R MCA, taken for thrombectomy (). Pt now functioning below his baseline mobility & presenting to ARU for intensive rehabiliation services. Family / Caregiver Present: No  Referring Practitioner: Jesús Penn DO  Referral Date : 23  Diagnosis: CVA  Other (Comment): Pt able to follow one step commands with time, repetition & redirection  General Comment  Comments: pt resting in bed upon PT arrival.    Restrictions:  Restrictions/Precautions: Fall Risk  Position Activity Restriction  Other position/activity restrictions: up as tolerated, up in chair     SUBJECTIVE  Subjective: Pt agreeable to PT evaluation/tx.  Pt with multiple tangential statements requiring redirectional cues       Post Treatment Pain Screening; n/a/no complaints of pain        Prior Level of Function:  Social/Functional History  Lives With: Spouse  Type of Home: House  Home Layout: Able to Live on Main level with bedroom/bathroom, Multi-level  Home Access: Stairs to enter with rails  Entrance Stairs - Number of Steps: 2  Bathroom Shower/Tub: Walk-in shower  Bathroom Toilet: Standard (sink next to toilet)  Bathroom Equipment: Hand-held shower, Built-in shower seat  Home Equipment: Crutches  Has the patient had two or more falls in the past year or any fall with injury in the past year?: No (none outside of fall PTA)  ADL Assistance: Independent  Homemaking Assistance: Independent  Ambulation Assistance: Independent  Transfer Assistance: Independent  Active : Yes  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, construction, repairs  Leisure & Hobbies: projects around house, [employer], [employer]  Additional Comments: Wife works outside the home      [address]: Impaired  Vision Exceptions: Wears glasses at all times    Hearing  Hearing: Within functional limits    Cognition  Overall Cognitive Status: Exceptions  Arousal/Alertness: Appropriate responses to stimuli  Following Commands:  (Pt follows one step commands with repetition, time & redirection)  Safety Judgement: Decreased awareness of need for assistance;Decreased awareness of need for safety  Insights: Decreased awareness of deficits  Initiation: Requires cues for some  Sequencing: Requires cues for some  Cognition Comment: Pt with tangential statements & easily distracted requiring redirectional cues. Dec'd attention/awareness to the L hand side    ROM  AROM RLE (degrees)  RLE AROM: WFL  AROM LLE (degrees)  LLE General AROM: Pt with dec'd AROM of the LLE. +ability to complete minimal L Hip &  knee / when placed in the antigravity positionn. No noted active mvmt of the ankle joint.     Strength  Strength RLE  Strength RLE: WFL  Strength LLE  Comment: global weakness of the LLE with 0/5 strength noted of the ankle joint & at most 2-/5 of the knee joint in / & 1/5 in hip flexion & knee flexion    Quality of Movement  Tone RLE  RLE Tone: Normotonic  Tone LLE  LLE Tone: Hypertonic  Motor Control  Gross Motor?: Exceptions  Comments: dec'd motor control of the LLE. Sensation  Overall Sensation Status: Impaired (intact to all sensations of the RLE. Impaired to LT & noxious stimuli of the LLE, pt able to accurately locate 1/5 noxious stimuli at the L knee cap only)    Functional Mobility  Bed mobility  Supine to Sit: Maximum assistance;2 Person assistance (with step by step cues for sequencing. Inc'd assistance to advance the LLE & for trunk ascension)  Transfers  Sit to Stand: Maximum Assistance;2 Person Assistance  Stand to Sit: 2 Person Assistance;Maximum Assistance  Stand Pivot Transfers: 2 Person Assistance;Maximum Assistance (with step by step cues for sequencing. L knee block provided.)  Comment: Multiple sit<>stand transfers completed & from various surface levels including: bed, bedside chair, w/c & toilet. Pt with L lateral lean in the standing position requiring VCs, TCs for facilitation of wt shifting/midline orientation. Pt demo's periods of pushing with the RUE when completing stand pivot transfers; therefore inc'd cues required for safety & technique. Multiple stand pivot transfers completed, including: bed>shower chair on wheels, shower chair>w/c, w/c>bedside chair. Pt with episode of L knee buckling during stand pivot transfer & requiring maximal assist to correct. Balance  Posture: Fair (forward flexed posture with rounded shoulders & downward gaze.)  Sitting - Static:  (Maximal assist)  Sitting - Dynamic:  (Maximal assist)  Standing - Static:  (2 person)  Standing - Dynamic:  (2 person)  Comments: pt sits at EOB wtih varying assist levels ranging from maximal<>brief minimalA. Pt with L lateral posterior lean & periods of pushing with the RUE. Cues for midline orientation & avoidance of pushing with the RUE.     Environmental Mobility  Ambulation  More Ambulation?: No  Stairs/Curb  Stairs?: No  Wheelchair Activities  Wheelchair Type: Standard  Propulsion: Yes  Propulsion 1  Propulsion: Manual  Level: Level Tile  Method: RUE;RLE  Level of Assistance:  (varying assist levels ranging from moderate<>minimal assist.)  Description/ Details: Pt requiring cues & assist for w/c management 2/2 demo's L sided neglect with +veering towards wall/objects wtih lack of awareness. Distance: 48'         ASSESSMENT       Activity Tolerance  Activity Tolerance: Patient tolerated treatment well    Assessment  Assessment: Patient presents to ARU following R MCA and fall. He is typically independent in his functional mobility, self care & participates in a bowling league. He is functioning well below his baseline & requiring 2 person assist to complete basic functional transfers (bed mobility, sit<>stand & pivot transfers). He is limited 2/2 impaired motor control/coordination, strength & sensation of the L hemibody, L sided neglect & cognitive deficits. Pt will continue to benefit from skilled PT services (daily) to address the above stated deficits & to maximize his functional independence with a goal to return home with his family. Treatment Diagnosis: impaired functional mobility  Therapy Prognosis: Good  Decision Making: Medium Complexity  Barriers to Learning: decreased safety awareness  Discharge Recommendations: 24 hour supervision or assist;Continue to assess pending progress;Home with Home health PT  PT D/C Equipment  Other: continue to assess  PT Equipment Recommendations  Other: continue to assess    CLINICAL IMPRESSION   Patient presents to ARU following R MCA and fall. He is typically independent in his functional mobility, self care & participates in a bowling league. He is functioning well below his baseline & requiring 2 person assist to complete basic functional transfers (bed mobility, sit<>stand & pivot transfers). He is limited 2/2 impaired motor control/coordination, strength & sensation of the L hemibody, L sided neglect & cognitive deficits.  Pt will continue to benefit from skilled PT services (daily) to address the above stated deficits & to maximize his functional independence with a goal to return home with his family. GOALS  Patient Goals   Patient Goals : to go home  Short Term Goals  Time Frame for Short Term Goals: 2 weeks  Short Term Goal 1: Pt will complete bed mobility with moderate assist  Short Term Goal 2: Pt will complete sit<>stand  & stand pivot transfer wtih moderate assist  Short Term Goal 3: Pt will propel self in manual w/c for 150' with minimal assist.  Short Term Goal 4: Pt will ambulate 10' with moderate assist & LRAD   Long Term Goals  Time Frame for Long Term Goals : 4 weeks  Long Term Goal 1: Pt will complete bed mobility with CGA  Long Term Goal 2: Pt will complete sit<>Stand & stand pivot transfers with CGA  Long Term Goal 3: Pt will propel self in manual w/c for 250' with supervision  Long Term Goal 4: Pt will ambulate 48' with CGA & LRAD    PLAN OF CARE  Frequency: 1-2 treatment sessions per day, 5-7 days per week  Physcial Therapy Plan  Days Per Week: 5 Days  Hours Per Day: 1 hour  Current Treatment Recommendations: Strengthening;Balance training;ROM; Functional mobility training;Neuromuscular re-education; Safety education & training;Patient/Caregiver education & training;Transfer training; Endurance training;Home exercise program;Gait training;Equipment evaluation, education, & procurement;Positioning; Therapeutic activities  Safety Devices  Type of Devices: Chair alarm in place;Call light within reach; Left in chair;Nurse notified;Gait belt;Patient at risk for falls  Restraints  Restraints Initially in Place: No    EDUCATION  Education  Education Given To: Patient  Education Provided: Role of Therapy;Plan of Care; Mobility Training;Transfer Training  Education Method: Verbal  Barriers to Learning: Cognition  Education Outcome: Verbalized understanding;Continued education needed    ELOS:          Therapy Time   Individual Concurrent Group Co-treatment   Time In 0730 0745   Time Out [address]     Timed Code Treatment Minutes: [address], [place], 01/30/23 at 1:38 PM

## 2023-01-30 NOTE — PROGRESS NOTES
Speech Language Pathology  Facility/Department: [facility] ACUTE REHAB UNIT   CLINICAL BEDSIDE SWALLOW EVALUATION    NAME: Jose Gibson  : 1955  MRN: [id number]    ADMISSION DATE: 2023  ADMITTING DIAGNOSIS: has Chest pain; Cerebrovascular accident (CVA) due to embolism of right middle cerebral artery ([address] Elevated troponin; Coronary artery disease involving native coronary artery of native heart without angina pectoris; and Acute CVA (cerebrovascular accident) [facility]) on their problem list.  ONSET DATE: 2023    Recent Chest Xray: 2023  Impression   Unremarkable portable exam     Date of Eval: 2023  Evaluating Therapist: TIGRE Pryor    Current Diet level:  Current Diet : Pureed / Thins    Primary Complaint     Pain: None indicated at time of session (endorses pain prior to session)    Reason for Referral  Jose Gibson was referred for a bedside swallow evaluation to assess the efficiency of his swallow function, identify signs and symptoms of aspiration and make recommendations regarding safe dietary consistencies, effective compensatory strategies, and safe eating environment. Impression  Dysphagia Diagnosis: Moderate pharyngeal stage dysphagia; Moderate oral stage dysphagia  Dysphagia Impression : Pt demonstrates oropharyngeal dysphagia consistent with MBSS. The patient demonstrates large bolus sizes but required cues for initiation / task continuance throughout meal time. Pt demonstrates reduced full left head turn for thin liquid presentation; cues for hand placement to far right rather than liquid administration following by head turn and swallow. Pt instructed for neutral head positioning and forward facing for therapeutic benefit with x33 cued effortful swallows achieved with puree, thin liquids and mechanically altered. No overt pocketing noted but pt continued to masticate / demo oral prep after the swallow had occurred with pt endorsing residue in dentition.  Pt noted to have anterior loss on left of puree textures only. Few instances of coughing with thin liquids with partial head turn. Recommend advancement to minced and moist solids with thin liquids with head turn to left (can do thin liquids in neutral head positioning outside of meals if desired with known risks) meds whole in puree. Dysphagia Outcome Severity Scale: Level 4: Mild moderate dysphagia- Intermittent supervision/cueing. One - two diet consistencies restricted     Treatment Plan  Requires SLP Intervention: Yes  Duration of Treatment: 5 days per week for length of stay  D/C Recommendations: Ongoing speech therapy is recommended during this hospitalization     Recommended Diet and Intervention  Minced and Moist / Thins / meds whole in puree  Recommended Form of Meds: Meds in puree  Recommendations: Dysphagia treatment;Assistance with meals; Modified barium swallow study  Therapeutic Interventions: Pharyngeal exercises; Thermal stimulation;Vital Stim/NMES;Oral motor exercises; Patient/Family education; Therapeutic PO trials with SLP; Diet tolerance monitoring; Bolus control exercises; Laryngeal exercises    Compensatory Swallowing Strategies  Compensatory Swallowing Strategies :  (Small bites, no pharyngeal neck extension, left head turn with thins, check for pocketing on left, 1:1 assist for small bolus size)    Treatment/Goals  Short-term Goals  Goal 1: Pt will demonstrate adequate oral containment of liquids and solids across meal without cueing. Goal 2: Pt will tolerate trials of solids with adequate mastication and propulsion as evidenced by min residue after the swallow across mealtime assessment. Goal 3: Pt will tolerate thin liquids with head in neutral position without s/s associated with aspiration. Goal 4: Pt will demonstrate safe feeding behavior as evidenced by small bolus size / appropriate rate without cues. Goal 5: Pt will tolerate least restrictive diet without respiratory decline.     General  Chart Reviewed: Yes  Comments: Pt presented [facility]/25/2023 to [facility] w/left sided weakness and slurred speech. Pt transferred to [facility] and received TNK and thrombectomy. Pt was evaluated by SLP while IP and found to have dysarthria and dysphagia. Behavior/Cognition: Alert; Cooperative;Pleasant mood; Impulsive;Distractible  Temperature Spikes Noted: No  Respiratory Status: Room air  O2 Device: None (Room air)  Follows Directions: Simple (with repetition)  Dentition: Adequate  Patient Positioning: Upright in chair  Baseline Vocal Quality: Normal  Volitional Cough: Weak  Prior Dysphagia History: None prior to admission per chart review  Consistencies Administered: Thin - straw;Pureed;Minced and Moist    Vision/Hearing  Vision  Vision: Within Functional Limits  Vision Exceptions: Wears glasses at all times  Hearing  Hearing: Within functional limits    Oral Motor Deficits  Oral/Motor  Oral Hygiene: Moist (lingual coating significant)    Prognosis  Individuals consulted  Consulted and agree with results and recommendations: Family member  Family member consulted: WIfe    Education  Patient Education: Educated on rationale, potential for repeat swallow study in next 7-14 days, recommendation for diet advancement with known risks associated with weak cough but use of strategies to minimize that risk, recommendation for head turn left but likelihood of use of neutral head positioning during therapeutic exercises in therapy, diet level instructions, compensation techniques, need for 1:1 supervision, importance of oral care, risks associated with aspiration pneumonia at this time. Patient Education Response: Needs reinforcement  Safety Devices in place: Yes  Type of devices: Call light within reach; Chair alarm in place       Therapy Time  SLP Individual Minutes  Time In: 0900  Time Out: 7790  Minutes: 50  SLP Co-Treatment Minutes  Time In: 0000  Time Out: 0000  Minutes: 0  SLP Total Treatment Time  Timed Code Treatment Minutes: 0 Minutes  Total Treatment Time: 48    Taylor Leal M.A., [facility]  Speech-Language Pathologist

## 2023-01-30 NOTE — H&P
Department of Physical Medicine & Rehabilitation  History & Physical      Patient Identification:  Armin Ramos  : 1955  Admit date: 2023   Attending provider: Rome Camacho DO        Primary care provider: Angel Puckett MD     Chief Complaint:     History of Present Illness/Hospital Course:  Armin Ramos is a 74year old male with a PMHx of anxiety, CAD (s/p stent ), HTN, HLD who presented to [facility] ED with complete left-sided paralysis, left-sided extinction, slurred speech following a fall. CTA showed right middle cerebral artery M1 segment severe stenosis/occlusion with some reconstitution of flow more distally. He underwent emergent cerebral angiogram which showed occlusion of the right middle cerebral artery origin with nearly complete restoration of flow after thrombectomy, but otherwise normal 3 vessel cerebral angiogram.     Prior Level of Function:   Independent for mobility, ADLs, and IADLs    Current Level of Function:  Mod assist     Pertinent Social History:  Support: Lives with wife       Past Medical History:   Diagnosis Date    Anxiety     Chest pain     Depression     Encounter for imaging to screen for metal prior to MRI 2023    CT Head cleared for metal, no foreign body--ok for MRI per     Hyperlipidemia     Hypertension     Wears glasses      Fall in past year: No    Past Surgical History:   Procedure Laterality Date    CARDIAC CATHETERIZATION  2008    COLONOSCOPY  2009    CORONARY ANGIOPLASTY WITH STENT PLACEMENT      FINGER TRIGGER RELEASE  3-    left- long finger. WISDOM TOOTH EXTRACTION       Major Surgery in past 100 days: Angiogram     No family history on file.     Social History     Socioeconomic History    Marital status:    Tobacco Use    Smoking status: Former     Types: Cigarettes     Quit date: 3/9/2011     Years since quittin.9    Smokeless tobacco: Never    Tobacco comments:     occasional cigar Vaping Use    Vaping Use: Never used   Substance and Sexual Activity    Alcohol use: Yes     Comment: 3 drinks 3 days per week    Drug use: No       Allergies   Allergen Reactions    Lipitor      myalgia    Tamiflu [Oseltamivir Phosphate] Rash         Current Facility-Administered Medications   Medication Dose Route Frequency Provider Last Rate Last Admin    nystatin (MYCOSTATIN) 590539 UNIT/ML suspension 500,000 Units  5 mL Oral 4x Daily Ketan L Heis, DO        aspirin chewable tablet 81 mg  81 mg Oral Daily Ketan L Heis, DO   81 mg at 01/30/23 1005    ezetimibe (ZETIA) tablet 10 mg  10 mg Oral Daily Ketan L Heis, DO   10 mg at 01/30/23 1006    ondansetron (ZOFRAN-ODT) disintegrating tablet 4 mg  4 mg Oral Q8H PRN Ketan L Heis, DO        Or    ondansetron (ZOFRAN) injection 4 mg  4 mg IntraVENous Q6H PRN Ketan L Heis, DO        rosuvastatin (CRESTOR) tablet 40 mg  40 mg Oral Daily Ketan L Heis, DO   40 mg at 01/30/23 1006    sodium chloride flush 0.9 % injection 5-40 mL  5-40 mL IntraVENous 2 times per day Ketan L Heis, DO        sodium chloride flush 0.9 % injection 5-40 mL  5-40 mL IntraVENous PRN Ketan L Heis, DO        acetaminophen (TYLENOL) tablet 650 mg  650 mg Oral Q4H PRN Ketan L Heis, DO   650 mg at 01/29/23 2213    enoxaparin (LOVENOX) injection 40 mg  40 mg SubCUTAneous Daily Ketan L Heis, DO   40 mg at 01/30/23 1005    bisacodyl (DULCOLAX) EC tablet 5 mg  5 mg Oral Daily Ketan L Heis, DO   5 mg at 01/30/23 1005    magnesium hydroxide (MILK OF MAGNESIA) 400 MG/5ML suspension 30 mL  30 mL Oral Daily PRN Ketan L Heis, DO        polyethylene glycol (GLYCOLAX) packet 17 g  17 g Oral Daily PRN Ketan L Heis, DO        melatonin disintegrating tablet 5 mg  5 mg Oral Nightly Ketan L Heis, DO   5 mg at 01/29/23 2213         REVIEW OF SYSTEMS:   CONSTITUTIONAL: negative for fevers, chills, diaphoresis, appetite change, night sweats, unexpected weight change, fatigue  EYES: negative for blurred vision, eye discharge, visual disturbance and icterus  HEENT: negative for hearing loss, tinnitus, ear drainage, sinus pressure, nasal congestion, epistaxis and snoring  RESPIRATORY: Negative for hemoptysis, cough, sputum production  CARDIOVASCULAR: negative for chest pain, palpitations, exertional chest pressure/discomfort, syncope, edema  GASTROINTESTINAL: negative for nausea, vomiting, diarrhea, blood in stool, abdominal pain, constipation  GENITOURINARY: negative for frequency, dysuria, urinary incontinence, decreased urine volume, and hematuria  HEMATOLOGIC/LYMPHATIC: negative for easy bruising, bleeding and lymphadenopathy  ALLERGIC/IMMUNOLOGIC: negative for recurrent infections, angioedema, anaphylaxis and drug reactions  ENDOCRINE: negative for weight changes and diabetic symptoms including polyuria, polydipsia and polyphagia  MUSCULOSKELETAL: negative for pain, joint swelling, decreased range of motion  NEUROLOGICAL: negative for headaches, Positive for slurred speech and unilateral weakness  PSYCHIATRIC/BEHAVIORAL: negative for hallucinations, behavioral problems, confusion and agitation. All pertinent positives are noted in the HPI. Physical Examination:  Vitals: Patient Vitals for the past 24 hrs:   BP Temp Temp src Pulse Resp SpO2 Height Weight   01/30/23 1000 122/83 98 °F (36.7 °C) Oral 76 16 97 % -- --   01/29/23 2345 -- -- -- -- -- -- 6' 1\" (1.854 m) 181 lb 10.5 oz (82.4 kg)   01/29/23 2213 128/76 98.2 °F (36.8 °C) Oral 78 16 98 % -- --   01/29/23 1630 136/80 98.4 °F (36.9 °C) Oral 76 16 97 % -- --   01/29/23 1600 122/78 98 °F (36.7 °C) Oral 72 -- 94 % -- --       Const: Alert. WDWN. No distress  Eyes: Conjunctiva noninjected, no icterus noted; pupils equal, round, and reactive to light. HENT: Atraumatic, normocephalic; Oral mucosa moist  Neck: Trachea midline, neck supple. No thyromegaly noted.   CV: Regular rate and rhythm, no murmur rub or gallop noted  Resp: Lungs clear to auscultation bilaterally, no rales wheezes or ronchi, no retractions. Respirations unlabored. GI: Soft, nontender, nondistended. Normal bowel sounds. No palpable masses. Skin: Normal temperature and turgor. No rashes or breakdown noted. Ext: No significant edema appreciated. No varicosities. MSK: No joint tenderness, erythema, warmth noted. AROM intact. Neuro: Alert, oriented, appropriate. No cranial nerve deficits appreciated. Sensation intact to light touch. Motor examination reveals normal strength in right side with 0/5 strength in left UE and 3/5 strength in left LE. Psych: Stable mood, normal judgement, normal affect        Lab Results   Component Value Date    WBC 8.1 01/30/2023    HGB 13.4 (L) 01/30/2023    HCT 38.8 (L) 01/30/2023    MCV 93.6 01/30/2023     01/30/2023     Lab Results   Component Value Date    INR 1.10 01/25/2023    INR 1.05 01/08/2019    PROTIME 14.1 01/25/2023    PROTIME 12.0 01/08/2019     Lab Results   Component Value Date    CREATININE 0.6 (L) 01/30/2023    BUN 15 01/30/2023     01/30/2023    K 3.7 01/30/2023    CL 99 01/30/2023    CO2 25 01/30/2023     Lab Results   Component Value Date    ALT 20 01/25/2023    AST 24 01/25/2023    ALKPHOS 47 01/25/2023    BILITOT <0.2 01/25/2023         No orders to display           The above laboratory data have been reviewed. The above imaging data have been reviewed. The above medical testing have been reviewed. Body mass index is 23.97 kg/m². POST ADMISSION PHYSICIAN EVALUATION  The patient has agreed to being admitted to our comprehensive inpatient rehabilitation facility and can tolerate the intensity of service consisting of at least:  --180 minutes of therapy a day, 5 out of 7 days a week. OR  --15 hours of intensive therapy within a 7 consecutive day period.      The patient/family has a good understanding of our discharge process and will benefit from an interdisciplinary inpatient rehabilitation program. The patient has potential to make improvement and is in need of at least two of the following multidisciplinary therapies including but not limited to physical, occupational, respiratory, and speech, nutritional services, wound care, and prosthetics and orthotics. Given the patients complex condition and risk of further medical complications, rehabilitation services cannot be safely provided at a lower level of care such as a skilled nursing facility. All of the goals listed below were reviewed with the patient and he/she is in agreement. I have compared the patients medical and functional status at the time of the preadmission screening and the same on this date, and there are no significant changes. By signing this document, I acknowledge that I have personally performed a full physical examination on this patient within 24 hours of admission to this inpatient rehabilitation facility and have determined the patient to be able to tolerate the above course of treatment at an intensive level for a reasonable period of time. I will be completing a detailed individualized  Plan of Care for this patient by day four of the patients stay based upon the Preadmission Screen, this Post-Admission Evaluation, and the therapy evaluations. Barriers: Decreased functional mobility, medical comorbidities  Services Required: PT, OT, SLP  Goals: mod I  Prognosis: Good  Anticipated Dispo: home  ELOS: TBD    Rehabilitation Diagnosis:   Stroke, 1.1, Left Body (R Brain)      Assessment and Plan:  R MCA Occlusion s/p TNK and thrombectomy  - PT/OT/SLP  -Keep SBP <160  - monitor neuro status   - Dysphagia- SLP eval         CAD   S/p stent 2019. Patient is on aspirin at home        HTN  Patient is on lisinopril 5mg daily at home  - monitor      HLD:  Patient is on Crestor 20mg and ezetimibe 10mg at home daily  -Continue home meds     Anxiety  Per chart, patient is on Valium 2mg at home.  Possible could be contributor to his chest pain yesterday, notes he felt anxious. Denies taking Valium at home, however it is listed as a home med. Ativan 0.5mg once overnight   -Hold home meds  -Pharmacy med recc    Impairments: Decreased functional mobility, Decreased ADLs    Bladder - high risk retention - Monitor PVRs, SC prn >300cc    Bowel - high risk constipation - colace BID, PRN miralax and MoM. follow bowel movements. Enema or suppository if needed.      Safety - fall precautions    PPx  DVT: lovenox  GI: pantoprazole    FULL CODE    Oscar Moore D.O. M.P.H  PM&R  1/30/2023  12:19 PM

## 2023-01-30 NOTE — PROGRESS NOTES
Oriented x 4. Assist x 2, steady. Patient able to take a nap in recliner in sleep position. Wife assisting, encouraging food intake and good swallow technique. Call light in reach. Alarm on for safety.

## 2023-01-30 NOTE — PLAN OF CARE
Problem: Discharge Planning  Goal: Discharge to home or other facility with appropriate resources  Outcome: Progressing     Problem: Skin/Tissue Integrity  Goal: Absence of new skin breakdown  Description: 1. Monitor for areas of redness and/or skin breakdown  2. Assess vascular access sites hourly  3. Every 4-6 hours minimum:  Change oxygen saturation probe site  4. Every 4-6 hours:  If on nasal continuous positive airway pressure, respiratory therapy assess nares and determine need for appliance change or resting period.   Outcome: Progressing     Problem: ABCDS Injury Assessment  Goal: Absence of physical injury  Outcome: Progressing  Absence of Physical Injury: Implement safety measures based on patient assessment     Problem: Safety - Adult  Goal: Free from fall injury  Outcome: Progressing  Free From Fall Injury: Instruct family/caregiver on patient safety

## 2023-01-30 NOTE — PROGRESS NOTES
Occupational Therapy  Facility/Department: [facility] UNIT  Rehabilitation Occupational Therapy Evaluation/ Treatment       Date: 23  Patient Name: Tosha Gaytankeeper       Room: 4089/2797-53  MRN: [id number]  Account: [de-identified]   : 1955  (74 y.o.) Gender: male     Referring Practitioner: DO Jam  Diagnosis: Acute ischemic stroke  Additional Pertinent Hx: Pt admitted to ARU on . Hospital course: presented to [facility] 23 with complete left-sided paralysis, left-sided extinction, slurred speech following a fall. He had been vomiting after drinking at a bar, and all of a sudden his friends say he slumped over and fell down. He hit his left side of his head on the bathtub. CT w/o contrast was negative, pt received TNK in emergency room. Transferred to [facility].   Diagnostic cerebral angiogram 2. Mechanical thrombectomy for stroke 3. Right common femoral artery Angio-Seal closure arteriotomy. PMHx includes: anxiety, chest pain, depression, hyperlipidemia, HTN. Restrictions  Other position/activity restrictions: up as tolerated, up in chair    Subjective  Subjective: Pt semi supine in bed upon arrival, agreeable to OT eval and treat. Vitals  Level of Consciousness: Alert (0)    Objective  Vision - Basic Assessment  Visual Field Cut: Left  Oculo Motor Control: Impaired  Impairments: Tracking (noted decreased tracking to pts L side with his L eye mainly)      Perception  Overall Perceptual Status: Impaired  Unilateral Attention: Cues to attend to left side of body   ROM  LUE PROM (degrees)  LUE General PROM: WFL to 90 deg shoulder flexion. WFL elbow flexion/ extension and digit flexion/ extension.  1/2 finger subluxation noted in L shoulder  LUE AROM (degrees)  LUE General AROM: no active movement noted in L UE  RUE AROM (degrees)  RUE AROM : WFL  Right Hand AROM (degrees)  Right Hand AROM: WFL  LUE Strength  L Hand General: 0/5  LUE Strength Comment: 0/5 L UE overall  RUE Strength  R Hand General: 4+/5  Fine Motor Skills          Hand Assessment     Functional Mobility  Balance  Sitting Balance: Maximum assistance (pt ranging from min A to max A at times, noted posterior lean and pushing to the L while seated EOB)  Standing Balance: Dependent/Total (max A x 2 standing for dressing tasks)  Standing Balance  Time: 10 sec multiple times  Activity: stand pivot transfers, standing for LB dressing  Transfers  Stand Pivot Transfers: 2 Person assistance;Maximum assistance  Sit to stand: 2 Person assistance;Maximum assistance  Stand to sit: 2 Person assistance;Maximum assistance  Transfer Comments: Multiple transfers, max A x 2 throughout and assist to block pts L knee when standing. Pt with one LOB when attempting to transfer from wc to recliner chair at end of session, noted L knee buckling. Pt required cueing to reach R hand forward to avoid pushing. Toilet Transfers  Toilet - Technique:  (via rolling shower chair)  Equipment Used:  (rolling shower chair)  Toilet Transfer: Dependent/Total  Toilet Transfers Comments: pt did not have urge to void, however rolled pt in bathroom via rolling shower chair. Max A x 2 to pivot from bed to rolling shower chair. ADL  Feeding: Setup  Feeding Skilled Clinical Factors: assist to open packages  Grooming: Minimal assistance  Grooming Skilled Clinical Factors: assist to place toothpaste onto toothbrush, assist to manage supplies  UE Bathing: Moderate assistance  UE Bathing Skilled Clinical Factors: seated in rolling shower chair, max verbal cueing to wash L UE, assist to lift UE to wash underarm  LE Bathing: Moderate assistance  LE Bathing Skilled Clinical Factors: assist to wash lower LEs. Pt able to wash iwona area and upper LEs with cueing.   UE Dressing: Maximum assistance  UE Dressing Skilled Clinical Factors: assist to place L UE into shirt, overhead, and bringing R UE into shirt, pull down over trunk  LE Dressing: Dependent/Total  LE Dressing Skilled Clinical Factors: assist from 2 helpers to pull up over hips. Pt able to assist threading R LE into pants, assist for L LE  Toileting: Dependent/Total  Toileting Skilled Clinical Factors: via rolling shower chair placed over toilet  Additional Comments: Bathing and dressing completed as listed above. Pt required extensive cueing to attend to L UE and for sequencing. Pt seated on rolling shower chair 2/2 extensive posterior lean and pushing to pts L while seaed EOB. Pt impulsive during transfers. Required cueing to attend to tasks, noted pt to be easily distracted during transfers and ADLs. Seated in bedside chair for oral care. Goals  Patient Goals   Patient goals : go home with my wife  Short Term Goals  Time Frame for Short Term Goals: By 2 weeks  Short Term Goal 1: Pt will complete LB dressing with mod A  Short Term Goal 2: Pt will complete toileting with mod A  Short Term Goal 3: Pt will complete toilet and functional transfers with mod A  Short Term Goal 4: Pt will complete UB dressing with min A  Short Term Goal 5: Pt will complete UE HEP x 10 reps for improved R UE coordination for ADLs  Long Term Goals  Time Frame for Long Term Goals : By 4 weeks  Long Term Goal 1: Pt will complete LB dressing with CGA  Long Term Goal 2: Pt will complete toileting with CGA  Long Term Goal 3: Pt will complete toilet and functional transfers with mod I  Long Term Goal 4: Pt will complete UB dressing with mod I  Long Term Goal 5: Pt will complete simple IADL with LRAD at mod I    Assessment  Performance deficits / Impairments: Decreased functional mobility ; Decreased endurance;Decreased ADL status; Decreased ROM; Decreased strength;Decreased sensation;Decreased balance;Decreased vision/visual deficit; Decreased coordination;Decreased posture  Assessment: Prior to admission pt was living at home with his wife, was independent with ADLs and IADLs. Pt now presents s/p CVA, now significantly below his baseline.  Pt demonstrating max A x 2 for sit to stand transfers, and max A x 2 for LB dressing. Pt with significant lateral lean/ pushing, and posterior lean while seated EOB. Wife will be able to assist at discharge, pt is hopeful to discharge home. Pt would benefit from continued OT while in ARU, continue OT POC. Treatment Diagnosis: Impaired ADL, Lft UE function,functional mobility  Prognosis: Good  Decision Making: Medium Complexity  Occupational Therapy Plan  Times Per Week: 5 days per week, 60 mins each  Current Treatment Recommendations: Strengthening;ROM;Balance training;Functional mobility training; Endurance training;Self-Care / ADL; Patient/Caregiver education & training;Neuromuscular re-education;Cognitive/Perceptual training       Therapy Time   Individual Concurrent Group Co-treatment   Time In 0845     0745   Time Out 0900     0845   Minutes 15     60   Timed Code Treatment Minutes: 60 Minutes (+15 min eval)   Alanna GONCALVES  [address] [facility] [place], OTR/L [id number]

## 2023-01-30 NOTE — PROGRESS NOTES
Comprehensive Nutrition Assessment    RECOMMENDATIONS:  PO Diet: Dysphagia-Minced and Moist  ONS: Ensure low amada, high protein bid  Nutrition Education: No recommendation at this time       NUTRITION ASSESSMENT:   Nutritional summary & status: Consult for ONS, new to ARU. Pt working with therapy on attempted visit. Diet advanced from Pureed to Minced and Moist per SLP. PO intakes varied with avg of 50%.  lbs. No weight loss noted per EMR. Nutrition status compromised due to varied meal intake. Will add Ensure bid to promote adequate nutrition during stay. Will monitor acceptance of ONS upon follow up visit. Continue to monitor. Admission/PMH: Admit; Acute CVA  PMHx: CAD, HTN, HLD    MALNUTRITION ASSESSMENT  Context of Malnutrition: Acute Illness   Malnutrition Status: At risk for malnutrition (Comment)  Findings of the 6 clinical characteristics of malnutrition (Minimum of 2 out of 6 clinical characteristics is required to make the diagnosis of moderate or severe Protein Calorie Malnutrition based on AND/ASPEN Guidelines):  Energy Intake:  Mild decrease in energy intake (Comment)  Weight Loss:  No significant weight loss     Body Fat Loss:  Unable to assess     Muscle Mass Loss:  Unable to assess    Fluid Accumulation:  No significant fluid accumulation     Strength:  Not Performed    NUTRITION DIAGNOSIS   Inadequate oral intake, Increased nutrient needs related to increase demand for energy/nutrients as evidenced by intake 26-50%, intake 51-75%, other (comment) (extended hospital stay(rehab))    Nutrition Monitoring and Evaluation:   Food/Nutrient Intake Outcomes:  Food and Nutrient Intake, Supplement Intake  Physical Signs/Symptoms Outcomes:  Biochemical Data, Chewing or Swallowing, Nutrition Focused Physical Findings, Skin, Weight     OBJECTIVE DATA: Significant to nutrition assessment  Nutrition Related Findings: No edema. LBM 1/25. Glu 115. Lytes wnl.   Wounds: None  Nutrition Goals: PO intake 75% or greater, by next RD assessment     CURRENT NUTRITION THERAPIES  ADULT DIET; Dysphagia - Minced and Moist  PO Intake: %, 51-75%, 26-50%   PO Supplement Intake:None Ordered  Additional Sources of Calories/IVF:n/a     ANTHROPOMETRICS  Current Height: 6' 1\" (185.4 cm)  Current Weight: 181 lb 10.5 oz (82.4 kg)    Admission weight: 181 lb 10.5 oz (82.4 kg)  Ideal Body Weight (IBW): 184 lbs  (84 kg)    Usual Bodyweight     BMI: 24    COMPARATIVE STANDARDS  Energy (kcal):  7358-9803 (25-30 kcal/CBW/82 kg)     Protein (g):   (1.2-1.5 gm/CBW/82 kg)       Fluid (mL/day):  2633-2009 (1 ml/kcal) or per provider    The patient will be monitored per nutrition standards of care. Consult dietitian if additional nutrition interventions are needed prior to RD reassessment.      Huong Hummel, [address]:  [phone]  Office:  [phone]

## 2023-01-30 NOTE — PROGRESS NOTES
Shift assessment complete. VSS. Patient C/O 3/10 headache pain, PRN Tylenol given. Patient is currently x2 with stedy till therapy evaluates. Patient is incontinent of bowel and bladder. Patient A/O x4 with intermittent confusion during night hours, Patient follows commands, Patient tolerated medications WW pudding, LUE/LLE continues to be flaccid, Call light and over bed table within reach, Hourly rounding and visual checks in place, Safety measures in place, Will continue to monitor.

## 2023-01-30 NOTE — PLAN OF CARE
ARU PATIENT TREATMENT PLAN  The [address]  [phone]    Collette Bari    : 1955  Acct #: [de-identified]  MRN: [id number]  PHYSICIAN:  Shellie Polk DO  Primary Problem    Patient Active Problem List   Diagnosis    Chest pain    Cerebrovascular accident (CVA) due to embolism of right middle cerebral artery (HCC)    Elevated troponin    Coronary artery disease involving native coronary artery of native heart without angina pectoris    Acute CV[facility] (cerebrovascular accident) [facility]       Rehabilitation Diagnosis:  Stroke, 1.1, Left Body (R Brain)  ADMIT DATE:2023    Patient Goals: go home with my wife  Admitting Impairments: Decreased functional mobility ; Decreased endurance;Decreased ADL status; Decreased ROM; Decreased strength;Decreased sensation;Decreased balance;Decreased vision/visual deficit; Decreased coordination;Decreased posture; Oropharyngeal dysphagia; Mild dysarthria;  Moderate cognitive impairment  Activity Restrictions: None  Participation Limitations: None   CARE PLAN   NURSING:  Collette Abdifatah while on this unit will:  [] Be continent of bowel and bladder     [x] Have an adequate number of bowel movements  [x] Urinate with no urinary retention >300ml in bladder  [] Complete bladder protocol with barker removal  [x] Maintain O2 SATs at __90_%  [x] Have pain managed while on ARU       [] Be pain free by discharge   [x] Have no skin breakdown while on ARU  [] Have improved skin integrity via wound measurements  [] Have no signs/symptoms of infection at the wound site  [x] Be free from injury during hospitalization   [x] Complete education with patient/family with understanding demonstrated for:  [x] Adjustment   [] Other:     Nursing Interventions will include:  [x] bowel/bladder training   [x] education for medical assistive devices   [x] medication education   [] O2 saturation management   [x] energy conservation   [x] stress management techniques   [x] fall prevention   [x] alarms protocol   [x] seating and positioning   [] skin/wound care   [x] pressure relief instruction   [] dressing changes     [x] infection protection   [x] DVT prophylaxis  [x] assistance with in room safety with transfers to bed, toilet, wheelchair, shower   [x] bathroom activities and hygiene  [] Other:    Patient/Caregiver Education for:  [x] Disease/sustained injury/management     [x] Medication Use  [] Surgical intervention  [x] Safety  [x] Body mechanics and or joint protection  [x] Health maintenance     [] Other:     PHYSICAL THERAPY:  Goals:                  Short Term Goals  Time Frame for Short Term Goals: 2 weeks  Short Term Goal 1: Pt will complete bed mobility with moderate assist  Short Term Goal 2: Pt will complete sit<>stand & stand pivot transfer wtih moderate assist  Short Term Goal 3: Pt will propel self in manual w/c for 150' with minimal assist.  Short Term Goal 4: Pt will ambulate 10' with moderate assist.            Long Term Goals  Time Frame for Long Term Goals : 4 weeks  Long Term Goal 1: Pt will complete bed mobility with CGA  Long Term Goal 2: Pt will complete sit<>Stand & stand pivot transfers with CGA  Long Term Goal 3: Pt will propel self in manual w/c for 250' with suprvision  Long Term Goal 4: Pt will ambulate 48' with CGA & LRAD  These goals were reviewed with this patient at the time of assessment and Bryan Price is in agreement.      Plan of Care: Pt to be seen 5 out of 7 days per week per ARU protocol (60 minutes with PT)                  Current Treatment Recommendations: Strengthening, Balance training, ROM, Functional mobility training, Neuromuscular re-education, Safety education & training, Patient/Caregiver education & training, Transfer training, Endurance training, Home exercise program, Gait training, Equipment evaluation, education, & procurement, Positioning, Therapeutic activities    OCCUPATIONAL THERAPY:  Goals:             Short Term Goals  Time Frame for Short Term Goals: By 2 weeks  Short Term Goal 1: Pt will complete LB dressing with mod A  Short Term Goal 2: Pt will complete toileting with mod A  Short Term Goal 3: Pt will complete toilet and functional transfers with mod A  Short Term Goal 4: Pt will complete UB dressing with min A  Short Term Goal 5: Pt will complete UE HEP x 10 reps for improved R UE coordination for ADLs :  Long Term Goals  Time Frame for Long Term Goals : By 4 weeks  Long Term Goal 1: Pt will complete LB dressing with CGA  Long Term Goal 2: Pt will complete toileting with CGA  Long Term Goal 3: Pt will complete toilet and functional transfers with mod I  Long Term Goal 4: Pt will complete UB dressing with mod I  Long Term Goal 5: Pt will complete simple IADL with LRAD at mod I :  These goals were reviewed with this patient at the time of assessment and Jl Bellgm is in agreement    Plan of Care:  Pt to be seen 5 out of 7 days per week per ARU protocol (60 minutes with OT)       SPEECH THERAPY: Goals will be left blank if speech is not following this patient. Goals:             Short-term Goals  Goal 1: Pt will demonstrate adequate oral containment of liquids and solids across meal without cueing. Goal 2: Pt will tolerate trials of solids with adequate mastication and propulsion as evidenced by min residue after the swallow across mealtime assessment. Goal 3: Pt will tolerate thin liquids with head in neutral position without s/s associated with aspiration. Goal 4: Pt will demonstrate safe feeding behavior as evidenced by small bolus size / appropriate rate without cues. Goal 5: Pt will tolerate least restrictive diet without respiratory decline. Short Term Goals  Goal 1: The patient will maintain eye contact to speaker positioned left of midline without distractors, min cues.   Goal 2: The patient will demonstrate sustained attention to task for 2 minutes with <2 prompts. Goal 3: The patient will demonstrate improved inhibition as evidenced by <3 prompts for impulsivity during a task. Goal 4: The patient will demonstrate improved self monitoring/correcting for basic tasks with <mod cues. Goal 5: The patient will tolerate ongoing cognitive linguistic assessment. Goal 6: The patient will demonstrate comprehensibility in multiple communication environments without cues. Plan of Care:  Pt to be seen 5 out of 7 days per week per ARU protocol (60 minutes with SLP)    Therapy Treatments will include:  [x]  therapeutic exercises    [x]  gait training     [x]  neuromuscular re-ed                            [x]  transfer training             [] community reintegration    [x] bed mobility                          [x]  w/c mobility and training  []  self care    []home mgmt    [x]  cognitive training            [x]  energy conservation        [x]  dysphagia tx    [x]  speech/language/communication therapy   []  group therapy    [x]  patient/family education    [] Other:    CASE MANAGEMENT:  Goals: Assist patient/family with discharge planning, patient/family counseling, and coordination with insurance during ARU stay.     Admission Period/Goal QM SCORES  QM Admit/Goal Score   Eating CARE Score: 5 / Discharge Goal: Independent   Oral Hygiene CARE Score: 3 / Discharge Goal: Independent   Shower/Bathing CARE Score: 1 / Discharge Goal: Supervision or touching assistance   UB Dressing CARE Score: 2 / Discharge Goal: Independent   LB Dressing CARE Score: 1 / Discharge Goal: Supervision or touching assistance   Putting on/off Footwear CARE Score: 1 / Discharge Goal: Independent   Toileting Hygiene CARE Score: 1 / Discharge Goal: Supervision or touching assistance   Bladder Continence      Bowel Continence      Toilet Transfers CARE Score: 1 / Discharge Goal: Supervision or touching assistance   Shower/Bathe Self  CARE Score: 1 / Discharge Goal: Supervision or touching assistance   Rolling Left and Right CARE Score: 2 / Discharge Goal: Supervision or touching assistance   Sit to Lying CARE Score: 1 / Discharge Goal: Supervision or touching assistance   Lying to Sitting on Bedside CARE Score: 1 / Discharge Goal: Supervision or touching assistance   Sit to Stand CARE Score: 1 / Discharge Goal: Supervision or touching assistance   Chair/Bed to Chair Transfer CARE Score: 1 / Discharge Goal: Supervision or touching assistance   Car Transfers CARE Score: 88 / Discharge Goal: Supervision or touching assistance   Walk 10 Feet CARE Score: 88 / Discharge Goal: Partial/moderate assistance   Walk 50 Feet with Two Turns CARE Score: 88 /     Walk 150 Feet CARE Score: 88 / Discharge Goal: Partial/moderate assistance   Walk 10 Feet on Uneven Surfaces CARE Score: 88 / Discharge Goal: Not Attempted   1 Step (Curb) CARE Score: 88 / Discharge Goal: Not Attempted   4 Steps CARE Score: 88 / Discharge Goal: Not Attempted   12 Steps CARE Score: 88 / Discharge Goal: Not Attempted   Picking up Object from Floor CARE Score: 88 / Discharge Goal: Partial/moderate assistance   Wheel 50 Feet with 2 Turns CARE Score: 3 / Discharge Goal: Supervision or touching assistance   Type         [] Manual        [] Motorized        [] N/A   Wheel 150 Feet CARE Score: 88 / Discharge Goal: Supervision or touching assistance   Type         [] Manual        [] Motorized        [] N/A        Jose Gibson will be seen a minimum of 3 hours of therapy per day, a minimum of 5 out of 7 days per week (please see above for specific treatment plan per PT/OT/SLP). [] In this rare instance due to the nature of this patient's medical involvement, this patient will be seen 15 hours per week (900 minutes within a 7day period). In addition, dietician/nutritionist may monitor calorie count as well as intake and collaboratively work with SLP on dietary upgrades.   Neuropsychology/Psychology may evaluate and provide necessary support. Medical issues being managed closely and that require 24hour availability of a physician:  [x] Swallowing Precautions  [x] Bowel/Bladder Fx  [] Weight bearing precautions  [] Wound Care    [x] Pain Mgmt   [] Infection Protection  [x] DVT Prophylaxis   [x] Fall Precautions  [x] Fluid/Electrolyte/Nutrition Balance  [x] Voice Protection   [] Respiratory  [] Other:    Medical Prognosis: [] Good  [x] Fair    [] Guarded   Total expected IRF days 21  Anticipated discharge destination:   [] Home Independently   [] Home Modified Independent  [x] Home with supervision    []SNF     [] Other                                           Physician anticipated functional outcomes:Pt will progress to a level of supervision to MOD I for all functional mobility and ADLs to allow for a safe return home. IPOC brief synthesis: Wayne Varner is a 74year old male with a PMHx of anxiety, CAD (s/p stent 2019), HTN, HLD who presented to [facility] ED with complete left-sided paralysis, left-sided extinction, slurred speech following a fall. CTA showed right middle cerebral artery M1 segment severe stenosis/occlusion with some reconstitution of flow more distally. He underwent emergent cerebral angiogram which showed occlusion of the right middle cerebral artery origin with nearly complete restoration of flow after thrombectomy, but otherwise normal 3 vessel cerebral angiogram.     This initial ARU patient treatment plan of care, together with the IPOC & the Education plan, form the foundation for the patient's plan of care. Weekly patient care conferences are held to evaluate progress towards the initial treatment plan & goals. I have reviewed this initial plan of care and agree with its contents:    Title   Name    Date    Time    Physician: Dora Castañeda D.O. MEliP.H  PM&R  2/1/2023  10:47 AM      Case Mgmt: Dea Cramer, LSW 1/30/23 5760    OT: Malinda Dobbins.  [address] [facility], OTR/L [id number] 1/30/23  @ 1240    PT: Lemuel Miguel DPT 1/30/2023 @ 9865    RN: Guanako Cadet RN 1/29/2023 2100    ST: Jim Pyle M.A., [address] [place] [phone], 1/30/2023, 1442    [facility] Supervisor: Selene Frankel, PT, DPT 1/31/2023 @ 1790

## 2023-01-30 NOTE — PROGRESS NOTES
01/30/23 1535   Encounter Summary   Encounter Overview/Reason  Initial Encounter   Service Provided For: Patient and family together   Referral/Consult From: Rounding   Support System Spouse; Hindu/kimberly community; Family members;Friends/neighbors   Last Encounter    (es 1/30)   Complexity of Encounter Moderate   Begin Time 1440   End Time  1455   Total Time Calculated 15 min   Assessment/Intervention/Outcome   Assessment Calm;Coping; Hopeful   Intervention Active listening;Discussed belief system/Denominational practices/kimberly;Discussed illness injury and its impact; Discussed relationship with God;Explored/Affirmed feelings, thoughts, concerns;Nurtured Hope;Prayer (assurance of)/[place]   Outcome Coping;Engaged in conversation   Plan and Referrals   Plan/Referrals Other (Comment)  (as needed)

## 2023-01-30 NOTE — PROGRESS NOTES
Speech Language Pathology  ACUTE REHAB UNIT  SPEECH/LANGUAGE PATHOLOGY      [x] Daily  [] Weekly Care Conference Note  [] Discharge    Patient:Jason Singh  P:[id number]  Rehab Dx/Hx: Acute CVA (cerebrovascular accident) ([facility], [address] [I63.9]    Precautions: [] Aspiration  [x] Fall risk  [] Sternal  [] Seizure [] Hip  [] Weight Bearing [] Other     ST Dx: [] Aphasia  [] Dysarthria  [] Apraxia   [x] Oropharyngeal dysphagia [x] Cognitive Impairment  [] Other:   Date of Admit: 1/29/2023  Room #: 3102/3102-01  Date: 1/30/2023          Current Diet Order:ADULT DIET; Dysphagia - Minced and Moist                  Lives With: Spouse           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, construction, repairs  Leisure & Hobbies: projects around house, music, [employer]    Dentition: Adequate  Vision  Vision Exceptions: Wears glasses at all times  Hearing  Hearing: Within functional limits  Barriers toward progress: Cognitive deficit and Impulsivity        Date: 1/30/2023     Tx session 1   Total Timed Code Min 0   Total Treatment Minutes 17   Individual Treatment Minutes 17   Group Treatment Minutes 0   Co-Treat Minutes 0   Brief Exception: N/A   Pain None indicated   Pain Intervention: [] RN notified  [] Repositioned  [] Intervention offered and patient declined  [x] N/A  [] Other:   Subjective:     Pt reclined in chair upon entry. Required assistance from RN to reposition pt for upright positioning for PO trials. Wife present   Objective / Goals:     Pt will demonstrate adequate oral containment of liquids and solids across meal without cueing. Pt assessed with thin carbonated trials via cup- no anterior loss. Pt will tolerate trials of solids with adequate mastication and propulsion as evidenced by min residue after the swallow across mealtime assessment. Targeted via bolus control exercises:  -lingual sweep with toothette: attempted to complete with MAX cues and model.  Pt unable to remove toothette from L buccal cavity with a lingual sweep. When completed on R side, pt able to remove from buccal cavity with lingual sweep and lateralize to midline of lingual surface. Pt will tolerate thin liquids with head in neutral position without s/s associated with aspiration. Completed cup sips of thin carbonated beverage: pt completed 10 trials with single instance of a cough. Pt required cue to cough as visibly appeared to be \"holding a cough in\". Pt will demonstrate safe feeding behavior as evidenced by small bolus size / appropriate rate without cues. Pt required mod cues for small sips with thin via cup trials. Pt will tolerate least restrictive diet without respiratory decline. No evidence of respiratory decline. Other areas targeted:    Education:   Pt and wife educated to rationale for tx session, skills targeted, and swallowing recommendations/strategies. Safety Devices: [x] Call light within reach  [x] Chair alarm activated and connected to nurse call light system  [] Bed alarm activated   [x] Other: wife present   Assessment: Pt with oropharyngeal phase dysphagia per MBSS. Reduced lingual coordination. Plan: Continue as per plan of care.       Interventions used this date:  [] Speech/Language Treatment  [] Instruction in HEP  [x] Dysphagia Treatment [] Cognitive Treatment   [] Other:    Discharge recommendations:  [] Home independently  [] Home with assistance []  24 hour supervision  [] ECF [] Other  Continued Tx Upon Discharge: ? [] Yes    [] No    [] TBD based on progress while on ARU     [] Vital Stim indicated     [] Other:   Estimated discharge date: Not yet established      Electronically signed by  Richelle Delgadillo, [facility]  Speech-Language Pathologist

## 2023-01-30 NOTE — PROGRESS NOTES
Speech Language Pathology  Facility/Department: [facility] ACUTE REHAB UNIT  Initial Speech/Language/Cognitive Assessment    NAME: Blanca Wayne  : 1955   MRN: [id number]  ADMISSION DATE: 2023  ADMITTING DIAGNOSIS: has Chest pain; Cerebrovascular accident (CVA) due to embolism of right middle cerebral artery ([address] Elevated troponin; Coronary artery disease involving native coronary artery of native heart without angina pectoris; and Acute CVA (cerebrovascular accident) [facility]) on their problem list.  DATE ONSET: 2023    Date of Eval: 2023   Evaluating Therapist: TIGRE Oh    RECENT RESULTS MRI: 2023  Impression       1. Multiple large acute ischemic insults in the right MCA territory, most pronounced in the basal ganglia, posterior insula, and temporal lobe with additional scattered areas in the right frontal and parietal lobes. 2.  Abnormal flow signal in the right M1 and M2 segments. Primary Complaint: None specific    Pain: Denied at time of session    Vision/ Hearing  Vision  Vision:  (not wearing glasses due to nose injury)  Vision Exceptions: Wears glasses at all times  Hearing  Hearing: Within functional limits    Assessment:  Cognitive Diagnosis: Moderate cognitive impairment  Speech Diagnosis: Mild dysarthria   Diagnosis: Moderate cognitive impairment characterized by executive dysfunction (impulsivity, reduced self monitoring/self correcting), reduced sustained/selective attention, pragmatic impairment (reduced topic maintenance and turn taking), left visual inattention. Pt very stimulable for identifying own errors and correction with heavy cues and erroreless learning strateiges. Pt with severely reduced ability to track to left side of page functionally for task completion. Pt did demonstrate improved visual attention to left side within contextual tasks. Pt demo's overall general awareness of deficits but not clear functional impact identification.  The patient does endorse all deficits related to his CVA vs external factors. Pt demonstrates a mild dysarthria characterized by imprecise articulatory precision and mildly rapid rate. The patient is limited in treatment tolerance due to lethargy with rapid decline from high energy/distractability to lethargy. Recommendations:  Recommendations  Requires SLP Intervention: Yes  Recommendations: Dysphagia treatment;Assistance with meals; Modified barium swallow study  Patient Education: Educated on CVA, brain specificity, results/recommendations from assessment, general goal areas and plan of care. Patient Education Response: Needs reinforcement  Timed Code Treatment Minutes: 0 Minutes  Total Treatment Time: 45  Duration of Treatment: 5 days per week for length of stay  D/C Recommendations: Ongoing speech therapy is recommended during this hospitalization     Plan:   Speech Therapy Prognosis  Prognosis: Good  Prognosis Considerations: Severity of Impairments;Previous Level of Function; Family/Community Support;Age;Participation Level; Motivation  Individuals consulted  Consulted and agree with results and recommendations: Family member  Family member consulted: WIfe, Malinda Sutherland in place: Yes  Type of devices: Call light within reach; Chair alarm in place    Goals:  Short Term Goals  Goal 1: The patient will maintain eye contact to speaker positioned left of midline without distractors, min cues. Goal 2: The patient will demonstrate sustained attention to task for 2 minutes with <2 prompts. Goal 3: The patient will demonstrate improved inhibition as evidenced by <3 prompts for impulsivity during a task. Goal 4: The patient will demonstrate improved self monitoring/correcting for basic tasks with <mod cues. Goal 5: The patient will tolerate ongoing cognitive linguistic assessment. Goal 6: The patient will demonstrate comprehensibility in multiple communication environments without cues. Subjective:  Pt presented 1/25/2023 to [facility] w/left sided weakness and slurred speech. Pt transferred to [facility] and received TNK and thrombectomy. Pt was evaluated by SLP while IP and found to have dysarthria and dysphagia. 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, [employer], [employer]  Vision  Vision:  (not wearing glasses due to nose injury)  Vision Exceptions: Wears glasses at all times  Hearing  Hearing: Within functional limits     Objective:  Oral Motor   Labial: Left droop  Dentition: Natural  Oral Hygiene: Moist (lingual coating significant)    Motor Speech  Dysarthric Characteristics: Imprecise (rapid rate)  Intelligibility: No impairment  Overall Impairment Severity: Minimal    Auditory Comprehension  Comprehension: Within Functional Limits     Expression  Primary Mode of Expression: Verbal  Verbal Expression  Verbal Expression: Within functional limits  Written Expression  Dominant Hand: Right  Written Expression:  (Did not assess)    Pragmatics/Social Functioning  Pragmatics: Exceptions to [facility] (Some instances of lability)  Eye Contact: Moderate (with speakers positioned on left)  Topic Maintenance: Moderate  Turn Taking:  Moderate    Cognition:   Orientation  Overall Orientation Status: Within Functional Limits (Off on time by 3 hours)  Attention  Attention: Exceptions to [facility] [place]  Selective Attention: Moderate  Sustained Attention: Moderate  Problem Solving  Problem Solving: Exceptions to [facility]  Executive Function Skills: Severe (very stimulable to ID errors with explicit instructions)  Safety/Judgment  Safety/Judgment: Exceptions to [facility]  Unable to Self-monitor and Self-correct Consistently: Severe  Insight: Mild   (able to ID impairments but not the functional implications)  Impulsive: Moderate    Prognosis:  Speech Therapy Prognosis  Prognosis: Good  Prognosis Considerations: Severity of Impairments;Previous Level of Function; Family/Community Support;Age;Participation Level; Motivation  Individuals consulted  Consulted and agree with results and recommendations: Family member  Family member consulted: WIfe, Myles Thomas    Education:  Patient Education: Educated on CVA, brain specificity, results/recommendations from assessment, general goal areas and plan of care. Patient Education Response: Needs reinforcement  Safety Devices in place: Yes  Type of devices: Call light within reach; Chair alarm in place    Therapy Time:   Individual Concurrent Group Co-treatment   Time In 1300 0000 0000 0000   Time Out 1345 0000 0000 0000   Minutes 45 0 0 0   Variance: 0  Timed Code Treatment Minutes: 0 Minutes  Total Treatment Time: [address] [place], [place] [facility]  Speech-Language Pathologist

## 2023-01-30 NOTE — PLAN OF CARE
Problem: Discharge Planning  Goal: Discharge to home or other facility with appropriate resources  Outcome: Progressing       Problem: Skin/Tissue Integrity  Goal: Absence of new skin breakdown  Description: 1. Monitor for areas of redness and/or skin breakdown  2. Assess vascular access sites hourly  3. Every 4-6 hours minimum:  Change oxygen saturation probe site  4. Every 4-6 hours:  If on nasal continuous positive airway pressure, respiratory therapy assess nares and determine need for appliance change or resting period.   Outcome: Progressing       Problem: ABCDS Injury Assessment  Goal: Absence of physical injury  Outcome: Progressing  Absence of Physical Injury: Implement safety measures based on patient assessment     Problem: Safety - Adult  Goal: Free from fall injury  Outcome: Progressing  Free From Fall Injury: Instruct family/caregiver on patient safety     Problem: Pain  Goal: Verbalizes/displays adequate comfort level or baseline comfort level  Outcome: Progressing  Verbalizes/displays adequate comfort level or baseline comfort level: Encourage patient to monitor pain and request assistance

## 2023-01-31 LAB
FACTOR V LEIDEN: NEGATIVE
MTHFR BY PCR SPECIMEN: NORMAL
MTHFR INTERPRETATION: NORMAL
MTHFR MUTATION A1298C: NORMAL
MTHFR MUTATION C677T: NEGATIVE
SPECIMEN: NORMAL

## 2023-01-31 PROCEDURE — 6360000002 HC RX W HCPCS: Performed by: PHYSICAL MEDICINE & REHABILITATION

## 2023-01-31 PROCEDURE — 97535 SELF CARE MNGMENT TRAINING: CPT

## 2023-01-31 PROCEDURE — 92526 ORAL FUNCTION THERAPY: CPT

## 2023-01-31 PROCEDURE — 97130 THER IVNTJ EA ADDL 15 MIN: CPT

## 2023-01-31 PROCEDURE — 1280000000 HC REHAB R&B

## 2023-01-31 PROCEDURE — 97530 THERAPEUTIC ACTIVITIES: CPT

## 2023-01-31 PROCEDURE — 99232 SBSQ HOSP IP/OBS MODERATE 35: CPT | Performed by: PHYSICAL MEDICINE & REHABILITATION

## 2023-01-31 PROCEDURE — 97129 THER IVNTJ 1ST 15 MIN: CPT

## 2023-01-31 PROCEDURE — 97112 NEUROMUSCULAR REEDUCATION: CPT

## 2023-01-31 PROCEDURE — 6370000000 HC RX 637 (ALT 250 FOR IP): Performed by: PHYSICAL MEDICINE & REHABILITATION

## 2023-01-31 RX ORDER — FLUOXETINE 10 MG/1
10 CAPSULE ORAL DAILY
Status: DISCONTINUED | OUTPATIENT
Start: 2023-01-31 | End: 2023-02-17

## 2023-01-31 RX ADMIN — POLYETHYLENE GLYCOL 3350 17 G: 17 POWDER, FOR SOLUTION ORAL at 10:26

## 2023-01-31 RX ADMIN — EZETIMIBE 10 MG: 10 TABLET ORAL at 10:25

## 2023-01-31 RX ADMIN — NYSTATIN 500000 UNITS: 100000 SUSPENSION ORAL at 10:26

## 2023-01-31 RX ADMIN — ENOXAPARIN SODIUM 40 MG: 100 INJECTION SUBCUTANEOUS at 10:25

## 2023-01-31 RX ADMIN — FLUOXETINE 10 MG: 10 CAPSULE ORAL at 10:25

## 2023-01-31 RX ADMIN — NYSTATIN 500000 UNITS: 100000 SUSPENSION ORAL at 17:03

## 2023-01-31 RX ADMIN — BISACODYL 5 MG: 5 TABLET, COATED ORAL at 10:25

## 2023-01-31 RX ADMIN — Medication 5 MG: at 19:59

## 2023-01-31 RX ADMIN — NYSTATIN 500000 UNITS: 100000 SUSPENSION ORAL at 19:59

## 2023-01-31 RX ADMIN — MAGNESIUM HYDROXIDE 30 ML: 400 SUSPENSION ORAL at 10:25

## 2023-01-31 RX ADMIN — PANTOPRAZOLE SODIUM 40 MG: 40 TABLET, DELAYED RELEASE ORAL at 06:32

## 2023-01-31 RX ADMIN — ACETAMINOPHEN 650 MG: 325 TABLET, FILM COATED ORAL at 18:24

## 2023-01-31 RX ADMIN — ROSUVASTATIN CALCIUM 40 MG: 20 TABLET, FILM COATED ORAL at 10:26

## 2023-01-31 RX ADMIN — ASPIRIN 81 MG 81 MG: 81 TABLET ORAL at 10:25

## 2023-01-31 ASSESSMENT — PAIN DESCRIPTION - LOCATION: LOCATION: HIP

## 2023-01-31 ASSESSMENT — PAIN DESCRIPTION - ORIENTATION: ORIENTATION: LEFT

## 2023-01-31 ASSESSMENT — PAIN SCALES - GENERAL
PAINLEVEL_OUTOF10: 4
PAINLEVEL_OUTOF10: 6

## 2023-01-31 NOTE — PROGRESS NOTES
Department of Physical Medicine & Rehabilitation  Progress Note    Patient Identification:  Tosha   [id number]  : 1955  Admit date: 2023    Chief Complaint: Acute CVA (cerebrovascular accident) [facility])    Subjective:   No acute events overnight. Patient seen this am. He is doing slightly better. Family meeting was had with his wife today to discuss prognosis and go over labs. Agreeable to start on low dose SSRI for CVA recovery. Working hard in therapy. ROS: No f/c, n/v, cp     Objective:  Patient Vitals for the past 24 hrs:   BP Temp Temp src Pulse Resp SpO2 Height   23 0930 125/83 97.7 °F (36.5 °C) Oral 84 16 96 % --   23 2147 128/84 98.8 °F (37.1 °C) Oral 75 16 97 % --   23 1537 -- -- -- -- 16 -- --   23 1331 -- -- -- -- -- -- 6' 1\" (1.854 m)     Const: Alert. No distress, pleasant. HEENT: Normocephalic, atraumatic. Normal sclera/conjunctiva. MMM. CV: Regular rate and rhythm. Resp: No respiratory distress. Lungs CTAB. Abd: Soft, nontender, nondistended, NABS+   Ext: No edema. Neuro: Alert, oriented, appropriately interactive. Psych: Cooperative, appropriate mood and affect    Laboratory data: Available via EMR. Last 24 hour lab  No results found for this or any previous visit (from the past 24 hour(s)). Therapy progress:  PT  Position Activity Restriction  Other position/activity restrictions: up as tolerated, up in chair  Objective     Sit to Stand: Maximum Assistance, 2 Person Assistance  Stand to Sit: 2 Person Assistance, Maximum Assistance     OT  PT Equipment Recommendations  Other: continue to assess  Toilet - Technique:  (via rolling shower chair)  Equipment Used:  (rolling shower chair)  Toilet Transfers Comments: pt did not have urge to void, however rolled pt in bathroom via rolling shower chair. Max A x 2 to pivot from bed to rolling shower chair. Assessment        SLP          Body mass index is 23.97 kg/m².     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 Patient is on aspirin at home        HTN  Patient is on lisinopril 5mg daily at home  - monitor      HLD:  Patient is on Crestor 20mg and ezetimibe 10mg at home daily  -Continue home meds     Anxiety  Per chart, patient is on Valium 2mg at home. Possible could be contributor to his chest pain yesterday, notes he felt anxious. Denies taking Valium at home, however it is listed as a home med.  Ativan 0.5mg once overnight   - prozac    Rehab Progress: Improving  Anticipated Dispo: home  Services/DME: [facility]  ELOS: IOANA Miles D.O. M.P.H  PM&R  1/31/2023  10:26 AM

## 2023-01-31 NOTE — PLAN OF CARE
Problem: Discharge Planning  Goal: Discharge to home or other facility with appropriate resources  Outcome: Progressing       Problem: Skin/Tissue Integrity  Goal: Absence of new skin breakdown  Description: 1. Monitor for areas of redness and/or skin breakdown  2. Assess vascular access sites hourly  3. Every 4-6 hours minimum:  Change oxygen saturation probe site  4. Every 4-6 hours:  If on nasal continuous positive airway pressure, respiratory therapy assess nares and determine need for appliance change or resting period.   Outcome: Progressing       Problem: ABCDS Injury Assessment  Goal: Absence of physical injury  Outcome: Progressing       Problem: Safety - Adult  Goal: Free from fall injury  Outcome: Progressing     Problem: Pain  Goal: Verbalizes/displays adequate comfort level or baseline comfort level  Outcome: Progressing  Verbalizes/displays adequate comfort level or baseline comfort level: Encourage patient to monitor pain and request assistance     Problem: Nutrition Deficit:  Goal: Optimize nutritional status  Outcome: Progressing

## 2023-01-31 NOTE — PROGRESS NOTES
Speech Language Pathology  ACUTE REHAB UNIT  SPEECH/LANGUAGE PATHOLOGY      [x] Daily  [] Weekly Care Conference Note  [] Discharge    Patient:Jason Arreola  SIC:[id number]  Rehab Dx/Hx: Acute CVA (cerebrovascular accident) ([facility] [address] [I63.9]    Precautions: [x] Aspiration  [x] Fall risk  [] Sternal  [] Seizure [] Hip  [] Weight Bearing [] Other     ST Dx: [] Aphasia  [x] Dysarthria  [] Apraxia   [x] Oropharyngeal dysphagia [x] Cognitive Impairment  [] Other:   Date of Admit: 1/29/2023  Room #: 3102/3102-01  Date: 1/31/2023          Current Diet Order:ADULT DIET; Dysphagia - Minced and Moist  ADULT ORAL NUTRITION SUPPLEMENT; Breakfast, Dinner; Low Calorie/High Protein Oral Supplement   Recommended Form of Meds: Meds in puree  Compensatory Swallowing Strategies :  (Small bites, no pharyngeal neck extension, left head turn with thins, check for pocketing on left, 1:1 assist for small bolus size)   Previous MBS: 1/26/23  Oral Phase  Mild-moderate dysfunction characterized by incomplete labial seal with anterior spillage, slowed/reduced mastication, mild stasis. Pharyngeal Phase  Moderate dysfunction characterized by impairments in timing, strength and coordination with premature bolus loss, reduced base of tongue retraction, delayed/reduced hyolaryngeal mechanics, and reduced pharyngeal constriction. Resulted in vallecular/pyriform/pharyngeal residue, as well as compromised airway protection with penetration and gross tracheal aspiration of thin and mildly thick liquids; strong reactive cough present but did not fully clear. Chin tuck strategy did not eliminate aspiration, however cued head turn LEFT with small straw sips was effective . Double swallow helped clear residual. Of note, throughout study pt with tendency to tilt head posteriorly which further exacerbated bolus control; benefited from cues for neutral positioning.   Upper Esophageal Screen  Indirectly assessed; appeared unremarkable. 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, music, [employer]    Dentition: Adequate  Vision  Vision:  (not wearing glasses due to nose injury)  Vision Exceptions: Wears glasses at all times  Hearing  Hearing: Within functional limits  Barriers toward progress: Cognitive deficit and Impulsivity        Date: 1/31/2023      Tx session 1 Tx session 2   Total Timed Code Min 20 50   Total Treatment Minutes 45 50   Individual Treatment Minutes 45 50   Group Treatment Minutes 0 0   Co-Treat Minutes 0 0   Brief Exception: N/A N/A   Pain None indicated None indicated   Pain Intervention: [] RN notified  [] Repositioned  [] Intervention offered and patient declined  [x] N/A  [] Other: [] RN notified  [] Repositioned  [] Intervention offered and patient declined  [x] N/A  [] Other:   Subjective:     Pt seated upright in chair agreeable to AM session during meal.  Pt reclined in chair upon entry; SLP and RN assisted with boost to be repositioned upright. Pt agreeable to session. Objective / Goals:     Goal 1: Pt will demonstrate adequate oral containment of liquids and solids across meal without cueing. Trace anterior loss on the L, x2 with masticated solids. Benefited from cue from SLP to identify and clear with napkin. Not targeted this session. Goal 2: Pt will tolerate trials of solids with adequate mastication and propulsion as evidenced by min residue after the swallow across mealtime assessment. Trace-min residue x2 when SLP checked oral cavity. Benefited from min cues to alternate solids and liquids. Not targeted this session. Goal 3: Pt will tolerate thin liquids with head in neutral position without s/s associated with aspiration.    Not directly targeted this session, however at times pt forgot to turn head to the L with sips of thin liquids and appeared to swallow with no immediate overt s/s penetration/aspiration. Not targeted this session. Goal 4: Pt will demonstrate safe feeding behavior as evidenced by small bolus size / appropriate rate without cues. >3 prompts to reduce bite size; as session progressed, patient with increased independence in reducing bolus size prior to placing in oral cavity. Not targeted this session. Goal 5: Pt will tolerate least restrictive diet without respiratory decline. X1 cough w/ thin liquids. ? Large/consecutive straw sip and only partial head turn. Effortful swallows: x41 cued with thin liquids and minced and moist.  Discussed swallow strategies prior to end of session. Pt provided with copy of strategies and placed on tray table for carry over with assistance from wife during meal.    Goal 1: The patient will maintain eye contact to speaker positioned left of midline without distractors, min cues. Improved eye contact this AM. Initial min cues, fading to MI to maintain midline while consuming PO. Adequate eye contact with speaker directly midline, and min cues to scan to the left to find SLP and wife on the couch. Difficulty reading sentences+scanning to left on next line. Pt continually omitted initial ~1-2 words on second/third lines. Max verbal and visual cues via use of highlight orange index card to scan to locate prior to reading initial word. Goal 2: The patient will demonstrate sustained attention to task for 2 minutes with <2 prompts. >2 prompts for sustaining attention to consuming AM meal. Continuous tangential comments/questions. Moderate cues to redirect to tasks >2 prompts required to redirect pt to sustain attention during REGLA subtest.    Goal 3: The patient will demonstrate improved inhibition as evidenced by <3 prompts for impulsivity during a task.    No significant occurrences of impulsivity with consuming PO this session. >3 prompts required. Impulsivity during reading tasks-starting to read prior to SLP finishing task instructions/talking over SLP across multiple occurrences. Goal 4: The patient will demonstrate improved self monitoring/correcting for basic tasks with <mod cues. Self corrected bite size x2 with removal of excess PO from utensil. Self corrected for locating correct information in x1 occurrence during REGLA reading subtest.   Goal 5: The patient will tolerate ongoing cognitive linguistic assessment. Not targeted this session. Targeted via reading+sentence completion with x4 options. 75% accuracy, increased to 87% accuracy given minimal cues. At times, pt with rapid rate of reading which resulted in omission of words and distorted phonemes. Patient was administered portions of the REGLA (Assessment of Language-Related Functional Activities) with the following results:  Understanding Medication Labels: (only completed initial x3/10 questions) 0% accuracy; required moderate cues to achieve answers. Reading Instructions:completed with 60% accuracy, when provided with moderate cues, accuracy increased to 100%. Pt identified error and self corrected x1. Goal 6: The patient will demonstrate comprehensibility in multiple communication environments without cues. Not directly targeted this session. Rapid rate of speech resulted in blended word boundaries. Pt benefited from mod cues to reduce rate for increased clarity and accuracy. Other areas targeted:     Education:   Educated re: swallow strategies, swallow physiology, diet level, improving attention to tasks. Educated re: rationale for therapeutic cognitive tasks and compensatory strategies for L side attention.     Safety Devices: [x] Call light within reach  [x] Chair alarm activated and connected to nurse call light system  [] Bed alarm activated   [x] Other: wife present [x] Call light within reach  [x] Chair alarm activated and connected to nurse call light system  [] Bed alarm activated  [x] Other: wife present   Assessment: Pt motivated during therapy sessions this date. Known oropharyngeal dysphagia s/p MBS. Demonstrated improvement utilizing swallowing strategies during meals. Biggest barriers include reduced attention, L side visual inattention, and executive function skills. Achieved increased accuracy when provided prompts and cues during therapeutic cognitive tasks. Plan: Continue as per plan of care. Interventions used this date:  [] Speech/Language Treatment  [] Instruction in HEP  [x] Dysphagia Treatment [x] Cognitive Treatment   [] Other:    Discharge recommendations:  [] Home independently  [x] Home with assistance []  24 hour supervision  [] ECF [] Other  Continued Tx Upon Discharge: ? [x] Yes    [] No    [] TBD based on progress while on ARU     [] Vital Stim indicated     [] Other:   Estimated discharge date: Not yet established    Electronically signed by:  Hill Meredith, [address] [place] [address] Language Pathology      The speech-language pathologist was present, directed the patient's care, made skilled judgment and was responsible for assessment and treatment.     Ewa Goodrich M.A., [address] [facility]  Speech-Language Pathologist

## 2023-01-31 NOTE — PROGRESS NOTES
Occupational Therapy  Facility/Department: [facility] ACUTE REHAB UNIT  Rehabilitation Occupational Therapy Daily Treatment Note    Date: 23  Patient Name: Lyndee Galeazzi       Room: 3740/9962-67  MRN: [id number]  Account: [de-identified]   : 1955  (68 y.o.) Gender: male                    Past Medical History:  has a past medical history of Anxiety, Chest pain, Depression, Encounter for imaging to screen for metal prior to MRI, Hyperlipidemia, Hypertension, and Wears glasses. Past Surgical History:   has a past surgical history that includes Strong tooth extraction; Colonoscopy (2009); Cardiac catheterization (2008); Finger trigger release (3-); and Coronary angioplasty with stent. Restrictions  Restrictions/Precautions: Fall Risk  Other position/activity restrictions: up as tolerated, up in chair    Subjective  Subjective: Pt seated in recliner w/ wife and daughter present. Pt agreeable to session. Declined the need to toilet. Co-tx indicated in order to maximize pt's therapeutic potential.  Restrictions/Precautions: Fall Risk             Objective     Cognition  Overall Cognitive Status: Exceptions  Arousal/Alertness: Appropriate responses to stimuli  Attention Span: Attends with cues to redirect; Difficulty attending to directions; Difficulty dividing attention  Safety Judgement: Decreased awareness of need for assistance;Decreased awareness of need for safety  Problem Solving: Assistance required to generate solutions;Assistance required to identify errors made;Assistance required to implement solutions  Insights: Decreased awareness of deficits  Initiation: Requires cues for some  Sequencing: Requires cues for some  Cognition Comment: Pt tangential at times, requiring cues to redirect. Pt w/ high distractability noted in gym, responded well to use of curtain during tx session. Pt w/ poor awareness of L side.   Orientation  Overall Orientation Status: Within Functional Limits  Orientation Level: Oriented to place;Oriented to situation;Oriented to person         ADL  Toileting  Assistance Level: Requires x 2 assistance  Skilled Clinical Factors: Pt incontinent of urine in brief. Required assist x2 to change brief on mat table in gym. Pt unaware of incontinence. Functional Mobility  Device: Wheelchair  Roll Left  Assistance Level: Maximum assistance  Skilled Clinical Factors: For brief change on mat table. Roll Right  Assistance Level: Maximum assistance  Skilled Clinical Factors: For brief change on mat table. Transfers  Surface: From chair with arms; Wheelchair; To mat; To chair with arms  Additional Factors: Verbal cues; Hand placement cues; Increased time to complete  Sit to Stand  Assistance Level: Requires x 2 assistance  Skilled Clinical Factors: Pt completed 3 STS in // bars w/ use of sling to support LUE. Pt required max A to achieve stand, standing for ~1 min each stand. Constant cues required for body mechanics and leaning to R side d/t significant L lateral lean. Stand to Sit  Assistance Level: Requires x 2 assistance  Skilled Clinical Factors: 2 helpers for controlled descent into w/c from // bars. Bed To/From Chair  Technique: Stand pivot; Sit pivot  Assistance Level: Requires x 2 assistance  Skilled Clinical Factors: 2 helpers required for stand pivot t/f recliner --> w/c on R side, max A x2 w/ significant LLE buckle. Sit Pivot  Assistance Level: Requires x 2 assistance  Skilled Clinical Factors: Pt completed sit pivot t/f w/c --> edge of mat toward R side w/ mod A x2. Pt completed sit pivot t/f edge of mat --> w/c on L side w/ mod A x2. Pt completed sit pivot t/f w/c --> recliner on R side w/ mod A x2. Assist to block LLE and support LUE during transfers. OT Exercises  Static Sitting Balance Exercises: Pt sat edge of mat requiring CGA - max A w/ use of mirror to assist w/ visual cues for midline orientation.  Pt required cues for body mechanics and leaning to R side d/t significant L lateral lean. Pt w/ improved balance when RUE was placed on lap vs mat table d/t pushing. Dynamic Sitting Balance Exercises: Pt required to reach for 4 colored cones using RUE in a variety of planes on L and R side w/ focus on adjusting midline positon after each reach, visual scanning, and crossing midline. Pt required CGA-max A during task. Mirror used for visual feedback. Completed task x4 w/ requirement to hand cones to therapist on R side x2 and then on L side x2. Assessment  Assessment  Assessment: Pt tolerated session well w/ improvement of static sitting balance w/ repitition on mat table transitioning to CGA level. D/t LLE weakness, pt w/ improved sit pivot transfer vs stand pivot. Recommend using sling during mobility to support LUE to prevent further subluxation. Educated family on support of LUE and focus of therapy session this date. Discussed observing during therapy but also importance of maintaining attention as pt is highly distractable at times. Pt would continue to benefit from skilled OT services in order to maximize fxl independence w/ ADLs and transfer. Continue OT POC. Activity Tolerance: Patient tolerated treatment well  Discharge Recommendations: 24 hour supervision or assist;Continue to assess pending progress;Home with Home health OT  OT Equipment Recommendations  Equipment Needed: Yes  ADL Assistive Devices: Shower Chair with back; Toileting - Raised Toilet Seat with arms;Grab Bars - toilet;Grab Bars - shower  Safety Devices  Safety Devices in place: Yes  Type of devices: Call light within reach; Chair alarm in place; Left in chair    Patient Education  Education  Education Given To: Patient; Family  Education Provided: Role of Therapy;Plan of Care;Family Education;Mobility Training;Transfer Training; Safety; Fall Prevention Strategies  Education Method: Demonstration;Verbal  Barriers to Learning: Cognition  Education Outcome: Verbalized understanding;Demonstrated understanding    Plan  Occupational Therapy Plan  Times Per Week: 5 days per week, 60 mins each  Current Treatment Recommendations: Strengthening;ROM;Balance training;Functional mobility training; Endurance training;Self-Care / ADL; Patient/Caregiver education & training;Neuromuscular re-education;Cognitive/Perceptual training    Goals  Patient Goals   Patient goals : go home with my wife  Short Term Goals  Time Frame for Short Term Goals: By 2 weeks  Short Term Goal 1: Pt will complete LB dressing with mod A  Short Term Goal 2: Pt will complete toileting with mod A  Short Term Goal 3: Pt will complete toilet and functional transfers with mod A  Short Term Goal 4: Pt will complete UB dressing with min A  Short Term Goal 5: Pt will complete UE HEP x 10 reps for improved R UE coordination for ADLs  Long Term Goals  Time Frame for Long Term Goals : By 4 weeks  Long Term Goal 1: Pt will complete LB dressing with CGA  Long Term Goal 2: Pt will complete toileting with CGA  Long Term Goal 3: Pt will complete toilet and functional transfers with mod I  Long Term Goal 4: Pt will complete UB dressing with mod I  Long Term Goal 5: Pt will complete simple IADL with LRAD at mod I           Therapy Time   Individual Concurrent Group Co-treatment   Time In       [address]   Time Out       1345   Minutes       Shu Obrien

## 2023-01-31 NOTE — PROGRESS NOTES
Physical Therapy  Facility/Department: [facility] ACUTE REHAB UNIT  Rehabilitation Physical Therapy Treatment Note    NAME: Kalyan Ruiz  : 1955 (74 y.o.)  MRN: [id number]  CODE STATUS: Full Code    Date of Service: 23       Restrictions:  Restrictions/Precautions: Fall Risk  Position Activity Restriction  Other position/activity restrictions: up as tolerated, up in chair     SUBJECTIVE  Subjective  Subjective: pt up in chair and agreeable to PT  Pain: reporting pain with movement in L hip           OBJECTIVE  Cognition  Overall Cognitive Status: Exceptions  Arousal/Alertness: Appropriate responses to stimuli  Attention Span: Attends with cues to redirect; Difficulty attending to directions; Difficulty dividing attention  Safety Judgement: Decreased awareness of need for assistance;Decreased awareness of need for safety  Problem Solving: Assistance required to generate solutions;Assistance required to identify errors made;Assistance required to implement solutions  Insights: Decreased awareness of deficits  Initiation: Requires cues for some  Sequencing: Requires cues for some  Cognition Comment: Pt tangential at times, requiring cues to redirect. Pt w/ high distractability noted in gym, responded well to use of curtain during tx session. Pt w/ poor awareness of L side. Orientation  Overall Orientation Status: Within Functional Limits  Orientation Level: Oriented to place;Oriented to situation;Oriented to person    Functional Mobility  Roll Left  Assistance Level: Maximum assistance  Skilled Clinical Factors: for pericare and donning new brief  Roll Right  Assistance Level: Maximum assistance  Skilled Clinical Factors: for pericare and donning new brief  Sit to Supine  Assistance Level: Moderate assistance; Requires x 2 assistance  Skilled Clinical Factors: on flat mat  Supine to Sit  Assistance Level: Moderate assistance; Requires x 2 assistance  Skilled Clinical Factors: on flat mat  Balance  Sitting Balance: Moderate assistance (CGA- max A with max VCs for correcting L lateral and posterior lean)  Standing Balance: Dependent/Total (max A of 2 in parallel bars with L knee blocked x3 trials 1 min each)  Standing Balance  Time: 3 x1 min  Activity: static stance in parallel bars  Transfers  Surface: From chair with arms; To mat;From mat; To chair with arms  Additional Factors: Verbal cues; Hand placement cues; Increased time to complete  Device:  (parallel bars)  Sit to Stand  Assistance Level: Requires x 2 assistance;Dependent (max A of 2 x3 trials in parallel bars, L knee blocked 2/2 buckling)  Skilled Clinical Factors: max VCs for correction of L lateral lean and pushing to L  Bed To/From Chair  Technique: Sit pivot  Assistance Level: Requires x 2 assistance; Moderate assistance (mod A of 2 for scoot pivot from wc> mat, mat >wc, and wc> recliner)  Stand Pivot  Assistance Level: Requires x 2 assistance;Maximum assistance (max A of 2 for attempt at stand pivot from recliner to wc, L knee buckled completely and pt dependently lowered to chair)             PT Exercises  Static Sitting Balance Exercises: Pt sat edge of mat requiring CGA - max A w/ use of mirror to assist w/ visual cues for midline orientation. Pt required cues for body mechanics and leaning to R side d/t significant L lateral and posterior lean. Pt w/ improved balance when RUE was placed on lap vs mat table d/t pushing. pt with improved self correction at end of exercise but continues to require max VCs  Dynamic Sitting Balance Exercises: Pt required to reach for 4 colored cones using RUE in a variety of planes on L and R side w/ focus on adjusting midline positon after each reach, visual scanning, and crossing midline. Pt required CGA-max A during task. Mirror used for visual feedback. Completed task x4 w/ requirement to hand cones to therapist on R side x2 and then on L side x2.   Static Standing Balance Exercises: static stance maintained in parallel bars with max A + mod-max A, L knee blocked at all times 2/2 buckling. max VCs required for correcting heavy lean to L. 3 trials x1 min each      ASSESSMENT/PROGRESS TOWARDS GOALS       Assessment  Assessment: pt tolerated session well, very hardworking and eager to participate. pt continues to require assist of 2 for all transfers and requires L knee blocked at all times in stance within parallel bars 2/2 severe buckling. pt demonstrating heavy L lateral and slight posterior lean in both sitting and stance requiring max VCs for correction and awareness. pt did improve sitting balance on mat with multiple periods of CGA with max cuing however continues to range from CGA- max A. pt well below baseline and will benefit from continued skilled PT to maximize independence.   Activity Tolerance: Patient tolerated treatment well;Patient limited by endurance  Discharge Recommendations: 24 hour supervision or assist;Continue to assess pending progress;Home with Home health PT  PT Equipment Recommendations  Other: continue to assess    Goals  Patient Goals   Patient Goals : to go home  Short Term Goals  Time Frame for Short Term Goals: 2 weeks (all ongoing)  Short Term Goal 1: Pt will complete bed mobility with moderate assist  Short Term Goal 2: Pt will complete sit<>stand transfer wtih moderate assist  Short Term Goal 3: Pt will propel self in manual w/c for 150' with minimal assist.  Short Term Goal 4: Pt will ambulate 10' with moderate assist.  Long Term Goals  Time Frame for Long Term Goals : 4 weeks (all ongoing)  Long Term Goal 1: Pt will complete bed mobility with CGA  Long Term Goal 2: Pt will complete sit<>Stand transfers with CGA  Long Term Goal 3: Pt will propel self in manual w/c for 250' with suprvision  Long Term Goal 4: Pt will ambulate 48' with CGA & LRAD    PLAN OF CARE/SAFETY  Physcial Therapy Plan  Days Per Week: 5 Days  Hours Per Day: 1 hour  Therapy Duration: 4 Weeks  Current Treatment Recommendations: Strengthening;Balance training;ROM; Functional mobility training;Neuromuscular re-education; Safety education & training;Patient/Caregiver education & training;Transfer training; Endurance training;Home exercise program;Gait training;Equipment evaluation, education, & procurement;Positioning; Therapeutic activities  Safety Devices  Type of Devices: Chair alarm in place;Call light within reach; Left in chair;Nurse notified;Gait belt;Patient at risk for falls  Restraints  Restraints Initially in Place: No    EDUCATION  Education  Education Given To: Patient  Education Provided: Role of Therapy;Plan of Care; Mobility Training;Transfer Training  Education Method: Verbal  Barriers to Learning: Cognition  Education Outcome: Verbalized understanding;Continued education needed        Therapy Time   Individual Concurrent Group Co-treatment   Time In       1245   Time Out       1345   Minutes       60     Timed Code Treatment Minutes: 60 Minutes  Variance: 0    Ting Safe, PT, 01/31/23 at 3:41 PM

## 2023-01-31 NOTE — PROGRESS NOTES
Shift assessment complete. VSS. Patient C/O 5/10 pain to left hip area, PRN Ultram given. Patient is currently x2 with stedy. Patient is incontinent of bowel and bladder. Patient A/O x4 with intermittent confusion during night hours, Patient follows commands, Patient tolerated medications WW pudding, LUE/LLE continues to be flaccid, Call light and over bed table within reach, Hourly rounding and visual checks in place, Safety measures in place, Will continue to monitor.

## 2023-01-31 NOTE — CARE COORDINATION
Case Management Assessment  Initial Evaluation    Date/Time of Evaluation: 1/31/2023 2:49 PM  Assessment Completed by: TRUMAN Espinosa    If patient is discharged prior to next notation, then this note serves as note for discharge by case management. Patient Name: Delgado Burks                   YOB: 1955  Diagnosis: Acute CVA (cerebrovascular accident) [facility]) [I63.9]                   Date / Time: 1/29/2023  4:35 PM    Patient Admission Status: REHAB IP   Readmission Risk (Low < 19, Mod (19-27), High > 27): Readmission Risk Score: 14.3    Current PCP: Angel Gonzalez MD  PCP verified by CM? Yes    Chart Reviewed: Yes      History Provided by:  chart-SW will meet with Pt in AM  Patient Orientation: Alert and Oriented    Patient Cognition: Alert    Hospitalization in the last 30 days (Readmission):  No    If yes, Readmission Assessment in CM Navigator will be completed. Advance Directives:      Code Status: Full Code   Patient's Primary Decision Maker is:      Primary Decision Maker (Active): RafaelDunia - Spouse - [phone]    Discharge Planning:    Patient lives with: Spouse/Significant Other Type of Home: House  Primary Care Giver: Self  Patient Support Systems include: Spouse/Significant Other   Current Financial resources:    Current community resources:    Current services prior to admission: None            Current DME:              Type of Home Care services:  Nursing Services, OT, PT    ADLS  Prior functional level: Independent in ADLs/IADLs  Current functional level:      PT AM-PAC:   /24  OT AM-PAC:   /24    Family can provide assistance at DC: Yes  Would you like Case Management to discuss the discharge plan with any other family members/significant others, and if so, who?     Plans to Return to Present Housing: Yes  Other Identified Issues/Barriers to RETURNING to current housing: none  Potential Assistance needed at discharge: 1 Lanie Durbin, Outpatient PT/OT Potential DME: TBD  Patient expects to discharge to: House  Plan for transportation at discharge: spouse    Financial    Payor: MEDICARE / Plan: MEDICARE PART A AND B / Product Type: *No Product type* /     Does insurance require precert for SNF: No    Potential assistance Purchasing Medications: No  Meds-to-Beds request:        [facility] [address], [address] [facility]  [address]  Phone: [phone] Fax: [phone]      Additional Case Management Notes:   SW will follow Pt's progress and assist with DC plan and needs. The Plan for Transition of Care is related to the following treatment goals of Acute CVA (cerebrovascular accident) ([facility] [address] [S23.7]    IF APPLICABLE: The Patient and/or patient representative Shawn Irvin and his family were provided with a choice of provider and agrees with the discharge plan. Freedom of choice list with basic dialogue that supports the patient's individualized plan of care/goals and shares the quality data associated with the providers was provided to: Patient   Patient Representative Name:       The Patient and/or Patient Representative Agree with the Discharge Plan?  Yes    Edil Sweet [place]  Case Management Department  Ph: [phone]

## 2023-02-01 LAB
ANION GAP SERPL CALCULATED.3IONS-SCNC: 9 MMOL/L (ref 3–16)
BASOPHILS ABSOLUTE: 0.1 K/UL (ref 0–0.2)
BASOPHILS RELATIVE PERCENT: 0.9 %
BUN BLDV-MCNC: 16 MG/DL (ref 7–20)
CALCIUM SERPL-MCNC: 9.2 MG/DL (ref 8.3–10.6)
CHLORIDE BLD-SCNC: 98 MMOL/L (ref 99–110)
CO2: 30 MMOL/L (ref 21–32)
CREAT SERPL-MCNC: 0.6 MG/DL (ref 0.8–1.3)
EOSINOPHILS ABSOLUTE: 0.2 K/UL (ref 0–0.6)
EOSINOPHILS RELATIVE PERCENT: 2.2 %
GFR SERPL CREATININE-BSD FRML MDRD: >60 ML/MIN/{1.73_M2}
GLUCOSE BLD-MCNC: 116 MG/DL (ref 70–99)
HCT VFR BLD CALC: 40.4 % (ref 40.5–52.5)
HEMOGLOBIN: 13.7 G/DL (ref 13.5–17.5)
LYMPHOCYTES ABSOLUTE: 1.5 K/UL (ref 1–5.1)
LYMPHOCYTES RELATIVE PERCENT: 19.7 %
MCH RBC QN AUTO: 32 PG (ref 26–34)
MCHC RBC AUTO-ENTMCNC: 33.9 G/DL (ref 31–36)
MCV RBC AUTO: 94.5 FL (ref 80–100)
MONOCYTES ABSOLUTE: 0.6 K/UL (ref 0–1.3)
MONOCYTES RELATIVE PERCENT: 7.7 %
NEUTROPHILS ABSOLUTE: 5.3 K/UL (ref 1.7–7.7)
NEUTROPHILS RELATIVE PERCENT: 69.5 %
PDW BLD-RTO: 13.1 % (ref 12.4–15.4)
PLATELET # BLD: 264 K/UL (ref 135–450)
PMV BLD AUTO: 7.4 FL (ref 5–10.5)
POTASSIUM REFLEX MAGNESIUM: 4.4 MMOL/L (ref 3.5–5.1)
PROTHROMBIN G20210A MUTATION: NEGATIVE
PT PCR SPECIMEN: NORMAL
RBC # BLD: 4.28 M/UL (ref 4.2–5.9)
SODIUM BLD-SCNC: 137 MMOL/L (ref 136–145)
WBC # BLD: 7.6 K/UL (ref 4–11)

## 2023-02-01 PROCEDURE — 99232 SBSQ HOSP IP/OBS MODERATE 35: CPT | Performed by: PHYSICAL MEDICINE & REHABILITATION

## 2023-02-01 PROCEDURE — 92526 ORAL FUNCTION THERAPY: CPT

## 2023-02-01 PROCEDURE — 80048 BASIC METABOLIC PNL TOTAL CA: CPT

## 2023-02-01 PROCEDURE — 97129 THER IVNTJ 1ST 15 MIN: CPT

## 2023-02-01 PROCEDURE — 36415 COLL VENOUS BLD VENIPUNCTURE: CPT

## 2023-02-01 PROCEDURE — 97530 THERAPEUTIC ACTIVITIES: CPT

## 2023-02-01 PROCEDURE — 97112 NEUROMUSCULAR REEDUCATION: CPT

## 2023-02-01 PROCEDURE — 97130 THER IVNTJ EA ADDL 15 MIN: CPT

## 2023-02-01 PROCEDURE — 1280000000 HC REHAB R&B

## 2023-02-01 PROCEDURE — 6360000002 HC RX W HCPCS: Performed by: PHYSICAL MEDICINE & REHABILITATION

## 2023-02-01 PROCEDURE — 85025 COMPLETE CBC W/AUTO DIFF WBC: CPT

## 2023-02-01 PROCEDURE — 6370000000 HC RX 637 (ALT 250 FOR IP): Performed by: PHYSICAL MEDICINE & REHABILITATION

## 2023-02-01 RX ORDER — TRAZODONE HYDROCHLORIDE 50 MG/1
50 TABLET ORAL NIGHTLY PRN
Status: DISCONTINUED | OUTPATIENT
Start: 2023-02-01 | End: 2023-02-24 | Stop reason: HOSPADM

## 2023-02-01 RX ORDER — DIPHENHYDRAMINE HCL 25 MG
25 TABLET ORAL EVERY 6 HOURS PRN
Status: DISCONTINUED | OUTPATIENT
Start: 2023-02-01 | End: 2023-02-24 | Stop reason: HOSPADM

## 2023-02-01 RX ADMIN — NYSTATIN 500000 UNITS: 100000 SUSPENSION ORAL at 14:18

## 2023-02-01 RX ADMIN — PANTOPRAZOLE SODIUM 40 MG: 40 TABLET, DELAYED RELEASE ORAL at 06:43

## 2023-02-01 RX ADMIN — NYSTATIN 500000 UNITS: 100000 SUSPENSION ORAL at 09:23

## 2023-02-01 RX ADMIN — ENOXAPARIN SODIUM 40 MG: 100 INJECTION SUBCUTANEOUS at 09:28

## 2023-02-01 RX ADMIN — ASPIRIN 81 MG 81 MG: 81 TABLET ORAL at 09:27

## 2023-02-01 RX ADMIN — EZETIMIBE 10 MG: 10 TABLET ORAL at 09:26

## 2023-02-01 RX ADMIN — TRAZODONE HYDROCHLORIDE 50 MG: 50 TABLET ORAL at 20:14

## 2023-02-01 RX ADMIN — TRAMADOL HYDROCHLORIDE 25 MG: 50 TABLET, COATED ORAL at 19:08

## 2023-02-01 RX ADMIN — ROSUVASTATIN CALCIUM 40 MG: 20 TABLET, FILM COATED ORAL at 09:28

## 2023-02-01 RX ADMIN — ACETAMINOPHEN 650 MG: 325 TABLET, FILM COATED ORAL at 06:43

## 2023-02-01 RX ADMIN — NYSTATIN 500000 UNITS: 100000 SUSPENSION ORAL at 19:04

## 2023-02-01 RX ADMIN — FLUOXETINE 10 MG: 10 CAPSULE ORAL at 09:27

## 2023-02-01 RX ADMIN — BISACODYL 5 MG: 5 TABLET, COATED ORAL at 09:27

## 2023-02-01 RX ADMIN — Medication 5 MG: at 20:14

## 2023-02-01 ASSESSMENT — PAIN DESCRIPTION - DESCRIPTORS: DESCRIPTORS: ACHING

## 2023-02-01 ASSESSMENT — PAIN SCALES - GENERAL: PAINLEVEL_OUTOF10: 0

## 2023-02-01 ASSESSMENT — PAIN - FUNCTIONAL ASSESSMENT: PAIN_FUNCTIONAL_ASSESSMENT: PREVENTS OR INTERFERES SOME ACTIVE ACTIVITIES AND ADLS

## 2023-02-01 ASSESSMENT — PAIN DESCRIPTION - ORIENTATION: ORIENTATION: MID

## 2023-02-01 ASSESSMENT — PAIN DESCRIPTION - LOCATION: LOCATION: BACK

## 2023-02-01 NOTE — PROGRESS NOTES
Speech Language Pathology  ACUTE REHAB UNIT  SPEECH/LANGUAGE PATHOLOGY      [x] Daily  [] Weekly Care Conference Note  [] Discharge    Patient:Jason Valadez Cons  [facility]:[id number]  Rehab Dx/Hx: Acute CVA (cerebrovascular accident) ([facility] [address] [I63.9]    Precautions: [x] Aspiration  [x] Fall risk  [] Sternal  [] Seizure [] Hip  [] Weight Bearing [] Other     ST Dx: [] Aphasia  [x] Dysarthria  [] Apraxia   [x] Oropharyngeal dysphagia [x] Cognitive Impairment  [] Other:   Date of Admit: 1/29/2023  Room #: 3102/3102-01  Date: 2/1/2023          Current Diet Order:ADULT DIET; Dysphagia - Minced and Moist  ADULT ORAL NUTRITION SUPPLEMENT; Breakfast, Dinner; Standard High Calorie/High Protein Oral Supplement   Recommended Form of Meds: Meds in puree  Compensatory Swallowing Strategies :  (Small bites, no pharyngeal neck extension, left head turn with thins, check for pocketing on left, 1:1 assist for small bolus size)   Previous MBS: 1/26/23  Oral Phase  Mild-moderate dysfunction characterized by incomplete labial seal with anterior spillage, slowed/reduced mastication, mild stasis. Pharyngeal Phase  Moderate dysfunction characterized by impairments in timing, strength and coordination with premature bolus loss, reduced base of tongue retraction, delayed/reduced hyolaryngeal mechanics, and reduced pharyngeal constriction. Resulted in vallecular/pyriform/pharyngeal residue, as well as compromised airway protection with penetration and gross tracheal aspiration of thin and mildly thick liquids; strong reactive cough present but did not fully clear. Chin tuck strategy did not eliminate aspiration, however cued head turn LEFT with small straw sips was effective . Double swallow helped clear residual. Of note, throughout study pt with tendency to tilt head posteriorly which further exacerbated bolus control; benefited from cues for neutral positioning.   Upper Esophageal Screen  Indirectly assessed; appeared unremarkable. 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 N/A   Pain Intervention: [] RN notified  [] Repositioned  [] Intervention offered and patient declined  [x] N/A  [] Other: [] RN notified  [] Repositioned  [] Intervention offered and patient declined  [x] N/A  [] Other:   Subjective:     Pt upright in chair and agreeable. Pts brother present for session. Pt reclined in chair upon entry. SLP assisted with boost to be repositioned upright. Pt agreeable to session. Pts son and brother present for session. Objective / Goals:     Goal 1: Pt will demonstrate adequate oral containment of liquids and solids across meal without cueing. Patient demonstrated multiple instances of bolus placement on labial surface during self feeding of puree. Pt with anterior loss of liquids and puree x5 across mealtime assessment. Benefits from effortful labial seal following bolus administration and for placement/presentation to unaffected side to reduce loss. Patient tolerated NMES via VitalStim placement 4a (targeting orbicularis oris, buccinator and superior pharyngeal constrictor) @ 8.0 mA on left and 3.0 mA on right x 42 minutes. Active contraction noted on left side.   Pt demonstrated anterior loss of liquid carbonated lime beverage on L side x2. SLP prompted pt to utilize effortful labial seal while swallowing beverage to reduce liquid loss on affected side. Goal 2: Pt will tolerate trials of solids with adequate mastication and propulsion as evidenced by min residue after the swallow across mealtime assessment. No solids this date. Pt with active mastication of puree texture. Bolus control via lateralization x5 when provided min tactile cues. Pt unable to accomplish labial seal due to impulsivity / inability to inhibit. Found oral residue in oral cavity at start of session; pt unaware. Goal 3: Pt will tolerate thin liquids with head in neutral position without s/s associated with aspiration. Pt tolerated thin liquids via cup with head in neutral head position with x2 episodes of coughing. X1 instance of wet vocal quality noted outside of these trials. Pt with slight head turn to right for one of these instances. Pt completed x39 cued effortful swallows. Pt tolerated carbonated lime beverage from nosey cup with head in neutral position; demonstrated cough x9 during liquid trial. X1 instance of strong pharyngeal neck extension resulting in prolonged coughing; no other cues for neutral head positioning. Goal 4: Pt will demonstrate safe feeding behavior as evidenced by small bolus size / appropriate rate without cues. Cues for small bolus size with liquids only this date. The pt requested clinicians to verify bolus size at start of meal. Pt with consistent piecemeal swallowing of thin liquids. Goal not targeted this session. Goal 5: Pt will tolerate least restrictive diet without respiratory decline. Chart review does not reveal any documented difficulty on current recommended diet level. No respiratory decline per chart. Pt WBC and Neutrophils Absolute within range. Goal not targeted this session.     Goal 1: The patient will maintain eye contact to speaker positioned left of midline without distractors, min cues. No cues this date for maintaining eye contact. Pt independently locating items on left side tray without cues. Targeted via leisure activity - required hand-over-hand to start progressing x1 min cue required for pt to visually scan to left side. Goal 2: The patient will demonstrate sustained attention to task for 2 minutes with <2 prompts. >2 prompts to remain on task for functional mealtime due to mostly conversational turns. Constant cueing for task continuation throughout session. Pt was able to sustain attention during barrier activity. x1 occurrence of tangent in which pt independently redirected himself to stay on topic. Goal 3: The patient will demonstrate improved inhibition as evidenced by <3 prompts for impulsivity during a task. X1 instance of impulsivity. X2 occurrences of impulsivity during leisure activity. Pt demonstrated improved inhibition during task requiring <3 prompts. Goal 4: The patient will demonstrate improved self monitoring/correcting for basic tasks with <mod cues. Pt identifying own deficits during session. Pt self monitored/corrected during barrier activity x1 without prompts or cues. Goal 5: The patient will tolerate ongoing cognitive linguistic assessment. Goal not targeted this session. Goal not targeted this session. Goal 6: The patient will demonstrate comprehensibility in multiple communication environments without cues. Pt comprehensible throughout session without background noise, optimal speaking environment. Pt demonstrated comprehensibility throughout session. Other areas targeted:     Education:   Education re: rationale for tasks this date. Education re: rationale provided for cognitive tasks and therapeutic swallowing strategies.     Safety Devices: [x] Call light within reach  [x] Chair alarm activated and connected to nurse call light system  [] Bed alarm activated   [] Other:  [x] Call light within reach  [x] Chair alarm activated and connected to nurse call light system  [] Bed alarm activated  [] Other:    Assessment: Oropharyngeal dysphagia per MBSS. Pt demonstrates a moderate cognitive impairment with good potential for improvement due to participation. Plan: Continue as per plan of care. Interventions used this date:  [] Speech/Language Treatment  [] Instruction in HEP  [x] Dysphagia Treatment [x] Cognitive Treatment   [] Other:    Discharge recommendations:  [] Home independently  [x] Home with assistance []  24 hour supervision  [] ECF [] Other  Continued Tx Upon Discharge: ? [x] Yes    [] No    [] TBD based on progress while on ARU     [] Vital Stim indicated     [] Other:   Estimated discharge date: Not yet established    Electronically signed by:  Salvador Galo[address] Language Pathology      The speech-language pathologist was present, directed the patient's care, made skilled judgment and was responsible for assessment and treatment.     Dodie Najera M.A., [facility]  Speech-Language Pathologist

## 2023-02-01 NOTE — PROGRESS NOTES
Occupational Therapy  Facility/Department: [facility] ACUTE REHAB UNIT  Rehabilitation Occupational Therapy Daily Treatment Note    Date: 23  Patient Name: Kalyan Ruiz       Room: 68/4343-21  MRN: [id number]  Account: [de-identified]   : 1955  (68 y.o.) Gender: male                    Past Medical History:  has a past medical history of Anxiety, Chest pain, Depression, Encounter for imaging to screen for metal prior to MRI, Hyperlipidemia, Hypertension, and Wears glasses. Past Surgical History:   has a past surgical history that includes [place] tooth extraction; Colonoscopy (2009); Cardiac catheterization (2008); Finger trigger release (3-); and Coronary angioplasty with stent. Restrictions  Restrictions/Precautions: Fall Risk  Other position/activity restrictions: up as tolerated, up in chair    Subjective  Subjective: Pt w/ PCA upon OT arrival. Brother present. Pt agreeable for session. Restrictions/Precautions: Fall Risk             Objective     Cognition  Arousal/Alertness: Appropriate responses to stimuli  Following Commands: Follows one step commands with repetition; Follows one step commands with increased time  Attention Span: Attends with cues to redirect; Difficulty attending to directions; Difficulty dividing attention  Safety Judgement: Decreased awareness of need for assistance;Decreased awareness of need for safety  Problem Solving: Assistance required to generate solutions;Assistance required to identify errors made;Assistance required to implement solutions  Insights: Decreased awareness of deficits  Initiation: Requires cues for some  Sequencing: Requires cues for some  Cognition Comment: Pt tangential at times, requiring cues to redirect. Pt w/ high distractability noted in gym. Pt w/ poor awareness of L side.   Orientation  Overall Orientation Status: Within Functional Limits   Perception  Unilateral Attention: Cues to attend left visual field     ADL  Putting On/Taking Off Footwear  Assistance Level: Maximum assistance  Skilled Clinical Factors: Assist to don B gym shoes. Functional Mobility  Device: Wheelchair  Assistance Level: Moderate assistance;Minimal assistance  Skilled Clinical Factors: Pt self propelled w/ RUE and RLE from gym to room w/ min-mod A w/ cues for environmental awarness to L side. Transfers  Surface: Wheelchair; To mat;From mat; To chair with arms  Additional Factors: Verbal cues; Hand placement cues; Increased time to complete  Bed To/From Chair  Technique: Sit pivot  Assistance Level: Requires x 2 assistance  Sit Pivot  Assistance Level: Requires x 2 assistance  Skilled Clinical Factors: Pt completed sit pivot t/f recliner --w/c on R side w/ mod Ax2, --> mat table toward R side w/ mod Ax2, to w/c from mat to L side w/ mod Ax2 --> recliner toward R side w/ mod Ax2. Cues for hand placement, body mechanics, and initiation/sequencing of task. Neuromuscular Education  Neuromuscular education: No  Weight Bearing  Weight Bearing Technique: Yes  LUE Weight Bearing: Forearm seated; Extended arm seated  Response To Weight Bearing Technique: Pt seated edge of mat, WB thru LUE to complete sitting balance task (see exercises section). Pt WB thru L elbow x8 w/ assist of 2 for mgmt of LUE as well as stabilizing RLE as pt was yelling out in pain in L hip during task causing overcompensation of RLE. Mirror used for visual feedback. Pt tolerated extended arm WB well but poor response to elbow WB d/t L hip pain. OT Exercises  Static Sitting Balance Exercises: Pt sat edge of mat requiring CGA - max A w/ use of mirror to assist w/ visual cues for midline orientation. Pt required cues for body mechanics and leaning to R side d/t significant L lateral lean. Pt w/ improved balance when RUE was placed on lap vs mat table d/t pushing. Pt eventually transitioned to a consistent min A sitting edge of mat.   Dynamic Sitting Balance Exercises: Pt required to reach for printed shapes and words in a variety of planes on L side and place on mirror on either R or L side using RUE w/ focus on adjusting midline positon after each reach, visual scanning, and crossing midline. Pt required CGA-max A during task. Mirror used for visual feedback. Pt eventually transitioned to a consistent min A during task. Pt becoming frustrated at times requiring cues for encouragement. Pt w/ posterior lean towards end of task demo'ing fatigue. Assessment  Assessment  Assessment: Pt tolerated session well. Improvement noted in sit pivot transfers to both R and L sides w/ a consistent mod A. Pt also w/ improved sitting balance this date transitioning to a consistent min A for midline orientation w/ cues required for L lateral lean and posterior/anterior lean. Pt continues to demo high distractability at times requiring cues for attention. Pt limited by L hip pain w/ WB activity. Pt would continue to benefit from skills OT services in order to maximize fxl independence w/ ADLs and transfers. Cont OT POC. Activity Tolerance: Patient tolerated treatment well;Patient limited by pain  Discharge Recommendations: 24 hour supervision or assist;Continue to assess pending progress;Home with Home health OT  OT Equipment Recommendations  ADL Assistive Devices: Shower Chair with back; Toileting - Raised Toilet Seat with arms;Grab Bars - toilet;Grab Bars - shower  Safety Devices  Safety Devices in place: Yes  Type of devices: Call light within reach; Chair alarm in place; Left in chair    Patient Education  Education  Education Given To: Patient; Family  Education Provided: Role of Therapy;Plan of Care;Family Education;Mobility Training;Transfer Training; Safety; Fall Prevention Strategies  Education Method: Demonstration;Verbal  Barriers to Learning: Cognition  Education Outcome: Verbalized understanding;Demonstrated understanding    Plan  Occupational Therapy Plan  Times Per Week: 5 days per week, 60 mins each  Current Treatment Recommendations: Strengthening;ROM;Balance training;Functional mobility training; Endurance training;Self-Care / ADL; Patient/Caregiver education & training;Neuromuscular re-education;Cognitive/Perceptual training    Goals  Patient Goals   Patient goals : go home with my wife  Short Term Goals  Time Frame for Short Term Goals: By 2 weeks  Short Term Goal 1: Pt will complete LB dressing with mod A  Short Term Goal 2: Pt will complete toileting with mod A  Short Term Goal 3: Pt will complete toilet and functional transfers with mod A  Short Term Goal 4: Pt will complete UB dressing with min A  Short Term Goal 5: Pt will complete UE HEP x 10 reps for improved R UE coordination for ADLs  Long Term Goals  Time Frame for Long Term Goals : By 4 weeks  Long Term Goal 1: Pt will complete LB dressing with CGA  Long Term Goal 2: Pt will complete toileting with CGA  Long Term Goal 3: Pt will complete toilet and functional transfers with mod I  Long Term Goal 4: Pt will complete UB dressing with mod I  Long Term Goal 5: Pt will complete simple IADL with LRAD at mod I                   Therapy Time   Individual Concurrent Group Co-treatment   Time In       1100   Time Out       1200   Minutes       60      Timed Code Treatment Minutes: 60 min     Total Treatment Minutes: 60 min        Scarlet Alas, OT

## 2023-02-01 NOTE — PATIENT CARE CONFERENCE
Inpatient Rehabilitation  Weekly Team Conference Note  The [address] [place] [address],Nob [address] [place] [address]  [phone]  Patient Name: Tosha         MRN: [id number]    : 1955  (74 y.o.)  Gender: male   Referring Practitioner: Zeb Polk DO  Diagnosis: CVA  The team conference for this patient was held on 2023 at 10:30am by:  Zeb Vargas.  DO Jam    Current/Goal QM SCORES  QM Current/Goal Score   Eating CARE Score: 5 / Discharge Goal: Independent   Oral Hygiene CARE Score: 3 / Discharge Goal: Independent   Shower/Bathing CARE Score: 1 / Discharge Goal: Supervision or touching assistance   UB Dressing CARE Score: 2 / Discharge Goal: Independent   LB Dressing CARE Score: 1 / Discharge Goal: Supervision or touching assistance   Putting on/off Footwear CARE Score: 2 / Discharge Goal: Independent   Toileting Hygiene CARE Score: 1 / Discharge Goal: Supervision or touching assistance   Bladder Continence Bladder Continence: Always incontinent    Bowel Continence      Toilet Transfers CARE Score: 1 / Discharge Goal: Supervision or touching assistance   Shower/Bathe Self  CARE Score: 1 / Discharge Goal: Supervision or touching assistance   Rolling Left and Right CARE Score: 2 / Discharge Goal: Supervision or touching assistance   Sit to Lying CARE Score: 1 / Discharge Goal: Supervision or touching assistance   Lying to Sitting on Bedside CARE Score: 1 / Discharge Goal: Supervision or touching assistance   Sit to Stand CARE Score: 1 / Discharge Goal: Supervision or touching assistance   Chair/Bed to Chair Transfer CARE Score: 1 / Discharge Goal: Supervision or touching assistance   Car Transfers CARE Score: 88 / Discharge Goal: Supervision or touching assistance   Walk 10 Feet CARE Score: 88 / Discharge Goal: Partial/moderate assistance   Walk 50 Feet with Two Turns CARE Score: 88 /     Walk 150 Feet CARE Score: 88 / Discharge Goal: Partial/moderate assistance   Walk 10 Feet on Uneven Surfaces CARE Score: 88 / Discharge Goal: Not Attempted   1 Step (Curb) CARE Score: 88 / Discharge Goal: Not Attempted   4 Steps CARE Score: 88 / Discharge Goal: Not Attempted   12 Steps CARE Score: 88 / Discharge Goal: Not Attempted   Picking up Object from Floor CARE Score: 88 / Discharge Goal: Partial/moderate assistance   Wheel 50 Feet with 2 Turns CARE Score: 3 / Discharge Goal: Supervision or touching assistance   Type         [] Manual        [] Motorized        [] N/A   Wheel 150 Feet CARE Score: 3 / Discharge Goal: Supervision or touching assistance   Type         [] Manual        [] Motorized        [] N/A     NURSING:  A&Ox: Level of Consciousness: Alert (0)  Orientation Level: Oriented X4  Harris Fall Risk Score: Harris Total Score: 75  Admission BIMS: 11   [] Unable to complete BIMS on Admission, Reasoning:   Wounds/Incisions/Ulcers: N/A  Medication Review: Reviewed with patient   Pain: L hip pain controlled with PRN Tylenol and Tramadol   Consultations: N/A  Imaging:    No orders to display     Active Comorbid Conditions: N/A  Systems Review:   Renal: WNL, Dialysis:  Type: , Frequency:   Neurological: CVA, impaired swallowing   Musculoskeletal: Flaccid to LUE and LLE  Respiratory: WNL  Cardiac/Circulatory/Peripheral Vascular: WNL  Abnormal/Relevant Test Results:   Abnormal/Relevant Lab Values:   CBC:   Recent Labs     02/01/23  0631   WBC 7.6   HGB 13.7   HCT 40.4*   MCV 94.5        BMP:   Recent Labs     02/01/23  0631      K 4.4   CL 98*   CO2 30   BUN 16   CREATININE 0.6*     PT/INR: No results for input(s): PROTIME, INR in the last 72 hours. APTT: No results for input(s): APTT in the last 72 hours.   Liver Profile:  Lab Results   Component Value Date/Time    AST 24 01/25/2023 07:39 PM    ALT 20 01/25/2023 07:39 PM    BILITOT <0.2 01/25/2023 07:39 PM    ALKPHOS 47 01/25/2023 07:39 PM     Lab Results   Component Value Date/Time    CHOL 140 01/27/2023 05:52 AM    HDL 50 01/27/2023 05:52 AM    TRIG 97 01/27/2023 05:52 AM     Recent Labs     02/01/23  0631   WBC 7.6   HGB 13.7   HCT 40.4*      MCV 94.5     Recent Labs     02/01/23  0631      K 4.4   CL 98*   CO2 30   BUN 16   CREATININE 0.6*     No results for input(s): AST, ALT, ALB, BILIDIR, BILITOT, ALKPHOS in the last 72 hours. No results for input(s): MG in the last 72 hours. Recent Labs     02/01/23  0631   WBC 7.6   HGB 13.7   HCT 40.4*        Recent Labs     02/01/23  0631      K 4.4   CL 98*   CO2 30   BUN 16   CREATININE 0.6*   CALCIUM 9.2     No results for input(s): AST, ALT, BILIDIR, BILITOT, ALKPHOS in the last 72 hours. No results for input(s): INR in the last 72 hours. No results for input(s): [place] in the last 72 hours. PHYSICAL THERAPY:  Bed Mobility:      Transfers:  Sit to Stand: Maximum Assistance, 2 Person Assistance  Stand to Sit: 2 Person Assistance, Maximum Assistance    Ambulation  More Ambulation?: No         OCCUPATIONAL THERAPY:  ADL  Feeding: Setup  Feeding Skilled Clinical Factors: assist to open packages  Grooming: Minimal assistance  Grooming Skilled Clinical Factors: assist to place toothpaste onto toothbrush, assist to manage supplies  UE Bathing: Moderate assistance  UE Bathing Skilled Clinical Factors: seated in rolling shower chair, max verbal cueing to wash L UE, assist to lift UE to wash underarm  LE Bathing: Moderate assistance  LE Bathing Skilled Clinical Factors: assist to wash lower LEs. Pt able to wash [place] area and upper LEs with cueing. UE Dressing: Maximum assistance  UE Dressing Skilled Clinical Factors: assist to place L UE into shirt, overhead, and bringing R UE into shirt, pull down over trunk  LE Dressing: Dependent/Total  LE Dressing Skilled Clinical Factors: assist from 2 helpers to pull up over hips.  Pt able to assist threading R LE into pants, assist for L LE  Toileting: Dependent/Total  Toileting Skilled Clinical Factors: via rolling shower chair placed over toilet  Additional Comments: Bathing and dressing completed as listed above. Pt required extensive cueing to attend to L UE and for sequencing. Pt seated on rolling shower chair 2/2 extensive posterior lean and pushing to pts L while seaed EOB. Pt impulsive during transfers. Required cueing to attend to tasks, noted pt to be easily distracted during transfers and ADLs. Seated in bedside chair for oral care. Toilet Transfers: Toilet Transfers  Toilet - Technique:  (via rolling shower chair)  Equipment Used:  (rolling shower chair)  Toilet Transfer: Dependent/Total  Toilet Transfers Comments: pt did not have urge to void, however rolled pt in bathroom via rolling shower chair. Max A x 2 to pivot from bed to rolling shower chair. Tub/ShowerTransfers:          SPEECH THERAPY:  Oropharyngeal dysphagia per MBSS. Pt demonstrates a moderate cognitive impairment with good potential for improvement due to participation. Assessment: Speech Therapy Diagnosis  Cognitive Diagnosis: Moderate cognitive impairment  Speech Diagnosis: Mild dysarthria    NUTRITION:  Current Weight: 181 lb 10.5 oz (82.4 kg) BMI (Calculated): 24  Current diet order: Current diet and supplement order: ADULT DIET; Dysphagia - Minced and Moist  ADULT ORAL NUTRITION SUPPLEMENT; Breakfast, Dinner; Standard High Calorie/High Protein Oral Supplement         Feeding: Total assist, Needs assist, Needs set up  Room Service: Selective   NSG Recorded PO: PO Meals Eaten (%): 51 - 75% PO Supplement (%): 76 - 100%  Malnutrition Status Malnutrition Status: At risk for malnutrition (Comment)    CASE MANAGEMENT:  Psychosocial/Behavioral Issues: none  Assessment:  Pt is from home with spouse; ind PTA.     Patient/Family Education provided by team:  Role of PT/OT, Nursing, ADL retraining, transfer training, stroke recovery and prevention, safety education, dysphagia recommendations, left visual attention compensation techniques    Patient Goals for Rehab stay:  1. To go home    Rehab Team Goals for patient for the Upcoming Week:  1. Increase independence with ADLs and transfers  2. Increase sitting balance  3. Improved attention    Barriers to Progress/Attainment of Goals & Efforts/Interventions to remove Barriers:  1. Decreased sitting balance and midline orientation - increase core strength  2. Decresed LLE/LUE ROM - WB and neuro re-education  3. Cognitive impairment - ongoing cognitive therapy     Discharge Plan:  Estimated Length of Stay: 21 days  Destination: home health  Services at Discharge: [address] [place] [address], Occupational Therapy, Speech Therapy, and Nursing 3x week  Equipment at Discharge: Will continue to assess  Community Resources:   Factors facilitating achievement of predicted outcomes: Family support, Caregiver support, Motivated, Cooperative, Pleasant, and Sense of humor  Barriers to the achievement of predicted outcomes: Cognitive deficit, Impulsivity, Limited safety awareness, Limited insight into deficits, Decreased endurance, Decreased sensation, Decreased proprioception, Upper extremity weakness, and Lower extremity weakness    Special Needs in the Upcoming Week:   [x] Family/Caregiver Education  [] Home visit  []Therapeutic Pass [] Consults:_______   [] Family/Caregiver Training  [] Stroke Risk Factor Education     [] Other;_______     TEAM SUMMARY: Will continue with current poc & goals until anticipated d/c date of 2/19/2023.     MD:   Stroke Risk Factors:   [] N/A for this patient [x] HTN  []  Diabetes  [x] Hyperlipidemia  []Obesity BMI >25  [] Atrial Fibrillation [] Smoker (current)  [] Smoker (quit in last 12 months)  [] Sleep Apnea [] Other:     Risk for Readmission: Moderate (10-19)    Justification for Continued Stay:   Criteria for continued IRF stay:  Based on my medical assessment of the patient and review of information from the interdisciplinary team, as part of this weekly team conference, the patient continues to meet the following criteria for IRF level of care:  [x] The patient requires 24-hour rehabilitation nursing care   [x] The patient requires an intensive rehabilitation therapy program  [x] The patient requires active and ongoing intervention of multiple therapy disciplines  [x] The patient requires continued physician supervision by a rehabilitation physician  [x] The patient requires an intensive and coordinated interdisciplinary team approach to the delivery of rehabilitative care    Medical Necessity-continued close physician medical management is required for:   [] Cardiac/Circulatory dysfunction  [] Respiratory/Pulmonary dysfunction  [] Integumentary complications  [] Peripheral Vascular dysfunction  [] Musculoskeletal dysfunction  [x] Neurological dysfunction d/t:  [x] CVA  [] SCI  [] TBI  [] Other: __________  [] Renal dysfunction  [] Hematologic dysfunction    [] Endocrine disorders  [] GI disorders     [] Genito-Urinary dysfunction    Assessment/Plan:  [x] The patient is making good progression towards their LTG's, is actively participating in, and has a reasonable expectation to continue to benefit from the intensive rehabilitation program.  [] The estimated discharge date has been changed from initial team conference due to:   [] The estimated discharge destination has been changed from initial team conference due to:     Rehab Team Members in attendance for Team Conference:  ARU Supervisor/PPS Coordinator:  Lauren Oliveros, PT, DPT    Therapy Manager/ARU :  Tasha Belcher, PT, DPT    Nursing Manager:  Tali Hyatt, JUANITA    Social Work:  Iqra Delaney, RN    Nursing:  Davis Zimmer, JUANITA Pastor, JUANITA Castañeda, JUANITA    Therapy:  Anuj Lovell, PT  Len Young, PT, DPT  Jared Loo PT, DPT   Ngoc Fabian, PT, DPT    William Day, OTR/L  Ja Hathaway, OTR/L    [facility], MA-CCC, SLP    Nutrition:  Tatyana Louise RD:    [place]: [phone]  Office: [phone]      I approve the established interdisciplinary plan of care as documented within the medical record of Connie Wills.     MD Signature LAVELLE Wolff.O. M.P.H  PM&R  2/2/2023  11:32 AM

## 2023-02-01 NOTE — PROGRESS NOTES
Department of Physical Medicine & Rehabilitation  Progress Note    Patient Identification:  Collette Bari  [id number]  : 1955  Admit date: 2023    Chief Complaint: Acute CVA (cerebrovascular accident) [facility])    Subjective:   Doing well in therapy. No new complaints overnight. He would like something to help him sleep at night. Still left side hemiplegic. High expectations today. ROS: No f/c, n/v, cp     Objective:  Patient Vitals for the past 24 hrs:   BP Temp Temp src Pulse Resp SpO2   23 0930 126/80 97.8 °F (36.6 °C) Oral 91 16 98 %   23 122/77 98.1 °F (36.7 °C) Oral 78 16 97 %       Const: Alert. No distress, pleasant. HEENT: Normocephalic, atraumatic. Normal sclera/conjunctiva. MMM. CV: Regular rate and rhythm. Resp: No respiratory distress. Lungs CTAB. Abd: Soft, nontender, nondistended, NABS+   Ext: No edema. Neuro: Alert, oriented, appropriately interactive. Psych: Cooperative, appropriate mood and affect    Laboratory data: Available via EMR.    Last 24 hour lab  Recent Results (from the past 24 hour(s))   Basic Metabolic Panel w/ Reflex to MG    Collection Time: 23  6:31 AM   Result Value Ref Range    Sodium 137 136 - 145 mmol/L    Potassium reflex Magnesium 4.4 3.5 - 5.1 mmol/L    Chloride 98 (L) 99 - 110 mmol/L    CO2 30 21 - 32 mmol/L    Anion Gap 9 3 - 16    Glucose 116 (H) 70 - 99 mg/dL    BUN 16 7 - 20 mg/dL    Creatinine 0.6 (L) 0.8 - 1.3 mg/dL    Est, Glom Filt Rate >60 >60    Calcium 9.2 8.3 - 10.6 mg/dL   CBC auto differential    Collection Time: 23  6:31 AM   Result Value Ref Range    WBC 7.6 4.0 - 11.0 K/uL    RBC 4.28 4.20 - 5.90 M/uL    Hemoglobin 13.7 13.5 - 17.5 g/dL    Hematocrit 40.4 (L) 40.5 - 52.5 %    MCV 94.5 80.0 - 100.0 fL    MCH 32.0 26.0 - 34.0 pg    MCHC 33.9 31.0 - 36.0 g/dL    RDW 13.1 12.4 - 15.4 %    Platelets 383 924 - 692 K/uL    MPV 7.4 5.0 - 10.5 fL    Neutrophils % 69.5 %    Lymphocytes % 19.7 %    Monocytes % 7.7 %    Eosinophils % 2.2 %    Basophils % 0.9 %    Neutrophils Absolute 5.3 1.7 - 7.7 K/uL    Lymphocytes Absolute 1.5 1.0 - 5.1 K/uL    Monocytes Absolute 0.6 0.0 - 1.3 K/uL    Eosinophils Absolute 0.2 0.0 - 0.6 K/uL    Basophils Absolute 0.1 0.0 - 0.2 K/uL       Therapy progress:  PT  Position Activity Restriction  Other position/activity restrictions: up as tolerated, up in chair  Objective     Sit to Stand: Maximum Assistance, 2 Person Assistance  Stand to Sit: 2 Person Assistance, Maximum Assistance     OT  PT Equipment Recommendations  Other: continue to assess  Toilet - Technique:  (via rolling shower chair)  Equipment Used:  (rolling shower chair)  Toilet Transfers Comments: pt did not have urge to void, however rolled pt in bathroom via rolling shower chair. Max A x 2 to pivot from bed to rolling shower chair. Assessment        SLP          Body mass index is 23.97 kg/m². 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 Patient is on aspirin at home        HTN  Patient is on lisinopril 5mg daily at home  - monitor      HLD:  Patient is on Crestor 20mg and ezetimibe 10mg at home daily  -Continue home meds     Anxiety  Per chart, patient is on Valium 2mg at home. Possible could be contributor to his chest pain yesterday, notes he felt anxious. Denies taking Valium at home, however it is listed as a home med.  Ativan 0.5mg once overnight   - prozac      Sleep disturbance  - Trazodone PRN  - benadryl PRN      Rehab Progress: Improving  Anticipated Dispo: home  Services/DME: [facility]  ELOS: LAVELLE Arango.CLIFF. M.P.H  PM&R  2/1/2023  10:59 AM

## 2023-02-01 NOTE — PROGRESS NOTES
Physical Therapy  Facility/Department: [facility] ACUTE REHAB UNIT  Rehabilitation Physical Therapy Treatment Note    NAME: Aida Norwood  : 1955 (74 y.o.)  MRN: [id number]  CODE STATUS: Full Code    Date of Service: 23       Restrictions:  Restrictions/Precautions: Fall Risk  Position Activity Restriction  Other position/activity restrictions: up as tolerated, up in chair     SUBJECTIVE  Subjective  Subjective: pt up in chair and agreeable to PT  Pain: reporting pain with movement in L hip            OBJECTIVE  Cognition  Arousal/Alertness: Appropriate responses to stimuli  Following Commands: Follows one step commands with repetition; Follows one step commands with increased time  Attention Span: Attends with cues to redirect; Difficulty attending to directions; Difficulty dividing attention  Safety Judgement: Decreased awareness of need for assistance;Decreased awareness of need for safety  Problem Solving: Assistance required to generate solutions;Assistance required to identify errors made;Assistance required to implement solutions  Insights: Decreased awareness of deficits  Initiation: Requires cues for some  Sequencing: Requires cues for some  Cognition Comment: Pt tangential at times, requiring cues to redirect. Pt w/ high distractability noted in gym. Pt w/ poor awareness of L side. Orientation  Overall Orientation Status: Within Functional Limits    Functional Mobility  Balance  Sitting Balance: Minimal assistance (CGA- max A on mat, mostly CGA- min A with max VCs for posture and to correct L lateral lean)  Standing Balance:  (not attempted this date)  Transfers  Surface: To chair with arms;From chair with arms; Wheelchair  Additional Factors: Verbal cues; Hand placement cues; Increased time to complete  Bed To/From Chair  Technique: Sit pivot  Assistance Level: Requires x 2 assistance; Moderate assistance  Skilled Clinical Factors: from recliner> wc, wc> mat, mat>wc, wc>recliner      Environmental Mobility  Wheelchair  Surface: Level surface  Device: Standard wheelchair  Additional Factors: Verbal cues; Increased time to complete (VCs for L attention)  Assistance Required to Manage Parts: All wheelchair parts  Assistance Level for Propulsion: Minimal assistance; Moderate assistance  Propulsion Method: Right upper extremity;Right lower extremity  Propulsion Quality: Slow velocity; Short strokes; Veers left  Propulsion Distance: 175'             PT Exercises  Static Sitting Balance Exercises: Pt sat edge of mat requiring CGA - max A w/ use of mirror to assist w/ visual cues for midline orientation. Pt required cues for body mechanics and leaning to R side d/t significant L lateral lean. Pt w/ improved balance when RUE was placed on lap vs mat table d/t pushing. Pt eventually transitioned to a consistent min A sitting edge of mat. Dynamic Sitting Balance Exercises: Pt required to reach for printed shapes and words in a variety of planes on L side and place on mirror on either R or L side using RUE w/ focus on adjusting midline positon after each reach, visual scanning, and crossing midline. Pt required CGA-max A during task. Mirror used for visual feedback. Pt eventually transitioned to a consistent min A during task. Pt becoming frustrated at times requiring cues for encouragement. Pt w/ posterior lean towards end of task demo'ing fatigue. ASSESSMENT/PROGRESS TOWARDS GOALS       Assessment  Assessment: pt tolerated session well, very hardworking and eager to participate. pt continues to require assist of 2 for all sit pivot transfers and requires L knee blocked at all times. pt demonstrating heavy L lateral and slight posterior lean in sitting requiring max VCs for correction and awareness.  pt did improve sitting balance on mat with multiple periods of CGA and mostly min A with max cuing however continues to range from CGA- max A. pt well below baseline and will benefit from continued skilled PT to maximize independence. Activity Tolerance: Patient tolerated treatment well;Patient limited by pain  Discharge Recommendations: 24 hour supervision or assist;Continue to assess pending progress;Home with Home health PT  PT Equipment Recommendations  Equipment Needed:  (defer)  Other: continue to assess    Goals  Patient Goals   Patient Goals : to go home  Short Term Goals  Time Frame for Short Term Goals: 2 weeks (all ongoing)  Short Term Goal 1: Pt will complete bed mobility with moderate assist  Short Term Goal 2: Pt will complete sit<>stand transfer wtih moderate assist  Short Term Goal 3: Pt will propel self in manual w/c for 150' with minimal assist.  Short Term Goal 4: Pt will ambulate 10' with moderate assist.  Long Term Goals  Time Frame for Long Term Goals : 4 weeks (all ongoing)  Long Term Goal 1: Pt will complete bed mobility with CGA  Long Term Goal 2: Pt will complete sit<>Stand transfers with CGA  Long Term Goal 3: Pt will propel self in manual w/c for 250' with suprvision  Long Term Goal 4: Pt will ambulate 48' with CGA & LRAD    PLAN OF CARE/SAFETY  Physcial Therapy Plan  Days Per Week: 5 Days  Hours Per Day: 1 hour  Therapy Duration: 4 Weeks  Current Treatment Recommendations: Strengthening;Balance training;ROM; Functional mobility training;Neuromuscular re-education; Safety education & training;Patient/Caregiver education & training;Transfer training; Endurance training;Home exercise program;Gait training;Equipment evaluation, education, & procurement;Positioning; Therapeutic activities  Safety Devices  Type of Devices: Chair alarm in place;Call light within reach; Left in chair;Nurse notified;Gait belt;Patient at risk for falls  Restraints  Restraints Initially in Place: No    EDUCATION  Education  Education Given To: Patient  Education Provided: Role of Therapy;Plan of Care; Mobility Training;Transfer Training  Education Method: Verbal  Barriers to Learning: Cognition  Education Outcome: Verbalized understanding;Continued education needed        Therapy Time   Individual Concurrent Group Co-treatment   Time In       1100   Time Out       1200   Minutes       60     Timed Code Treatment Minutes: 60 Minutes  Variance: 0    Amy Chand, PT, 02/01/23 at 3:19 PM

## 2023-02-01 NOTE — PLAN OF CARE
Problem: Skin/Tissue Integrity  Goal: Absence of new skin breakdown  Description: 1. Monitor for areas of redness and/or skin breakdown  Note: Check and change Q 2 and PRN for incontinence. Attempted to toilet before need x3 unsuccessful.      Problem: Pain  Goal: Verbalizes/displays adequate comfort level or baseline comfort level  2/1/2023 0233 by Geetha Jackson RN  Verbalizes/displays adequate comfort level or baseline comfort level: Encourage patient to monitor pain and request assistance

## 2023-02-02 ENCOUNTER — APPOINTMENT (OUTPATIENT)
Dept: GENERAL RADIOLOGY | Age: 68
DRG: 057 | End: 2023-02-02
Attending: PHYSICAL MEDICINE & REHABILITATION
Payer: MEDICARE

## 2023-02-02 PROCEDURE — 97112 NEUROMUSCULAR REEDUCATION: CPT

## 2023-02-02 PROCEDURE — 97129 THER IVNTJ 1ST 15 MIN: CPT

## 2023-02-02 PROCEDURE — 6370000000 HC RX 637 (ALT 250 FOR IP): Performed by: PHYSICAL MEDICINE & REHABILITATION

## 2023-02-02 PROCEDURE — 99233 SBSQ HOSP IP/OBS HIGH 50: CPT | Performed by: PHYSICAL MEDICINE & REHABILITATION

## 2023-02-02 PROCEDURE — 73502 X-RAY EXAM HIP UNI 2-3 VIEWS: CPT

## 2023-02-02 PROCEDURE — 6360000002 HC RX W HCPCS: Performed by: PHYSICAL MEDICINE & REHABILITATION

## 2023-02-02 PROCEDURE — 1280000000 HC REHAB R&B

## 2023-02-02 PROCEDURE — 92526 ORAL FUNCTION THERAPY: CPT

## 2023-02-02 PROCEDURE — 97530 THERAPEUTIC ACTIVITIES: CPT

## 2023-02-02 PROCEDURE — 97535 SELF CARE MNGMENT TRAINING: CPT

## 2023-02-02 PROCEDURE — 97130 THER IVNTJ EA ADDL 15 MIN: CPT

## 2023-02-02 PROCEDURE — 97542 WHEELCHAIR MNGMENT TRAINING: CPT

## 2023-02-02 RX ADMIN — NYSTATIN 500000 UNITS: 100000 SUSPENSION ORAL at 18:28

## 2023-02-02 RX ADMIN — NYSTATIN 500000 UNITS: 100000 SUSPENSION ORAL at 20:25

## 2023-02-02 RX ADMIN — ROSUVASTATIN CALCIUM 40 MG: 20 TABLET, FILM COATED ORAL at 09:30

## 2023-02-02 RX ADMIN — BISACODYL 5 MG: 5 TABLET, COATED ORAL at 09:29

## 2023-02-02 RX ADMIN — POLYETHYLENE GLYCOL 3350 17 G: 17 POWDER, FOR SOLUTION ORAL at 09:45

## 2023-02-02 RX ADMIN — ENOXAPARIN SODIUM 40 MG: 100 INJECTION SUBCUTANEOUS at 09:28

## 2023-02-02 RX ADMIN — FLUOXETINE 10 MG: 10 CAPSULE ORAL at 09:30

## 2023-02-02 RX ADMIN — NYSTATIN 500000 UNITS: 100000 SUSPENSION ORAL at 09:29

## 2023-02-02 RX ADMIN — PANTOPRAZOLE SODIUM 40 MG: 40 TABLET, DELAYED RELEASE ORAL at 06:52

## 2023-02-02 RX ADMIN — ASPIRIN 81 MG 81 MG: 81 TABLET ORAL at 09:29

## 2023-02-02 RX ADMIN — Medication 5 MG: at 20:26

## 2023-02-02 RX ADMIN — TRAMADOL HYDROCHLORIDE 25 MG: 50 TABLET, COATED ORAL at 20:25

## 2023-02-02 RX ADMIN — EZETIMIBE 10 MG: 10 TABLET ORAL at 09:29

## 2023-02-02 RX ADMIN — TRAZODONE HYDROCHLORIDE 50 MG: 50 TABLET ORAL at 18:48

## 2023-02-02 ASSESSMENT — PAIN DESCRIPTION - DESCRIPTORS: DESCRIPTORS: ACHING;DULL

## 2023-02-02 ASSESSMENT — PAIN DESCRIPTION - ORIENTATION: ORIENTATION: LEFT

## 2023-02-02 ASSESSMENT — PAIN SCALES - GENERAL
PAINLEVEL_OUTOF10: 6
PAINLEVEL_OUTOF10: 0

## 2023-02-02 ASSESSMENT — PAIN DESCRIPTION - LOCATION: LOCATION: HIP

## 2023-02-02 NOTE — CARE COORDINATION
CM met at bedside with pt and wife. Pt about to get bath so CM spoke with pt's wife Olamide Rodriguez in [facility]. DC date written on board 2/19/22. Plan is for pt to return home with home care. They do not have any equipment at home. Wife may want home eval depending on therapy recs closer to dc.

## 2023-02-02 NOTE — PROGRESS NOTES
Department of Physical Medicine & Rehabilitation  Progress Note    Patient Identification:  Kalyan Ruiz  [id number]  : 1955  Admit date: 2023    Chief Complaint: Acute CVA (cerebrovascular accident) [facility])    Subjective:   Doing well. No new complaints overnight. He is resting in his chair this morning. Feeling more tingling in his arm and leg. Showing improvement daily. Team conference today to discuss discharge. ROS: No f/c, n/v, cp     Objective:  Patient Vitals for the past 24 hrs:   BP Temp Temp src Pulse Resp SpO2   23 0917 120/74 97.8 °F (36.6 °C) Oral 71 16 97 %   23 127/74 99.2 °F (37.3 °C) Oral 83 16 98 %   23 1908 -- -- -- -- 16 --       Const: Alert. No distress, pleasant. HEENT: Normocephalic, atraumatic. Normal sclera/conjunctiva. MMM. CV: Regular rate and rhythm. Resp: No respiratory distress. Lungs CTAB. Abd: Soft, nontender, nondistended, NABS+   Ext: No edema. Neuro: Alert, oriented, appropriately interactive. Psych: Cooperative, appropriate mood and affect    Laboratory data: Available via EMR. Last 24 hour lab  No results found for this or any previous visit (from the past 24 hour(s)). Therapy progress:  PT  Position Activity Restriction  Other position/activity restrictions: up as tolerated, up in chair  Objective     Sit to Stand: Maximum Assistance, 2 Person Assistance  Stand to Sit: 2 Person Assistance, Maximum Assistance     OT  PT Equipment Recommendations  Equipment Needed:  (defer)  Other: continue to assess  Toilet - Technique:  (via rolling shower chair)  Equipment Used:  (rolling shower chair)  Toilet Transfers Comments: pt did not have urge to void, however rolled pt in bathroom via rolling shower chair. Max A x 2 to pivot from bed to rolling shower chair. Assessment        SLP          Body mass index is 23.97 kg/m².     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 Patient is on aspirin at home        HTN  Patient is on lisinopril 5mg daily at home  - monitor      HLD:  Patient is on Crestor 20mg and ezetimibe 10mg at home daily  -Continue home meds     Anxiety  Per chart, patient is on Valium 2mg at home. Possible could be contributor to his chest pain yesterday, notes he felt anxious. Denies taking Valium at home, however it is listed as a home med. Ativan 0.5mg once overnight   - prozac      Sleep disturbance  - Trazodone PRN  - benadryl PRN      Rehab Progress: Improving  Anticipated Dispo: home  Services/DME: [facility] [place]  ELOS: 2/19      Team conference was held today on the patient and discussed directly with the patient utilizing their entire treatment team. Please see separate team note for details. Total treatment time for today's care >50 min. >50% of time spent counseling with patient and coordinating care.          Cecily Roland D.O. M.P.H  PM&R  2/2/2023  11:32 AM

## 2023-02-02 NOTE — PROGRESS NOTES
Occupational Therapy  Facility/Department: [facility] ACUTE REHAB UNIT  Rehabilitation Occupational Therapy Daily Treatment Note    Date: 23  Patient Name: Rubens Snow       Room: 9108/9870-18  MRN: [id number]  Account: [de-identified]   : 1955  (68 y.o.) Gender: male                    Past Medical History:  has a past medical history of Anxiety, Chest pain, Depression, Encounter for imaging to screen for metal prior to MRI, Hyperlipidemia, Hypertension, and Wears glasses. Past Surgical History:   has a past surgical history that includes [place] tooth extraction; Colonoscopy (2009); Cardiac catheterization (2008); Finger trigger release (3-); and Coronary angioplasty with stent. Restrictions  Restrictions/Precautions: Fall Risk  Other position/activity restrictions: up as tolerated, up in chair    Subjective  Subjective: Pt asleep in recliner upon OT arrival. Pt's wife present. Co-tx indicated to maximize therapeutic potential.  Restrictions/Precautions: Fall Risk             Objective     Cognition  Arousal/Alertness: Appropriate responses to stimuli  Following Commands: Follows one step commands with repetition; Follows one step commands with increased time  Attention Span: Attends with cues to redirect; Difficulty attending to directions; Difficulty dividing attention  Safety Judgement: Decreased awareness of need for assistance;Decreased awareness of need for safety  Problem Solving: Assistance required to generate solutions;Assistance required to identify errors made;Assistance required to implement solutions  Insights: Decreased awareness of deficits  Initiation: Requires cues for some  Sequencing: Requires cues for some  Cognition Comment: Pt tangential at times, requiring cues to redirect. Pt w/ poor awareness of L side.   Orientation  Overall Orientation Status: Within Functional Limits   Perception  Unilateral Attention: Cues to attend left visual field     ADL  Upper Extremity Dressing  Assistance Level: Requires x 2 assistance  Skilled Clinical Factors: 2 helpers required w/ assist to lean anteriorly and to the right. Assist to thread LUE into shirt, pt able to assist w/ RUE w/ cues. Assist to pull overhead and adjust around trunk. Putting On/Taking Off Footwear  Assistance Level: Maximum assistance  Skilled Clinical Factors: Assist to doff B socks, pt attempted R sock but d/t pain in L hip, pt was unable to fully remove. Assist to don B socks. Pt donned R shoe w/ assist to adjust around ankle. Functional Mobility  Device: Wheelchair  Assistance Level: Minimal assistance  Skilled Clinical Factors: Pt self propelled w/ RUE and RLE from gym to room w/ min A w/ cues for environmental awarness to L side. Transfers  Surface: Wheelchair; To mat;From mat;From chair with arms  Additional Factors: Verbal cues; Hand placement cues; Increased time to complete  Sit Pivot  Assistance Level: Requires x 2 assistance  Skilled Clinical Factors: Pt completed sit pivot t/f recliner --w/c on R side w/ max Ax2, --> mat table toward R side w/ max Ax2, to w/c from mat to L side w/ mod Ax2. Cues for hand placement, body mechanics, and initiation/sequencing of task. Weight Bearing  Weight Bearing Technique: Yes  LUE Weight Bearing: Extended arm seated  Response To Weight Bearing Technique: Pt seated edge of mat, WB thru LUE to complete sitting balance task (see exercises section). Pt WB thru LUE ~20 minutes w/ assist to stabilize. OT Exercises  Static Sitting Balance Exercises: Pt sat edge of mat requiring SBA w/ use of mirror to assist w/ visual cues for midline orientation. Pt required cues for body mechanics and leaning to R side d/t significant L lateral lean. Pt w/ improved balance when RUE was placed on lap vs mat table d/t pushing. Pt maintained balance ~3 minutes.   Dynamic Sitting Balance Exercises: Pt sitting edge of mat w/ WB thru LUE, pt required to use RUE to write card to wife, which was placed on mirror to facilitate 90 degrees of shoulder flexion of RUE while reaching out of base of support. One item placed on R side and another item placed on L side in order to incorporate crossing midline and attention to L side. Pt required consistent CGA - min A for balance during task but max verbal cues for body mechanics and L lateral lean. Mirror used for visual feedback. Assessment  Assessment  Assessment: Pt tolerated session well this date w/ less assistance required for static and dynamic sitting balance. Pt continues to be limited by L hip pain, which was communicated to MD. Pt also continues to require verbal cues for attention to L side as well as sequencing and initiation. Pt would benefit from continued therapy services to maximize independence w/ ADLs and fxl mobility tasks. Continue OT POC. Activity Tolerance: Patient tolerated treatment well;Patient limited by pain  Discharge Recommendations: 24 hour supervision or assist;Continue to assess pending progress;Home with Home health OT  OT Equipment Recommendations  ADL Assistive Devices: Shower Chair with back; Toileting - Raised Toilet Seat with arms;Grab Bars - toilet;Grab Bars - shower  Safety Devices  Safety Devices in place: Yes  Type of devices: Call light within reach; Chair alarm in place; Left in chair    Patient Education  Education  Education Given To: Patient; Family  Education Provided: Role of Therapy;Plan of Care;Family Education;Mobility Training;Transfer Training; Safety; Fall Prevention Strategies  Education Method: Demonstration;Verbal  Barriers to Learning: Cognition  Education Outcome: Verbalized understanding;Demonstrated understanding    Plan  Occupational Therapy Plan  Times Per Week: 5 days per week, 60 mins each  Current Treatment Recommendations: Strengthening;ROM;Balance training;Functional mobility training; Endurance training;Self-Care / ADL; Patient/Caregiver education & training;Neuromuscular re-education;Cognitive/Perceptual training    Goals  Patient Goals   Patient goals : go home with my wife  Short Term Goals  Time Frame for Short Term Goals: By 2 weeks  Short Term Goal 1: Pt will complete LB dressing with mod A  Short Term Goal 2: Pt will complete toileting with mod A  Short Term Goal 3: Pt will complete toilet and functional transfers with mod A  Short Term Goal 4: Pt will complete UB dressing with min A  Short Term Goal 5: Pt will complete UE HEP x 10 reps for improved R UE coordination for ADLs  Long Term Goals  Time Frame for Long Term Goals : By 4 weeks  Long Term Goal 1: Pt will complete LB dressing with CGA  Long Term Goal 2: Pt will complete toileting with CGA  Long Term Goal 3: Pt will complete toilet and functional transfers with mod I  Long Term Goal 4: Pt will complete UB dressing with mod I  Long Term Goal 5: Pt will complete simple IADL with LRAD at mod I                Therapy Time   Individual Concurrent Group Co-treatment   Time In       1100   Time Out       1210   Minutes       70       Timed Code Treatment Minutes:  70 min     Total Treatment Minutes: 70  min        Barb Baum OT

## 2023-02-02 NOTE — PROGRESS NOTES
Physical Therapy  Facility/Department: [facility] ACUTE REHAB UNIT  Rehabilitation Physical Therapy Treatment Note    NAME: Bryan Price  : 1955 (74 y.o.)  MRN: [id number]  CODE STATUS: Full Code    Date of Service: 23       Restrictions:  Restrictions/Precautions: Fall Risk  Position Activity Restriction  Other position/activity restrictions: up as tolerated, up in chair     SUBJECTIVE  Subjective  Subjective: pt up in chair and agreeable to PT  Pain: reporting pain with movement in L hip         OBJECTIVE  Cognition  Arousal/Alertness: Appropriate responses to stimuli  Following Commands: Follows one step commands with repetition; Follows one step commands with increased time  Attention Span: Attends with cues to redirect; Difficulty attending to directions; Difficulty dividing attention  Safety Judgement: Decreased awareness of need for assistance;Decreased awareness of need for safety  Problem Solving: Assistance required to generate solutions;Assistance required to identify errors made;Assistance required to implement solutions  Insights: Decreased awareness of deficits  Initiation: Requires cues for some  Sequencing: Requires cues for some  Cognition Comment: Pt tangential at times, requiring cues to redirect. Pt w/ poor awareness of L side. Orientation  Overall Orientation Status: Within Functional Limits    Functional Mobility  Balance  Sitting Balance: Contact guard assistance (CGA for static sitting on mat, CGA-min A with dynamic activites. max VCs for correcting L and posterior lean. mirror used for visual cue. after 30 min of sitting pt becoming fatigued and requiring mod-max A to maintain balance)  Standing Balance:  (not attempted this date)  Transfers  Surface: To chair with arms;From chair with arms; Wheelchair  Additional Factors: Verbal cues; Hand placement cues; Increased time to complete  Bed To/From Chair  Technique: Sit pivot  Assistance Level: Requires x 2 assistance; Moderate assistance;Maximum assistance (max A of 2 from to wc from recliner and from wc to mat, mod A of 2 from mat to wc)  Skilled Clinical Factors: from recliner> wc, wc> mat, mat>wc      Environmental Mobility  Wheelchair  Surface: Level surface  Device: Standard wheelchair  Additional Factors: Verbal cues; Increased time to complete (VCs for L attention and avoiding objects in wilder)  Assistance Required to Manage Parts: All wheelchair parts  Assistance Level for Propulsion: Minimal assistance  Propulsion Method: Right upper extremity;Right lower extremity  Propulsion Quality: Slow velocity; Short strokes; Veers left  Propulsion Distance: 175' x2             PT Exercises  Static Sitting Balance Exercises: Pt sat edge of mat requiring SBA w/ use of mirror to assist w/ visual cues for midline orientation. Pt required cues for body mechanics and leaning to R side d/t significant L lateral lean. Pt w/ improved balance when RUE was placed on lap vs mat table d/t pushing. Pt maintained static balance ~3 minutes. pt also given task while statically sitting to look to L and name suit and number on playing cards posted on the wall. verbal cues required for some direction, pt able to attend to objects on L with inc time and cues  Dynamic Sitting Balance Exercises: Pt sitting edge of mat w/ WB thru LUE, pt required to use RUE to write card to wife, which was placed on mirror to facilitate 90 degrees of shoulder flexion of RUE while reaching out of base of support. One item placed on R side and another item placed on L side in order to incorporate crossing midline and attention to L side. Pt required consistent CGA - min A for balance during task but max verbal cues for body mechanics and L lateral lean. Mirror used for visual feedback. ASSESSMENT/PROGRESS TOWARDS GOALS       Assessment  Assessment: pt tolerated session well, very hardworking and eager to participate.  pt continues to require assist of 2 for all sit pivot transfers and requires L knee blocked at all times. pt demonstrating continued L lateral and slight posterior lean in sitting requiring max VCs for correction and awareness however much improved on this date maintaining static sitting with SBA and dynamic sitting with CGA- min A demonstrating ability to sit ~30 min before fatiguing and requiring inc assist. pt very limited by flaccid L side with little to no muscle contraction noted as well as L inattention. pt well below baseline and will benefit from continued skilled PT to maximize independence. Activity Tolerance: Patient tolerated treatment well;Patient limited by pain; Patient limited by fatigue  Discharge Recommendations: 24 hour supervision or assist;Continue to assess pending progress;Home with Home health PT  PT Equipment Recommendations  Other: continue to assess    Goals  Patient Goals   Patient Goals : to go home  Short Term Goals  Time Frame for Short Term Goals: 2 weeks (all ongoing)  Short Term Goal 1: Pt will complete bed mobility with moderate assist  Short Term Goal 2: Pt will complete sit<>stand transfer wtih moderate assist  Short Term Goal 3: Pt will propel self in manual w/c for 150' with minimal assist.- met 2/2  Short Term Goal 4: Pt will ambulate 10' with moderate assist.  Long Term Goals  Time Frame for Long Term Goals : 4 weeks (all ongoing)  Long Term Goal 1: Pt will complete bed mobility with CGA  Long Term Goal 2: Pt will complete sit<>Stand transfers with CGA  Long Term Goal 3: Pt will propel self in manual w/c for 250' with suprvision  Long Term Goal 4: Pt will ambulate 48' with CGA & LRAD    PLAN OF CARE/SAFETY  Physcial Therapy Plan  Days Per Week: 5 Days  Hours Per Day: 1 hour  Therapy Duration: 4 Weeks  Current Treatment Recommendations: Strengthening;Balance training;ROM; Functional mobility training;Neuromuscular re-education; Safety education & training;Patient/Caregiver education & training;Transfer training; Endurance training;Home exercise program;Gait training;Equipment evaluation, education, & procurement;Positioning; Therapeutic activities  Safety Devices  Type of Devices: Chair alarm in place;Call light within reach; Left in chair;Nurse notified;Gait belt;Patient at risk for falls  Restraints  Restraints Initially in Place: No    EDUCATION  Education  Education Given To: Patient  Education Provided: Role of Therapy;Plan of Care; Mobility Training;Transfer Training  Education Method: Verbal  Barriers to Learning: Cognition  Education Outcome: Verbalized understanding;Continued education needed        Therapy Time   Individual Concurrent Group Co-treatment   Time In       1100   Time Out       1210   Minutes       70     Timed Code Treatment Minutes: 70 Minutes  Variance: 0    Gemma Franklin PT, 02/02/23 at 3:12 PM

## 2023-02-02 NOTE — PROGRESS NOTES
Speech Language Pathology  ACUTE REHAB UNIT  SPEECH/LANGUAGE PATHOLOGY      [x] Daily  [x] Weekly Care Conference Note  [] Discharge    Patient:Jason Bates  FIV:[id number]  Rehab Dx/Hx: Acute CVA (cerebrovascular accident) ([facility] [address] [I63.9]    Precautions: [x] Aspiration  [x] Fall risk  [] Sternal  [] Seizure [] Hip  [] Weight Bearing [] Other     ST Dx: [] Aphasia  [x] Dysarthria  [] Apraxia   [x] Oropharyngeal dysphagia [x] Cognitive Impairment  [] Other:   Date of Admit: 1/29/2023  Room #: 3102/3102-01  Date: 2/2/2023          Current Diet Order:ADULT DIET; Dysphagia - Minced and Moist  ADULT ORAL NUTRITION SUPPLEMENT; Breakfast, Dinner; Standard High Calorie/High Protein Oral Supplement   Recommended Form of Meds: Meds in puree  Compensatory Swallowing Strategies :  (Small bites, no pharyngeal neck extension, left head turn with thins, check for pocketing on left, 1:1 assist for small bolus size)   Previous MBS: 1/26/23  Oral Phase  Mild-moderate dysfunction characterized by incomplete labial seal with anterior spillage, slowed/reduced mastication, mild stasis. Pharyngeal Phase  Moderate dysfunction characterized by impairments in timing, strength and coordination with premature bolus loss, reduced base of tongue retraction, delayed/reduced hyolaryngeal mechanics, and reduced pharyngeal constriction. Resulted in vallecular/pyriform/pharyngeal residue, as well as compromised airway protection with penetration and gross tracheal aspiration of thin and mildly thick liquids; strong reactive cough present but did not fully clear. Chin tuck strategy did not eliminate aspiration, however cued head turn LEFT with small straw sips was effective . Double swallow helped clear residual. Of note, throughout study pt with tendency to tilt head posteriorly which further exacerbated bolus control; benefited from cues for neutral positioning.   Upper Esophageal Screen  Indirectly assessed; appeared unremarkable. 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, music, [employer]    Dentition: Adequate  Vision  Vision:  (not wearing glasses due to nose injury)  Vision Exceptions: Wears glasses at all times  Hearing  Hearing: Within functional limits  Barriers toward progress: Cognitive deficit and Impulsivity        Date: 2/2/2023       Tx session 1 Tx session 2 Weekly Summary   Total Timed Code Min 15 25    Total Treatment Minutes 45 50    Individual Treatment Minutes 45 50    Group Treatment Minutes 0 0    Co-Treat Minutes 0 0    Brief Exception: N/A N/A    Pain None indicated None indicated     Pain Intervention: [] RN notified  [] Repositioned  [] Intervention offered and patient declined  [x] N/A  [] Other: [] RN notified  [] Repositioned  [] Intervention offered and patient declined  [x] N/A  [] Other:    Subjective:     Pt resting in chair upon entry endorsing fatigue, however agreeable to session at this time. Continued fatigue  Pt seated upright in chair agreeable to session. Objective / Goals:      Goal 1: Pt will demonstrate adequate oral containment of liquids and solids across meal without cueing. With sips of thin liquids, minimal anterior loss on the L x12. Benefited from cues to identify and use napkin to clear. Targeted via labial seals x15 and labial seals against resistance x10. Improved seal as trials progressed, however benefited from continuous cues. PROGRESSING; CONTINUE   Goal 2: Pt will tolerate trials of solids with adequate mastication and propulsion as evidenced by min residue after the swallow across mealtime assessment. Observed pt using lingual sweep to assist in checking for oral clearance. Trialed soft solids x4 bites.  Demonstrated adequate mastication with soft solids. No oral residue remaining after all trials. Upon initial start of session, pt noted to have piece of ? Left over lunch meal residue remaining along L buccal cavity/teeth. Pt unaware until pulled out by SLP with toothette. SLP assisted pt with oral care prior to trials of thin liquids. PROGRESSING; CONTINUE   Goal 3: Pt will tolerate thin liquids with head in neutral position without s/s associated with aspiration. Trialed x14; x2 coughs noted after completion of swallow; and x3 throat clears. X38 effortful swallows. (Pt's wife communicated increased effortful swallows to reach x50 at end of session). Chin tuck against resistance: x2 sets of x15     Sour thin bolus+effortful swallows: x15  x1 prolonged cough and throat clear upon completion of all trials. SLP noted pts eyes to be watering and provided with cues to continue coughing. PROGRESSING; CONTINUE   Goal 4: Pt will demonstrate safe feeding behavior as evidenced by small bolus size / appropriate rate without cues. Improved small bites noted this session given MI. Pt appropriately asked SLP \"is this too big\", to ensure adequate bolus size prior to placing in oral cavity. X1 occurrence of large bite of trialed soft solids. Benefited from cues to identify large bite despite already consuming. Not targeted this session. PROGRESSING; CONTINUE   Goal 5: Pt will tolerate least restrictive diet without respiratory decline. Pt's WBC and Neutrophils absolute WFL. No respiratory distress during meal.  Not targeted this session. PROGRESSING; CONTINUE   Goal 1: The patient will maintain eye contact to speaker positioned left of midline without distractors, min cues. Improved eye contact with speaker at midline. No cues required this AM. Pt appropriately turning head to locate SLP and wife sitting on couch across x3/5 occurrences. SLP positioning self on the left.  Pt with fixated midline position, and benefited from min cues to rotate head and eyes to find speaker with eye contact on the L. Targeted via organization of cards via color/symbol/shape. Completed with 85% accuracy, increased to 100% given min cues for cards remaining on the left side of tray table. PROGRESSING; CONTINUE   Goal 2: The patient will demonstrate sustained attention to task for 2 minutes with <2 prompts. Redirected during session x3 Pt sustained attention throughout card organization task. Toward end of task, pt with fleeting attention due to increased fatigue. PROGRESSING; CONTINUE   Goal 3: The patient will demonstrate improved inhibition as evidenced by <3 prompts for impulsivity during a task. No occurrences of impulsivity this session. X1 occurrence of pt quickly initiating task prior to SLP finishing instructions. PROGRESSING; CONTINUE   Goal 4: The patient will demonstrate improved self monitoring/correcting for basic tasks with <mod cues. Improved self monitoring for use of head in neutral/midline position while swallowing all PO given min cues. With increasingly complex task pt benefited from min cues to identify errors and self correct for accuracy. PROGRESSING; CONTINUE   Goal 5: The patient will tolerate ongoing cognitive linguistic assessment. Not targeted this session. Not targeted this session. CONTINUE   Goal 6: The patient will demonstrate comprehensibility in multiple communication environments without cues. No cues required. 100% comprehensible. No cues required. 100% comprehensible. PROGRESSING; CONTINUE   Other areas targeted:      Education:   Education re: therapeutic swallow strategies and cognitive tasks to maintain eye contact at midline and sustain attention. Education re: risks associated with aspiration/aspiration pneumonia and oral care.      Safety Devices: [x] Call light within reach  [x] Chair alarm activated and connected to nurse call light system  [] Bed alarm activated   [] Other:  [x] Call light within reach  [x] Chair alarm activated and connected to nurse call light system  [] Bed alarm activated  [] Other:     Assessment: Oropharyngeal dysphagia per MBS. Progressing with use of swallow strategies given fading cues. Moderate cognitive impairments, however very stimulable to SLPs cues for improved carryover and accuracy. Plan: Continue as per plan of care. Interventions used this date:  [] Speech/Language Treatment  [] Instruction in HEP  [x] Dysphagia Treatment [x] Cognitive Treatment   [] Other:    Discharge recommendations:  [] Home independently  [x] Home with assistance []  24 hour supervision  [] ECF [] Other  Continued Tx Upon Discharge: ? [x] Yes    [] No    [] TBD based on progress while on ARU     [] Vital Stim indicated     [] Other:   Estimated discharge date: February 19th, 2023    Electronically signed by:  Tawny Medellin [address] [place] [address] Language Pathology      The speech-language pathologist was present, directed the patient's care, made skilled judgment and was responsible for assessment and treatment.     Karina Vaughn M.A., [address] [facility]  Speech-Language Pathologist

## 2023-02-02 NOTE — PROGRESS NOTES
Comprehensive Nutrition Assessment    RECOMMENDATIONS:  PO Diet: Dysphagia-Minced and Moist  ONS: Ensure plus bid  Nutrition Education: No recommendation at this time       NUTRITION ASSESSMENT:   Nutritional summary & status: Follow up. Pt is on a Dysphagia-Minced and Moist Diet with varied intake of meals with avg at  26-50%. Stated that he has been drinking Ensure Plus bid. Continues to work with SLP on diet progression. Will continue to follow. Admission/PMH: Admit; Acute CVA  PMHx: CAD, HTN, HLD    MALNUTRITION ASSESSMENT  Context of Malnutrition: Acute Illness   Malnutrition Status: At risk for malnutrition (Comment)  Findings of the 6 clinical characteristics of malnutrition (Minimum of 2 out of 6 clinical characteristics is required to make the diagnosis of moderate or severe Protein Calorie Malnutrition based on AND/ASPEN Guidelines):  Energy Intake:  Mild decrease in energy intake (Comment)  Weight Loss:  No significant weight loss     Body Fat Loss:  Unable to assess     Muscle Mass Loss:  Unable to assess    Fluid Accumulation:  No significant fluid accumulation     Strength:  Not Performed    NUTRITION DIAGNOSIS   Inadequate oral intake, Increased nutrient needs related to increase demand for energy/nutrients as evidenced by intake 26-50%, intake 51-75%, other (comment) (extended hospital stay(rehab))    Nutrition Monitoring and Evaluation:   Food/Nutrient Intake Outcomes:  Food and Nutrient Intake, Supplement Intake  Physical Signs/Symptoms Outcomes:  Biochemical Data, Nutrition Focused Physical Findings, Weight     OBJECTIVE DATA: Significant to nutrition assessment  Nutrition Related Findings: No edema. LBM1/30. Glu 116. Lytes wnl. Wounds: None  Nutrition Goals: PO intake 75% or greater, prior to discharge     [facility] DIET;  Dysphagia - Minced and Moist  ADULT ORAL NUTRITION SUPPLEMENT; Breakfast, Dinner; Standard High Calorie/High Protein Oral Supplement  PO Intake: 26-50%, 1-25%, 51-75%   PO Supplement Intake:% (per pt)  Additional Sources of Calories/IVF:n/a     ANTHROPOMETRICS  Current Height: 6' 1\" (185.4 cm)  Current Weight: 181 lb 10.5 oz (82.4 kg)    Admission weight: 181 lb 10.5 oz (82.4 kg)  Ideal Body Weight (IBW): 184 lbs  (84 kg)    Usual Bodyweight     BMI: 24    COMPARATIVE STANDARDS  Energy (kcal):  5289-2484 (25-30 kcal/CBW/82 kg)     Protein (g):   (1.2-1.5 gm/CBW/82 kg)       Fluid (mL/day):  1898-3059 (1 ml/kcal) or per provider    The patient will be monitored per nutrition standards of care. Consult dietitian if additional nutrition interventions are needed prior to RD reassessment.      Lucius Preston, [address]:  [phone]  Office:  [phone]

## 2023-02-02 NOTE — PLAN OF CARE
Problem: Discharge Planning  Goal: Discharge to home or other facility with appropriate resources  Flowsheets (Taken 1/30/2023 2130 by Kandice Forbes RN)  Discharge to home or other facility with appropriate resources: Identify barriers to discharge with patient and caregiver     Problem: Pain  Goal: Verbalizes/displays adequate comfort level or baseline comfort level  Flowsheets (Taken 1/31/2023 1811 by Dc Guerra RN)  Verbalizes/displays adequate comfort level or baseline comfort level: Encourage patient to monitor pain and request assistance

## 2023-02-03 LAB
ANION GAP SERPL CALCULATED.3IONS-SCNC: 7 MMOL/L (ref 3–16)
BASOPHILS ABSOLUTE: 0.1 K/UL (ref 0–0.2)
BASOPHILS RELATIVE PERCENT: 0.8 %
BUN BLDV-MCNC: 17 MG/DL (ref 7–20)
CALCIUM SERPL-MCNC: 9.1 MG/DL (ref 8.3–10.6)
CHLORIDE BLD-SCNC: 97 MMOL/L (ref 99–110)
CO2: 29 MMOL/L (ref 21–32)
CREAT SERPL-MCNC: 0.6 MG/DL (ref 0.8–1.3)
EOSINOPHILS ABSOLUTE: 0.2 K/UL (ref 0–0.6)
EOSINOPHILS RELATIVE PERCENT: 2.9 %
GFR SERPL CREATININE-BSD FRML MDRD: >60 ML/MIN/{1.73_M2}
GLUCOSE BLD-MCNC: 112 MG/DL (ref 70–99)
HCT VFR BLD CALC: 39.8 % (ref 40.5–52.5)
HEMOGLOBIN: 13.4 G/DL (ref 13.5–17.5)
LYMPHOCYTES ABSOLUTE: 1.7 K/UL (ref 1–5.1)
LYMPHOCYTES RELATIVE PERCENT: 24.3 %
MCH RBC QN AUTO: 31.9 PG (ref 26–34)
MCHC RBC AUTO-ENTMCNC: 33.6 G/DL (ref 31–36)
MCV RBC AUTO: 95.1 FL (ref 80–100)
MONOCYTES ABSOLUTE: 0.6 K/UL (ref 0–1.3)
MONOCYTES RELATIVE PERCENT: 9.1 %
NEUTROPHILS ABSOLUTE: 4.5 K/UL (ref 1.7–7.7)
NEUTROPHILS RELATIVE PERCENT: 62.9 %
PDW BLD-RTO: 12.9 % (ref 12.4–15.4)
PLATELET # BLD: 298 K/UL (ref 135–450)
PMV BLD AUTO: 7.1 FL (ref 5–10.5)
POTASSIUM REFLEX MAGNESIUM: 4 MMOL/L (ref 3.5–5.1)
RBC # BLD: 4.19 M/UL (ref 4.2–5.9)
SODIUM BLD-SCNC: 133 MMOL/L (ref 136–145)
WBC # BLD: 7.1 K/UL (ref 4–11)

## 2023-02-03 PROCEDURE — 97535 SELF CARE MNGMENT TRAINING: CPT

## 2023-02-03 PROCEDURE — 97032 APPL MODALITY 1+ESTIM EA 15: CPT

## 2023-02-03 PROCEDURE — 97530 THERAPEUTIC ACTIVITIES: CPT

## 2023-02-03 PROCEDURE — 99232 SBSQ HOSP IP/OBS MODERATE 35: CPT | Performed by: PHYSICAL MEDICINE & REHABILITATION

## 2023-02-03 PROCEDURE — 80048 BASIC METABOLIC PNL TOTAL CA: CPT

## 2023-02-03 PROCEDURE — 97112 NEUROMUSCULAR REEDUCATION: CPT

## 2023-02-03 PROCEDURE — 97542 WHEELCHAIR MNGMENT TRAINING: CPT

## 2023-02-03 PROCEDURE — 1280000000 HC REHAB R&B

## 2023-02-03 PROCEDURE — 97130 THER IVNTJ EA ADDL 15 MIN: CPT

## 2023-02-03 PROCEDURE — 92526 ORAL FUNCTION THERAPY: CPT

## 2023-02-03 PROCEDURE — 6370000000 HC RX 637 (ALT 250 FOR IP): Performed by: PHYSICAL MEDICINE & REHABILITATION

## 2023-02-03 PROCEDURE — 97129 THER IVNTJ 1ST 15 MIN: CPT

## 2023-02-03 PROCEDURE — 6360000002 HC RX W HCPCS: Performed by: PHYSICAL MEDICINE & REHABILITATION

## 2023-02-03 PROCEDURE — 85025 COMPLETE CBC W/AUTO DIFF WBC: CPT

## 2023-02-03 PROCEDURE — 36415 COLL VENOUS BLD VENIPUNCTURE: CPT

## 2023-02-03 RX ORDER — LACTULOSE 10 G/15ML
20 SOLUTION ORAL 3 TIMES DAILY
Status: DISCONTINUED | OUTPATIENT
Start: 2023-02-03 | End: 2023-02-08

## 2023-02-03 RX ADMIN — DICLOFENAC SODIUM 4 G: 10 GEL TOPICAL at 21:05

## 2023-02-03 RX ADMIN — DICLOFENAC SODIUM 4 G: 10 GEL TOPICAL at 12:53

## 2023-02-03 RX ADMIN — BISACODYL 5 MG: 5 TABLET, COATED ORAL at 08:15

## 2023-02-03 RX ADMIN — ACETAMINOPHEN 650 MG: 325 TABLET, FILM COATED ORAL at 21:04

## 2023-02-03 RX ADMIN — FLUOXETINE 10 MG: 10 CAPSULE ORAL at 08:15

## 2023-02-03 RX ADMIN — MINERAL OIL 330 ML: 1000 LIQUID ORAL at 16:03

## 2023-02-03 RX ADMIN — Medication 5 MG: at 21:04

## 2023-02-03 RX ADMIN — ACETAMINOPHEN 650 MG: 325 TABLET, FILM COATED ORAL at 08:48

## 2023-02-03 RX ADMIN — PANTOPRAZOLE SODIUM 40 MG: 40 TABLET, DELAYED RELEASE ORAL at 05:38

## 2023-02-03 RX ADMIN — LACTULOSE 20 G: 20 SOLUTION ORAL at 12:54

## 2023-02-03 RX ADMIN — LACTULOSE 20 G: 20 SOLUTION ORAL at 21:04

## 2023-02-03 RX ADMIN — ASPIRIN 81 MG 81 MG: 81 TABLET ORAL at 08:14

## 2023-02-03 RX ADMIN — ROSUVASTATIN CALCIUM 40 MG: 20 TABLET, FILM COATED ORAL at 08:13

## 2023-02-03 RX ADMIN — EZETIMIBE 10 MG: 10 TABLET ORAL at 08:14

## 2023-02-03 RX ADMIN — NYSTATIN 500000 UNITS: 100000 SUSPENSION ORAL at 08:14

## 2023-02-03 RX ADMIN — NYSTATIN 500000 UNITS: 100000 SUSPENSION ORAL at 21:04

## 2023-02-03 RX ADMIN — ENOXAPARIN SODIUM 40 MG: 100 INJECTION SUBCUTANEOUS at 08:13

## 2023-02-03 RX ADMIN — NYSTATIN 500000 UNITS: 100000 SUSPENSION ORAL at 12:56

## 2023-02-03 RX ADMIN — TRAZODONE HYDROCHLORIDE 50 MG: 50 TABLET ORAL at 21:04

## 2023-02-03 ASSESSMENT — PAIN SCALES - GENERAL
PAINLEVEL_OUTOF10: 2
PAINLEVEL_OUTOF10: 6
PAINLEVEL_OUTOF10: 5
PAINLEVEL_OUTOF10: 6

## 2023-02-03 ASSESSMENT — PAIN - FUNCTIONAL ASSESSMENT
PAIN_FUNCTIONAL_ASSESSMENT: PREVENTS OR INTERFERES SOME ACTIVE ACTIVITIES AND ADLS
PAIN_FUNCTIONAL_ASSESSMENT: ACTIVITIES ARE NOT PREVENTED

## 2023-02-03 ASSESSMENT — PAIN DESCRIPTION - ORIENTATION
ORIENTATION: ANTERIOR
ORIENTATION: LEFT

## 2023-02-03 ASSESSMENT — PAIN DESCRIPTION - DESCRIPTORS
DESCRIPTORS: ACHING
DESCRIPTORS: ACHING;DISCOMFORT

## 2023-02-03 ASSESSMENT — PAIN DESCRIPTION - LOCATION
LOCATION: HEAD
LOCATION: HIP

## 2023-02-03 NOTE — PROGRESS NOTES
Speech Language Pathology  ACUTE REHAB UNIT  SPEECH/LANGUAGE PATHOLOGY      [x] Daily  [x] Weekly Care Conference Note  [] Discharge    Patient:Jason Zepeda  GYB:[id number]  Rehab Dx/Hx: Acute CVA (cerebrovascular accident) ([facility] [address] [I63.9]    Precautions: [x] Aspiration  [x] Fall risk  [] Sternal  [] Seizure [] Hip  [] Weight Bearing [] Other     ST Dx: [] Aphasia  [x] Dysarthria  [] Apraxia   [x] Oropharyngeal dysphagia [x] Cognitive Impairment  [] Other:   Date of Admit: 1/29/2023  Room #: 3102/3102-01  Date: 2/3/2023          Current Diet Order:ADULT DIET; Dysphagia - Minced and Moist  ADULT ORAL NUTRITION SUPPLEMENT; Breakfast, Dinner; Standard High Calorie/High Protein Oral Supplement   Recommended Form of Meds: Meds in puree  Compensatory Swallowing Strategies :  (Small bites, no pharyngeal neck extension, left head turn with thins, check for pocketing on left, 1:1 assist for small bolus size)   Previous MBS: 1/26/23  Oral Phase  Mild-moderate dysfunction characterized by incomplete labial seal with anterior spillage, slowed/reduced mastication, mild stasis. Pharyngeal Phase  Moderate dysfunction characterized by impairments in timing, strength and coordination with premature bolus loss, reduced base of tongue retraction, delayed/reduced hyolaryngeal mechanics, and reduced pharyngeal constriction. Resulted in vallecular/pyriform/pharyngeal residue, as well as compromised airway protection with penetration and gross tracheal aspiration of thin and mildly thick liquids; strong reactive cough present but did not fully clear. Chin tuck strategy did not eliminate aspiration, however cued head turn LEFT with small straw sips was effective . Double swallow helped clear residual. Of note, throughout study pt with tendency to tilt head posteriorly which further exacerbated bolus control; benefited from cues for neutral positioning.   Upper Esophageal Screen  Indirectly assessed; appeared unremarkable. 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 Pain Indicated left hip pain with movement. None indicated    Pain Intervention: [] RN notified  [] Repositioned  [] Intervention offered and patient declined  [x] N/A  [] Other: [] RN notified  [] Repositioned  [] Intervention offered and patient declined  [x] N/A  [] Other:   Subjective:     Pt supine in bed asleep but agreeable to get up for therapy. Pt upright in bed. Spouse present and observing therapy. Objective / Goals:     Goal 1: Pt will demonstrate adequate oral containment of liquids and solids across meal without cueing. Patient tolerated NMES via VitalStim placement 4a (targeting orbicularis oris, buccinator and superior pharyngeal constrictor) @ 3.0 mA on right and @8.0 mA on left x 30 minutes with excellent contraction. Patient tolerated NMES via VitalStim placement 4a (targeting orbicularis oris, buccinator and superior pharyngeal constrictor) @ 8.5 mA on left and 4.0 mA on right x 28 minutes. Targeted labial seal via oral motor exercise with pt drawing x4 ounces pudding  via standard straw; oral residue after the swallow resulting in need for cued second swallows.  Completed cued effortful swallows for all bolus. Goal 2: Pt will tolerate trials of solids with adequate mastication and propulsion as evidenced by min residue after the swallow across mealtime assessment. Pt with severe difficulty propelling tylenol pill with need for multiple attempts. Residue noted as above. Did not attempt PO due to lethargy. Goal 3: Pt will tolerate thin liquids with head in neutral position without s/s associated with aspiration. Cued fast/effortful swallows x31. Goal not targeted this session. X1 episode of coughing after the swallow with pudding. Chin tuck against resistance x15, reps x2; pt needed increased rest breaks this session. Goal 4: Pt will demonstrate safe feeding behavior as evidenced by small bolus size / appropriate rate without cues. Appropriate bolus size and rate. Pt actually required cues to progress through meal and continue PO. Goal not targeted this session. Goal 5: Pt will tolerate least restrictive diet without respiratory decline. Chart review does not reveal any documented difficulty on current recommended diet level. Pt has no evidence of respiratory decline per chart. WBC/Neutrophils Absolute not indicating immunocompromised. Chart review does not reveal any documented difficulty on current recommended diet level. Goal 1: The patient will maintain eye contact to speaker positioned left of midline without distractors, min cues. Maintained appropriate eye contact throughout session with speaker at midline and to left of midline. Pt did not exhibit any instances of visualization with incorrect speakers. Pt required verbal cue to look to left to locate desired item x1 but then placed the item back in left visual field and relocated throughout meal with 100% accuracy. Reduced eye contact this session with eye closing frequently. Pt did exhibit looking to speaker on right while talking to a communication partner on left.     Goal 2: The patient will demonstrate sustained attention to task for 2 minutes with <2 prompts. Pt required >2 prompts for attention / task continuance. Pt continues to be tangential and this limits attention the most but unsure this is entirely neurogenic. Cues for task continuance required and eye opening due to lethargy. Goal 3: The patient will demonstrate improved inhibition as evidenced by <3 prompts for impulsivity during a task. No overt impulsivity noted. .Goal not targeted this session. Goal 4: The patient will demonstrate improved self monitoring/correcting for basic tasks with <mod cues. Pt able to verbalize impairments this date but unable to correct consistently. Goal not targeted this session. Goal 5: The patient will tolerate ongoing cognitive linguistic assessment. Goal not targeted this session. Goal not targeted this session. Goal 6: The patient will demonstrate comprehensibility in multiple communication environments without cues. Goal not targeted this session. Goal not targeted this session. Other areas targeted:     Education:   Education ongoing re: skills targeted Education re: MBS results to spouse (pt not attending), recommendation for therapeutic exercises over the weekend, risks associated with aspiration / not doing head turn to left   Safety Devices: [x] Call light within reach  [x] Chair alarm activated and connected to nurse call light system  [] Bed alarm activated   [] Other:  [x] Call light within reach  [x] Chair alarm activated and connected to nurse call light system  [] Bed alarm activated  [] Other:    Assessment: Oropharyngeal dysphagia. 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 Good prognosis for improvement. Plan: Continue as per plan of care.       Interventions used this date:  [] Speech/Language Treatment  [] Instruction in HEP  [x] Dysphagia Treatment [x] Cognitive Treatment   [] Other:    Discharge recommendations:  [] Home independently  [x] Home with assistance []  24 hour supervision  [] ECF [] Other  Continued Tx Upon Discharge: ? [x] Yes    [] No    [] TBD based on progress while on ARU     [] Vital Stim indicated     [] Other:   Estimated discharge date: February 19th, 2023    Electronically signed by:  Yunior Ga M.A., [place]  Speech-Language Pathologist

## 2023-02-03 NOTE — PROGRESS NOTES
Occupational Therapy  Facility/Department: [facility] ACUTE REHAB UNIT  Rehabilitation Occupational Therapy Daily Treatment Note    Date: 2/3/23  Patient Name: Armin Ramos       Room: 8437/2160-41  MRN: [id number]  Account: [de-identified]   : 1955  (68 y.o.) Gender: male                    Past Medical History:  has a past medical history of Anxiety, Chest pain, Depression, Encounter for imaging to screen for metal prior to MRI, Hyperlipidemia, Hypertension, and Wears glasses. Past Surgical History:   has a past surgical history that includes [place] tooth extraction; Colonoscopy (2009); Cardiac catheterization (2008); Finger trigger release (3-); and Coronary angioplasty with stent. Restrictions       Subjective  Subjective: Pt semi supine upon OT arrival w/ wife and RN present. Co-tx indicated to maximize therapeutic potential.               Objective  Cognition  Arousal/Alertness: Appropriate responses to stimuli   Following Commands: Follows one step commands with repetition; Follows one step commands with increased time   Attention Span: Attends with cues to redirect; Difficulty attending to directions; Difficulty dividing attention   Safety Judgement: Decreased awareness of need for assistance;Decreased awareness of need for safety   Problem Solving: Assistance required to generate solutions;Assistance required to identify errors made;Assistance required to implement solutions   Insights: Decreased awareness of deficits   Initiation: Requires cues for some   Sequencing: Requires cues for some   Cognition Comment: Pt tangential at times, requiring cues to redirect. Pt w/ poor awareness of L side. Orientation  Overall Orientation Status: Within Functional Limits        ADL  Upper Extremity Dressing  Assistance Level: Maximum assistance  Skilled Clinical Factors: UE dressing complete seated in w/c. Pt required assist to initiate threading LUE using RUE and max VCs to attend to L side.  Pt threaded to elbow w/ tactile cueing, threaded RUE and pulled overhead. Assist to pull down over trunk, specifically on L side. Lower Extremity Dressing  Assistance Level: Requires x 2 assistance  Skilled Clinical Factors: Assist x2 to thread BLE into pants, assist to don new brief and pull up pants over hips via rolling side to side. Putting On/Taking Off Footwear  Assistance Level: Maximum assistance  Skilled Clinical Factors: Assist to doff B socks. Assist to don B socks and shoes. Toileting  Assistance Level: Requires x 2 assistance  Skilled Clinical Factors: Pt incontinent of urine in brief. Required assist x2 to change brief supine. Pt unaware of incontinence. Functional Mobility  Device: Wheelchair  Assistance Level: Contact guard assist;Minimal assistance  Skilled Clinical Factors: Pt self propelled w/ RUE and RLE from room <-> gym w/ CGA and min A, verbal cues for environmental awareness and obstacle avoidance. Roll Left  Assistance Level: Contact guard assist  Skilled Clinical Factors: CGA assist to roll to L 2x during session. Roll Right  Assistance Level: Moderate assistance  Skilled Clinical Factors: Mod A to roll to R sidde 2x during session. Sit to Supine  Assistance Level: Requires x 2 assistance; Moderate assistance  Skilled Clinical Factors: Pt reequired mod A x2 sit --> supine on mat table. Supine to Sit  Assistance Level: Requires x 2 assistance;Maximum assistance  Skilled Clinical Factors: Pt required max A x2 supine --> sit EOB and from edge of mat. Scooting  Assistance Level: Requires x 2 assistance; Moderate assistance  Skilled Clinical Factors: Mod A x2 to scoot toward L side on mat table. Transfers  Surface: Wheelchair; To mat;From mat  Additional Factors: Verbal cues; Hand placement cues; Increased time to complete  Bed To/From Chair  Technique: Sit pivot  Assistance Level: Requires x 2 assistance; Moderate assistance  Skilled Clinical Factors: 2 helpers required t/f EOB --> w/c on R side w/ mod A x2. Sit Pivot  Assistance Level: Requires x 2 assistance; Moderate assistance  Skilled Clinical Factors: Pt completed sit pivot t/f EOB --> w/c --> edge of mat --> w/c w/ mod A x2 all toward R side. OT Exercises  A/AROM Exercises: AAROM ~25% in conjunction w/ fxl estim to facilitate elbow flexion and extension w/ use of PROM to achieve full range from 90 degrees. AAROM/PROM also completed w/ fxl estim on quad to facilitate flexion/extension of LLE. Static Sitting Balance Exercises: Pt sat EOB and edge of mat ranging from CGA to max A during session. Additional Activities:  Modalities: yes   Left; Upper armElectrical Stimulation Location. Left; Upper arm. The comment is Quad. Electrical Stimulation Action Estim applied to L bicep to facilitate elbow flexion/extension for 12 mins. Estim also applied to L quad for 10 mins. Electrical Stimulation Parameters 34 mA CC, 5 seconds on/5 seconds off for LUE, 60 mA CC, 5 seconds on/5 seconds off for LLE. Electrical Stimulation Goals Neuromuscular Facilitation        Assessment  Assessment  Assessment: Pt tolerated session well, initiated use of estim this date in order to promote neuromuscular re-education. Pt w/ good response to estim in sidelying on R side for gravity eliminated plane. Pt tolerated the tx well, no redness/irritation post tx. Pt continues to require cues for attention to L side during all tasks w/ significant L lateral lean during unsupported sit that requires at least cues to correct. Pt would benefit from continued therapy services to maximize independence w/ ADLs and fxl mobility tasks. Cont OT POC. Activity Tolerance: Patient tolerated treatment well  Discharge Recommendations: 24 hour supervision or assist;Continue to assess pending progress;Home with Home health OT  Safety Devices  Safety Devices in place: Yes  Type of devices: Call light within reach; Chair alarm in place; Left in chair    Patient Education  Education  Education Given To: Patient; Family  Education Provided: Role of Therapy;Plan of Care;Family Education;Mobility Training;Transfer Training; Safety; Fall Prevention Strategies  Education Method: Demonstration;Verbal  Barriers to Learning: Cognition  Education Outcome: Verbalized understanding;Demonstrated understanding    Plan  Occupational Therapy Plan  Times Per Week: 5 days per week, 60 mins each  Current Treatment Recommendations: Strengthening;ROM;Balance training;Functional mobility training; Endurance training;Self-Care / ADL; Patient/Caregiver education & training;Neuromuscular re-education;Cognitive/Perceptual training; Safety education & training    Goals  Patient Goals   Patient goals : go home with my wife  Short Term Goals  Time Frame for Short Term Goals: By 2 weeks  Short Term Goal 1: Pt will complete LB dressing with mod A  Short Term Goal 2: Pt will complete toileting with mod A  Short Term Goal 3: Pt will complete toilet and functional transfers with mod A  Short Term Goal 4: Pt will complete UB dressing with min A  Short Term Goal 5: Pt will complete UE HEP x 10 reps for improved R UE coordination for ADLs  Long Term Goals  Time Frame for Long Term Goals : By 4 weeks  Long Term Goal 1: Pt will complete LB dressing with CGA  Long Term Goal 2: Pt will complete toileting with CGA  Long Term Goal 3: Pt will complete toilet and functional transfers with CGA. Long Term Goal 4: Pt will complete UB dressing with SPV.   Long Term Goal 5: Pt will complete simple IADL with LRAD at mod I      Therapy Time   Individual Concurrent Group Co-treatment   Time In       1315   Time Out       1430   Minutes       75       Timed Code Treatment Minutes:  75 min     Total Treatment Minutes: 75 min     Keke Desai OT

## 2023-02-03 NOTE — PROGRESS NOTES
Department of Physical Medicine & Rehabilitation  Progress Note    Patient Identification:  Amaya Garsia  [id number]  : 1955  Admit date: 2023    Chief Complaint: Acute CVA (cerebrovascular accident) [facility])    Subjective:   Doing well. No new complaints overnight. He states he needs to have a bowl movement. He also is having some left hip pain which is likely due to OA. No new complaints. ROS: No f/c, n/v, cp     Objective:  Patient Vitals for the past 24 hrs:   BP Temp Temp src Pulse Resp SpO2   23 0811 119/76 98.4 °F (36.9 °C) Oral 75 18 96 %   23 -- -- -- -- 18 --   23 118/76 98.1 °F (36.7 °C) Oral 87 18 96 %       Const: Alert. No distress, pleasant. HEENT: Normocephalic, atraumatic. Normal sclera/conjunctiva. MMM. CV: Regular rate and rhythm. Resp: No respiratory distress. Lungs CTAB. Abd: Soft, nontender, nondistended, NABS+   Ext: No edema. Neuro: Alert, oriented, appropriately interactive. Psych: Cooperative, appropriate mood and affect    Laboratory data: Available via EMR.    Last 24 hour lab  Recent Results (from the past 24 hour(s))   Basic Metabolic Panel w/ Reflex to MG    Collection Time: 23  6:56 AM   Result Value Ref Range    Sodium 133 (L) 136 - 145 mmol/L    Potassium reflex Magnesium 4.0 3.5 - 5.1 mmol/L    Chloride 97 (L) 99 - 110 mmol/L    CO2 29 21 - 32 mmol/L    Anion Gap 7 3 - 16    Glucose 112 (H) 70 - 99 mg/dL    BUN 17 7 - 20 mg/dL    Creatinine 0.6 (L) 0.8 - 1.3 mg/dL    Est, Glom Filt Rate >60 >60    Calcium 9.1 8.3 - 10.6 mg/dL   CBC auto differential    Collection Time: 23  6:56 AM   Result Value Ref Range    WBC 7.1 4.0 - 11.0 K/uL    RBC 4.19 (L) 4.20 - 5.90 M/uL    Hemoglobin 13.4 (L) 13.5 - 17.5 g/dL    Hematocrit 39.8 (L) 40.5 - 52.5 %    MCV 95.1 80.0 - 100.0 fL    MCH 31.9 26.0 - 34.0 pg    MCHC 33.6 31.0 - 36.0 g/dL    RDW 12.9 12.4 - 15.4 %    Platelets 418 471 - 724 K/uL    MPV 7.1 5.0 - 10.5 fL Neutrophils % 62.9 %    Lymphocytes % 24.3 %    Monocytes % 9.1 %    Eosinophils % 2.9 %    Basophils % 0.8 %    Neutrophils Absolute 4.5 1.7 - 7.7 K/uL    Lymphocytes Absolute 1.7 1.0 - 5.1 K/uL    Monocytes Absolute 0.6 0.0 - 1.3 K/uL    Eosinophils Absolute 0.2 0.0 - 0.6 K/uL    Basophils Absolute 0.1 0.0 - 0.2 K/uL         Therapy progress:  PT  Position Activity Restriction  Other position/activity restrictions: up as tolerated, up in chair  Objective     Sit to Stand: Maximum Assistance, 2 Person Assistance  Stand to Sit: 2 Person Assistance, Maximum Assistance     OT  PT Equipment Recommendations  Equipment Needed:  (defer)  Other: continue to assess  Toilet - Technique:  (via rolling shower chair)  Equipment Used:  (rolling shower chair)  Toilet Transfers Comments: pt did not have urge to void, however rolled pt in bathroom via rolling shower chair. Max A x 2 to pivot from bed to rolling shower chair. Assessment        SLP          Body mass index is 23.97 kg/m². 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 Patient is on aspirin at home        HTN  Patient is on lisinopril 5mg daily at home  - monitor      HLD:  Patient is on Crestor 20mg and ezetimibe 10mg at home daily  -Continue home meds     Anxiety  Per chart, patient is on Valium 2mg at home. Possible could be contributor to his chest pain yesterday, notes he felt anxious. Denies taking Valium at home, however it is listed as a home med.  Ativan 0.5mg once overnight   - prozac      Sleep disturbance  - Trazodone PRN  - benadryl PRN      Rehab Progress: Improving  Anticipated Dispo: home  Services/DME: [place]  ELOS: 2/19      LAVELLE Howard.O. M.P.H  PM&R  2/3/2023  11:02 AM

## 2023-02-03 NOTE — PROGRESS NOTES
Physical Therapy  Facility/Department: [facility] ACUTE REHAB UNIT  Rehabilitation Physical Therapy Treatment Note    NAME: Aida Norwood  : 1955 (74 y.o.)  MRN: [id number]  CODE STATUS: Full Code    Date of Service: 2/3/23       Restrictions:  Restrictions/Precautions: Fall Risk   Position Activity Restriction  Other position/activity restrictions: up as tolerated, up in chair      SUBJECTIVE  Subjective  Subjective: pt up in chair and agreeable to PT  Pain: reporting pain with movement in L hip           OBJECTIVE  Cognition  Arousal/Alertness: Appropriate responses to stimuli   Following Commands: Follows one step commands with repetition; Follows one step commands with increased time   Attention Span: Attends with cues to redirect; Difficulty attending to directions; Difficulty dividing attention   Safety Judgement: Decreased awareness of need for assistance;Decreased awareness of need for safety   Problem Solving: Assistance required to generate solutions;Assistance required to identify errors made;Assistance required to implement solutions   Insights: Decreased awareness of deficits   Initiation: Requires cues for some   Sequencing: Requires cues for some   Cognition Comment: Pt tangential at times, requiring cues to redirect. Pt w/ poor awareness of L side. Orientation  Overall Orientation Status: Within Functional Limits     Functional Mobility  Roll Left  Assistance Level: Contact guard assist  Skilled Clinical Factors: for tiff and donning new brief, VCs for sequencing  Roll Right  Assistance Level: Moderate assistance  Skilled Clinical Factors: for tiff and donning new brief  Sit to Supine  Assistance Level: Requires x 2 assistance; Moderate assistance  Skilled Clinical Factors: to mat- pt then placed in sidelying on R side for gravity eliminated movement patterns  Supine to Sit  Assistance Level: Requires x 2 assistance;Maximum assistance  Skilled Clinical Factors: from EOB and mat- pt pushing/ resisting through RLE increasing amount of assist needed  Scooting  Assistance Level: Requires x 2 assistance; Moderate assistance  Skilled Clinical Factors: to [facility]  Balance  Sitting Balance: Minimal assistance (varying between CGA- max A. pt very distracted/ fatigued upon getting out of bed initially requiring mod-max A for sitting balance w/ max VCs to correct to CGA-min prior to transfer. once transferred to mat pt maintained sitting balance with CGA- min A)  Transfers  Surface: From bed; Wheelchair;From mat; To mat  Additional Factors: Verbal cues; Hand placement cues; Increased time to complete (cues to lean R and to avoid pushing through RUE)  Bed To/From Chair  Technique: Sit pivot  Assistance Level: Requires x 2 assistance; Moderate assistance  Skilled Clinical Factors: from EOB>wc, wc>mat, mat> wc      Environmental Mobility  Wheelchair  Surface: Level surface  Device: Standard wheelchair  Additional Factors: Verbal cues; Increased time to complete (VCs for L attention and avoiding objects in wilder)  Assistance Required to Manage Parts: All wheelchair parts  Assistance Level for Propulsion: Minimal assistance;Stand by assist  Propulsion Method: Right upper extremity;Right lower extremity  Propulsion Quality: Slow velocity; Short strokes; Veers left  Propulsion Distance: 175' x2  Skilled Clinical Factors: SBA on way to gym with 2 instances of min A where pt ran into objects on L side and required assist to veer around. on way back to room pt required CGA- min A due to increased distraction in wilder      Neuromuscular Education  Neuromuscular Education: Yes  NDT Treatment: Upper extremity; Lower extremity (sidelying on R to place L side in gravity eliminated position)  Facilitation techniques: [place] electric stimulation  Functional Movement Patterns: Estim applied to L bicep to facilitate elbow flexion/extension for 12 mins. Estim also applied to L quad for 10 mins.  Electrical Stimulation Parameters: 34 mA CC, 5 seconds on/5 seconds off for LUE, 60 mA CC, 5 seconds on/5 seconds off for LLE with goal of Neuromuscular Facilitation. strong biceps contraction noted moving 25% of elbow ROM and AAROM performed to reach full ROM. small quad contraction noted with occasional large contraction into knee extension. ASSESSMENT/PROGRESS TOWARDS GOALS       Assessment  Assessment: pt tolerated session well, very hardworking and eager to participate. pt continues to require assist of 2 for all sit pivot transfers and requires L knee blocked at all times. pt demonstrating continued L lateral and slight posterior lean in sitting requiring max VCs for correction and awareness. Therapy session today focused on performance of russian stimulation in sidelying for gravity eliminated AAROM to bicep and quadriceps, good contraction noted with application to biceps, inc pain reported by pt with quad stimulation limiting contraction able to achieve however pt did demonstrate a few episodes of strong knee extension. pt very limited by hemiparesis noted on LUE/LE as well as L inattention. pt well below baseline and will benefit from continued skilled PT to maximize independence. Activity Tolerance: Patient tolerated treatment well;Patient limited by pain; Patient limited by fatigue  Discharge Recommendations: 24 hour supervision or assist;Continue to assess pending progress;Home with Home health PT  PT Equipment Recommendations  Other: continue to assess    Goals  Patient Goals   Patient Goals : to go home  Short Term Goals  Time Frame for Short Term Goals: 2 weeks (all ongoing)  Short Term Goal 1: Pt will complete bed mobility with moderate assist  Short Term Goal 2: Pt will complete sit<>stand transfer wtih moderate assist  Short Term Goal 3: Pt will propel self in manual w/c for 150' with minimal assist.- met 2/2  Short Term Goal 4: Pt will ambulate 10' with moderate assist.  Long Term Goals  Time Frame for Long Term Goals : 4 weeks (all ongoing)  Long Term Goal 1: Pt will complete bed mobility with CGA  Long Term Goal 2: Pt will complete sit<>Stand transfers with CGA  Long Term Goal 3: Pt will propel self in manual w/c for 250' with suprvision  Long Term Goal 4: Pt will ambulate 48' with CGA & LRAD    PLAN OF CARE/SAFETY  Physcial Therapy Plan  Days Per Week: 5 Days  Hours Per Day: 1 hour  Therapy Duration: 4 Weeks  Current Treatment Recommendations: Strengthening;Balance training;ROM; Functional mobility training;Neuromuscular re-education; Safety education & training;Patient/Caregiver education & training;Transfer training; Endurance training;Home exercise program;Gait training;Equipment evaluation, education, & procurement;Positioning; Therapeutic activities  Safety Devices  Type of Devices: Chair alarm in place;Call light within reach; Left in chair;Nurse notified;Gait belt;Patient at risk for falls  Restraints  Restraints Initially in Place: No    EDUCATION  Education  Education Given To: Patient  Education Provided: Role of Therapy;Plan of Care; Mobility Training;Transfer Training  Education Method: Verbal  Barriers to Learning: Cognition  Education Outcome: Verbalized understanding;Continued education needed        Therapy Time   Individual Concurrent Group Co-treatment   Time In       1315   Time Out       1430   Minutes       75     Timed Code Treatment Minutes: 75 Minutes  Variance: 0    Jane Hayden PT, 02/03/23 at 3:22 PM

## 2023-02-03 NOTE — PLAN OF CARE
Problem: Discharge Planning  Goal: Discharge to home or other facility with appropriate resources  Outcome: Progressing  Flowsheets (Taken 1/30/2023 2130 by Sachin Melgoza, RN)  Discharge to home or other facility with appropriate resources: Identify barriers to discharge with patient and caregiver     Problem: Skin/Tissue Integrity  Goal: Absence of new skin breakdown  Description: 1. Monitor for areas of redness and/or skin breakdown  2. Assess vascular access sites hourly  3. Every 4-6 hours minimum:  Change oxygen saturation probe site  4. Every 4-6 hours:  If on nasal continuous positive airway pressure, respiratory therapy assess nares and determine need for appliance change or resting period.   Outcome: Progressing     Problem: ABCDS Injury Assessment  Goal: Absence of physical injury  Outcome: Progressing  Flowsheets (Taken 1/29/2023 1656)  Absence of Physical Injury: Implement safety measures based on patient assessment     Problem: Safety - Adult  Goal: Free from fall injury  Outcome: Progressing  Flowsheets (Taken 1/29/2023 1656)  Free From Fall Injury: Instruct family/caregiver on patient safety     Problem: Pain  Goal: Verbalizes/displays adequate comfort level or baseline comfort level  Outcome: Progressing  Flowsheets (Taken 1/31/2023 1811 by Trevor Squires, JUANITA)  Verbalizes/displays adequate comfort level or baseline comfort level: Encourage patient to monitor pain and request assistance     Problem: Nutrition Deficit:  Goal: Optimize nutritional status  Outcome: Progressing  Flowsheets (Taken 2/2/2023 1308 by Blanca Han RD)  Nutrient intake appropriate for improving, restoring, or maintaining nutritional needs:   Assess nutritional status and recommend course of action   Monitor oral intake, labs, and treatment plans   Recommend appropriate diets, oral nutritional supplements, and vitamin/mineral supplements

## 2023-02-03 NOTE — PROGRESS NOTES
Patient alert and oriented x4, has been tired this morning. C/o not having BM for a week. There is a BM charted, but wife and client doubt it is true, lactulose and SMOG enema ordered by Dr. Hector Gaston. Has pain in left hip, took tylenol this morning declined tramadol d/t afraid it would make him more sleepy. Will continue to monitor.

## 2023-02-04 LAB
DRVVT CONFIRMATION TEST: ABNORMAL RATIO
DRVVT SCREEN: 24 SEC (ref 33–44)
DRVVT,DIL: ABNORMAL SEC (ref 33–44)
HEXAGONAL PHOSPHOLIPID NEUTRALIZAT TEST: ABNORMAL
LUPUS ANTICOAG INTERP: ABNORMAL
PLT NEUTA: ABNORMAL
PT D: 13.5 SEC (ref 12–15.5)
PTT D: 34 SEC (ref 32–48)
PTT-D CORR REFLEX: ABNORMAL SEC (ref 32–48)
PTT-HEPARIN NEUTRALIZED: ABNORMAL SEC (ref 32–48)
REPTILASE TIME: ABNORMAL SEC
THROMBIN TIME: ABNORMAL SEC (ref 14.7–19.5)

## 2023-02-04 PROCEDURE — 92526 ORAL FUNCTION THERAPY: CPT

## 2023-02-04 PROCEDURE — 6370000000 HC RX 637 (ALT 250 FOR IP): Performed by: PHYSICAL MEDICINE & REHABILITATION

## 2023-02-04 PROCEDURE — 97129 THER IVNTJ 1ST 15 MIN: CPT

## 2023-02-04 PROCEDURE — 99232 SBSQ HOSP IP/OBS MODERATE 35: CPT | Performed by: PHYSICAL MEDICINE & REHABILITATION

## 2023-02-04 PROCEDURE — 6360000002 HC RX W HCPCS: Performed by: PHYSICAL MEDICINE & REHABILITATION

## 2023-02-04 PROCEDURE — 1280000000 HC REHAB R&B

## 2023-02-04 RX ORDER — TIZANIDINE 4 MG/1
2 TABLET ORAL EVERY 6 HOURS PRN
Status: DISCONTINUED | OUTPATIENT
Start: 2023-02-04 | End: 2023-02-13

## 2023-02-04 RX ADMIN — ACETAMINOPHEN 650 MG: 325 TABLET, FILM COATED ORAL at 20:41

## 2023-02-04 RX ADMIN — DICLOFENAC SODIUM 4 G: 10 GEL TOPICAL at 20:41

## 2023-02-04 RX ADMIN — TIZANIDINE 2 MG: 4 TABLET ORAL at 20:41

## 2023-02-04 RX ADMIN — TRAZODONE HYDROCHLORIDE 50 MG: 50 TABLET ORAL at 20:41

## 2023-02-04 RX ADMIN — NYSTATIN 500000 UNITS: 100000 SUSPENSION ORAL at 18:21

## 2023-02-04 RX ADMIN — EZETIMIBE 10 MG: 10 TABLET ORAL at 09:34

## 2023-02-04 RX ADMIN — Medication 5 MG: at 20:41

## 2023-02-04 RX ADMIN — ROSUVASTATIN CALCIUM 40 MG: 20 TABLET, FILM COATED ORAL at 09:36

## 2023-02-04 RX ADMIN — PANTOPRAZOLE SODIUM 40 MG: 40 TABLET, DELAYED RELEASE ORAL at 05:58

## 2023-02-04 RX ADMIN — TRAMADOL HYDROCHLORIDE 25 MG: 50 TABLET, COATED ORAL at 09:38

## 2023-02-04 RX ADMIN — NYSTATIN 500000 UNITS: 100000 SUSPENSION ORAL at 09:37

## 2023-02-04 RX ADMIN — BISACODYL 5 MG: 5 TABLET, COATED ORAL at 09:35

## 2023-02-04 RX ADMIN — ENOXAPARIN SODIUM 40 MG: 100 INJECTION SUBCUTANEOUS at 09:36

## 2023-02-04 RX ADMIN — NYSTATIN 500000 UNITS: 100000 SUSPENSION ORAL at 20:42

## 2023-02-04 RX ADMIN — ASPIRIN 81 MG 81 MG: 81 TABLET ORAL at 09:34

## 2023-02-04 RX ADMIN — FLUOXETINE 10 MG: 10 CAPSULE ORAL at 09:34

## 2023-02-04 RX ADMIN — DICLOFENAC SODIUM 4 G: 10 GEL TOPICAL at 09:47

## 2023-02-04 RX ADMIN — NYSTATIN 500000 UNITS: 100000 SUSPENSION ORAL at 13:22

## 2023-02-04 RX ADMIN — TIZANIDINE 2 MG: 4 TABLET ORAL at 13:23

## 2023-02-04 ASSESSMENT — PAIN DESCRIPTION - ONSET
ONSET: ON-GOING
ONSET: ON-GOING

## 2023-02-04 ASSESSMENT — PAIN DESCRIPTION - DESCRIPTORS
DESCRIPTORS: ACHING;DISCOMFORT
DESCRIPTORS: ACHING
DESCRIPTORS: ACHING

## 2023-02-04 ASSESSMENT — PAIN - FUNCTIONAL ASSESSMENT
PAIN_FUNCTIONAL_ASSESSMENT: PREVENTS OR INTERFERES SOME ACTIVE ACTIVITIES AND ADLS

## 2023-02-04 ASSESSMENT — PAIN DESCRIPTION - ORIENTATION
ORIENTATION: LEFT

## 2023-02-04 ASSESSMENT — PAIN SCALES - GENERAL
PAINLEVEL_OUTOF10: 3
PAINLEVEL_OUTOF10: 6
PAINLEVEL_OUTOF10: 6
PAINLEVEL_OUTOF10: 0
PAINLEVEL_OUTOF10: 0

## 2023-02-04 ASSESSMENT — PAIN DESCRIPTION - FREQUENCY
FREQUENCY: INTERMITTENT
FREQUENCY: INTERMITTENT

## 2023-02-04 ASSESSMENT — PAIN DESCRIPTION - LOCATION
LOCATION: HIP

## 2023-02-04 ASSESSMENT — PAIN DESCRIPTION - PAIN TYPE
TYPE: ACUTE PAIN
TYPE: ACUTE PAIN

## 2023-02-04 NOTE — PLAN OF CARE
Problem: Discharge Planning  Goal: Discharge to home or other facility with appropriate resources  2/3/2023 2158 by Nurys Tipton RN  Outcome: Progressing  2/3/2023 1147 by Sam Jarquin RN  Outcome: Progressing  Flowsheets (Taken 1/30/2023 2130 by Nurys Tipton RN)  Discharge to home or other facility with appropriate resources: Identify barriers to discharge with patient and caregiver     Problem: Skin/Tissue Integrity  Goal: Absence of new skin breakdown  Description: 1. Monitor for areas of redness and/or skin breakdown  2. Assess vascular access sites hourly  3. Every 4-6 hours minimum:  Change oxygen saturation probe site  4. Every 4-6 hours:  If on nasal continuous positive airway pressure, respiratory therapy assess nares and determine need for appliance change or resting period.   2/3/2023 2158 by Nurys Tipton RN  Outcome: Progressing  2/3/2023 1147 by Sam Jarquin RN  Outcome: Progressing     Problem: ABCDS Injury Assessment  Goal: Absence of physical injury  2/3/2023 2158 by Nurys Tipton RN  Outcome: Progressing  2/3/2023 1147 by Sam Jarquin RN  Outcome: Progressing  Flowsheets (Taken 1/29/2023 1656)  Absence of Physical Injury: Implement safety measures based on patient assessment     Problem: Safety - Adult  Goal: Free from fall injury  2/3/2023 2158 by Nurys Tipton RN  Outcome: Progressing  2/3/2023 1147 by Sam Jarquin RN  Outcome: Progressing  Flowsheets (Taken 1/29/2023 1656)  Free From Fall Injury: Instruct family/caregiver on patient safety     Problem: Pain  Goal: Verbalizes/displays adequate comfort level or baseline comfort level  2/3/2023 2158 by Nurys Tipton RN  Outcome: Progressing  Flowsheets (Taken 2/3/2023 2104)  Verbalizes/displays adequate comfort level or baseline comfort level: Encourage patient to monitor pain and request assistance  2/3/2023 1147 by Sam Jarquin RN  Outcome: Progressing  Flowsheets (Taken 1/31/2023 1811 by Ricardo Bobby RN)  Verbalizes/displays adequate comfort level or baseline comfort level: Encourage patient to monitor pain and request assistance     Problem: Nutrition Deficit:  Goal: Optimize nutritional status  2/3/2023 2158 by Elian Bowers RN  Outcome: Progressing  2/3/2023 1147 by Leigh Santoro RN  Outcome: Progressing  Flowsheets (Taken 2/2/2023 1308 by Rudy Olson RD)  Nutrient intake appropriate for improving, restoring, or maintaining nutritional needs:   Assess nutritional status and recommend course of action   Monitor oral intake, labs, and treatment plans   Recommend appropriate diets, oral nutritional supplements, and vitamin/mineral supplements

## 2023-02-04 NOTE — PROGRESS NOTES
Department of Physical Medicine & Rehabilitation  Progress Note    Patient Identification:  Lindsay Ramos  [id number]  : 1955  Admit date: 2023    Chief Complaint: Acute CVA (cerebrovascular accident) [facility])    Subjective:   Resting in bed this morning on exam. No new complaints overnight. Continues to have tingling and neurogenic pain in his left leg. Working hard in therapy. Slept well last night. ROS: No f/c, n/v, cp     Objective:  Patient Vitals for the past 24 hrs:   BP Temp Temp src Pulse Resp SpO2   23 0922 123/76 98.1 °F (36.7 °C) Oral 83 18 95 %   23 2104 132/84 98.2 °F (36.8 °C) Oral 78 18 98 %   23 1300 -- -- -- -- -- 96 %       Const: Alert. No distress, pleasant. HEENT: Normocephalic, atraumatic. Normal sclera/conjunctiva. MMM. CV: Regular rate and rhythm. Resp: No respiratory distress. Lungs CTAB. Abd: Soft, nontender, nondistended, NABS+   Ext: No edema. Neuro: Alert, oriented, appropriately interactive. Psych: Cooperative, appropriate mood and affect    Laboratory data: Available via EMR. Last 24 hour lab  No results found for this or any previous visit (from the past 24 hour(s)). Therapy progress:  PT  Position Activity Restriction  Other position/activity restrictions: up as tolerated, up in chair (Simultaneous filing. User may not have seen previous data.)  Objective     Sit to Stand: Maximum Assistance, 2 Person Assistance  Stand to Sit: 2 Person Assistance, Maximum Assistance     OT  PT Equipment Recommendations  Equipment Needed:  (defer)  Other: continue to assess  Toilet - Technique:  (via rolling shower chair)  Equipment Used:  (rolling shower chair)  Toilet Transfers Comments: pt did not have urge to void, however rolled pt in bathroom via rolling shower chair. Max A x 2 to pivot from bed to rolling shower chair. Assessment        SLP          Body mass index is 23.97 kg/m².     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 Patient is on aspirin at home        HTN  Patient is on lisinopril 5mg daily at home  - monitor      HLD:  Patient is on Crestor 20mg and ezetimibe 10mg at home daily  -Continue home meds     Anxiety  Per chart, patient is on Valium 2mg at home. Possible could be contributor to his chest pain yesterday, notes he felt anxious. Denies taking Valium at home, however it is listed as a home med.  Ativan 0.5mg once overnight   - prozac      Sleep disturbance  - Trazodone PRN  - benadryl PRN    Add low dose tizanidine for spasticity         Rehab Progress: Improving  Anticipated Dispo: home  Services/DME: [facility]  ELOS: 2/19      BRAN DesirPEliH  PM&R  2/4/2023  12:15 PM

## 2023-02-04 NOTE — PROGRESS NOTES
Shift assessment complete. VSS. Patient A/O x4, confused at times. Patient C/O 2/10 headache, PRN Tylenol given. Medications crushed in pudding, tolerated well, patient reminded to turn head to the left to swallow and at midline. Safety measures in place. Call light and over bed table within reach. Hourly rounding and visual checks in place. Will continue to monitor.

## 2023-02-04 NOTE — PROGRESS NOTES
Pt awake in bed eating breakfast. Physical assessment and vital signs as charted. Pt currently rates his L hip pain as a 6 out of 10 on the pain scale, 25 mg of PRN tramadol was administered to the Pt at 0938. Call light placed within reach. RN will continue to monitor Pt.

## 2023-02-04 NOTE — PROGRESS NOTES
Patient took lactulose after lunch and it went down trachea, patient coughed for several minutes, lungs clear, oral suction provided, o2 sats never dropped below 95%, but patient very distraught and afraid to eat, would not eat dinner or take nystatin, stating afraid and also not hungry. Patient needs to turn head to left head level with every swallow, patient had forgotten to do that when he coughed it up. Will continue to monitor speech therapy is aware.

## 2023-02-04 NOTE — PROGRESS NOTES
Speech Language Pathology  ACUTE REHAB UNIT  SPEECH/LANGUAGE PATHOLOGY      [x] Daily  [x] Weekly Care Conference Note  [] Discharge    Patient:Jason Carbajal  UZMA:[id number]  Rehab Dx/Hx: Acute CVA (cerebrovascular accident) ([facility] [address] [I63.9]    Precautions: [x] Aspiration  [x] Fall risk  [] Sternal  [] Seizure [] Hip  [] Weight Bearing [] Other     ST Dx: [] Aphasia  [x] Dysarthria  [] Apraxia   [x] Oropharyngeal dysphagia [x] Cognitive Impairment  [] Other:   Date of Admit: 1/29/2023  Room #: 3102/3102-01  Date: 2/4/2023          Current Diet Order:ADULT DIET; Dysphagia - Minced and Moist  ADULT ORAL NUTRITION SUPPLEMENT; Breakfast, Dinner; Standard High Calorie/High Protein Oral Supplement   Recommended Form of Meds: Meds in puree  Compensatory Swallowing Strategies :  (Small bites, no pharyngeal neck extension, left head turn with thins, check for pocketing on left, 1:1 assist for small bolus size)   Previous MBS: 1/26/23  Oral Phase  Mild-moderate dysfunction characterized by incomplete labial seal with anterior spillage, slowed/reduced mastication, mild stasis. Pharyngeal Phase  Moderate dysfunction characterized by impairments in timing, strength and coordination with premature bolus loss, reduced base of tongue retraction, delayed/reduced hyolaryngeal mechanics, and reduced pharyngeal constriction. Resulted in vallecular/pyriform/pharyngeal residue, as well as compromised airway protection with penetration and gross tracheal aspiration of thin and mildly thick liquids; strong reactive cough present but did not fully clear. Chin tuck strategy did not eliminate aspiration, however cued head turn LEFT with small straw sips was effective . Double swallow helped clear residual. Of note, throughout study pt with tendency to tilt head posteriorly which further exacerbated bolus control; benefited from cues for neutral positioning.   Upper Esophageal Screen  Indirectly assessed; appeared unremarkable. 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 Pain Intervention: [x] RN notified  [] Repositioned  [] Intervention offered and patient declined  [] N/A  [] Other:   Subjective:     Pt alert and agreeable to ST, daughter present in room for session. Pt declining solid PO this day, leg bothering him too much. Objective / Goals:    Goal 1: Pt will demonstrate adequate oral containment of liquids and solids across meal without cueing. Targeted labial seal via oral motor exercises. Pt completing lip puckering x10. Pt drawing thin liquids via straw with min loss noted. Goal 2: Pt will tolerate trials of solids with adequate mastication and propulsion as evidenced by min residue after the swallow across mealtime assessment. Pt declines solids at this time d/t increased leg pain. Goal 3: Pt will tolerate thin liquids with head in neutral position without s/s associated with aspiration. Cued fast/effortful swallows x15. CTAR x30. Pt tolerating 10 straw drinks thin liquids with head in neutral left position, independently states this strategy. Goal 4: Pt will demonstrate safe feeding behavior as evidenced by small bolus size / appropriate rate without cues.    Pt declines solids at this time d/t increased leg pain. Goal 5: Pt will tolerate least restrictive diet without respiratory decline. Chart review does not reveal any documented difficulty on current recommended diet level. Goal 1: The patient will maintain eye contact to speaker positioned left of midline without distractors, min cues. Maintained appropriate eye contact throughout session with speaker at midline and to left of midline when cued to do so. Pt did not exhibit any instances of visualization with incorrect speakers. Pt telling stories to pt and clinician, mostly looking straight ahead rather than at either listener in L visual field. Goal 2: The patient will demonstrate sustained attention to task for 2 minutes with <2 prompts. Goal not targeted this session. Goal 3: The patient will demonstrate improved inhibition as evidenced by <3 prompts for impulsivity during a task. No overt impulsivity noted. Goal 4: The patient will demonstrate improved self monitoring/correcting for basic tasks with <mod cues. Pt able to verbalize impairments this date but unable to correct consistently. Goal 5: The patient will tolerate ongoing cognitive linguistic assessment. Goal not targeted this session. Goal 6: The patient will demonstrate comprehensibility in multiple communication environments without cues. Goal not targeted this session. Other areas targeted:    Education:   Education ongoing re: skills targeted   Safety Devices: [x] Call light within reach  [x] Chair alarm activated and connected to nurse call light system  [] Bed alarm activated   [] Other:    Assessment: Oropharyngeal dysphagia. 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 Good prognosis for improvement. Plan: Continue as per plan of care.       Interventions used this date:  [] Speech/Language Treatment  [] Instruction in HEP  [x] Dysphagia Treatment [x] Cognitive Treatment   [] Other:    Discharge recommendations:  [] Home independently  [x] Home with assistance []  24 hour supervision  [] ECF [] Other  Continued Tx Upon Discharge: ? [x] Yes    [] No    [] TBD based on progress while on ARU     [] Vital Stim indicated     [] Other:   Estimated discharge date: February 19th, 2023    Electronically signed by:   Caitlyn Madera. [id number]  Speech Language Pathologist

## 2023-02-05 VITALS
DIASTOLIC BLOOD PRESSURE: 72 MMHG | HEIGHT: 73 IN | OXYGEN SATURATION: 97 % | BODY MASS INDEX: 24.08 KG/M2 | HEART RATE: 72 BPM | SYSTOLIC BLOOD PRESSURE: 120 MMHG | WEIGHT: 181.66 LBS | TEMPERATURE: 97.3 F | RESPIRATION RATE: 18 BRPM

## 2023-02-05 PROCEDURE — 6360000002 HC RX W HCPCS: Performed by: PHYSICAL MEDICINE & REHABILITATION

## 2023-02-05 PROCEDURE — 99232 SBSQ HOSP IP/OBS MODERATE 35: CPT | Performed by: PHYSICAL MEDICINE & REHABILITATION

## 2023-02-05 PROCEDURE — 6370000000 HC RX 637 (ALT 250 FOR IP): Performed by: PHYSICAL MEDICINE & REHABILITATION

## 2023-02-05 PROCEDURE — 1280000000 HC REHAB R&B

## 2023-02-05 RX ADMIN — FLUOXETINE 10 MG: 10 CAPSULE ORAL at 10:34

## 2023-02-05 RX ADMIN — PANTOPRAZOLE SODIUM 40 MG: 40 TABLET, DELAYED RELEASE ORAL at 06:19

## 2023-02-05 RX ADMIN — TRAZODONE HYDROCHLORIDE 50 MG: 50 TABLET ORAL at 19:36

## 2023-02-05 RX ADMIN — TIZANIDINE 2 MG: 4 TABLET ORAL at 19:36

## 2023-02-05 RX ADMIN — ACETAMINOPHEN 650 MG: 325 TABLET, FILM COATED ORAL at 20:47

## 2023-02-05 RX ADMIN — ACETAMINOPHEN 650 MG: 325 TABLET, FILM COATED ORAL at 16:48

## 2023-02-05 RX ADMIN — EZETIMIBE 10 MG: 10 TABLET ORAL at 10:34

## 2023-02-05 RX ADMIN — ASPIRIN 81 MG 81 MG: 81 TABLET ORAL at 10:34

## 2023-02-05 RX ADMIN — NYSTATIN 500000 UNITS: 100000 SUSPENSION ORAL at 12:55

## 2023-02-05 RX ADMIN — NYSTATIN 500000 UNITS: 100000 SUSPENSION ORAL at 20:47

## 2023-02-05 RX ADMIN — Medication 5 MG: at 20:47

## 2023-02-05 RX ADMIN — NYSTATIN 500000 UNITS: 100000 SUSPENSION ORAL at 10:36

## 2023-02-05 RX ADMIN — DICLOFENAC SODIUM 4 G: 10 GEL TOPICAL at 16:49

## 2023-02-05 RX ADMIN — TIZANIDINE 2 MG: 4 TABLET ORAL at 10:34

## 2023-02-05 RX ADMIN — NYSTATIN 500000 UNITS: 100000 SUSPENSION ORAL at 17:55

## 2023-02-05 RX ADMIN — ROSUVASTATIN CALCIUM 40 MG: 20 TABLET, FILM COATED ORAL at 10:35

## 2023-02-05 RX ADMIN — ENOXAPARIN SODIUM 40 MG: 100 INJECTION SUBCUTANEOUS at 10:36

## 2023-02-05 RX ADMIN — DICLOFENAC SODIUM 4 G: 10 GEL TOPICAL at 20:47

## 2023-02-05 ASSESSMENT — PAIN - FUNCTIONAL ASSESSMENT
PAIN_FUNCTIONAL_ASSESSMENT: PREVENTS OR INTERFERES SOME ACTIVE ACTIVITIES AND ADLS

## 2023-02-05 ASSESSMENT — PAIN SCALES - GENERAL
PAINLEVEL_OUTOF10: 3
PAINLEVEL_OUTOF10: 5
PAINLEVEL_OUTOF10: 2
PAINLEVEL_OUTOF10: 7
PAINLEVEL_OUTOF10: 2

## 2023-02-05 ASSESSMENT — PAIN DESCRIPTION - ORIENTATION
ORIENTATION: LEFT

## 2023-02-05 ASSESSMENT — PAIN DESCRIPTION - FREQUENCY
FREQUENCY: INTERMITTENT

## 2023-02-05 ASSESSMENT — PAIN DESCRIPTION - DESCRIPTORS
DESCRIPTORS: ACHING;DISCOMFORT
DESCRIPTORS: ACHING;DISCOMFORT;SPASM

## 2023-02-05 ASSESSMENT — PAIN DESCRIPTION - ONSET
ONSET: ON-GOING

## 2023-02-05 ASSESSMENT — PAIN DESCRIPTION - LOCATION
LOCATION: HIP

## 2023-02-05 ASSESSMENT — PAIN DESCRIPTION - PAIN TYPE
TYPE: ACUTE PAIN

## 2023-02-05 NOTE — PROGRESS NOTES
Shift assessment complete. VSS. Patient A/O x4, confused at times. Patient C/O 3/10 Left hip pain PRN Tylenol and Zanaflex given. Medications given whole in pudding, tolerated well, patient reminded to turn head to the left to swallow and at midline. Safety measures in place. Call light and over bed table within reach. Hourly rounding and visual checks in place. Will continue to monitor.

## 2023-02-05 NOTE — PROGRESS NOTES
Pt awake in his recliner visiting with family. Physical assessment and vital signs as charted. Pt currently rates his L hip pain as a 5 out of 10 on the pain scale, 2 mg of PRN zanaflex was administered to the Pt at 1034. Call light placed within reach. RN will continue to monitor Pt.

## 2023-02-05 NOTE — PROGRESS NOTES
Department of Physical Medicine & Rehabilitation  Progress Note    Patient Identification:  Anton Coker  [id number]  : 1955  Admit date: 2023    Chief Complaint: Acute CVA (cerebrovascular accident) [facility])    Subjective:   Feeling much better today. Improved sleep with muscle relaxer at night. He is in good spirits and is ready for therapy tomorrow. Will attempt bed exercises today. Seen in his chair this morning. ROS: No f/c, n/v, cp     Objective:  Patient Vitals for the past 24 hrs:   BP Temp Temp src Pulse Resp SpO2   23 1028 116/73 97.3 °F (36.3 °C) Oral 70 18 96 %   23 2030 120/74 98.8 °F (37.1 °C) Oral 85 18 96 %   23 1502 107/72 98.2 °F (36.8 °C) Oral 83 18 95 %       Const: Alert. No distress, pleasant. HEENT: Normocephalic, atraumatic. Normal sclera/conjunctiva. MMM. CV: Regular rate and rhythm. Resp: No respiratory distress. Lungs CTAB. Abd: Soft, nontender, nondistended, NABS+   Ext: No edema. Neuro: Alert, oriented, appropriately interactive. Psych: Cooperative, appropriate mood and affect    Laboratory data: Available via EMR. Last 24 hour lab  No results found for this or any previous visit (from the past 24 hour(s)). Therapy progress:  PT  Position Activity Restriction  Other position/activity restrictions: up as tolerated, up in chair (Simultaneous filing. User may not have seen previous data.)  Objective     Sit to Stand: Maximum Assistance, 2 Person Assistance  Stand to Sit: 2 Person Assistance, Maximum Assistance     OT  PT Equipment Recommendations  Equipment Needed:  (defer)  Other: continue to assess  Toilet - Technique:  (via rolling shower chair)  Equipment Used:  (rolling shower chair)  Toilet Transfers Comments: pt did not have urge to void, however rolled pt in bathroom via rolling shower chair. Max A x 2 to pivot from bed to rolling shower chair. Assessment        SLP          Body mass index is 23.97 kg/m².     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 Patient is on aspirin at home        HTN  Patient is on lisinopril 5mg daily at home  - monitor      HLD:  Patient is on Crestor 20mg and ezetimibe 10mg at home daily  -Continue home meds     Anxiety  Per chart, patient is on Valium 2mg at home. Possible could be contributor to his chest pain yesterday, notes he felt anxious. Denies taking Valium at home, however it is listed as a home med.  Ativan 0.5mg once overnight   - prozac      Sleep disturbance  - Trazodone PRN  - benadryl PRN    Add low dose tizanidine for spasticity   - improving left leg tightness         Rehab Progress: Improving  Anticipated Dispo: home  Services/DME: [facility]  ELOS: 2/19      Taco Patel D.O. MEliP.H  PM&R  2/5/2023  11:25 AM

## 2023-02-05 NOTE — PLAN OF CARE
Problem: Discharge Planning  Goal: Discharge to home or other facility with appropriate resources  Outcome: Progressing     Problem: Skin/Tissue Integrity  Goal: Absence of new skin breakdown  Description: 1. Monitor for areas of redness and/or skin breakdown  2. Assess vascular access sites hourly  3. Every 4-6 hours minimum:  Change oxygen saturation probe site  4. Every 4-6 hours:  If on nasal continuous positive airway pressure, respiratory therapy assess nares and determine need for appliance change or resting period.   Outcome: Progressing     Problem: ABCDS Injury Assessment  Goal: Absence of physical injury  Outcome: Progressing     Problem: Safety - Adult  Goal: Free from fall injury  Outcome: Progressing  Free From Fall Injury: Based on caregiver fall risk screen, instruct family/caregiver to ask for assistance with transferring infant if caregiver noted to have fall risk factors     Problem: Pain  Goal: Verbalizes/displays adequate comfort level or baseline comfort level  Outcome: Progressing  Verbalizes/displays adequate comfort level or baseline comfort level: Encourage patient to monitor pain and request assistance     Problem: Nutrition Deficit:  Goal: Optimize nutritional status  Outcome: Progressing

## 2023-02-06 LAB
ANION GAP SERPL CALCULATED.3IONS-SCNC: 8 MMOL/L (ref 3–16)
BASOPHILS ABSOLUTE: 0.1 K/UL (ref 0–0.2)
BASOPHILS RELATIVE PERCENT: 0.8 %
BUN BLDV-MCNC: 20 MG/DL (ref 7–20)
CALCIUM SERPL-MCNC: 9.4 MG/DL (ref 8.3–10.6)
CHLORIDE BLD-SCNC: 98 MMOL/L (ref 99–110)
CO2: 28 MMOL/L (ref 21–32)
CREAT SERPL-MCNC: 0.6 MG/DL (ref 0.8–1.3)
EOSINOPHILS ABSOLUTE: 0.2 K/UL (ref 0–0.6)
EOSINOPHILS RELATIVE PERCENT: 2.7 %
GFR SERPL CREATININE-BSD FRML MDRD: >60 ML/MIN/{1.73_M2}
GLUCOSE BLD-MCNC: 117 MG/DL (ref 70–99)
HCT VFR BLD CALC: 39.2 % (ref 40.5–52.5)
HEMOGLOBIN: 13.3 G/DL (ref 13.5–17.5)
LYMPHOCYTES ABSOLUTE: 1.9 K/UL (ref 1–5.1)
LYMPHOCYTES RELATIVE PERCENT: 25.5 %
MCH RBC QN AUTO: 32.3 PG (ref 26–34)
MCHC RBC AUTO-ENTMCNC: 33.8 G/DL (ref 31–36)
MCV RBC AUTO: 95.7 FL (ref 80–100)
MONOCYTES ABSOLUTE: 0.6 K/UL (ref 0–1.3)
MONOCYTES RELATIVE PERCENT: 8.4 %
NEUTROPHILS ABSOLUTE: 4.7 K/UL (ref 1.7–7.7)
NEUTROPHILS RELATIVE PERCENT: 62.6 %
PDW BLD-RTO: 12.3 % (ref 12.4–15.4)
PLATELET # BLD: 338 K/UL (ref 135–450)
PMV BLD AUTO: 7.1 FL (ref 5–10.5)
POTASSIUM REFLEX MAGNESIUM: 4.2 MMOL/L (ref 3.5–5.1)
RBC # BLD: 4.1 M/UL (ref 4.2–5.9)
SODIUM BLD-SCNC: 134 MMOL/L (ref 136–145)
WBC # BLD: 7.6 K/UL (ref 4–11)

## 2023-02-06 PROCEDURE — 97110 THERAPEUTIC EXERCISES: CPT

## 2023-02-06 PROCEDURE — 36415 COLL VENOUS BLD VENIPUNCTURE: CPT

## 2023-02-06 PROCEDURE — 97530 THERAPEUTIC ACTIVITIES: CPT

## 2023-02-06 PROCEDURE — 92526 ORAL FUNCTION THERAPY: CPT

## 2023-02-06 PROCEDURE — 6370000000 HC RX 637 (ALT 250 FOR IP): Performed by: PHYSICAL MEDICINE & REHABILITATION

## 2023-02-06 PROCEDURE — 1280000000 HC REHAB R&B

## 2023-02-06 PROCEDURE — 6360000002 HC RX W HCPCS: Performed by: PHYSICAL MEDICINE & REHABILITATION

## 2023-02-06 PROCEDURE — 85025 COMPLETE CBC W/AUTO DIFF WBC: CPT

## 2023-02-06 PROCEDURE — 97129 THER IVNTJ 1ST 15 MIN: CPT

## 2023-02-06 PROCEDURE — 97112 NEUROMUSCULAR REEDUCATION: CPT

## 2023-02-06 PROCEDURE — 97130 THER IVNTJ EA ADDL 15 MIN: CPT

## 2023-02-06 PROCEDURE — 80048 BASIC METABOLIC PNL TOTAL CA: CPT

## 2023-02-06 PROCEDURE — 99232 SBSQ HOSP IP/OBS MODERATE 35: CPT | Performed by: PHYSICAL MEDICINE & REHABILITATION

## 2023-02-06 RX ADMIN — DICLOFENAC SODIUM 4 G: 10 GEL TOPICAL at 09:24

## 2023-02-06 RX ADMIN — TRAMADOL HYDROCHLORIDE 25 MG: 50 TABLET, COATED ORAL at 18:32

## 2023-02-06 RX ADMIN — Medication 5 MG: at 20:28

## 2023-02-06 RX ADMIN — ACETAMINOPHEN 650 MG: 325 TABLET, FILM COATED ORAL at 09:24

## 2023-02-06 RX ADMIN — TRAZODONE HYDROCHLORIDE 50 MG: 50 TABLET ORAL at 20:28

## 2023-02-06 RX ADMIN — ENOXAPARIN SODIUM 40 MG: 100 INJECTION SUBCUTANEOUS at 09:23

## 2023-02-06 RX ADMIN — ROSUVASTATIN CALCIUM 40 MG: 20 TABLET, FILM COATED ORAL at 09:29

## 2023-02-06 RX ADMIN — NYSTATIN 500000 UNITS: 100000 SUSPENSION ORAL at 09:24

## 2023-02-06 RX ADMIN — NYSTATIN 500000 UNITS: 100000 SUSPENSION ORAL at 18:11

## 2023-02-06 RX ADMIN — TIZANIDINE 2 MG: 4 TABLET ORAL at 20:28

## 2023-02-06 RX ADMIN — FLUOXETINE 10 MG: 10 CAPSULE ORAL at 09:24

## 2023-02-06 RX ADMIN — TIZANIDINE 2 MG: 4 TABLET ORAL at 12:34

## 2023-02-06 RX ADMIN — ASPIRIN 81 MG 81 MG: 81 TABLET ORAL at 09:24

## 2023-02-06 RX ADMIN — NYSTATIN 500000 UNITS: 100000 SUSPENSION ORAL at 20:28

## 2023-02-06 RX ADMIN — DICLOFENAC SODIUM 4 G: 10 GEL TOPICAL at 20:28

## 2023-02-06 RX ADMIN — BISACODYL 5 MG: 5 TABLET, COATED ORAL at 09:24

## 2023-02-06 RX ADMIN — EZETIMIBE 10 MG: 10 TABLET ORAL at 09:24

## 2023-02-06 RX ADMIN — PANTOPRAZOLE SODIUM 40 MG: 40 TABLET, DELAYED RELEASE ORAL at 06:23

## 2023-02-06 RX ADMIN — NYSTATIN 500000 UNITS: 100000 SUSPENSION ORAL at 12:35

## 2023-02-06 ASSESSMENT — PAIN DESCRIPTION - PAIN TYPE: TYPE: ACUTE PAIN

## 2023-02-06 ASSESSMENT — PAIN SCALES - GENERAL
PAINLEVEL_OUTOF10: 0
PAINLEVEL_OUTOF10: 3
PAINLEVEL_OUTOF10: 6

## 2023-02-06 ASSESSMENT — PAIN DESCRIPTION - LOCATION
LOCATION: HIP
LOCATION: HIP

## 2023-02-06 ASSESSMENT — PAIN DESCRIPTION - DESCRIPTORS
DESCRIPTORS: ACHING;DISCOMFORT
DESCRIPTORS: ACHING;DISCOMFORT

## 2023-02-06 ASSESSMENT — PAIN DESCRIPTION - ORIENTATION
ORIENTATION: LEFT
ORIENTATION: LEFT

## 2023-02-06 ASSESSMENT — PAIN DESCRIPTION - FREQUENCY: FREQUENCY: INTERMITTENT

## 2023-02-06 ASSESSMENT — PAIN DESCRIPTION - ONSET: ONSET: ON-GOING

## 2023-02-06 NOTE — PROGRESS NOTES
Shift assessment complete. VSS. A&Ox4. Pain being managed with PRN medication. Non-pharm measures of rest, repositioning, and distraction encouraged throughout shift. Fall precautions in place. Patient up to chair for breakfast and lunch. Chair alarm utilized throughout shift. No new complaints at this time.      Vitals:    02/06/23 0753   BP: 130/86   Pulse: 64   Resp: 18   Temp: 97.6 °F (36.4 °C)   SpO2: 99%

## 2023-02-06 NOTE — PLAN OF CARE
Problem: Discharge Planning  Goal: Discharge to home or other facility with appropriate resources  Outcome: Progressing  Flowsheets (Taken 2/6/2023 0753)  Discharge to home or other facility with appropriate resources: Identify barriers to discharge with patient and caregiver     Problem: Skin/Tissue Integrity  Goal: Absence of new skin breakdown  Description: 1. Monitor for areas of redness and/or skin breakdown  2. Assess vascular access sites hourly  3. Every 4-6 hours minimum:  Change oxygen saturation probe site  4. Every 4-6 hours:  If on nasal continuous positive airway pressure, respiratory therapy assess nares and determine need for appliance change or resting period.   Outcome: Progressing     Problem: ABCDS Injury Assessment  Goal: Absence of physical injury  Outcome: Progressing     Problem: Safety - Adult  Goal: Free from fall injury  Outcome: Progressing     Problem: Pain  Goal: Verbalizes/displays adequate comfort level or baseline comfort level  Outcome: Progressing  Flowsheets (Taken 2/6/2023 0753)  Verbalizes/displays adequate comfort level or baseline comfort level:   Encourage patient to monitor pain and request assistance   Assess pain using appropriate pain scale   Administer analgesics based on type and severity of pain and evaluate response   Implement non-pharmacological measures as appropriate and evaluate response     Problem: Nutrition Deficit:  Goal: Optimize nutritional status  Outcome: Progressing

## 2023-02-06 NOTE — PROGRESS NOTES
Department of Physical Medicine & Rehabilitation  Progress Note    Patient Identification:  Annmarie Naylor  [id number]  : 1955  Admit date: 2023    Chief Complaint: Acute CVA (cerebrovascular accident) [facility])    Subjective:   Doing well. No new complaints overnight. Improving in therapy. Slept well last night. Participating in SLP this morning on exam. No new pain today. ROS: No f/c, n/v, cp     Objective:  Patient Vitals for the past 24 hrs:   BP Temp Temp src Pulse Resp SpO2 Weight   23 0753 130/86 97.6 °F (36.4 °C) Oral 64 18 99 % --   23 -- -- -- -- -- -- 169 lb 12.1 oz (77 kg)   23 120/72 97.3 °F (36.3 °C) Oral 72 18 97 % --       Const: Alert. No distress, pleasant. HEENT: Normocephalic, atraumatic. Normal sclera/conjunctiva. MMM. CV: Regular rate and rhythm. Resp: No respiratory distress. Lungs CTAB. Abd: Soft, nontender, nondistended, NABS+   Ext: No edema. Neuro: Alert, oriented, appropriately interactive. Psych: Cooperative, appropriate mood and affect    Laboratory data: Available via EMR.    Last 24 hour lab  Recent Results (from the past 24 hour(s))   Basic Metabolic Panel w/ Reflex to MG    Collection Time: 23  6:41 AM   Result Value Ref Range    Sodium 134 (L) 136 - 145 mmol/L    Potassium reflex Magnesium 4.2 3.5 - 5.1 mmol/L    Chloride 98 (L) 99 - 110 mmol/L    CO2 28 21 - 32 mmol/L    Anion Gap 8 3 - 16    Glucose 117 (H) 70 - 99 mg/dL    BUN 20 7 - 20 mg/dL    Creatinine 0.6 (L) 0.8 - 1.3 mg/dL    Est, Glom Filt Rate >60 >60    Calcium 9.4 8.3 - 10.6 mg/dL   CBC auto differential    Collection Time: 23  6:41 AM   Result Value Ref Range    WBC 7.6 4.0 - 11.0 K/uL    RBC 4.10 (L) 4.20 - 5.90 M/uL    Hemoglobin 13.3 (L) 13.5 - 17.5 g/dL    Hematocrit 39.2 (L) 40.5 - 52.5 %    MCV 95.7 80.0 - 100.0 fL    MCH 32.3 26.0 - 34.0 pg    MCHC 33.8 31.0 - 36.0 g/dL    RDW 12.3 (L) 12.4 - 15.4 %    Platelets 553 297 - 366 K/uL    MPV 7.1 5.0 - 10.5 fL    Neutrophils % 62.6 %    Lymphocytes % 25.5 %    Monocytes % 8.4 %    Eosinophils % 2.7 %    Basophils % 0.8 %    Neutrophils Absolute 4.7 1.7 - 7.7 K/uL    Lymphocytes Absolute 1.9 1.0 - 5.1 K/uL    Monocytes Absolute 0.6 0.0 - 1.3 K/uL    Eosinophils Absolute 0.2 0.0 - 0.6 K/uL    Basophils Absolute 0.1 0.0 - 0.2 K/uL           Therapy progress:  PT  Position Activity Restriction  Other position/activity restrictions: up as tolerated, up in chair (Simultaneous filing. User may not have seen previous data.)  Objective     Sit to Stand: Maximum Assistance, 2 Person Assistance  Stand to Sit: 2 Person Assistance, Maximum Assistance     OT  PT Equipment Recommendations  Equipment Needed:  (defer)  Other: continue to assess  Toilet - Technique:  (via rolling shower chair)  Equipment Used:  (rolling shower chair)  Toilet Transfers Comments: pt did not have urge to void, however rolled pt in bathroom via rolling shower chair. Max A x 2 to pivot from bed to rolling shower chair. Assessment        SLP          Body mass index is 22.4 kg/m². 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 Patient is on aspirin at home        HTN  Patient is on lisinopril 5mg daily at home  - monitor      HLD:  Patient is on Crestor 20mg and ezetimibe 10mg at home daily  -Continue home meds     Anxiety  Per chart, patient is on Valium 2mg at home. Possible could be contributor to his chest pain yesterday, notes he felt anxious. Denies taking Valium at home, however it is listed as a home med.  Ativan 0.5mg once overnight   - prozac      Sleep disturbance  - Trazodone PRN  - benadryl PRN  -improving     Add low dose tizanidine for spasticity   - improving left leg tightness         Rehab Progress: Improving  Anticipated Dispo: home  Services/DME: [place]  ELOS: 2/19      Farshad Polk BRAN JOEL  PM&R  2/6/2023  10:50 AM

## 2023-02-06 NOTE — PROGRESS NOTES
Occupational Therapy  Facility/Department: [facility] ACUTE REHAB UNIT  Rehabilitation Occupational Therapy Daily Treatment Note    Date: 23  Patient Name: Luke Turner       Room: 1832/4060-60  MRN: [id number]  Account: [de-identified]   : 1955  (68 y.o.) Gender: male                    Past Medical History:  has a past medical history of Anxiety, Chest pain, Depression, Encounter for imaging to screen for metal prior to MRI, Hyperlipidemia, Hypertension, and Wears glasses. Past Surgical History:   has a past surgical history that includes [place] tooth extraction; Colonoscopy (2009); Cardiac catheterization (2008); Finger trigger release (3-); and Coronary angioplasty with stent. Restrictions  Restrictions/Precautions: Fall Risk;Up as Tolerated    Subjective  Subjective: Pt seated in recliner upon OT arrival w/ wife present. Co-tx indicated to maximize therapeutic potential.  Restrictions/Precautions: Fall Risk;Up as Tolerated             Objective     Cognition  Arousal/Alertness: Appropriate responses to stimuli  Following Commands: Follows one step commands with repetition; Follows one step commands with increased time  Attention Span: Attends with cues to redirect; Difficulty attending to directions; Difficulty dividing attention  Safety Judgement: Decreased awareness of need for assistance;Decreased awareness of need for safety  Problem Solving: Assistance required to generate solutions;Assistance required to identify errors made;Assistance required to implement solutions  Insights: Decreased awareness of deficits  Initiation: Requires cues for some  Sequencing: Requires cues for some  Cognition Comment: Pt tangential at times, requiring cues to redirect. Pt w/ poor awareness of L side.   Orientation  Overall Orientation Status: Within Functional Limits            Functional Mobility  Device: Wheelchair  Assistance Level: Contact guard assist;Minimal assistance  Skilled Clinical Factors: Pt self propelled w/ RUE and RLE from room -> gym w/ CGA and min A, verbal cues for environmental awareness and obstacle avoidance. Sit to Supine  Assistance Level: Maximum assistance  Skilled Clinical Factors: Max A x1 sit --> supine, assisst to hook RLE under LLE. Transfers  Surface: From chair with arms; Wheelchair; To chair with arms; To mat;From mat; To bed  Additional Factors: Verbal cues; Hand placement cues; Increased time to complete  Sit to Stand  Assistance Level: Requires x 2 assistance; Moderate assistance;Minimal assistance  Skilled Clinical Factors: Pt completed STS in // bars requiring mod Ax2 for first stand and min Ax2 in second stand. Mirror used for visual feedback to correct upright posture. Stand to Sit  Assistance Level: Requires x 2 assistance;Contact guard assist  Skilled Clinical Factors: 2 helpers providing CGA for controlled descent into w/c from // bars. Sit Pivot  Assistance Level: Requires x 2 assistance; Moderate assistance;Minimal assistance  Skilled Clinical Factors: Pt completed sit pivot t/f recliner -->w/c --> edge of mat --> w/c w/ min x2 toward both R and L sides. Pt t/f w/c --> EOB toward R side w/ mod A.   OT Exercises  Exercise Treatment: Pt completed core exercises seated edge of mat using weighted ball, PNF patterns, 10x 3 reps w/ rest breaks in between bouts w/ use of mirror. Pt then completed modified sit ups w/ exerise ball placed to support trunk. Pt completed 10x 3 reps requiring max-min assist w/ therapist assisting w/ blocking BLE to prevent compensation. Max cues required in order to maintain attention. Pt required min-max A during exercises for sitting balance w/ posterior and lateral L lean. Dynamic Standing Balance Exercises: Pt completed weightshifting ~2 mins in standing w/ min LLE buckling w/ assist x2. Assessment  Assessment  Assessment: Pt tolerated session well, continues to improve w/ sit pivot transfers.  Session spent focusing on core strength in order to continue to improve sitting balane. Pt tolerated exercises well but require max verbal cueing for attention to task w/ high distractability noted this date. Pt completed 2 stands w/ less pain and LLE buckling this date w/ use of sling to support LUE. Pt would benefit from continued therapy services to maximize independene w/ ADLs and fxl mobility tasks. Cont OT POC. Activity Tolerance: Patient tolerated treatment well  Discharge Recommendations: 24 hour supervision or assist;Continue to assess pending progress;Home with Home health OT  Safety Devices  Safety Devices in place: Yes  Type of devices: Bed alarm in place; Left in bed;Call light within reach    Patient Education  Education  Education Given To: Patient; Family  Education Provided: Role of Therapy;Plan of Care;Family Education;Mobility Training;Transfer Training; Safety; Fall Prevention Strategies  Education Method: Demonstration;Verbal  Barriers to Learning: Cognition  Education Outcome: Verbalized understanding;Demonstrated understanding    Plan  Occupational Therapy Plan  Times Per Week: 5 days per week, 60 mins each  Current Treatment Recommendations: Strengthening;ROM;Balance training;Functional mobility training; Endurance training;Self-Care / ADL; Patient/Caregiver education & training;Neuromuscular re-education;Cognitive/Perceptual training; Safety education & training    Goals  Patient Goals   Patient goals : go home with my wife  Short Term Goals  Time Frame for Short Term Goals: By 2 weeks  Short Term Goal 1: Pt will complete LB dressing with mod A  Short Term Goal 2: Pt will complete toileting with mod A  Short Term Goal 3: Pt will complete toilet and functional transfers with mod A  Short Term Goal 4: Pt will complete UB dressing with min A  Short Term Goal 5: Pt will complete UE HEP x 10 reps for improved R UE coordination for ADLs  Long Term Goals  Time Frame for Long Term Goals : By 4 weeks  Long Term Goal 1: Pt will complete LB dressing with CGA  Long Term Goal 2: Pt will complete toileting with CGA  Long Term Goal 3: Pt will complete toilet and functional transfers with CGA. Long Term Goal 4: Pt will complete UB dressing with SPV.   Long Term Goal 5: Pt will complete simple IADL with LRAD at mod I             Therapy Time   Individual Concurrent Group Co-treatment   Time In       1245   Time Out       1354   Minutes       69       Timed Code Treatment Minutes:  69 min     Total Treatment Minutes: 71 min     Roberta Ramos, OT

## 2023-02-06 NOTE — PROGRESS NOTES
Physical Therapy  Facility/Department: [facility] ACUTE REHAB UNIT  Rehabilitation Physical Therapy Treatment Note    NAME: Delgado Burks  : 1955 (74 y.o.)  MRN: [id number]  CODE STATUS: Full Code    Date of Service: 23       Restrictions:  Restrictions/Precautions: Fall Risk;Up as Tolerated  Position Activity Restriction  Other position/activity restrictions: up as tolerated, up in chair     SUBJECTIVE  Subjective  Subjective: pt up in chair and agreeable to PT  Pain: reporting pain with movement in L hip           OBJECTIVE  Cognition  Arousal/Alertness: Appropriate responses to stimuli  Following Commands: Follows one step commands with repetition; Follows one step commands with increased time  Attention Span: Attends with cues to redirect; Difficulty attending to directions; Difficulty dividing attention  Safety Judgement: Decreased awareness of need for assistance;Decreased awareness of need for safety  Problem Solving: Assistance required to generate solutions;Assistance required to identify errors made;Assistance required to implement solutions  Insights: Decreased awareness of deficits  Initiation: Requires cues for some  Sequencing: Requires cues for some  Cognition Comment: Pt tangential at times, requiring cues to redirect. Pt w/ poor awareness of L side. Orientation  Overall Orientation Status: Within Functional Limits    Functional Mobility  Sit to Supine  Assistance Level: Maximum assistance  Skilled Clinical Factors: cues to hook RLE under LLE to assist with swinging BLEs into bed  Scooting  Assistance Level: Minimal assistance; Requires x 2 assistance  Skilled Clinical Factors: on mat, back in chair, and in wc  Balance  Sitting Balance: Minimal assistance (min- max A with fatigue- posterior and L lateral lean)  Standing Balance: Dependent/Total (mod A of 2 initially progressing to min A of 2 with L knee blocked and max VCs for upright posture)  Standing Balance  Time: 2 trials within parallel bars, 1-2 min each  Activity: static stance in parallel bars with lateral weight shifts on 2nd trial. minimal LLE buckling with good upright posture  Transfers  Surface: From chair with arms;From bed; To mat;From mat  Additional Factors: Verbal cues; Hand placement cues; Increased time to complete (cues for R lateral lean)  Device:  (parallel bars)  Sit to Stand  Assistance Level: Dependent; Requires x 2 assistance (mod A of 2 on 1st trial, min A of 2 on 2nd trial. LLE blocked at all times)  Skilled Clinical Factors: max VCs for correction of L lateral and posterior lean  Stand to Sit  Assistance Level: Requires x 2 assistance  Skilled Clinical Factors: for controlled descent  Bed To/From Chair  Technique: Sit pivot  Assistance Level: Requires x 2 assistance (min A of 2 from recliner > wc, wc > mat, mat >wc. mod A of 2 from wc to bed)      Environmental Mobility  Wheelchair  Surface: Level surface  Device: Standard wheelchair  Additional Factors: Verbal cues; Increased time to complete (VCs for L attention and avoiding objects in wilder)  Assistance Required to Manage Parts: All wheelchair parts  Assistance Level for Propulsion: Supervision  Propulsion Method: Right upper extremity;Right lower extremity  Propulsion Quality: Slow velocity; Short strokes; Veers left  Propulsion Distance: 175'  Skilled Clinical Factors: 2 instances of running into objects on L side able to correct with max VCs. max VCs for L attention and staying on R side of hallway             PT Exercises  Exercise Treatment: pt performed modified assisted sit ups on edge of mat with swiss ball for support and min-mod physical assist, performed for 3 sets of 10. max VCs for initiation and focused attention  Dynamic Sitting Balance Exercises: pt sitting edge of mat with WB through LUE, pt performed PNF diagonals with RUE and 2.2lb x10 reps each diagonal requiring min-max A for sitting balance 2/2 posterior lean requiring max VCs to correct and for redirection  Static Standing Balance Exercises: static stance maintained x2 trials in parallel  bars initially with mod A of 2 progressing to min A of 2 (LUE in givmohr and L knee blocked at all times). max VCs for upright posture. weight shifts performed x10 on 2nd trial with min LE buckling      ASSESSMENT/PROGRESS TOWARDS GOALS       Assessment  Assessment: pt tolerated session well, very hardworking and eager to participate. pt continues to be limited by distraction, L inattention, and L hemiparesis. pt assist of 2 for scoot pivot transfers however now mostly min A of 2 with improved initiation. pt able to perform stands within parallel bars with min-mod A of 2 and progressed to min A of 2 with static standing balance. pt requiring min- max A for dynamic sitting balance with posterior and L lean requiring max VCs for correction. pt continues to be well below baseline and would benefit from continued PT to maximize independence.   Activity Tolerance: Patient tolerated treatment well  Discharge Recommendations: 24 hour supervision or assist;Continue to assess pending progress;Home with Home health PT  PT Equipment Recommendations  Other: continue to assess    Goals  Patient Goals   Patient Goals : to go home  Short Term Goals  Time Frame for Short Term Goals: 2 weeks (all ongoing)  Short Term Goal 1: Pt will complete bed mobility with moderate assist  Short Term Goal 2: Pt will complete sit<>stand transfer wtih moderate assist  Short Term Goal 3: Pt will propel self in manual w/c for 150' with minimal assist.- met 2/2  Short Term Goal 4: Pt will ambulate 10' with moderate assist.  Long Term Goals  Time Frame for Long Term Goals : 4 weeks (all ongoing)  Long Term Goal 1: Pt will complete bed mobility with CGA  Long Term Goal 2: Pt will complete sit<>Stand transfers with CGA  Long Term Goal 3: Pt will propel self in manual w/c for 250' with suprvision  Long Term Goal 4: Pt will ambulate 48' with CGA & LRAD    PLAN OF CARE/SAFETY  Physcial Therapy Plan  Days Per Week: 5 Days  Hours Per Day: 1 hour  Therapy Duration: 4 Weeks  Current Treatment Recommendations: Strengthening;Balance training;ROM; Functional mobility training;Neuromuscular re-education; Safety education & training;Patient/Caregiver education & training;Transfer training; Endurance training;Home exercise program;Gait training;Equipment evaluation, education, & procurement;Positioning; Therapeutic activities  Safety Devices  Type of Devices: Call light within reach;Nurse notified;Gait belt;Patient at risk for falls; Left in bed;Bed alarm in place  Restraints  Restraints Initially in Place: No    EDUCATION  Education  Education Given To: Patient  Education Provided: Role of Therapy;Plan of Care; Mobility Training;Transfer Training  Education Method: Verbal  Barriers to Learning: Cognition  Education Outcome: Verbalized understanding;Continued education needed        Therapy Time   Individual Concurrent Group Co-treatment   Time In       1677   Time Out       1354   Minutes       69        Variance: 0    Nanci Diaz PT, 02/06/23 at 3:26 PM

## 2023-02-06 NOTE — PROGRESS NOTES
Speech Language Pathology  ACUTE REHAB UNIT  SPEECH/LANGUAGE PATHOLOGY      [x] Daily  [] Weekly Care Conference Note  [] Discharge    Patient:Jaosn Lopez  QPV:[id number]  Rehab Dx/Hx: Acute CVA (cerebrovascular accident) ([facility] [address] [I63.9]    Precautions: [x] Aspiration  [x] Fall risk  [] Sternal  [] Seizure [] Hip  [] Weight Bearing [] Other     ST Dx: [] Aphasia  [x] Dysarthria  [] Apraxia   [x] Oropharyngeal dysphagia [x] Cognitive Impairment  [] Other:   Date of Admit: 1/29/2023  Room #: 3102/3102-01  Date: 2/6/2023          Current Diet Order:ADULT DIET; Dysphagia - Minced and Moist  ADULT ORAL NUTRITION SUPPLEMENT; Breakfast, Dinner; Standard High Calorie/High Protein Oral Supplement   Recommended Form of Meds: Meds in puree  Compensatory Swallowing Strategies :  (Small bites, no pharyngeal neck extension, left head turn with thins, check for pocketing on left, 1:1 assist for small bolus size)   Previous MBS: 1/26/23  Oral Phase  Mild-moderate dysfunction characterized by incomplete labial seal with anterior spillage, slowed/reduced mastication, mild stasis. Pharyngeal Phase  Moderate dysfunction characterized by impairments in timing, strength and coordination with premature bolus loss, reduced base of tongue retraction, delayed/reduced hyolaryngeal mechanics, and reduced pharyngeal constriction. Resulted in vallecular/pyriform/pharyngeal residue, as well as compromised airway protection with penetration and gross tracheal aspiration of thin and mildly thick liquids; strong reactive cough present but did not fully clear. Chin tuck strategy did not eliminate aspiration, however cued head turn LEFT with small straw sips was effective . Double swallow helped clear residual. Of note, throughout study pt with tendency to tilt head posteriorly which further exacerbated bolus control; benefited from cues for neutral positioning.   Upper Esophageal Screen  Indirectly assessed; appeared unremarkable. 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, music, [employer]    Dentition: Adequate  Vision  Vision:  (not wearing glasses due to nose injury)  Vision Exceptions: Wears glasses at all times  Hearing  Hearing: Within functional limits  Barriers toward progress: Severity of impairments      Date: 2/6/2023      Tx session 1 Tx session 2   Total Timed Code Min 30 25   Total Treatment Minutes 45 38   Individual Treatment Minutes 45 38   Group Treatment Minutes 0 0   Co-Treat Minutes 0 0   Brief Exception: N/A N/A   Pain None indicated  None indicated    Pain Intervention: [] RN notified  [] Repositioned  [] Intervention offered and patient declined  [x] N/A  [] Other: [] RN notified  [] Repositioned  [] Intervention offered and patient declined  [x] N/A  [] Other:   Subjective:     Pt upright in chair and agreeable to therapy. Pts spouse is present for duration of session. Pt upright in chair and participates well in therapy. Objective / Goals:     Goal 1: Pt will demonstrate adequate oral containment of liquids and solids across meal without cueing. Patient tolerated NMES via VitalStim placement 4a (targeting orbicularis oris, buccinator and superior pharyngeal constrictor) @ 8.0 mA on left x 17 minutes, increased to @ 9.0 mA x22 minutes, @ 3.0 mA x 39 minutes. X1 instance of loss of saliva + puree from oral cavity. Multiple instances of placement of bolus onto labial surface during self feeding due to reduced mouth opening / loss of left sided tone. Inconsistently sensate. Patient tolerated NMES via VitalStim placement 4a (targeting orbicularis oris, buccinator and superior pharyngeal constrictor) @ 9.0 mA on left and 2.0 mA on right x 28 minutes.      X1 instance of loss of secretions + bolus to chin without awareness. Pt with pooling of secretions + bolus to left labial surface without loss. Cued for \"slurp and swallow\" technique. Goal 2: Pt will tolerate trials of solids with adequate mastication and propulsion as evidenced by min residue after the swallow across mealtime assessment. Cued for increased mastication with solids and effortful swallows with minced and moist solids. Pt demo'd min-mod oral residue diffusely in oral cavity. X1 small portion of the bolus located in left lateral sulcus. Completed trials of meltable solid (chocolate) with pt demonstrating a moderate amount of residue pocketed in left lateral sulci with anterior spillage. Cued for effortful swallows. Goal 3: Pt will tolerate thin liquids with head in neutral position without s/s associated with aspiration. Thins via standard cup with head in neutral position with x1 instance of coughing. Cued for cough and swallow. X49 cued effortful swallows. Goal not targeted this session. Goal 4: Pt will demonstrate safe feeding behavior as evidenced by small bolus size / appropriate rate without cues. Min cues for bolus size Pt with initial instruction given on bolus size. Pt asking about bolus size appropriateness. Goal 5: Pt will tolerate least restrictive diet without respiratory decline. Chart review does not reveal any documented difficulty on current recommended diet level. No overt s/s associated with aspiration. Goal 1: The patient will maintain eye contact to speaker positioned left of midline without distractors, min cues. No cues required for eye contact this date. No cues required. Completed visual scanning with 71% accuracy with prompts only. Pt able to increase to 100% accuracy given verbal cues only. Goal 2: The patient will demonstrate sustained attention to task for 2 minutes with <2 prompts.    Mod-max cues for attention throughout session; spouse is present and is able to provide cues as well.  >2 prompts for sustained attention. Goal 3: The patient will demonstrate improved inhibition as evidenced by <3 prompts for impulsivity during a task. >3 prompts for not talking while chewing and swallowing. Cloze paragraph - min-mod cues for impulsivity / visual scanning. Ongoing cues during dysphagia therapy to inhibit talking while eating. Goal 4: The patient will demonstrate improved self monitoring/correcting for basic tasks with <mod cues. Goal not targeted this session. Goal not targeted this session. Goal 5: The patient will tolerate ongoing cognitive linguistic assessment. Goal not targeted this session. Goal not targeted this session. Goal 6: The patient will demonstrate comprehensibility in multiple communication environments without cues. Goal not targeted this session. Goal not targeted this session. Other areas targeted:     Education:   Education ongoing re: skills targeted. Ongoing education about neuroplasticity and brain specificity    Safety Devices: [x] Call light within reach  [x] Chair alarm activated and connected to nurse call light system  [] Bed alarm activated   [] Other:  [x] Call light within reach  [x] Chair alarm activated and connected to nurse call light system  [] Bed alarm activated  [] Other:    Assessment: Moderate cognitive impairment characterized by executive dysfunction with primary impairment of attention / disinhibition. Oropharyngeal dysphagia per MBSS. Mild dysarthria. Plan: Continue as per plan of care.       Interventions used this date:  [] Speech/Language Treatment  [] Instruction in HEP  [x] Dysphagia Treatment [x] Cognitive Treatment   [] Other:    Discharge recommendations:  [] Home independently  [x] Home with assistance []  24 hour supervision  [] ECF [] Other  Continued Tx Upon Discharge: ? [x] Yes    [] No    [] TBD based on progress while on ARU     [] Vital Stim indicated     [] Other: Estimated discharge date: February 19th, 2023    Electronically signed by:  Ele Hernadez M.A., [place]  Speech-Language Pathologist

## 2023-02-06 NOTE — PROGRESS NOTES
Shift assessment complete. VSS. Patient A/O x4, confused at times. Slurred speech at times. Patient C/O 2/10 Left hip pain PRN Tylenol and Zanaflex given. Medications given whole in pudding, tolerated well, patient reminded to turn head to the left to swallow and at midline. Safety measures in place. Call light and over bed table within reach. Hourly rounding and visual checks in place.  Will continue to monitor

## 2023-02-07 PROCEDURE — 97530 THERAPEUTIC ACTIVITIES: CPT

## 2023-02-07 PROCEDURE — 6370000000 HC RX 637 (ALT 250 FOR IP): Performed by: PHYSICAL MEDICINE & REHABILITATION

## 2023-02-07 PROCEDURE — 97032 APPL MODALITY 1+ESTIM EA 15: CPT

## 2023-02-07 PROCEDURE — 97535 SELF CARE MNGMENT TRAINING: CPT

## 2023-02-07 PROCEDURE — 97129 THER IVNTJ 1ST 15 MIN: CPT

## 2023-02-07 PROCEDURE — 97130 THER IVNTJ EA ADDL 15 MIN: CPT

## 2023-02-07 PROCEDURE — 1280000000 HC REHAB R&B

## 2023-02-07 PROCEDURE — 99232 SBSQ HOSP IP/OBS MODERATE 35: CPT | Performed by: PHYSICAL MEDICINE & REHABILITATION

## 2023-02-07 PROCEDURE — 6360000002 HC RX W HCPCS: Performed by: PHYSICAL MEDICINE & REHABILITATION

## 2023-02-07 PROCEDURE — 92526 ORAL FUNCTION THERAPY: CPT

## 2023-02-07 RX ADMIN — TRAZODONE HYDROCHLORIDE 50 MG: 50 TABLET ORAL at 20:38

## 2023-02-07 RX ADMIN — BISACODYL 5 MG: 5 TABLET, COATED ORAL at 09:10

## 2023-02-07 RX ADMIN — DICLOFENAC SODIUM 4 G: 10 GEL TOPICAL at 09:09

## 2023-02-07 RX ADMIN — NYSTATIN 500000 UNITS: 100000 SUSPENSION ORAL at 13:15

## 2023-02-07 RX ADMIN — PANTOPRAZOLE SODIUM 40 MG: 40 TABLET, DELAYED RELEASE ORAL at 06:52

## 2023-02-07 RX ADMIN — EZETIMIBE 10 MG: 10 TABLET ORAL at 09:10

## 2023-02-07 RX ADMIN — ACETAMINOPHEN 650 MG: 325 TABLET, FILM COATED ORAL at 10:59

## 2023-02-07 RX ADMIN — TIZANIDINE 2 MG: 4 TABLET ORAL at 20:38

## 2023-02-07 RX ADMIN — ROSUVASTATIN CALCIUM 40 MG: 20 TABLET, FILM COATED ORAL at 10:58

## 2023-02-07 RX ADMIN — NYSTATIN 500000 UNITS: 100000 SUSPENSION ORAL at 18:14

## 2023-02-07 RX ADMIN — DICLOFENAC SODIUM 4 G: 10 GEL TOPICAL at 20:38

## 2023-02-07 RX ADMIN — FLUOXETINE 10 MG: 10 CAPSULE ORAL at 09:10

## 2023-02-07 RX ADMIN — ASPIRIN 81 MG 81 MG: 81 TABLET ORAL at 09:10

## 2023-02-07 RX ADMIN — NYSTATIN 500000 UNITS: 100000 SUSPENSION ORAL at 20:38

## 2023-02-07 RX ADMIN — NYSTATIN 500000 UNITS: 100000 SUSPENSION ORAL at 09:10

## 2023-02-07 RX ADMIN — ACETAMINOPHEN 650 MG: 325 TABLET, FILM COATED ORAL at 06:52

## 2023-02-07 RX ADMIN — ENOXAPARIN SODIUM 40 MG: 100 INJECTION SUBCUTANEOUS at 09:10

## 2023-02-07 RX ADMIN — LACTULOSE 20 G: 20 SOLUTION ORAL at 09:10

## 2023-02-07 RX ADMIN — Medication 5 MG: at 20:38

## 2023-02-07 ASSESSMENT — PAIN DESCRIPTION - LOCATION: LOCATION: HIP

## 2023-02-07 ASSESSMENT — PAIN SCALES - GENERAL: PAINLEVEL_OUTOF10: 6

## 2023-02-07 ASSESSMENT — PAIN DESCRIPTION - ORIENTATION: ORIENTATION: LEFT

## 2023-02-07 NOTE — PROGRESS NOTES
Occupational Therapy  Facility/Department: [facility] ACUTE REHAB UNIT  Rehabilitation Occupational Therapy Daily Treatment Note    Date: 23  Patient Name: Armin Ramos       Room: 7298/0114-18  MRN: [id number]  Account: [de-identified]   : 1955  (68 y.o.) Gender: male                    Past Medical History:  has a past medical history of Anxiety, Chest pain, Depression, Encounter for imaging to screen for metal prior to MRI, Hyperlipidemia, Hypertension, and Wears glasses. Past Surgical History:   has a past surgical history that includes [place] tooth extraction; Colonoscopy (2009); Cardiac catheterization (2008); Finger trigger release (3-); and Coronary angioplasty with stent. Restrictions  Restrictions/Precautions: Fall Risk;Up as Tolerated    Subjective  Subjective: Pt seated in recliner upon OT arrival w/ brother present. Pt w/ prolonged coughing spell (~20 mins) at start of session d/t \"thrush medicine going down the wrong pipe. \" During coughing, OT/PT assisted w/ sitting balance. Co-tx indicated to maximize therapeutic potential.  Restrictions/Precautions: Fall Risk;Up as Tolerated             Objective     Cognition  Arousal/Alertness: Appropriate responses to stimuli  Following Commands: Follows one step commands with repetition; Follows one step commands with increased time  Attention Span: Attends with cues to redirect; Difficulty attending to directions; Difficulty dividing attention  Safety Judgement: Decreased awareness of need for assistance;Decreased awareness of need for safety  Problem Solving: Assistance required to generate solutions;Assistance required to identify errors made;Assistance required to implement solutions  Insights: Decreased awareness of deficits  Initiation: Requires cues for some  Sequencing: Requires cues for some  Cognition Comment: Pt tangential at times, requiring cues to redirect. Pt w/ poor awareness of L side.   Orientation  Overall Orientation Status: Within Functional Limits         ADL  Upper Extremity Dressing  Assistance Level: Moderate assistance  Skilled Clinical Factors: Pt attempting to don shirt w/o following therapist's cues. Required redirection and task was started over. Pt used RUE to thread LUE into shirt and pulled up to elbow. Threaded RUE into shirt and overhead. Assist to pull down on L side. Putting On/Taking Off Footwear  Assistance Level: Maximum assistance  Skilled Clinical Factors: Assist to don B shoes. Functional Mobility  Device: Wheelchair  Assistance Level: Contact guard assist;Minimal assistance  Skilled Clinical Factors: Pt self propelled w/ RUE and RLE from room -> gym w/ CGA and min A, verbal cues for environmental awareness and obstacle avoidance. Transfers  Surface: From chair with arms; Wheelchair; To mat;From mat  Additional Factors: Verbal cues; Hand placement cues; Increased time to complete  Sit Pivot  Assistance Level: Requires x 2 assistance; Moderate assistance;Minimal assistance  Skilled Clinical Factors: Pt completed sit pivot t/f recliner --> w/c toward R side w/ min Ax1 and mod Ax1. T/f w/c --> edge of mat w/ min Ax2 and from mat to w/c w/ mod Ax2 toward R side. OT Exercises  A/AROM Exercises: AAROM ~25% in conjunction w/ fxl estim to facilitate elbow flexion and extension w/ use of PROM to achieve full range from 90 degrees. AAROM/PROM also completed w/ fxl estim on quad to facilitate flexion/extension of LLE. Static Sitting Balance Exercises: Pt seated edge of mat during eletrical stimulation for at least 25 mins requiring SBA-CGA and max cues for upright posture. Additional Activities:  Modalities: yes    Left; Upper armElectrical Stimulation Location. Left; Upper arm. The comment is Quad. Electrical Stimulation Action Estim applied to L bicep to facilitate elbow flexion/extension for 12 mins. Estim also applied to L quad for 10 mins.    Electrical Stimulation Parameters Started at 30 and transitioned to 45 mA CC, 5 seconds on/5 seconds off for LUE, started at 60 and transitioned to 70 mA CC, 5 seconds on/5 seconds off for LLE. Electrical Stimulation Goals Neuromuscular Facilitation           Assessment  Assessment  Assessment: Pt tolerated session well despite initial coughing spell for ~20 mins. Pt w/ significantly improved sitting balance during estim requiring SBA-CGA. Estim completed sitting edge of mat vs in sidelying this date in order to continue increasing sitting balance and multi-tasking. Pt would benefit from continued therapy services to maximize independence w/ ADLs and fxl mobility tasks. Cont OT POC. Activity Tolerance: Patient tolerated treatment well  Discharge Recommendations: 24 hour supervision or assist;Continue to assess pending progress;Home with Home health OT  Safety Devices  Safety Devices in place: Yes  Type of devices: Call light within reach; Chair alarm in place; Left in chair    Patient Education  Education  Education Given To: Patient; Family  Education Provided: Role of Therapy;Plan of Care;Family Education;Mobility Training;Transfer Training; Safety; Fall Prevention Strategies  Education Method: Demonstration;Verbal  Barriers to Learning: Cognition  Education Outcome: Verbalized understanding;Demonstrated understanding    Plan  Occupational Therapy Plan  Times Per Week: 5 days per week, 60 mins each  Current Treatment Recommendations: Strengthening;ROM;Balance training;Functional mobility training; Endurance training;Self-Care / ADL; Patient/Caregiver education & training;Neuromuscular re-education;Cognitive/Perceptual training; Safety education & training    Goals  Patient Goals   Patient goals : go home with my wife  Short Term Goals  Time Frame for Short Term Goals: By 2 weeks  Short Term Goal 1: Pt will complete LB dressing with mod A  Short Term Goal 2: Pt will complete toileting with mod A  Short Term Goal 3: Pt will complete toilet and functional transfers with mod A  Short Term Goal 4: Pt will complete UB dressing with min A  Short Term Goal 5: Pt will complete UE HEP x 10 reps for improved R UE coordination for ADLs  Long Term Goals  Time Frame for Long Term Goals : By 4 weeks  Long Term Goal 1: Pt will complete LB dressing with CGA  Long Term Goal 2: Pt will complete toileting with CGA  Long Term Goal 3: Pt will complete toilet and functional transfers with CGA. Long Term Goal 4: Pt will complete UB dressing with SPV.   Long Term Goal 5: Pt will complete simple IADL with LRAD at mod I        Therapy Time   Individual Concurrent Group Co-treatment   Time In       0915   Time Out       1030   Minutes       75       Timed Code Treatment Minutes:  75 min     Total Treatment Minutes: 75 min     Aminata Coats OT

## 2023-02-07 NOTE — PROGRESS NOTES
Department of Physical Medicine & Rehabilitation  Progress Note    Patient Identification:  Rubens Snow  [id number]  : 1955  Admit date: 2023    Chief Complaint: Acute CVA (cerebrovascular accident) [facility])    Subjective:   No new complaints overnight. Seen with therapy this morning. Making progress daily. He ate well this morning and slept through the night. Very motivated in therapy. ROS: No f/c, n/v, cp     Objective:  Patient Vitals for the past 24 hrs:   BP Temp Temp src Pulse Resp SpO2   23 0845 118/73 97.5 °F (36.4 °C) Oral 71 18 97 %   23 121/75 98.4 °F (36.9 °C) Oral 70 18 96 %       Const: Alert. No distress, pleasant. HEENT: Normocephalic, atraumatic. Normal sclera/conjunctiva. MMM. CV: Regular rate and rhythm. Resp: No respiratory distress. Lungs CTAB. Abd: Soft, nontender, nondistended, NABS+   Ext: No edema. Neuro: Alert, oriented, appropriately interactive. Psych: Cooperative, appropriate mood and affect    Laboratory data: Available via EMR. Last 24 hour lab  No results found for this or any previous visit (from the past 24 hour(s)). Therapy progress:  PT  Position Activity Restriction  Other position/activity restrictions: up as tolerated, up in chair  Objective     Sit to Stand: Maximum Assistance, 2 Person Assistance  Stand to Sit: 2 Person Assistance, Maximum Assistance     OT  PT Equipment Recommendations  Equipment Needed:  (defer)  Other: continue to assess  Toilet - Technique:  (via rolling shower chair)  Equipment Used:  (rolling shower chair)  Toilet Transfers Comments: pt did not have urge to void, however rolled pt in bathroom via rolling shower chair. Max A x 2 to pivot from bed to rolling shower chair. Assessment        SLP          Body mass index is 22.4 kg/m².     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 Patient is on aspirin at home        HTN  Patient is on lisinopril 5mg daily at home  - monitor      HLD:  Patient is on Crestor 20mg and ezetimibe 10mg at home daily  -Continue home meds     Anxiety  Per chart, patient is on Valium 2mg at home. Possible could be contributor to his chest pain yesterday, notes he felt anxious. Denies taking Valium at home, however it is listed as a home med.  Ativan 0.5mg once overnight   - prozac      Sleep disturbance  - Trazodone PRN  - benadryl PRN  -improving     Add low dose tizanidine for spasticity   - improving left leg tightness         Rehab Progress: Improving  Anticipated Dispo: home  Services/DME: [facility]  ELOS: 2/19      Yaima Hubbard D.O. M.P.H  PM&R  2/7/2023  10:19 AM

## 2023-02-07 NOTE — PROGRESS NOTES
Speech Language Pathology  ACUTE REHAB UNIT  SPEECH/LANGUAGE PATHOLOGY      [x] Daily  [] Weekly Care Conference Note  [] Discharge    Patient:Jason Cheney  YTP:[id number]  Rehab Dx/Hx: Acute CVA (cerebrovascular accident) ([facility] [address] [I63.9]    Precautions: [x] Aspiration  [x] Fall risk  [] Sternal  [] Seizure [] Hip  [] Weight Bearing [] Other     ST Dx: [] Aphasia  [x] Dysarthria  [] Apraxia   [x] Oropharyngeal dysphagia [x] Cognitive Impairment  [] Other:   Date of Admit: 1/29/2023  Room #: 3102/3102-01  Date: 2/7/2023          Current Diet Order:ADULT DIET; Dysphagia - Minced and Moist  ADULT ORAL NUTRITION SUPPLEMENT; Breakfast, Dinner; Standard High Calorie/High Protein Oral Supplement   Recommended Form of Meds: Meds in puree  Compensatory Swallowing Strategies :  (Small bites, no pharyngeal neck extension, left head turn with thins, check for pocketing on left, 1:1 assist for small bolus size)   Previous MBS: 1/26/23  Oral Phase  Mild-moderate dysfunction characterized by incomplete labial seal with anterior spillage, slowed/reduced mastication, mild stasis. Pharyngeal Phase  Moderate dysfunction characterized by impairments in timing, strength and coordination with premature bolus loss, reduced base of tongue retraction, delayed/reduced hyolaryngeal mechanics, and reduced pharyngeal constriction. Resulted in vallecular/pyriform/pharyngeal residue, as well as compromised airway protection with penetration and gross tracheal aspiration of thin and mildly thick liquids; strong reactive cough present but did not fully clear. Chin tuck strategy did not eliminate aspiration, however cued head turn LEFT with small straw sips was effective . Double swallow helped clear residual. Of note, throughout study pt with tendency to tilt head posteriorly which further exacerbated bolus control; benefited from cues for neutral positioning.   Upper Esophageal Screen  Indirectly assessed; appeared unremarkable. 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, music, [employer]    Dentition: Adequate  Vision  Vision:  (not wearing glasses due to nose injury)  Vision Exceptions: Wears glasses at all times  Hearing  Hearing: Within functional limits  Barriers toward progress: Severity of impairments      Date: 2/7/2023      Tx session 1 Tx session 2   Total Timed Code Min 25 30   Total Treatment Minutes 45 45   Individual Treatment Minutes 45 45   Group Treatment Minutes 0 0   Co-Treat Minutes 0 0   Brief Exception: N/A N/A   Pain None indicated  None indicated    Pain Intervention: [] RN notified  [] Repositioned  [] Intervention offered and patient declined  [x] N/A  [] Other: [] RN notified  [] Repositioned  [] Intervention offered and patient declined  [x] N/A  [] Other:   Subjective:     Pt seated in chair and assisted with repositioning to be upright. Brother present. Pt seated upright in bed agreeable to session at this time. Brother present. Objective / Goals:     Goal 1: Pt will demonstrate adequate oral containment of liquids and solids across meal without cueing. Targeted via labial seals against resistance x20  Pt demonstrated adequate oral containment of liquid throughout session given min cues to sustain tight lips to prevent anterior loss. x3 additional hard \"slurp and swallow\" to clear oral residue   Goal 2: Pt will tolerate trials of solids with adequate mastication and propulsion as evidenced by min residue after the swallow across mealtime assessment. Min residue x1 on L side lingual surface. Identified to use lingual sweep  x3 and given min cues used liquid wash. Pt also used finger sweep x1. Minimal oral residue present throughout liquid trials.    Goal 3: Pt will tolerate thin liquids with head in neutral position without s/s associated with aspiration. Completed x11 in neutral position. Coughs/throat clears: x4 occurrences Pt tolerated 4 oz of ice cream with head in neutral position. X1 cough at end of session/after completion of all bites, however ? If related to trials. Goal 4: Pt will demonstrate safe feeding behavior as evidenced by small bolus size / appropriate rate without cues. Effortful swallows: x60    Independent with monitoring bolus size and rate. Effortful swallows: x30  Chin tuck against resistance: x25   Goal 5: Pt will tolerate least restrictive diet without respiratory decline. WBC and Neutrophils Absolute WNL. No respiratory distress noted during meal. Pt expressed prolonged coughing episode with nystatin wash this AM. Pt and brother stated likely swallowed prior to turning head to the L. Goal 1: The patient will maintain eye contact to speaker positioned left of midline without distractors, min cues. Improved eye contact with speaker at midline. Additional improved eye contact with speakers sitting to pt's left. Targeted via reading words and sentences L-R across the page:     Words: x7 cues to scan to the left. Omitted initial ~1-2 letters in x2 occurrences. Sentences: 100% accuracy, independently     Alternating odd and even numbers: mod cues to alternate and locate digits. Goal 2: The patient will demonstrate sustained attention to task for 2 minutes with <2 prompts. Redirected pt x5 to sustain attention to task. Family assisted with cues. Pt redirected self given min cue x1. Mod cues to attend to instructions on alternating odd and even number task. Pt continually required repetition to determine forward vs working backwards. Goal 3: The patient will demonstrate improved inhibition as evidenced by <3 prompts for impulsivity during a task. No noted impulsivity during session.  Pt did not demonstrate impulsivity this date.    Goal 4: The patient will demonstrate improved self monitoring/correcting for basic tasks with <mod cues. Improved self monitoring of attention as session progressed. Pt pointing out \"oh I should stop\"-in regards to talking/consuming PO. Pt required x7 min cues to correct errors during reading task. Goal 5: The patient will tolerate ongoing cognitive linguistic assessment. Not targeted this session. Not targeted this session. Goal 6: The patient will demonstrate comprehensibility in multiple communication environments without cues. Pt was entirely comprehensible throughout session without cues. Pt was comprehensible throughout session without cues. Other areas targeted:     Education:   Educated re: rationale for swallowing strategies and importance of sustaining attention while chewing/swallowing during meals Educated re: Rationale for swallowing exercises and therapeutic cognitive tasks during session. Safety Devices: [x] Call light within reach  [x] Chair alarm activated and connected to nurse call light system  [] Bed alarm activated   [x] Other: brother present [x] Call light within reach  [] Chair alarm activated and connected to nurse call light system  [x] Bed alarm activated  [x] Other: brother present   Assessment: Pt demonstrated improvement with self monitoring of attention this date during meals and therapy. Improved oral containment of liquids and reduced residue. Required min-mod cues increase to modified independence for cognitive tasks throughout session. Plan: Continue as per plan of care.       Interventions used this date:  [] Speech/Language Treatment  [] Instruction in HEP  [x] Dysphagia Treatment [x] Cognitive Treatment   [] Other:    Discharge recommendations:  [] Home independently  [x] Home with assistance []  24 hour supervision  [] ECF [] Other  Continued Tx Upon Discharge: ? [x] Yes    [] No    [] TBD based on progress while on ARU     [] Vital Stim indicated [] Other:   Estimated discharge date: February 19th, 2023    Electronically signed by:  Lynda Mckeon, [address] [place] [address] Language Pathology      The speech-language pathologist was present, directed the patient's care, made skilled judgment and was responsible for assessment and treatment.     Jazmyne Sinclair M.A., [address] [place] [address]  Speech-Language Pathologist

## 2023-02-07 NOTE — PLAN OF CARE
Problem: Nutrition Deficit:  Goal: Optimize nutritional status  2/7/2023 0113 by Lisa Hamilton RN  Nutrient intake appropriate for improving, restoring, or maintaining nutritional needs:   Assess nutritional status and recommend course of action   Monitor oral intake, labs, and treatment plans   Recommend appropriate diets, oral nutritional supplements, and vitamin/mineral supplements  Problem: Pain  Goal: Verbalizes/displays adequate comfort level or baseline comfort level  2/7/2023 0113 by Lisa Hamilton RN  Verbalizes/displays adequate comfort level or baseline comfort level:   Encourage patient to monitor pain and request assistance   Assess pain using appropriate pain scale   Administer analgesics based on type and severity of pain and evaluate response   Implement non-pharmacological measures as appropriate and evaluate response

## 2023-02-07 NOTE — PROGRESS NOTES
Physical Therapy  Facility/Department: [facility] ACUTE REHAB UNIT  Rehabilitation Physical Therapy Treatment Note    NAME: Blanca Wayne  : 1955 (36 y.o.)  MRN: [id number]  CODE STATUS: Full Code    Date of Service: 23       Restrictions:  Restrictions/Precautions: Fall Risk;Up as Tolerated     SUBJECTIVE  Subjective  Subjective: pt up in chair and agreeable to PT  Pain: reporting pain with movement in L hip           OBJECTIVE  Cognition  Arousal/Alertness: Appropriate responses to stimuli  Following Commands: Follows one step commands with repetition; Follows one step commands with increased time  Attention Span: Attends with cues to redirect; Difficulty attending to directions; Difficulty dividing attention  Safety Judgement: Decreased awareness of need for assistance;Decreased awareness of need for safety  Problem Solving: Assistance required to generate solutions;Assistance required to identify errors made;Assistance required to implement solutions  Insights: Decreased awareness of deficits  Initiation: Requires cues for some  Sequencing: Requires cues for some  Cognition Comment: Pt tangential at times, requiring cues to redirect. Pt w/ poor awareness of L side. Orientation  Overall Orientation Status: Within Functional Limits    Functional Mobility  Balance  Sitting Balance: Contact guard assistance (occasional min A but able to correct with cues)  Standing Balance:  (not tested on this date)  Transfers  Surface: To chair with arms;From chair with arms; Wheelchair;From mat; To mat  Additional Factors: Verbal cues; Hand placement cues; Increased time to complete (cues for R lateral lean)  Bed To/From Chair  Technique: Sit pivot  Assistance Level: Requires x 2 assistance  Skilled Clinical Factors: from recliner>wc with mod A + min A, wc>mat with min A x2, mat>wc with mod A x2      Environmental Mobility  Wheelchair  Surface: Level surface  Device: Standard wheelchair  Additional Factors: Verbal cues;Increased time to complete (VCs for L attention and avoiding objects in wilder)  Assistance Required to Manage Parts: All wheelchair parts  Assistance Level for Propulsion: Stand by assist  Propulsion Method: Right upper extremity;Right lower extremity  Propulsion Quality: Slow velocity; Short strokes; Veers left  Propulsion Distance: 175'  Skilled Clinical Factors: 2 instances of running into objects on L side able to correct with max VCs. max VCs for L attention and staying on R side of hallway      Neuromuscular Education  Neuromuscular Education: Yes  NDT Treatment: Upper extremity; Lower extremity; Sitting (sidelying on R to place L side in gravity eliminated position)  Functional Movement Patterns: in sitting om edge of mat while focusing on seated balance Estim applied to L bicep to facilitate elbow flexion/extension for 12 mins. Estim also applied to L quad for 10 mins. Electrical Stimulation Parameters: 30-45 mA CC, 5 seconds on/5 seconds off for LUE, 60-70 mA CC, 5 seconds on/5 seconds off for LLE with goal of Neuromuscular Facilitation. strong biceps contraction noted moving 25% of elbow ROM and AAROM performed to reach full ROM. small quad contraction noted, PROM performed for LAQ during contraction             ASSESSMENT/PROGRESS TOWARDS GOALS       Assessment  Assessment: pt tolerated session well despite enough coughing fit lasting ~20 min, very hardworking and eager to participate. pt continues to be limited by distraction, L inattention, and L hemiparesis. pt assist of 2 for scoot pivot transfers. [place] E-stim applied to both LUE and LLE on this date while pt worked on seated balance with good muscle contractions noted to quad and bicep. pt maintaining seated balance with mostly CGA and occasional min A with ability to correct with cues. pt continues to be well below baseline and would benefit from continued PT to maximize independence.   Activity Tolerance: Patient tolerated treatment well  Discharge Recommendations: 24 hour supervision or assist;Continue to assess pending progress;Home with Home health PT  PT Equipment Recommendations  Other: continue to assess    Goals  Patient Goals   Patient Goals : to go home  Short Term Goals  Time Frame for Short Term Goals: 2 weeks (all ongoing)  Short Term Goal 1: Pt will complete bed mobility with moderate assist  Short Term Goal 2: Pt will complete sit<>stand transfer wtih moderate assist  Short Term Goal 3: Pt will propel self in manual w/c for 150' with minimal assist.- met 2/2  Short Term Goal 4: Pt will ambulate 10' with moderate assist.  Long Term Goals  Time Frame for Long Term Goals : 4 weeks (all ongoing)  Long Term Goal 1: Pt will complete bed mobility with CGA  Long Term Goal 2: Pt will complete sit<>Stand transfers with CGA  Long Term Goal 3: Pt will propel self in manual w/c for 250' with suprvision  Long Term Goal 4: Pt will ambulate 48' with CGA & LRAD    PLAN OF CARE/SAFETY  Physcial Therapy Plan  Days Per Week: 5 Days  Hours Per Day: 1 hour  Therapy Duration: 4 Weeks  Current Treatment Recommendations: Strengthening;Balance training;ROM; Functional mobility training;Neuromuscular re-education; Safety education & training;Patient/Caregiver education & training;Transfer training; Endurance training;Home exercise program;Gait training;Equipment evaluation, education, & procurement;Positioning; Therapeutic activities  Safety Devices  Type of Devices: Call light within reach;Nurse notified;Gait belt;Patient at risk for falls; Chair alarm in place; Left in chair  Restraints  Restraints Initially in Place: No    EDUCATION  Education  Education Given To: Patient  Education Provided: Role of Therapy;Plan of Care; Mobility Training;Transfer Training  Education Method: Verbal  Barriers to Learning: Cognition  Education Outcome: Verbalized understanding;Continued education needed        Therapy Time   Individual Concurrent Group Co-treatment   Time In 0915   Time Out       1030   Minutes       75     Timed Code Treatment Minutes: 75 Minutes  Variance: 0    Chiqui Acosta, PT, 02/07/23 at 3:27 PM

## 2023-02-08 LAB
ANION GAP SERPL CALCULATED.3IONS-SCNC: 9 MMOL/L (ref 3–16)
BASOPHILS ABSOLUTE: 0.1 K/UL (ref 0–0.2)
BASOPHILS RELATIVE PERCENT: 0.8 %
BUN BLDV-MCNC: 15 MG/DL (ref 7–20)
CALCIUM SERPL-MCNC: 8.9 MG/DL (ref 8.3–10.6)
CHLORIDE BLD-SCNC: 101 MMOL/L (ref 99–110)
CO2: 26 MMOL/L (ref 21–32)
CREAT SERPL-MCNC: 0.6 MG/DL (ref 0.8–1.3)
EOSINOPHILS ABSOLUTE: 0.1 K/UL (ref 0–0.6)
EOSINOPHILS RELATIVE PERCENT: 2.2 %
GFR SERPL CREATININE-BSD FRML MDRD: >60 ML/MIN/{1.73_M2}
GLUCOSE BLD-MCNC: 107 MG/DL (ref 70–99)
HCT VFR BLD CALC: 37.2 % (ref 40.5–52.5)
HEMOGLOBIN: 12.7 G/DL (ref 13.5–17.5)
LYMPHOCYTES ABSOLUTE: 2 K/UL (ref 1–5.1)
LYMPHOCYTES RELATIVE PERCENT: 29.2 %
MCH RBC QN AUTO: 32.1 PG (ref 26–34)
MCHC RBC AUTO-ENTMCNC: 34.1 G/DL (ref 31–36)
MCV RBC AUTO: 94.2 FL (ref 80–100)
MONOCYTES ABSOLUTE: 0.5 K/UL (ref 0–1.3)
MONOCYTES RELATIVE PERCENT: 7.2 %
NEUTROPHILS ABSOLUTE: 4.1 K/UL (ref 1.7–7.7)
NEUTROPHILS RELATIVE PERCENT: 60.6 %
PDW BLD-RTO: 12.6 % (ref 12.4–15.4)
PLATELET # BLD: 345 K/UL (ref 135–450)
PMV BLD AUTO: 7.1 FL (ref 5–10.5)
POTASSIUM REFLEX MAGNESIUM: 4.5 MMOL/L (ref 3.5–5.1)
RBC # BLD: 3.95 M/UL (ref 4.2–5.9)
SODIUM BLD-SCNC: 136 MMOL/L (ref 136–145)
WBC # BLD: 6.8 K/UL (ref 4–11)

## 2023-02-08 PROCEDURE — 6370000000 HC RX 637 (ALT 250 FOR IP): Performed by: PHYSICAL MEDICINE & REHABILITATION

## 2023-02-08 PROCEDURE — 6360000002 HC RX W HCPCS: Performed by: PHYSICAL MEDICINE & REHABILITATION

## 2023-02-08 PROCEDURE — 80048 BASIC METABOLIC PNL TOTAL CA: CPT

## 2023-02-08 PROCEDURE — 97530 THERAPEUTIC ACTIVITIES: CPT

## 2023-02-08 PROCEDURE — 1280000000 HC REHAB R&B

## 2023-02-08 PROCEDURE — 99232 SBSQ HOSP IP/OBS MODERATE 35: CPT | Performed by: PHYSICAL MEDICINE & REHABILITATION

## 2023-02-08 PROCEDURE — 92526 ORAL FUNCTION THERAPY: CPT

## 2023-02-08 PROCEDURE — 97130 THER IVNTJ EA ADDL 15 MIN: CPT

## 2023-02-08 PROCEDURE — 85025 COMPLETE CBC W/AUTO DIFF WBC: CPT

## 2023-02-08 PROCEDURE — 97129 THER IVNTJ 1ST 15 MIN: CPT

## 2023-02-08 PROCEDURE — 97032 APPL MODALITY 1+ESTIM EA 15: CPT

## 2023-02-08 RX ORDER — SENNA AND DOCUSATE SODIUM 50; 8.6 MG/1; MG/1
2 TABLET, FILM COATED ORAL 2 TIMES DAILY
Status: DISCONTINUED | OUTPATIENT
Start: 2023-02-08 | End: 2023-02-24 | Stop reason: HOSPADM

## 2023-02-08 RX ADMIN — TRAMADOL HYDROCHLORIDE 25 MG: 50 TABLET, COATED ORAL at 18:44

## 2023-02-08 RX ADMIN — EZETIMIBE 10 MG: 10 TABLET ORAL at 09:01

## 2023-02-08 RX ADMIN — FLUOXETINE 10 MG: 10 CAPSULE ORAL at 09:01

## 2023-02-08 RX ADMIN — DICLOFENAC SODIUM 4 G: 10 GEL TOPICAL at 21:00

## 2023-02-08 RX ADMIN — ACETAMINOPHEN 650 MG: 325 TABLET, FILM COATED ORAL at 06:33

## 2023-02-08 RX ADMIN — NYSTATIN 500000 UNITS: 100000 SUSPENSION ORAL at 21:00

## 2023-02-08 RX ADMIN — Medication 5 MG: at 21:00

## 2023-02-08 RX ADMIN — ASPIRIN 81 MG 81 MG: 81 TABLET ORAL at 09:01

## 2023-02-08 RX ADMIN — SENNOSIDES AND DOCUSATE SODIUM 2 TABLET: 50; 8.6 TABLET ORAL at 09:01

## 2023-02-08 RX ADMIN — TRAZODONE HYDROCHLORIDE 50 MG: 50 TABLET ORAL at 21:00

## 2023-02-08 RX ADMIN — NYSTATIN 500000 UNITS: 100000 SUSPENSION ORAL at 09:01

## 2023-02-08 RX ADMIN — DICLOFENAC SODIUM 4 G: 10 GEL TOPICAL at 09:02

## 2023-02-08 RX ADMIN — SENNOSIDES AND DOCUSATE SODIUM 2 TABLET: 50; 8.6 TABLET ORAL at 20:59

## 2023-02-08 RX ADMIN — PANTOPRAZOLE SODIUM 40 MG: 40 TABLET, DELAYED RELEASE ORAL at 06:33

## 2023-02-08 RX ADMIN — TIZANIDINE 2 MG: 4 TABLET ORAL at 20:59

## 2023-02-08 RX ADMIN — ROSUVASTATIN CALCIUM 40 MG: 20 TABLET, FILM COATED ORAL at 09:01

## 2023-02-08 RX ADMIN — ENOXAPARIN SODIUM 40 MG: 100 INJECTION SUBCUTANEOUS at 09:01

## 2023-02-08 RX ADMIN — NYSTATIN 500000 UNITS: 100000 SUSPENSION ORAL at 13:16

## 2023-02-08 ASSESSMENT — PAIN DESCRIPTION - DESCRIPTORS
DESCRIPTORS: ACHING
DESCRIPTORS: ACHING;DISCOMFORT

## 2023-02-08 ASSESSMENT — PAIN DESCRIPTION - ORIENTATION
ORIENTATION: LEFT
ORIENTATION: LEFT

## 2023-02-08 ASSESSMENT — PAIN SCALES - GENERAL
PAINLEVEL_OUTOF10: 1
PAINLEVEL_OUTOF10: 7
PAINLEVEL_OUTOF10: 6

## 2023-02-08 ASSESSMENT — PAIN DESCRIPTION - FREQUENCY
FREQUENCY: INTERMITTENT
FREQUENCY: INTERMITTENT

## 2023-02-08 ASSESSMENT — PAIN DESCRIPTION - PAIN TYPE
TYPE: ACUTE PAIN
TYPE: ACUTE PAIN

## 2023-02-08 ASSESSMENT — PAIN DESCRIPTION - ONSET
ONSET: ON-GOING
ONSET: ON-GOING

## 2023-02-08 ASSESSMENT — PAIN - FUNCTIONAL ASSESSMENT
PAIN_FUNCTIONAL_ASSESSMENT: PREVENTS OR INTERFERES SOME ACTIVE ACTIVITIES AND ADLS
PAIN_FUNCTIONAL_ASSESSMENT: PREVENTS OR INTERFERES SOME ACTIVE ACTIVITIES AND ADLS

## 2023-02-08 ASSESSMENT — PAIN DESCRIPTION - LOCATION
LOCATION: HIP
LOCATION: HIP

## 2023-02-08 NOTE — PROGRESS NOTES
Occupational Therapy  Facility/Department: [facility] ACUTE REHAB UNIT  Rehabilitation Occupational Therapy Daily Treatment Note    Date: 23  Patient Name: Nguyen Ye       Room: 4670/6166-10  MRN: [id number]  Account: [de-identified]   : 1955  (68 y.o.) Gender: male                    Past Medical History:  has a past medical history of Anxiety, Chest pain, Depression, Encounter for imaging to screen for metal prior to MRI, Hyperlipidemia, Hypertension, and Wears glasses. Past Surgical History:   has a past surgical history that includes [place] tooth extraction; Colonoscopy (2009); Cardiac catheterization (2008); Finger trigger release (3-); and Coronary angioplasty with stent. Restrictions  Restrictions/Precautions: Fall Risk;Up as Tolerated    Subjective  Subjective: Pt semi supine upon OT arrival. Agreeable to session, reports he did not sleep last night. Co-tx indicated in order to maximize therapeutic potential.  Restrictions/Precautions: Fall Risk;Up as Tolerated             Objective     Cognition  Arousal/Alertness: Appropriate responses to stimuli  Following Commands: Follows one step commands with repetition; Follows one step commands with increased time  Attention Span: Attends with cues to redirect; Difficulty attending to directions; Difficulty dividing attention  Safety Judgement: Decreased awareness of need for assistance;Decreased awareness of need for safety  Problem Solving: Assistance required to generate solutions;Assistance required to identify errors made;Assistance required to implement solutions  Insights: Decreased awareness of deficits  Initiation: Requires cues for some  Sequencing: Requires cues for some  Cognition Comment: Pt tangential at times, requiring cues to redirect. Pt w/ poor awareness of L side.   Orientation  Overall Orientation Status: Within Functional Limits  Orientation Level: Oriented to place;Oriented to situation;Oriented to person ADL  Putting On/Taking Off Footwear  Assistance Level: Maximum assistance  Skilled Clinical Factors: Assist to don B shoes. Functional Mobility  Device: Wheelchair  Assistance Level: Contact guard assist;Minimal assistance  Skilled Clinical Factors: Pt self propelled w/ RUE and RLE from room -> gym w/ CGA and min A, verbal cues for environmental awareness and obstacle avoidance. Supine to Sit  Assistance Level: Requires x 2 assistance; Moderate assistance  Skilled Clinical Factors: Pt required mod A x2 supine --> sit EOB. Pt w/ poor balance and midline orientation sitting EOB initially. Transfers  Surface: From bed; Wheelchair; To mat;From mat  Additional Factors: Verbal cues; Hand placement cues; Increased time to complete  Sit to Stand  Assistance Level: Requires x 2 assistance; Moderate assistance;Minimal assistance  Skilled Clinical Factors: Pt completed initial STS from w/c in // bars w/ min A x2 and then required mod A x1 for second STS. Pt required use of mirror for visual feedback and cues for upright posture. Stand to Sit  Assistance Level: Moderate assistance  Skilled Clinical Factors: Pt required mod A for controlled descent from stand to sit in // bars into w/c. Bed To/From Chair  Technique: Sit pivot  Assistance Level: Requires x 2 assistance; Moderate assistance;Minimal assistance  Skilled Clinical Factors: Pt completed sit pivot t/f EOB --> w/c toward R side w/ mod A x1 and min A x1. Sit Pivot  Assistance Level: Requires x 2 assistance; Moderate assistance;Minimal assistance  Skilled Clinical Factors: Pt completed sit pivot t/f w/c --> mat --> w/c toward R side w/ mod A x1 and min A x1. Completed in 2 scoots w/ max cues for head/hip ratio and body mechanics. OT Exercises  A/AROM Exercises: AAROM ~25% in conjunction w/ fxl estim to facilitate wrist flexion and extension w/ use of PROM to achieve full range from 90 degrees.  AAROM/PROM also completed w/ fxl estim on quad to facilitate flexion/extension of LLE. Static Sitting Balance Exercises: Pt seated edge of mat during eletrical stimulation for at least 25 mins requiring SBA-CGA and max cues for upright posture. Static Standing Balance Exercises: Pt stood for 3 mins in // bars w/ min A x1 and CGA of another w/ use of mirror for visual feedback w/ cues required for upright posture. LLE blocked during stand w/ less buckling observed. Pt completed second stand for ~1 min requiring min-mod A x1. Additional Activities:  Modalities: yes    Left quad and forearm (wrist flexors & extensors)   Electrical Stimulation Action Estim applied to L wrist extensors to facilitate wrist extension (10 mins). Estim applied to L wrist extensors and flexors to facilitate wrist flexion and extension in reciprocal pattern (6 mins). Estim applied to L quad to facilitate knee flexion/extension (10 mins). Electrical Stimulation Parameters Started at 35 and transitioned to 45 mA CC, 5 seconds on/5 seconds off for wrist extensors; started at 40 and transitioned to 45 mA CC, 5 seconds on/5 seconds off, reciprocal pattern for wrist flexors/extensors; started at 65 and transitioned to 70 mA CC, 5 seconds on/5 seconds off, for LLE. Electrical Stimulation Goals Neuromuscular Facilitation           Assessment  Assessment  Assessment: Pt tolerated session very well this date. Sit pivot transfers continue to improve toward R side. Pt w/ improved static standing balance, able to maintain stand for ~3 mins w/ no more than min-CGA of 2 helpers. Pt also able to complete stand w/ assist x1 this date. Pt tolerated estim well, required cues and distraction d/t pain but overall demo'ed good response and contractions of muscles. Pt would continue to benefit from skilled therapy services in order to maximize independence w/ ADLs and fxl mobility tasks. Cont OT POC.   Activity Tolerance: Patient tolerated treatment well  Discharge Recommendations: 24 hour supervision or assist;Continue to assess pending progress;Home with Home health OT  Safety Devices  Safety Devices in place: Yes  Type of devices: Call light within reach; Chair alarm in place; Left in chair    Patient Education  Education  Education Given To: Patient; Family  Education Provided: Role of Therapy;Plan of Care;Family Education;Mobility Training;Transfer Training; Safety; Fall Prevention Strategies  Education Method: Demonstration;Verbal  Barriers to Learning: Cognition  Education Outcome: Verbalized understanding;Demonstrated understanding    Plan  Occupational Therapy Plan  Times Per Week: 5 days per week, 60 mins each  Current Treatment Recommendations: Strengthening;ROM;Balance training;Functional mobility training; Endurance training;Self-Care / ADL; Patient/Caregiver education & training;Neuromuscular re-education;Cognitive/Perceptual training; Safety education & training    Goals  Patient Goals   Patient goals : go home with my wife  Short Term Goals  Time Frame for Short Term Goals: By 2 weeks  Short Term Goal 1: Pt will complete LB dressing with mod A  Short Term Goal 2: Pt will complete toileting with mod A  Short Term Goal 3: Pt will complete toilet and functional transfers with mod A  Short Term Goal 4: Pt will complete UB dressing with min A  Short Term Goal 5: Pt will complete UE HEP x 10 reps for improved R UE coordination for ADLs  Long Term Goals  Time Frame for Long Term Goals : By 4 weeks  Long Term Goal 1: Pt will complete LB dressing with CGA  Long Term Goal 2: Pt will complete toileting with CGA  Long Term Goal 3: Pt will complete toilet and functional transfers with CGA. Long Term Goal 4: Pt will complete UB dressing with SPV.   Long Term Goal 5: Pt will complete simple IADL with LRAD at mod I        Therapy Time   Individual Concurrent Group Co-treatment   Time In       0730   Time Out       0845   Minutes       75         Timed Code Treatment Minutes:  75 min     Total Treatment Minutes: 75 min Felipe Steinberg, OT

## 2023-02-08 NOTE — CARE COORDINATION
FUAD met with Pt and spouse at bedside this AM. Spouse asked if SW could relay info from [employer] each Thursday. Spouse also had questions regarding Pt's Medicare Coverage for hospital and ARU stay and copay days for secondary insurance as Pt just recently received Medicare. SW provided info from [employer]. [employer] to spouse regarding coverage for inpatient  hospital stay and inpatient rehab coverage. Emotional support provided. FUAD will follow up with Pt and spouse after Team Conference tomorrow.     Katherin Rahman  Case Management  [phone]

## 2023-02-08 NOTE — PLAN OF CARE
Problem: Pain  Goal: Verbalizes/displays adequate comfort level or baseline comfort level  2/8/2023 0154 by Yonathan Covington RN  Verbalizes/displays adequate comfort level or baseline comfort level:   Encourage patient to monitor pain and request assistance   Assess pain using appropriate pain scale   Administer analgesics based on type and severity of pain and evaluate response   Implement non-pharmacological measures as appropriate and evaluate response     Problem: Nutrition Deficit:  Goal: Optimize nutritional status  2/8/2023 0154 by Yonathan Covington RN  Nutrient intake appropriate for improving, restoring, or maintaining nutritional needs:   Assess nutritional status and recommend course of action   Monitor oral intake, labs, and treatment plans   Recommend appropriate diets, oral nutritional supplements, and vitamin/mineral supplements

## 2023-02-08 NOTE — PROGRESS NOTES
Department of Physical Medicine & Rehabilitation  Progress Note    Patient Identification:  Anton Coker  [id number]  : 1955  Admit date: 2023    Chief Complaint: Acute CVA (cerebrovascular accident) [facility])    Subjective:   Doing well. No new complaints overnight. He is participating well in therapy. He is working n bed exercises today. BM this morning. Slept well last night. ROS: No f/c, n/v, cp     Objective:  Patient Vitals for the past 24 hrs:   BP Temp Temp src Pulse Resp SpO2   23 0853 121/81 98.3 °F (36.8 °C) Oral 67 18 98 %   23 123/74 97.7 °F (36.5 °C) Oral 72 18 96 %       Const: Alert. No distress, pleasant. HEENT: Normocephalic, atraumatic. Normal sclera/conjunctiva. MMM. CV: Regular rate and rhythm. Resp: No respiratory distress. Lungs CTAB. Abd: Soft, nontender, nondistended, NABS+   Ext: No edema. Neuro: Alert, oriented, appropriately interactive. Psych: Cooperative, appropriate mood and affect    Laboratory data: Available via EMR.    Last 24 hour lab  Recent Results (from the past 24 hour(s))   Basic Metabolic Panel w/ Reflex to MG    Collection Time: 23  6:28 AM   Result Value Ref Range    Sodium 136 136 - 145 mmol/L    Potassium reflex Magnesium 4.5 3.5 - 5.1 mmol/L    Chloride 101 99 - 110 mmol/L    CO2 26 21 - 32 mmol/L    Anion Gap 9 3 - 16    Glucose 107 (H) 70 - 99 mg/dL    BUN 15 7 - 20 mg/dL    Creatinine 0.6 (L) 0.8 - 1.3 mg/dL    Est, Glom Filt Rate >60 >60    Calcium 8.9 8.3 - 10.6 mg/dL   CBC auto differential    Collection Time: 23  6:29 AM   Result Value Ref Range    WBC 6.8 4.0 - 11.0 K/uL    RBC 3.95 (L) 4.20 - 5.90 M/uL    Hemoglobin 12.7 (L) 13.5 - 17.5 g/dL    Hematocrit 37.2 (L) 40.5 - 52.5 %    MCV 94.2 80.0 - 100.0 fL    MCH 32.1 26.0 - 34.0 pg    MCHC 34.1 31.0 - 36.0 g/dL    RDW 12.6 12.4 - 15.4 %    Platelets 718 342 - 547 K/uL    MPV 7.1 5.0 - 10.5 fL    Neutrophils % 60.6 %    Lymphocytes % 29.2 %    Monocytes % 7.2 %    Eosinophils % 2.2 %    Basophils % 0.8 %    Neutrophils Absolute 4.1 1.7 - 7.7 K/uL    Lymphocytes Absolute 2.0 1.0 - 5.1 K/uL    Monocytes Absolute 0.5 0.0 - 1.3 K/uL    Eosinophils Absolute 0.1 0.0 - 0.6 K/uL    Basophils Absolute 0.1 0.0 - 0.2 K/uL             Therapy progress:  PT  Position Activity Restriction  Other position/activity restrictions: up as tolerated, up in chair  Objective     Sit to Stand: Maximum Assistance, 2 Person Assistance  Stand to Sit: 2 Person Assistance, Maximum Assistance     OT  PT Equipment Recommendations  Equipment Needed:  (defer)  Other: continue to assess  Toilet - Technique:  (via rolling shower chair)  Equipment Used:  (rolling shower chair)  Toilet Transfers Comments: pt did not have urge to void, however rolled pt in bathroom via rolling shower chair. Max A x 2 to pivot from bed to rolling shower chair. Assessment        SLP          Body mass index is 22.4 kg/m². 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 Patient is on aspirin at home        HTN  Patient is on lisinopril 5mg daily at home  - monitor      HLD:  Patient is on Crestor 20mg and ezetimibe 10mg at home daily  -Continue home meds     Anxiety  Per chart, patient is on Valium 2mg at home. Possible could be contributor to his chest pain yesterday, notes he felt anxious. Denies taking Valium at home, however it is listed as a home med.  Ativan 0.5mg once overnight   - prozac      Sleep disturbance  - Trazodone PRN  - benadryl PRN  -improving     Add low dose tizanidine for spasticity   - improving left leg tightness         Rehab Progress: Improving  Anticipated Dispo: home  Services/DME: [place]  ELOS: 2/19      Ros Burgos D.O. M.P.H  PM&R  2/8/2023  10:50 AM

## 2023-02-08 NOTE — PROGRESS NOTES
Speech Language Pathology  ACUTE REHAB UNIT  SPEECH/LANGUAGE PATHOLOGY      [x] Daily  [] Weekly Care Conference Note  [] Discharge    Patient:Jason Cheney  END:[id number]  Rehab Dx/Hx: Acute CVA (cerebrovascular accident) ([facility] [address] [I63.9]    Precautions: [x] Aspiration  [x] Fall risk  [] Sternal  [] Seizure [] Hip  [] Weight Bearing [] Other     ST Dx: [] Aphasia  [x] Dysarthria  [] Apraxia   [x] Oropharyngeal dysphagia [x] Cognitive Impairment  [] Other:   Date of Admit: 1/29/2023  Room #: 3102/3102-01  Date: 2/8/2023          Current Diet Order:ADULT DIET; Dysphagia - Minced and Moist  ADULT ORAL NUTRITION SUPPLEMENT; Breakfast, Dinner; Standard High Calorie/High Protein Oral Supplement   Recommended Form of Meds: Meds in puree  Compensatory Swallowing Strategies :  (Small bites, no pharyngeal neck extension, left head turn with thins, check for pocketing on left, 1:1 assist for small bolus size)   Previous MBS: 1/26/23  Oral Phase  Mild-moderate dysfunction characterized by incomplete labial seal with anterior spillage, slowed/reduced mastication, mild stasis. Pharyngeal Phase  Moderate dysfunction characterized by impairments in timing, strength and coordination with premature bolus loss, reduced base of tongue retraction, delayed/reduced hyolaryngeal mechanics, and reduced pharyngeal constriction. Resulted in vallecular/pyriform/pharyngeal residue, as well as compromised airway protection with penetration and gross tracheal aspiration of thin and mildly thick liquids; strong reactive cough present but did not fully clear. Chin tuck strategy did not eliminate aspiration, however cued head turn LEFT with small straw sips was effective . Double swallow helped clear residual. Of note, throughout study pt with tendency to tilt head posteriorly which further exacerbated bolus control; benefited from cues for neutral positioning.   Upper Esophageal Screen  Indirectly assessed; appeared unremarkable. 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 None indicated   Pain Intervention: [] RN notified  [] Repositioned  [] Intervention offered and patient declined  [x] N/A  [] Other: [] RN notified  [x] Repositioned: [facility] through session PCA and SLP assisted pt with sitting upright in chair. [] Intervention offered and patient declined  [] N/A  [] Other:   Subjective:     Pt upright in wheelchair and agreeable to session. Spouse present for duration of session. Pt seated upright in chair agreeable to SLP session at this time. Wife present. Objective / Goals:     Goal 1: Pt will demonstrate adequate oral containment of liquids and solids across meal without cueing. Patient tolerated NMES via VitalStim placement 4a (targeting orbicularis oris, buccinator and superior pharyngeal constrictor) @ 4.0 mA on right x 35 minutes and 7.5 mA on left x 35 minutes. Loss of secretions + puree noted x1  Placement of PO on left sided labial surface intertmittently due to reduced tone. No anterior loss this session while pt consumed carbonated liquid beverage via straw.     Goal 2: Pt will tolerate trials of solids with adequate mastication and propulsion as evidenced by min residue after the swallow across mealtime assessment. Completed trials of minced and moist solids mixed in puree. Continues to demo poor bolus cohesion with meds whole in pudding x2 requiring multiple attempts, mod cues. Not directly targeted this session. Goal 3: Pt will tolerate thin liquids with head in neutral position without s/s associated with aspiration. Thins via cup edge with head in neutral position with cued fast/effortful swallows with cough x2 Trialed carbonated thin via single straw sips x25. No s/s noted. Goal 4: Pt will demonstrate safe feeding behavior as evidenced by small bolus size / appropriate rate without cues. Pt with large bolus size intermittently with current diet level. Attempted self administration of bolus size from banana but pt continued to take large bolus size despite cues. Pt taking small sips of thin liquids with no cues required. Goal 5: Pt will tolerate least restrictive diet without respiratory decline. WBC and Neutrophil Absolute are WFL. Chart review does not reveal any documented difficulty on current recommended diet level. No respiratory decline. PT did report yesterday x~20 minute coughing episode during session following liquids. Effortful swallows x57    Lingual/finger sweep: x3    Slurp and swallow: x1 with prompts. Effortful swallows with thin liquids: x25      CTAR: x3 sets x15 with improved strength noted. Goal 1: The patient will maintain eye contact to speaker positioned left of midline without distractors, min cues. Good eye contact with speakers this date. Targeted via letter cancellation x2: Pt required mod cues to continue to scan back to the left of page to identify letters for cancellation task. Goal 2: The patient will demonstrate sustained attention to task for 2 minutes with <2 prompts.    Pt required prompts x2 to redirect attention to consuming meal.  Redirected self during swallow exercises x4    >2 prompts from SLP to continue focusing on trials of thin liquids. Pt with tangential comments throughout. Wife assisting with redirecting as well. Goal 3: The patient will demonstrate improved inhibition as evidenced by <3 prompts for impulsivity during a task. Difficulty with inhibition of large bolus size despite ability to self identify goal.  No impulsivity observed during swallowing therapy and letter cancellation task. Goal 4: The patient will demonstrate improved self monitoring/correcting for basic tasks with <mod cues. Pt demonstrated improved self monitoring for attention x1 during meal.  Pt aware of tangential comments in x1 occurrence and redirected self to task without prompts. Goal 5: The patient will tolerate ongoing cognitive linguistic assessment. Goal not targeted this session. Not targeted this session. Goal 6: The patient will demonstrate comprehensibility in multiple communication environments without cues. Goal not targeted this session. Not targeted this session. Other areas targeted:     Education:   Education ongoing re: Rationale for compensatory swallow techniques to over-chew, take small bites, swallow hard, and aim to place PO on right side of mouth to avoid loss of PO on left. Educated re: Rationale for swallowing strategies implementing chin tuck against resistance, liquid lime carbonated beverage via to improve sensory impairments, labial seal and hard swallows. Pt comprehended use of letter cancellation task to target left visual scanning. Safety Devices: [x] Call light within reach  [x] Chair alarm activated and connected to nurse call light system  [] Bed alarm activated   [] Other:  [x] Call light within reach  [x] Chair alarm activated and connected to nurse call light system  [] Bed alarm activated  [] Other:    Assessment: Oropharyngeal dysphagia. Barriers to diet advancement are attention primarily and secondary with bolus size. Pt complained of right eye watering. Requested to target goal to improve difficulty with locating text messages on cell phone. Plan: Continue as per plan of care. Interventions used this date:  [] Speech/Language Treatment  [] Instruction in HEP  [x] Dysphagia Treatment [x] Cognitive Treatment   [] Other:    Discharge recommendations:  [] Home independently  [x] Home with assistance []  24 hour supervision  [] ECF [] Other  Continued Tx Upon Discharge: ? [x] Yes    [] No    [] TBD based on progress while on ARU     [] Vital Stim indicated     [] Other:   Estimated discharge date: February 19th, 2023    Electronically signed by:  Session 1:  Ginger Wiggins, [address] [place] [address] Language Pathology      Steve Mehta M.A., [facility]  Speech-Language Pathologist    The speech-language pathologist was present, directed the patient's care, made skilled judgment and was responsible for assessment and treatment. Session 2:  Carson Valladares., [facility]  Speech-Language Pathologist    The speech-language pathologist was present, directed the patient's care, made skilled judgment and was responsible for assessment and treatment.

## 2023-02-08 NOTE — PATIENT CARE CONFERENCE
Inpatient Rehabilitation  Weekly Team Conference Note  The [address]  [phone]  Patient Name: Anabel Hooks        MRN: [id number]    : 1955  (74 y.o.)  Gender: male   Referring Practitioner: Lara Polk DO  Diagnosis: CVA  The team conference for this patient was held on 2023 at 10:30am by:  Lara Polk DO    Current/Goal QM SCORES  QM Current/Goal Score   Eating CARE Score: 5 / Discharge Goal: Independent   Oral Hygiene CARE Score: 3 / Discharge Goal: Independent   Shower/Bathing CARE Score: 1 / Discharge Goal: Supervision or touching assistance   UB Dressing CARE Score: 3 / Discharge Goal: Independent   LB Dressing CARE Score: 1 / Discharge Goal: Supervision or touching assistance   Putting on/off Footwear CARE Score: 2 / Discharge Goal: Independent   Toileting Hygiene CARE Score: 1 / Discharge Goal: Supervision or touching assistance   Bladder Continence Bladder Continence: Always incontinent    Bowel Continence      Toilet Transfers CARE Score: 1 / Discharge Goal: Supervision or touching assistance   Shower/Bathe Self  CARE Score: 1 / Discharge Goal: Supervision or touching assistance   Rolling Left and Right CARE Score: 4 / Discharge Goal: Supervision or touching assistance   Sit to Lying CARE Score: 2 / Discharge Goal: Supervision or touching assistance   Lying to Sitting on Bedside CARE Score: 1 / Discharge Goal: Supervision or touching assistance   Sit to Stand CARE Score: 3 / Discharge Goal: Supervision or touching assistance   Chair/Bed to Chair Transfer CARE Score: 1 / Discharge Goal: Supervision or touching assistance   Car Transfers CARE Score: 88 / Discharge Goal: Supervision or touching assistance   Walk 10 Feet CARE Score: 88 / Discharge Goal: Partial/moderate assistance   Walk 50 Feet with Two Turns CARE Score: 88 /     Walk 150 Feet CARE Score: 88 / Discharge Goal: Partial/moderate assistance   Walk 10 Feet on Uneven Surfaces CARE Score: 88 / Discharge Goal: Not Attempted   1 Step (Curb) CARE Score: 88 / Discharge Goal: Not Attempted   4 Steps CARE Score: 88 / Discharge Goal: Not Attempted   12 Steps CARE Score: 88 / Discharge Goal: Not Attempted   Picking up Object from Floor CARE Score: 88 / Discharge Goal: Partial/moderate assistance   Wheel 50 Feet with 2 Turns CARE Score: 4 / Discharge Goal: Supervision or touching assistance   Type         [] Manual        [] Motorized        [] N/A   Wheel 150 Feet CARE Score: 4 / Discharge Goal: Supervision or touching assistance   Type         [] Manual        [] Motorized        [] N/A     NURSING:  A&Ox: Level of Consciousness: Alert (0)  Orientation Level: Oriented X4  Harris Fall Risk Score: Harris Total Score: 75  Admission BIMS: 12  Wounds/Incisions/Ulcers: N/A  Medication Review: Medications reviewed with the patient. Pain: Patients pain is relieved with PRN Ultam, and PRN Zanaflex. Consultations: N/A  Imaging: See Below   XR HIP 2-3 VW W PELVIS LEFT   Final Result   1. Mild-moderate left hip osteoarthrosis. Active Comorbid Conditions:Anxiety, Depression, HTN, Hyperlipidema. Systems Review:   Renal: Incontinent at times, Dialysis:  Type: N/A, Frequency: N/A  Neurological: WDL  Musculoskeletal: Left side flaccid  Respiratory: WDL  Cardiac/Circulatory/Peripheral Vascular: BLEs non pitting edema, ZIO patch on. Abnormal/Relevant Test Results: See Below  Abnormal/Relevant Lab Values:   CBC:   Recent Labs     02/08/23  0629   WBC 6.8   HGB 12.7*   HCT 37.2*   MCV 94.2        BMP:   Recent Labs     02/08/23  0628      K 4.5      CO2 26   BUN 15   CREATININE 0.6*     PT/INR: No results for input(s): PROTIME, INR in the last 72 hours. APTT: No results for input(s): APTT in the last 72 hours.   Liver Profile:  Lab Results   Component Value Date/Time    AST 24 01/25/2023 07:39 PM    ALT 20 01/25/2023 07:39 PM    BILITOT <0.2 01/25/2023 07:39 PM ALKPHOS 47 01/25/2023 07:39 PM     Lab Results   Component Value Date/Time    CHOL 140 01/27/2023 05:52 AM    HDL 50 01/27/2023 05:52 AM    TRIG 97 01/27/2023 05:52 AM     Recent Labs     02/08/23  0629   WBC 6.8   HGB 12.7*   HCT 37.2*      MCV 94.2     Recent Labs     02/08/23  0628      K 4.5      CO2 26   BUN 15   CREATININE 0.6*     No results for input(s): AST, ALT, ALB, BILIDIR, BILITOT, ALKPHOS in the last 72 hours. No results for input(s): MG in the last 72 hours. Recent Labs     02/08/23  0629   WBC 6.8   HGB 12.7*   HCT 37.2*        Recent Labs     02/08/23 0628      K 4.5      CO2 26   BUN 15   CREATININE 0.6*   CALCIUM 8.9     No results for input(s): AST, ALT, BILIDIR, BILITOT, ALKPHOS in the last 72 hours. No results for input(s): INR in the last 72 hours. No results for input(s): [place] in the last 72 hours. PHYSICAL THERAPY:  Bed Mobility:      Transfers:  Sit to Stand: Maximum Assistance, 2 Person Assistance  Stand to Sit: 2 Person Assistance, Maximum Assistance    Ambulation  More Ambulation?: No         OCCUPATIONAL THERAPY:  ADL  Feeding: Setup  Feeding Skilled Clinical Factors: assist to open packages  Grooming: Minimal assistance  Grooming Skilled Clinical Factors: assist to place toothpaste onto toothbrush, assist to manage supplies  UE Bathing: Moderate assistance  UE Bathing Skilled Clinical Factors: seated in rolling shower chair, max verbal cueing to wash L UE, assist to lift UE to wash underarm  LE Bathing: Moderate assistance  LE Bathing Skilled Clinical Factors: assist to wash lower LEs. Pt able to wash [place] area and upper LEs with cueing. UE Dressing: Maximum assistance  UE Dressing Skilled Clinical Factors: assist to place L UE into shirt, overhead, and bringing R UE into shirt, pull down over trunk  LE Dressing: Dependent/Total  LE Dressing Skilled Clinical Factors: assist from 2 helpers to pull up over hips.  Pt able to assist threading R LE into pants, assist for L LE  Toileting: Dependent/Total  Toileting Skilled Clinical Factors: via rolling shower chair placed over toilet  Additional Comments: Bathing and dressing completed as listed above. Pt required extensive cueing to attend to L UE and for sequencing. Pt seated on rolling shower chair 2/2 extensive posterior lean and pushing to pts L while seaed EOB. Pt impulsive during transfers. Required cueing to attend to tasks, noted pt to be easily distracted during transfers and ADLs. Seated in bedside chair for oral care. Toilet Transfers: Toilet Transfers  Toilet - Technique:  (via rolling shower chair)  Equipment Used:  (rolling shower chair)  Toilet Transfer: Dependent/Total  Toilet Transfers Comments: pt did not have urge to void, however rolled pt in bathroom via rolling shower chair. Max A x 2 to pivot from bed to rolling shower chair. Tub/ShowerTransfers:          SPEECH THERAPY:  Assessment: Speech Therapy Diagnosis  Cognitive Diagnosis: Moderate cognitive impairment  Speech Diagnosis: Mild dysarthria    NUTRITION:  Current Weight: 169 lb 12.1 oz (77 kg) BMI (Calculated): 22.4  Current diet order: Current diet and supplement order: ADULT DIET; Dysphagia - Minced and Moist  ADULT ORAL NUTRITION SUPPLEMENT; Breakfast, Dinner; Standard High Calorie/High Protein Oral Supplement         Feeding: Able to feed self, Needs assist, Needs set up (1:1, for strategies)  Room Service: Selective   NSG Recorded PO: PO Meals Eaten (%): 1 - 25% PO Supplement (%): 76 - 100%  Malnutrition Status Malnutrition Status: At risk for malnutrition (Comment)      CASE MANAGEMENT:  Psychosocial/Behavioral Issues: none  Assessment:  Pt is from home with spouse. SW will assist with DC plan and needs. Patient/Family Education provided by team:  Role of PT/OT, Nursing, Stroke recovery, ADL retraining, safety and fall prevention    Patient Goals for Rehab stay:  1.  To go home w/ wife    Rehab Team Goals for patient for the Upcoming Week:  1. Increase independence w/ fxl transfers   2. Increase independence w/ fxl ADLs    Barriers to Progress/Attainment of Goals & Efforts/Interventions to remove Barriers:  1. Decreased ROM/strength in LUE/LLE - neuro muscular re-education  2. Decreased sitting balance - transfer training     Discharge Plan:  Estimated Length of Stay: 21 days  Destination: home health  Services at Discharge: [address] [place] [address], Occupational Therapy, Speech Therapy, and Nursing 3x week  Equipment at Discharge: continue to assess- likely wc and RW  Community Resources:   Factors facilitating achievement of predicted outcomes: Family support, Friend support, Motivated, Cooperative, and Pleasant  Barriers to the achievement of predicted outcomes: Cognitive deficit, Limited safety awareness, Decreased endurance, Decreased sensation, Decreased proprioception, Upper extremity weakness, and Lower extremity weakness    Special Needs in the Upcoming Week:   [x] Family/Caregiver Education  [] Home visit  []Therapeutic Pass [] Consults:_______   [] Family/Caregiver Training  [] Stroke Risk Factor Education     [] Other;_______     TEAM SUMMARY: Will continue with current poc & goals until anticipated d/c date of 2/19/2023.     MD:   Stroke Risk Factors:   [] N/A for this patient [x] HTN  []  Diabetes  [x] Hyperlipidemia  []Obesity BMI >25  [] Atrial Fibrillation [] Smoker (current)  [] Smoker (quit in last 12 months)  [] Sleep Apnea [] Other:     Risk for Readmission: Moderate (10-19)    Justification for Continued Stay:   Criteria for continued IRF stay:  Based on my medical assessment of the patient and review of information from the interdisciplinary team, as part of this weekly team conference, the patient continues to meet the following criteria for IRF level of care:  [x] The patient requires 24-hour rehabilitation nursing care   [x] The patient requires an intensive rehabilitation therapy program  [x] The patient requires active and ongoing intervention of multiple therapy disciplines  [x] The patient requires continued physician supervision by a rehabilitation physician  [x] The patient requires an intensive and coordinated interdisciplinary team approach to the delivery of rehabilitative care    Medical Necessity-continued close physician medical management is required for:   [] Cardiac/Circulatory dysfunction  [] Respiratory/Pulmonary dysfunction  [] Integumentary complications  [] Peripheral Vascular dysfunction  [] Musculoskeletal dysfunction  [x] Neurological dysfunction d/t:  [x] CVA  [] SCI  [] TBI  [] Other: __________  [] Renal dysfunction  [] Hematologic dysfunction    [] Endocrine disorders  [] GI disorders     [] Genito-Urinary dysfunction    Assessment/Plan:  [x] The patient is making good progression towards their LTG's, is actively participating in, and has a reasonable expectation to continue to benefit from the intensive rehabilitation program.  [] The estimated discharge date has been changed from initial team conference due to:   [] The estimated discharge destination has been changed from initial team conference due to:     Rehab Team Members in attendance for Team Conference:  ARU Supervisor/PPS Coordinator:  Lashanda Sousa, PT, DPT    Therapy Manager/ARU :  Glenna Mueller PT, DPT    Social Work:  Cm Blake, JUANITA    Nursing:  Cristiano Terrell, JUANITA Kimbrough, JUANITA    Therapy:  Keya Jose, PT  Betina Londono, PT, DPT  Jass Cornejo PT, DPT     [facility], OTR/L  Rose Marie Johnson, OTR/L  Charlene Tristan, OTR/L    DANA Godinez, SLP    Nutrition:  Zi Brown, [address]:    [place]: [phone]  Office:  [phone]      I approve the established interdisciplinary plan of care as documented within the medical record of Brett Downs.     JOE VitalO. M.P.H  PM&R  2/9/2023  10:49 AM

## 2023-02-08 NOTE — PROGRESS NOTES
Physical Therapy  Facility/Department: [facility] ACUTE REHAB UNIT  Rehabilitation Physical Therapy Treatment Note    NAME: Nguyen Ye  : 1955 (64 y.o.)  MRN: [id number]  CODE STATUS: Full Code    Date of Service: 23       Restrictions:  Restrictions/Precautions: Fall Risk;Up as Tolerated     SUBJECTIVE  Subjective  Subjective: pt up in chair and agreeable to PT  Pain: reporting pain with movement in L hip         OBJECTIVE  Cognition  Arousal/Alertness: Appropriate responses to stimuli  Following Commands: Follows one step commands with repetition; Follows one step commands with increased time  Attention Span: Attends with cues to redirect; Difficulty attending to directions; Difficulty dividing attention  Safety Judgement: Decreased awareness of need for assistance;Decreased awareness of need for safety  Problem Solving: Assistance required to generate solutions;Assistance required to identify errors made;Assistance required to implement solutions  Insights: Decreased awareness of deficits  Initiation: Requires cues for some  Sequencing: Requires cues for some  Cognition Comment: Pt tangential at times, requiring cues to redirect. Pt w/ poor awareness of L side. Orientation  Overall Orientation Status: Within Functional Limits  Orientation Level: Oriented to place;Oriented to situation;Oriented to person    Functional Mobility  Roll Left  Assistance Level: Stand by assist  Supine to Sit  Assistance Level: Requires x 2 assistance; Moderate assistance  Skilled Clinical Factors: assist of 2 for trunk, pt able to get LEs off edge of bed with mod A of 1  Scooting  Assistance Level: Moderate assistance  Skilled Clinical Factors: on mat, to edge of bed, and in wc  Balance  Sitting Balance: Contact guard assistance (initial sitting balance upon getting out of bed requiring max A progressing to CGA once pt able to find his center.  pt maintained sitting balance with SBA-CGA throughout entire e-stim treatment with occasional cues to correct posture.)  Standing Balance: Minimal assistance  Standing Balance  Time: 2 trials within parallel bars, 3 min + 1 min  Activity: static stance in parallel bars with min A + CGA. minimal LLE buckling with good upright posture, consistent blocking of L knee. with assist for placement pt able to maintain L WB on parallel bar  Transfers  Surface: From bed; To mat;From mat; Wheelchair  Additional Factors: Verbal cues; Hand placement cues; Increased time to complete (cues for R lateral lean)  Sit to Stand  Assistance Level: Moderate assistance  Skilled Clinical Factors: 1st trial min A of 2, 2nd trial mod A of 1 both in parallel bars  Stand to Sit  Assistance Level: Moderate assistance  Skilled Clinical Factors: for controlled descent  Bed To/From Chair  Technique: Sit pivot  Assistance Level: Requires x 2 assistance  Skilled Clinical Factors: from EOB> wc, wc>mat, mat>wc mod A + min A      Environmental Mobility  Wheelchair  Surface: Level surface  Device: Standard wheelchair  Additional Factors: Verbal cues; Increased time to complete (VCs for L attention and avoiding objects in wilder)  Assistance Required to Manage Parts: All wheelchair parts  Assistance Level for Propulsion: Stand by assist  Propulsion Method: Right upper extremity;Right lower extremity  Propulsion Quality: Slow velocity; Short strokes; Veers left  Propulsion Distance: 175'  Skilled Clinical Factors: 1 instance of running into object on L side able to correct with max VCs. max VCs for L attention and staying on R side of hallway      Neuromuscular Education  Neuromuscular Education: Yes  NDT Treatment: Upper extremity; Lower extremity; Sitting (sidelying on R to place L side in gravity eliminated position)  Functional Movement Patterns: in sitting on edge of mat while focusing on seated balance [place] Estim applied to L forearm to facilitate wrist extension for 10 mins. [place] Estim also applied to L quad for 10 mins.  Electrical Stimulation Parameters: 40-45 mA CC, 5 seconds on/5 seconds off for LUE, 65-70 mA CC, 5 seconds on/5 seconds off for LLE with goal of Neuromuscular Facilitation. strong wrist extension contraction achieved without physical assist from OT. small quad contraction noted, PROM performed for LAQ during contraction. also applied continuous reciprocal e-stim to wrist flexors and extensors starting at 40mA CC- 45 mA CC with good wrist flexion and extension contractions for 6 min             ASSESSMENT/PROGRESS TOWARDS GOALS       Assessment  Assessment: pt tolerated session well very hardworking and eager to participate. pt continues to be limited by distraction, L inattention, and L hemiparesis. pt assist of 2 for scoot pivot transfers. [place] E-stim applied to both LUE and LLE on this date while pt worked on seated balance with good muscle contractions noted to quad and wrist flexions/extensors however no active movement noted. standing balance improved within parallel bars progressing to min A of 1 with occasional L knee buckling and cues to correct posture. pt able to progress with sit <> stand transfers from min A of 2 to mod A of 1. pt maintaining seated balance with mostly CGA and occasional min A with occasional cues to correct posture. pt continues to be well below baseline and would benefit from continued PT to maximize independence.   Activity Tolerance: Patient tolerated treatment well  Discharge Recommendations: 24 hour supervision or assist;Continue to assess pending progress;Home with Home health PT  PT Equipment Recommendations  Equipment Needed:  (defer)  Other: continue to assess    Goals  Patient Goals   Patient Goals : to go home  Short Term Goals  Time Frame for Short Term Goals: 2 weeks (all ongoing)  Short Term Goal 1: Pt will complete bed mobility with moderate assist  Short Term Goal 2: Pt will complete sit<>stand transfer wtih moderate assist  Short Term Goal 3: Pt will propel self in manual w/c for 150' with minimal assist.- met 2/2  Short Term Goal 4: Pt will ambulate 10' with moderate assist.  Long Term Goals  Time Frame for Long Term Goals : 4 weeks (all ongoing)  Long Term Goal 1: Pt will complete bed mobility with CGA  Long Term Goal 2: Pt will complete sit<>Stand transfers with CGA  Long Term Goal 3: Pt will propel self in manual w/c for 250' with suprvision  Long Term Goal 4: Pt will ambulate 48' with CGA & LRAD    PLAN OF CARE/SAFETY  Physcial Therapy Plan  Days Per Week: 5 Days  Hours Per Day: 1 hour  Therapy Duration: 4 Weeks  Current Treatment Recommendations: Strengthening;Balance training;ROM; Functional mobility training;Neuromuscular re-education; Safety education & training;Patient/Caregiver education & training;Transfer training; Endurance training;Home exercise program;Gait training;Equipment evaluation, education, & procurement;Positioning; Therapeutic activities  Safety Devices  Type of Devices: Call light within reach;Nurse notified;Gait belt;Patient at risk for falls; Chair alarm in place; Left in chair  Restraints  Restraints Initially in Place: No    EDUCATION  Education  Education Given To: Patient  Education Provided: Role of Therapy;Plan of Care; Mobility Training;Transfer Training  Education Method: Verbal  Barriers to Learning: Cognition  Education Outcome: Verbalized understanding;Continued education needed        Therapy Time   Individual Concurrent Group Co-treatment   Time In       0730   Time Out       0845   Minutes       75     Timed Code Treatment Minutes: 75 Minutes  Variance: 0    Sue Kirby, PT, 02/08/23 at 11:13 AM

## 2023-02-08 NOTE — PROGRESS NOTES
Comprehensive Nutrition Assessment    RECOMMENDATIONS:  PO Diet: Dysphagia-Minced & Moist  ONS: Ensure Plus HP bid  Nutrition Education: No recommendation at this time       NUTRITION ASSESSMENT:   Nutritional summary & status: Follow up; Pt continues on a Dysphagia-Minced and Moist Diet with recorded meal intakes of 51-75%. Visited at noon meal with wife present. Pt stated po intake is improving and continues to work with SLP on diet adv. Continues to accept Ensure Plus HP bid and wife bringing in Smoothies from home for additional protein. Continue with current nutrition interventions and monitor throughout stay. Admission/PMH: Admit; Acute CVA  PMHx: CAD, HTN, HLD    MALNUTRITION ASSESSMENT  Context of Malnutrition: Acute Illness   Malnutrition Status: At risk for malnutrition (Comment)  Findings of the 6 clinical characteristics of malnutrition (Minimum of 2 out of 6 clinical characteristics is required to make the diagnosis of moderate or severe Protein Calorie Malnutrition based on AND/ASPEN Guidelines):  Energy Intake:  Mild decrease in energy intake (Comment)  Weight Loss:  No significant weight loss     Body Fat Loss:  No significant body fat loss     Muscle Mass Loss:  No significant muscle mass loss    Fluid Accumulation:  No significant fluid accumulation     Strength:  Not Performed    NUTRITION DIAGNOSIS   Inadequate oral intake, Increased nutrient needs related to increase demand for energy/nutrients as evidenced by intake 26-50%, intake 51-75%, other (comment) (extended hospital stay(rehab))    Nutrition Monitoring and Evaluation:   Food/Nutrient Intake Outcomes:  Food and Nutrient Intake, Supplement Intake  Physical Signs/Symptoms Outcomes:  Biochemical Data, Nutrition Focused Physical Findings, Weight     OBJECTIVE DATA: Significant to nutrition assessment  Nutrition Related Findings: LUE/LLE non-pitting edema. Glu 107. LBM 2/04.   Wounds: None  Nutrition Goals: PO intake 75% or greater, prior to discharge     CURRENT NUTRITION THERAPIES  ADULT DIET; Dysphagia - Minced and Moist  ADULT ORAL NUTRITION SUPPLEMENT; Breakfast, Dinner; Standard High Calorie/High Protein Oral Supplement  PO Intake: 51-75%   PO Supplement Intake:%  Additional Sources of Calories/IVF:n/a     ANTHROPOMETRICS  Current Height: 6' 1\" (185.4 cm)  Current Weight: 169 lb 12.1 oz (77 kg)    Admission weight: 181 lb 10.5 oz (82.4 kg)  Ideal Body Weight (IBW): 184 lbs  (84 kg)    Usual Bodyweight     BMI: 22.4    COMPARATIVE STANDARDS  Energy (kcal):  6946-3351 (25-30 kcal/CBW/82 kg)     Protein (g):   (1.2-1.5 gm/CBW/82 kg)       Fluid (mL/day):  0634-7710 (1 ml/kcal) or per provider    The patient will be monitored per nutrition standards of care. Consult dietitian if additional nutrition interventions are needed prior to RD reassessment.      Karime Carrillo, [facility]:  [phone]  Office:  [phone]

## 2023-02-09 PROCEDURE — 6360000002 HC RX W HCPCS: Performed by: PHYSICAL MEDICINE & REHABILITATION

## 2023-02-09 PROCEDURE — 97530 THERAPEUTIC ACTIVITIES: CPT

## 2023-02-09 PROCEDURE — 1280000000 HC REHAB R&B

## 2023-02-09 PROCEDURE — 92526 ORAL FUNCTION THERAPY: CPT

## 2023-02-09 PROCEDURE — 99233 SBSQ HOSP IP/OBS HIGH 50: CPT | Performed by: PHYSICAL MEDICINE & REHABILITATION

## 2023-02-09 PROCEDURE — 6370000000 HC RX 637 (ALT 250 FOR IP): Performed by: PHYSICAL MEDICINE & REHABILITATION

## 2023-02-09 PROCEDURE — 97032 APPL MODALITY 1+ESTIM EA 15: CPT

## 2023-02-09 PROCEDURE — 97535 SELF CARE MNGMENT TRAINING: CPT

## 2023-02-09 PROCEDURE — 97130 THER IVNTJ EA ADDL 15 MIN: CPT

## 2023-02-09 PROCEDURE — 97110 THERAPEUTIC EXERCISES: CPT

## 2023-02-09 PROCEDURE — 97129 THER IVNTJ 1ST 15 MIN: CPT

## 2023-02-09 RX ORDER — METHYLPHENIDATE HYDROCHLORIDE 10 MG/1
5 TABLET ORAL EVERY MORNING
Status: DISCONTINUED | OUTPATIENT
Start: 2023-02-09 | End: 2023-02-11

## 2023-02-09 RX ADMIN — FLUOXETINE 10 MG: 10 CAPSULE ORAL at 09:57

## 2023-02-09 RX ADMIN — DICLOFENAC SODIUM 4 G: 10 GEL TOPICAL at 09:59

## 2023-02-09 RX ADMIN — SENNOSIDES AND DOCUSATE SODIUM 2 TABLET: 50; 8.6 TABLET ORAL at 20:46

## 2023-02-09 RX ADMIN — GLYCERIN 2 G: 2 SUPPOSITORY RECTAL at 18:59

## 2023-02-09 RX ADMIN — NYSTATIN 500000 UNITS: 100000 SUSPENSION ORAL at 18:59

## 2023-02-09 RX ADMIN — ASPIRIN 81 MG 81 MG: 81 TABLET ORAL at 09:58

## 2023-02-09 RX ADMIN — TRAZODONE HYDROCHLORIDE 50 MG: 50 TABLET ORAL at 20:46

## 2023-02-09 RX ADMIN — Medication 5 MG: at 20:46

## 2023-02-09 RX ADMIN — EZETIMIBE 10 MG: 10 TABLET ORAL at 09:57

## 2023-02-09 RX ADMIN — NYSTATIN 500000 UNITS: 100000 SUSPENSION ORAL at 09:57

## 2023-02-09 RX ADMIN — SENNOSIDES AND DOCUSATE SODIUM 2 TABLET: 50; 8.6 TABLET ORAL at 09:58

## 2023-02-09 RX ADMIN — ROSUVASTATIN CALCIUM 40 MG: 20 TABLET, FILM COATED ORAL at 09:58

## 2023-02-09 RX ADMIN — ENOXAPARIN SODIUM 40 MG: 100 INJECTION SUBCUTANEOUS at 09:56

## 2023-02-09 RX ADMIN — NYSTATIN 500000 UNITS: 100000 SUSPENSION ORAL at 12:36

## 2023-02-09 RX ADMIN — NYSTATIN 500000 UNITS: 100000 SUSPENSION ORAL at 20:46

## 2023-02-09 RX ADMIN — DICLOFENAC SODIUM 4 G: 10 GEL TOPICAL at 20:46

## 2023-02-09 RX ADMIN — PANTOPRAZOLE SODIUM 40 MG: 40 TABLET, DELAYED RELEASE ORAL at 06:11

## 2023-02-09 RX ADMIN — TIZANIDINE 2 MG: 4 TABLET ORAL at 20:46

## 2023-02-09 ASSESSMENT — PAIN SCALES - GENERAL: PAINLEVEL_OUTOF10: 0

## 2023-02-09 NOTE — PROGRESS NOTES
Department of Physical Medicine & Rehabilitation  Progress Note    Patient Identification:  Amaya Garsia  [id number]  : 1955  Admit date: 2023    Chief Complaint: Acute CVA (cerebrovascular accident) [facility])    Subjective:   Working in therapy on exam. He does have some inattention in therapy and after discussion with patient and wife he would like to try ritalin. Participating well in therapy. No new complaints overnight. Team conference today. ROS: No f/c, n/v, cp     Objective:  Patient Vitals for the past 24 hrs:   BP Temp Temp src Pulse Resp SpO2   23 2059 124/82 98.2 °F (36.8 °C) Oral 69 16 97 %   23 1844 -- -- -- -- 16 --       Const: Alert. No distress, pleasant. HEENT: Normocephalic, atraumatic. Normal sclera/conjunctiva. MMM. CV: Regular rate and rhythm. Resp: No respiratory distress. Lungs CTAB. Abd: Soft, nontender, nondistended, NABS+   Ext: No edema. Neuro: Alert, oriented, appropriately interactive. Psych: Cooperative, appropriate mood and affect    Laboratory data: Available via EMR. Last 24 hour lab  No results found for this or any previous visit (from the past 24 hour(s)). Therapy progress:  PT  Position Activity Restriction  Other position/activity restrictions: up as tolerated, up in chair  Objective     Sit to Stand: Maximum Assistance, 2 Person Assistance  Stand to Sit: 2 Person Assistance, Maximum Assistance     OT  PT Equipment Recommendations  Equipment Needed:  (defer)  Other: continue to assess  Toilet - Technique:  (via rolling shower chair)  Equipment Used:  (rolling shower chair)  Toilet Transfers Comments: pt did not have urge to void, however rolled pt in bathroom via rolling shower chair. Max A x 2 to pivot from bed to rolling shower chair. Assessment        SLP          Body mass index is 22.4 kg/m².     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 Patient is on aspirin at home        HTN  Patient is on lisinopril 5mg daily at home  - monitor      HLD:  Patient is on Crestor 20mg and ezetimibe 10mg at home daily  -Continue home meds     Anxiety  Per chart, patient is on Valium 2mg at home. Possible could be contributor to his chest pain yesterday, notes he felt anxious. Denies taking Valium at home, however it is listed as a home med. Ativan 0.5mg once overnight   - prozac      Sleep disturbance  - Trazodone PRN  - benadryl PRN  -improving     Add low dose tizanidine for spasticity   - improving left leg tightness         Rehab Progress: Improving  Anticipated Dispo: home  Services/DME: [facility] [place]  ELOS: 2/19        Team conference was held today on the patient and discussed directly with the patient utilizing their entire treatment team. Please see separate team note for details. Total treatment time for today's care >50 min. >50% of time spent counseling with patient and coordinating care.        Jyothi Oliver D.O. M.P.H  PM&R  2/9/2023  8:59 AM

## 2023-02-09 NOTE — PLAN OF CARE
Problem: Discharge Planning  Goal: Discharge to home or other facility with appropriate resources  Outcome: Progressing  Note: Patient and wife making plans to get a rolling shower chair for home use versus a shower bench that does not provide the torso support he requires at this time. Problem: Skin/Tissue Integrity  Goal: Absence of new skin breakdown  Description: 1. Monitor for areas of redness and/or skin breakdown  2. Assess vascular access sites hourly  3. Every 4-6 hours minimum:  Change oxygen saturation probe site  4. Every 4-6 hours:  If on nasal continuous positive airway pressure, respiratory therapy assess nares and determine need for appliance change or resting period. Outcome: Progressing  Note: Left shoulder bruising transitioning to more yellow color, less darkness. Scattered bruising. Skin intact. Problem: ABCDS Injury Assessment  Goal: Absence of physical injury  Outcome: Progressing     Problem: Safety - Adult  Goal: Free from fall injury  Outcome: Progressing  Note: Patient continues to lean left; increased ability to correct his left lean by focusing gaze forward and reaching right. Problem: Pain  Goal: Verbalizes/displays adequate comfort level or baseline comfort level  Outcome: Progressing     Problem: Nutrition Deficit:  Goal: Optimize nutritional status  Outcome: Progressing  Note: Good appetite, practices safe swallow techniques taught by SLP.

## 2023-02-09 NOTE — CARE COORDINATION
Spoke to both patient and wife. Told them decided discharge date 2/18 by team and marked whiteboard. Explained that we need to have their needs met for DMEs in the home, but wife worried about the doorways for w/c and not being able to lift patient should he have any falls or transfers. This CM talked to PT/OT and they stated that if the wife would measure the doorways in home, that would be helpful. Let wife know. Explained to wife and patient that he may have progress in the next week and that PT/OT will have recommendations as they work with him further. Patient and wife agreed to [facility] for home PT/OT with no preference of company. [id number] [place] [address] and let them know they need to follow this case for home care needs and let them know discharge date.

## 2023-02-09 NOTE — PROGRESS NOTES
Physical Therapy  Facility/Department: [facility] ACUTE REHAB UNIT  Rehabilitation Physical Therapy Treatment Note    NAME: Blanca Wayne  : 1955 (74 y.o.)  MRN: [id number]  CODE STATUS: Full Code    Date of Service: 23       Restrictions:  Restrictions/Precautions: Fall Risk;Up as Tolerated     SUBJECTIVE  Subjective  Subjective: pt up in chair and agreeable to PT  Pain: reporting pain with movement in L hip         OBJECTIVE  Cognition  Arousal/Alertness: Appropriate responses to stimuli  Following Commands: Follows one step commands with repetition; Follows one step commands with increased time  Attention Span: Attends with cues to redirect; Difficulty attending to directions; Difficulty dividing attention  Safety Judgement: Decreased awareness of need for assistance;Decreased awareness of need for safety  Problem Solving: Assistance required to generate solutions;Assistance required to identify errors made;Assistance required to implement solutions  Insights: Decreased awareness of deficits  Initiation: Requires cues for some  Sequencing: Requires cues for some  Cognition Comment: Pt tangential at times, requiring cues to redirect. Pt w/ poor awareness of L side. Orientation  Overall Orientation Status: Within Functional Limits    Functional Mobility  Bridging  Assistance Level: Minimal assistance  Skilled Clinical Factors: for placement of LEs- pt able to lift bottom without physical assist  Roll Left  Assistance Level: Stand by assist  Skilled Clinical Factors: for tiff and donning new brief, VCs for sequencing  Roll Right  Assistance Level: Minimal assistance  Skilled Clinical Factors: for tiff and donning new brief  Supine to Sit  Assistance Level: Requires x 2 assistance; Moderate assistance  Skilled Clinical Factors: assist of 2 for trunk, pt able to get LEs off edge of bed with mod A of 1  Scooting  Assistance Level:  Moderate assistance  Skilled Clinical Factors: on mat, to edge of bed, and in wc  Balance  Sitting Balance: Contact guard assistance (initial sitting balance upon getting out of bed requiring brief period of mod A progressing to CGA once pt able to find his center. pt maintained sitting balance with SBA-CGA throughout entire e-stim treatment with occasional cues to correct posture.)  Standing Balance: Minimal assistance (mod A progressing to min A in parallel bars)  Standing Balance  Time: 2 min + 4 min  Activity: static stance in parallel bars with mod A on 1st stand and min A on second stand, LLE buckling with first stand however pt able to perform terminal knee extension on LLE on 2nd stand and keep it from buckling with cues and good upright posture, L knee blocked on 1st stand, guarded on 2nd stand. with assist for placement pt able to maintain L WB on parallel bar  Comments: mirror used for visual feedback- max VCs for maintaining attention and upright posture  Transfers  Surface: From bed; To mat;From mat; Wheelchair  Additional Factors: Verbal cues; Hand placement cues; Increased time to complete (cues for R lateral lean)  Device:  (parallel bars)  Sit to Stand  Assistance Level: Moderate assistance;Minimal assistance  Skilled Clinical Factors: 1st trial mod A, 2nd trial min A in parallel bars. max VCs for anterior WS and hand/foot placement  Stand to Sit  Assistance Level: Moderate assistance  Skilled Clinical Factors: for controlled descent  Bed To/From Chair  Technique: Sit pivot  Assistance Level: Requires x 2 assistance  Skilled Clinical Factors: from EOB> wc, wc>mat, mat>wc mod A + min A      Environmental Mobility  Wheelchair  Surface: Level surface  Device: Standard wheelchair  Additional Factors: Verbal cues; Increased time to complete (VCs for L attention and avoiding objects in wilder)  Assistance Required to Manage Parts:  All wheelchair parts  Assistance Level for Propulsion: Stand by assist  Propulsion Method: Right upper extremity;Right lower extremity  Propulsion Quality: Slow velocity; Short strokes; Veers left  Propulsion Distance: 175'  Skilled Clinical Factors: 1 instance of running into object on L side able to correct with max VCs. max VCs for L attention and staying on R side of hallway. pt demos improved awareness with less cues      Neuromuscular Education  Neuromuscular Education: Yes  NDT Treatment: Upper extremity; Lower extremity; Sitting (sidelying on R to place L side in gravity eliminated position)  Functional Movement Patterns: in sitting on edge of mat while focusing on seated balance Citizen of [place] Estim applied to L quad for 8 mins. Electrical Stimulation Parameters: 65-74 mA CC, 5 seconds on/5 seconds off for LLE with goal of Neuromuscular Facilitation. pt demos strong quad contraction and minimal active contration into LAQ against gravity improved with verbal and manual cues to perform. also applied continuous reciprocal e-stim to posterior and anterior deltoid at 45 mA CC for 5 min and co-contraction on posterior/anterior deltoid for 10 min at 55 mA CC with good shoulder musculature contractions noted in form of shoulder shrug             ASSESSMENT/PROGRESS TOWARDS GOALS       Assessment  Assessment: pt tolerated session well very hardworking and eager to participate. pt continues to be limited by distraction, L inattention, and L hemiparesis. pt assist of 2 for scoot pivot transfers. [place] E-stim applied to both LUE and LLE on this date while pt worked on seated balance with good muscle contractions noted to quad and deltoid. pt now exhibiting active contractions in quad and hamstring muscles with ability to perform partial long arc quad against gravity and terminal knee extension in standing to prevent LLE buckling. standing balance improved within parallel bars progressing from mod to min A of 1 with occasional L knee buckling and cues to correct posture.  pt able to progress with sit <> stand transfers from mod A of 1 to min A of 1. pt maintaining seated balanceSBA- CGA throughout e-stim treatment with occasional cues to correct posture. pt making good progress towards goals however continues to be well below baseline and would benefit from continued PT to maximize independence. Activity Tolerance: Patient tolerated treatment well  Discharge Recommendations: 24 hour supervision or assist;Continue to assess pending progress;Home with Home health PT  PT Equipment Recommendations  Equipment Needed:  (defer)  Other: continue to assess    Goals  Patient Goals   Patient Goals : to go home  Short Term Goals  Time Frame for Short Term Goals: 2 weeks (all ongoing)  Short Term Goal 1: Pt will complete bed mobility with moderate assist  Short Term Goal 2: Pt will complete sit<>stand transfer wtih moderate assist  Short Term Goal 3: Pt will propel self in manual w/c for 150' with minimal assist.- met 2/2  Short Term Goal 4: Pt will ambulate 10' with moderate assist.  Long Term Goals  Time Frame for Long Term Goals : 4 weeks (all ongoing)  Long Term Goal 1: Pt will complete bed mobility with CGA  Long Term Goal 2: Pt will complete sit<>Stand transfers with CGA  Long Term Goal 3: Pt will propel self in manual w/c for 250' with suprvision  Long Term Goal 4: Pt will ambulate 48' with CGA & LRAD    PLAN OF CARE/SAFETY  Physcial Therapy Plan  Days Per Week: 5 Days  Hours Per Day: 1 hour  Therapy Duration: 4 Weeks  Current Treatment Recommendations: Strengthening;Balance training;ROM; Functional mobility training;Neuromuscular re-education; Safety education & training;Patient/Caregiver education & training;Transfer training; Endurance training;Home exercise program;Gait training;Equipment evaluation, education, & procurement;Positioning; Therapeutic activities  Safety Devices  Type of Devices: Call light within reach;Nurse notified;Gait belt;Patient at risk for falls; Chair alarm in place; Left in chair  Restraints  Restraints Initially in Place: No    EDUCATION  Education  Education Given To: Patient  Education Provided: Role of Therapy;Plan of Care; Mobility Training;Transfer Training  Education Method: Verbal  Barriers to Learning: Cognition  Education Outcome: Verbalized understanding;Continued education needed        Therapy Time   Individual Concurrent Group Co-treatment   Time In       0730   Time Out       0845   Minutes       75        Variance: 0    Merian Cabot, PT, 02/09/23 at 1:28 PM

## 2023-02-09 NOTE — PROGRESS NOTES
[id number] shower with use of rolling shower chair, assist x 2, PCA and RN. Zio patch covered with waterproof dressing. Patient tolerated well.

## 2023-02-09 NOTE — PLAN OF CARE
Problem: Discharge Planning  Goal: Discharge to home or other facility with appropriate resources  Outcome: Progressing     Problem: Skin/Tissue Integrity  Goal: Absence of new skin breakdown  Description: 1. Monitor for areas of redness and/or skin breakdown  2. Assess vascular access sites hourly  3. Every 4-6 hours minimum:  Change oxygen saturation probe site  4. Every 4-6 hours:  If on nasal continuous positive airway pressure, respiratory therapy assess nares and determine need for appliance change or resting period.   Outcome: Progressing     Problem: ABCDS Injury Assessment  Goal: Absence of physical injury  Outcome: Progressing     Problem: Safety - Adult  Goal: Free from fall injury  Outcome: Progressing     Problem: Pain  Goal: Verbalizes/displays adequate comfort level or baseline comfort level  Outcome: Progressing  Verbalizes/displays adequate comfort level or baseline comfort level: Encourage patient to monitor pain and request assistance     Problem: Nutrition Deficit:  Goal: Optimize nutritional status  Outcome: Progressing  Nutrient intake appropriate for improving, restoring, or maintaining nutritional needs:   Assess nutritional status and recommend course of action   Monitor oral intake, labs, and treatment plans   Recommend appropriate diets, oral nutritional supplements, and vitamin/mineral supplements

## 2023-02-09 NOTE — PROGRESS NOTES
Occupational Therapy  Facility/Department: [facility] ACUTE REHAB UNIT  Rehabilitation Occupational Therapy Daily Treatment Note    Date: 23  Patient Name: Lindsay Ramos       Room: 8863/7345-15  MRN: [id number]  Account: [de-identified]   : 1955  (68 y.o.) Gender: male                    Past Medical History:  has a past medical history of Anxiety, Chest pain, Depression, Encounter for imaging to screen for metal prior to MRI, Hyperlipidemia, Hypertension, and Wears glasses. Past Surgical History:   has a past surgical history that includes [place] tooth extraction; Colonoscopy (2009); Cardiac catheterization (2008); Finger trigger release (3-); and Coronary angioplasty with stent. Restrictions  Restrictions/Precautions: Fall Risk;Up as Tolerated    Subjective  Subjective: Pt semi supine upon OT arrival. Agreeable to session. Co-tx indicated in order to maximize therapeutic potential.  Restrictions/Precautions: Fall Risk;Up as Tolerated             Objective     Cognition  Arousal/Alertness: Appropriate responses to stimuli  Following Commands: Follows one step commands with repetition; Follows one step commands with increased time  Attention Span: Attends with cues to redirect; Difficulty attending to directions; Difficulty dividing attention  Safety Judgement: Decreased awareness of need for assistance;Decreased awareness of need for safety  Problem Solving: Assistance required to generate solutions;Assistance required to identify errors made;Assistance required to implement solutions  Insights: Decreased awareness of deficits  Initiation: Requires cues for some  Sequencing: Requires cues for some  Cognition Comment: Pt tangential at times, requiring cues to redirect. Pt w/ poor awareness of L side. Orientation  Overall Orientation Status: Within Functional Limits         ADL  Upper Extremity Dressing  Assistance Level:  Moderate assistance  Skilled Clinical Factors: Pt attempting to don shirt w/o following therapist's cues. Required redirection and task was started over. Pt used RUE to thread LUE into shirt and pulled up to elbow. Threaded RUE into shirt and overhead. Assist to pull down on L side. Lower Extremity Dressing  Assistance Level: Maximum assistance  Skilled Clinical Factors: Assist to thread BLE into pants and place new brief. Pt rolled side to side to pull up. Able to complete bridge while therapist pulled up on L side. Putting On/Taking Off Footwear  Assistance Level: Maximum assistance  Skilled Clinical Factors: Assist to don B shoes. Toileting  Assistance Level: Maximum assistance  Skilled Clinical Factors: Pt incontinent of urine in brief. Assist to complete iwona-hygiene and place new brief. Pt assisted w/ rolling side to side. Functional Mobility  Device: Wheelchair  Assistance Level: Contact guard assist;Minimal assistance  Skilled Clinical Factors: Pt self propelled w/ RUE and RLE from room -> gym w/ CGA and min A, verbal cues for environmental awareness and obstacle avoidance. Bridging  Assistance Level: Minimal assistance  Skilled Clinical Factors: Assist to place LLE, but pt able to go into bridge while supine for LE dressing. Roll Left  Assistance Level: Stand by assist  Skilled Clinical Factors: SBA x1 to roll to left. Roll Right  Assistance Level: Minimal assistance  Skilled Clinical Factors: Min A x1 to roll to right. Supine to Sit  Assistance Level: Moderate assistance; Requires x 2 assistance  Skilled Clinical Factors: Pt required mod A x2 supine --> sit EOB. Pt w/ poor balance and midline orientation sitting EOB initially. Transfers  Surface: From bed; Wheelchair; To mat;From mat  Additional Factors: Verbal cues; Hand placement cues; Increased time to complete  Sit to Stand  Assistance Level:  Moderate assistance;Minimal assistance  Skilled Clinical Factors: Pt completed initial STS from w/c in // bars w/ mod A x1 and CGA x1 and then required min A x1 and CGA x1 for second STS. Pt required use of mirror for visual feedback and cues for upright posture. Stand to Sit  Assistance Level: Contact guard assist  Skilled Clinical Factors: Pt required CGA for controlled descent from stand to sit in // bars into w/c. Bed To/From Chair  Technique: Sit pivot  Assistance Level: Requires x 2 assistance; Moderate assistance;Minimal assistance  Skilled Clinical Factors: Pt completed sit pivot t/f EOB --> w/c toward R side w/ mod A x1 and min A x1. Sit Pivot  Assistance Level: Requires x 2 assistance; Moderate assistance;Minimal assistance  Skilled Clinical Factors: Pt completed sit pivot t/f w/c --> mat --> w/c toward R and L side w/ mod A x1 and min A x1. OT Exercises  Exercise Treatment: Pt complete active terminal extensions in standing in // bars x10-x15, able to hold for 2-3s w/ tactile cues. Mirror used for visual feedback. PROM Exercises: PROM completed w/ fxl estim on deltoid to facilitate shoulder shrugging w/ use of mirror for visual feedback. A/AROM Exercises: AAROM/PROM completed w/ fxl estim on quad to facilitate flexion/extension of LLE. Static Standing Balance Exercises: Pt stood x2 in // bars w/ mod A x1 for first stand and min Ax1 for second stand w/ use of mirror for visual feedback w/ cues required for upright posture. LLE blocked during first stand w/ less buckling observed w/ time. Additional Activities:  Modalities: yes    Left quad and L anterior/posterior deltoid   Electrical Stimulation Action Estim applied to L posterior/anterior deltoid to facilitate shoulder shrugging (10 + 5 mins). Estim applied to L quad to facilitate knee flexion/extension (10 mins). Electrical Stimulation Parameters Reciprocal 5 min 45mA CC and co-contract 10 min 55mA CC for anterior and posterior deltoid; Single channel 8 min 65-74mA CC for quad.    Electrical Stimulation Goals Neuromuscular Facilitation           Assessment  Assessment  Assessment: Pt tolerated session well w/ increased ROM in LLE observed, specifically in quad. Pt able to stand w/ less assist and complete active terminal extension in addition to bridge in bed. Trace contractions observed in bicep when tested. Pt continues to benefit from fxl estim in order to encourage neuromuscular re-education. Pt would continue to benefit from skilled therapy services in order to maximize independence w/ ADLs and fxl mobility. Cont OT POC. Activity Tolerance: Patient tolerated treatment well  Discharge Recommendations: 24 hour supervision or assist;Continue to assess pending progress;Home with Home health OT  Safety Devices  Safety Devices in place: Yes  Type of devices: Call light within reach; Chair alarm in place; Left in chair    Patient Education  Education  Education Given To: Patient; Family  Education Provided: Role of Therapy;Plan of Care;Family Education;Mobility Training;Transfer Training; Safety; Fall Prevention Strategies  Education Method: Demonstration;Verbal  Barriers to Learning: Cognition  Education Outcome: Verbalized understanding;Demonstrated understanding    Plan  Occupational Therapy Plan  Times Per Week: 5 days per week, 60 mins each  Current Treatment Recommendations: Strengthening;ROM;Balance training;Functional mobility training; Endurance training;Self-Care / ADL; Patient/Caregiver education & training;Neuromuscular re-education;Cognitive/Perceptual training; Safety education & training    Goals  Patient Goals   Patient goals : go home with my wife  Short Term Goals  Time Frame for Short Term Goals: By 2 weeks  Short Term Goal 1: Pt will complete LB dressing with mod A  Short Term Goal 2: Pt will complete toileting with mod A  Short Term Goal 3: Pt will complete toilet and functional transfers with mod A  Short Term Goal 4: Pt will complete UB dressing with min A  Short Term Goal 5: Pt will complete UE HEP x 10 reps for improved R UE coordination for ADLs  Long Term Goals  Time Frame for Long Term Goals : By 4 weeks  Long Term Goal 1: Pt will complete LB dressing with CGA  Long Term Goal 2: Pt will complete toileting with CGA  Long Term Goal 3: Pt will complete toilet and functional transfers with CGA. Long Term Goal 4: Pt will complete UB dressing with SPV.   Long Term Goal 5: Pt will complete simple IADL with LRAD at mod I        Therapy Time   Individual Concurrent Group Co-treatment   Time In       0730   Time Out       0845   Minutes       75       Timed Code Treatment Minutes:  75 min     Total Treatment Minutes: 75 min     Angela Bryson OT

## 2023-02-10 LAB
ANION GAP SERPL CALCULATED.3IONS-SCNC: 8 MMOL/L (ref 3–16)
BASOPHILS ABSOLUTE: 0.1 K/UL (ref 0–0.2)
BASOPHILS RELATIVE PERCENT: 1.6 %
BUN BLDV-MCNC: 13 MG/DL (ref 7–20)
CALCIUM SERPL-MCNC: 8.9 MG/DL (ref 8.3–10.6)
CHLORIDE BLD-SCNC: 101 MMOL/L (ref 99–110)
CO2: 26 MMOL/L (ref 21–32)
CREAT SERPL-MCNC: 0.6 MG/DL (ref 0.8–1.3)
EOSINOPHILS ABSOLUTE: 0.1 K/UL (ref 0–0.6)
EOSINOPHILS RELATIVE PERCENT: 2.1 %
GFR SERPL CREATININE-BSD FRML MDRD: >60 ML/MIN/{1.73_M2}
GLUCOSE BLD-MCNC: 115 MG/DL (ref 70–99)
HCT VFR BLD CALC: 37.2 % (ref 40.5–52.5)
HEMOGLOBIN: 12.6 G/DL (ref 13.5–17.5)
LYMPHOCYTES ABSOLUTE: 2 K/UL (ref 1–5.1)
LYMPHOCYTES RELATIVE PERCENT: 29 %
MCH RBC QN AUTO: 31.7 PG (ref 26–34)
MCHC RBC AUTO-ENTMCNC: 33.9 G/DL (ref 31–36)
MCV RBC AUTO: 93.5 FL (ref 80–100)
MONOCYTES ABSOLUTE: 0.6 K/UL (ref 0–1.3)
MONOCYTES RELATIVE PERCENT: 8.2 %
NEUTROPHILS ABSOLUTE: 4 K/UL (ref 1.7–7.7)
NEUTROPHILS RELATIVE PERCENT: 59.1 %
PDW BLD-RTO: 12.6 % (ref 12.4–15.4)
PLATELET # BLD: 356 K/UL (ref 135–450)
PMV BLD AUTO: 7 FL (ref 5–10.5)
POTASSIUM REFLEX MAGNESIUM: 4.3 MMOL/L (ref 3.5–5.1)
RBC # BLD: 3.98 M/UL (ref 4.2–5.9)
SODIUM BLD-SCNC: 135 MMOL/L (ref 136–145)
WBC # BLD: 6.8 K/UL (ref 4–11)

## 2023-02-10 PROCEDURE — 93246 EXT ECG>7D<15D RECORDING: CPT | Performed by: INTERNAL MEDICINE

## 2023-02-10 PROCEDURE — 97542 WHEELCHAIR MNGMENT TRAINING: CPT | Performed by: PHYSICAL THERAPIST

## 2023-02-10 PROCEDURE — 80048 BASIC METABOLIC PNL TOTAL CA: CPT

## 2023-02-10 PROCEDURE — 6370000000 HC RX 637 (ALT 250 FOR IP): Performed by: PHYSICAL MEDICINE & REHABILITATION

## 2023-02-10 PROCEDURE — 97530 THERAPEUTIC ACTIVITIES: CPT

## 2023-02-10 PROCEDURE — 97130 THER IVNTJ EA ADDL 15 MIN: CPT

## 2023-02-10 PROCEDURE — 97112 NEUROMUSCULAR REEDUCATION: CPT | Performed by: PHYSICAL THERAPIST

## 2023-02-10 PROCEDURE — 85025 COMPLETE CBC W/AUTO DIFF WBC: CPT

## 2023-02-10 PROCEDURE — 99232 SBSQ HOSP IP/OBS MODERATE 35: CPT | Performed by: PHYSICAL MEDICINE & REHABILITATION

## 2023-02-10 PROCEDURE — 97530 THERAPEUTIC ACTIVITIES: CPT | Performed by: PHYSICAL THERAPIST

## 2023-02-10 PROCEDURE — 97535 SELF CARE MNGMENT TRAINING: CPT

## 2023-02-10 PROCEDURE — 1280000000 HC REHAB R&B

## 2023-02-10 PROCEDURE — 92526 ORAL FUNCTION THERAPY: CPT

## 2023-02-10 PROCEDURE — 6360000002 HC RX W HCPCS: Performed by: PHYSICAL MEDICINE & REHABILITATION

## 2023-02-10 PROCEDURE — 97129 THER IVNTJ 1ST 15 MIN: CPT

## 2023-02-10 PROCEDURE — 97112 NEUROMUSCULAR REEDUCATION: CPT

## 2023-02-10 PROCEDURE — 36415 COLL VENOUS BLD VENIPUNCTURE: CPT

## 2023-02-10 RX ADMIN — ROSUVASTATIN CALCIUM 40 MG: 20 TABLET, FILM COATED ORAL at 09:05

## 2023-02-10 RX ADMIN — DICLOFENAC SODIUM 4 G: 10 GEL TOPICAL at 20:49

## 2023-02-10 RX ADMIN — Medication 5 MG: at 20:48

## 2023-02-10 RX ADMIN — ENOXAPARIN SODIUM 40 MG: 100 INJECTION SUBCUTANEOUS at 08:56

## 2023-02-10 RX ADMIN — NYSTATIN 500000 UNITS: 100000 SUSPENSION ORAL at 18:01

## 2023-02-10 RX ADMIN — ACETAMINOPHEN 650 MG: 325 TABLET, FILM COATED ORAL at 20:48

## 2023-02-10 RX ADMIN — TIZANIDINE 2 MG: 4 TABLET ORAL at 20:49

## 2023-02-10 RX ADMIN — NYSTATIN 500000 UNITS: 100000 SUSPENSION ORAL at 20:48

## 2023-02-10 RX ADMIN — NYSTATIN 500000 UNITS: 100000 SUSPENSION ORAL at 12:35

## 2023-02-10 RX ADMIN — SENNOSIDES AND DOCUSATE SODIUM 2 TABLET: 50; 8.6 TABLET ORAL at 08:55

## 2023-02-10 RX ADMIN — PANTOPRAZOLE SODIUM 40 MG: 40 TABLET, DELAYED RELEASE ORAL at 06:40

## 2023-02-10 RX ADMIN — EZETIMIBE 10 MG: 10 TABLET ORAL at 08:54

## 2023-02-10 RX ADMIN — DICLOFENAC SODIUM 4 G: 10 GEL TOPICAL at 09:04

## 2023-02-10 RX ADMIN — ASPIRIN 81 MG 81 MG: 81 TABLET ORAL at 08:54

## 2023-02-10 RX ADMIN — METHYLPHENIDATE HYDROCHLORIDE 5 MG: 10 TABLET ORAL at 06:40

## 2023-02-10 RX ADMIN — NYSTATIN 500000 UNITS: 100000 SUSPENSION ORAL at 08:56

## 2023-02-10 RX ADMIN — TRAZODONE HYDROCHLORIDE 50 MG: 50 TABLET ORAL at 20:48

## 2023-02-10 RX ADMIN — SENNOSIDES AND DOCUSATE SODIUM 2 TABLET: 50; 8.6 TABLET ORAL at 20:48

## 2023-02-10 RX ADMIN — FLUOXETINE 10 MG: 10 CAPSULE ORAL at 08:56

## 2023-02-10 ASSESSMENT — PAIN SCALES - GENERAL
PAINLEVEL_OUTOF10: 2
PAINLEVEL_OUTOF10: 0

## 2023-02-10 ASSESSMENT — PAIN DESCRIPTION - ORIENTATION: ORIENTATION: LEFT

## 2023-02-10 ASSESSMENT — PAIN DESCRIPTION - LOCATION: LOCATION: HIP

## 2023-02-10 ASSESSMENT — PAIN - FUNCTIONAL ASSESSMENT: PAIN_FUNCTIONAL_ASSESSMENT: PREVENTS OR INTERFERES SOME ACTIVE ACTIVITIES AND ADLS

## 2023-02-10 ASSESSMENT — PAIN DESCRIPTION - DESCRIPTORS: DESCRIPTORS: ACHING;DISCOMFORT

## 2023-02-10 NOTE — CARE COORDINATION
FUAD spoke with Pt's wife and son regarding Home Eval that PT/OT would like to do on Wednesday 2/15 at noon. Pt's wife states this date and time will work for her. FUAD advised Les Felipe. FUAD will follow up on Monday.      [employer] [place]  Case Management  [phone]

## 2023-02-10 NOTE — PLAN OF CARE
Problem: Nutrition Deficit:  Goal: Optimize nutritional status  2/10/2023 0047 by Yolanda Floyd RN  Nutrient intake appropriate for improving, restoring, or maintaining nutritional needs:   Assess nutritional status and recommend course of action   Monitor oral intake, labs, and treatment plans   Recommend appropriate diets, oral nutritional supplements, and vitamin/mineral supplements    Problem: Pain  Goal: Verbalizes/displays adequate comfort level or baseline comfort level  2/10/2023 0047 by Yolanda Floyd RN  Verbalizes/displays adequate comfort level or baseline comfort level: Encourage patient to monitor pain and request assistance  Note: Pain to L hip improving. Scheduled Voltaren applied. Denied need for oral pain medication.

## 2023-02-10 NOTE — PROGRESS NOTES
Speech Language Pathology  ACUTE REHAB UNIT  SPEECH/LANGUAGE PATHOLOGY      [x] Daily  [] Weekly Care Conference Note  [] Discharge    Patient:Jason Naylor  DFM:[id number]  Rehab Dx/Hx: Acute CVA (cerebrovascular accident) ([facility] [address] [I63.9]    Precautions: [x] Aspiration  [x] Fall risk  [] Sternal  [] Seizure [] Hip  [] Weight Bearing [] Other  ST Dx: [] Aphasia  [x] Dysarthria  [] Apraxia   [x] Oropharyngeal dysphagia [x] Cognitive Impairment  [] Other:   Date of Admit: 1/29/2023  Room #: 3102/3102-01  Date: 2/10/2023          Current Diet Order:ADULT DIET; Dysphagia - Minced and Moist  ADULT ORAL NUTRITION SUPPLEMENT; Breakfast, Dinner; Standard High Calorie/High Protein Oral Supplement   Recommended Form of Meds: Meds in puree  Compensatory Swallowing Strategies :  (Small bites, no pharyngeal neck extension, left head turn with thins, check for pocketing on left, 1:1 assist for small bolus size)   Previous MBS: 1/26/23  Oral Phase  Mild-moderate dysfunction characterized by incomplete labial seal with anterior spillage, slowed/reduced mastication, mild stasis. Pharyngeal Phase  Moderate dysfunction characterized by impairments in timing, strength and coordination with premature bolus loss, reduced base of tongue retraction, delayed/reduced hyolaryngeal mechanics, and reduced pharyngeal constriction. Resulted in vallecular/pyriform/pharyngeal residue, as well as compromised airway protection with penetration and gross tracheal aspiration of thin and mildly thick liquids; strong reactive cough present but did not fully clear. Chin tuck strategy did not eliminate aspiration, however cued head turn LEFT with small straw sips was effective . Double swallow helped clear residual. Of note, throughout study pt with tendency to tilt head posteriorly which further exacerbated bolus control; benefited from cues for neutral positioning.   Upper Esophageal Screen  Indirectly assessed; appeared unremarkable. 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 None indicated   Pain Intervention: [] RN notified  [] Repositioned  [] Intervention offered and patient declined  [x] N/A  [] Other: [] RN notified  [] Repositioned  [] Intervention offered and patient declined  [x] N/A  [] Other:   Subjective:     Pt upright in chair with brother present. Pt seated upright in chair finishing with RN/PCA from toileting. Agreeable to session at this time. Pts family present. Objective / Goals:     Goal 1: Pt will demonstrate adequate oral containment of liquids and solids across meal without cueing. Patient tolerated NMES via VitalStim placement 4a (targeting orbicularis oris, buccinator and superior pharyngeal constrictor) @ 3.0 on right and 7.05 mA on left x 37 minutes. Loss of solids bolus x1 and liquid x1. Improved sensation for bolus placement on labial surface during self feeding. Pt able to orally contain trial of advanced solids without cues. No loss. Goal 2: Pt will tolerate trials of solids with adequate mastication and propulsion as evidenced by min residue after the swallow across mealtime assessment.    Completed trials of soft & bite sized solids - There is a mild-moderate oral residue in comparison to bolus size after the swallow which can be cleared with liquid rinse and puree trials. Pt endorsing oral residue after the swallow and independently utilizing compensation to clear. Excessive mastication present after initial swallow of soft and bite sized pieces. Effortful swallows: x56    Throat clear/cough: x3 when cued to clear pt reported globus sensation. Lingual sweep: x2    Finger sweep: x2    Liquid wash: x7 Trials of advanced solids (small bites of kit jamey): x12     Effortful swallows x22 with trials solids+liquids. No oral residue noted during this trial. Pt endorsed that \"some of it is stuck in his back molars\". Adequate use of lingual sweep and liquid wash as needed. Goal 3: Pt will tolerate thin liquids with head in neutral position without s/s associated with aspiration. Thins via cup edge with neutral position: x15. Required x5 cues to keep head in neutral position while swallowing. Trialed via single straw sips x15. No s/s noted. Goal 4: Pt will demonstrate safe feeding behavior as evidenced by small bolus size / appropriate rate without cues. Cues x1 for rate control at start. Pt able to take 1 bolus at a time when placed on dish. SLP presented pt with precut trials of advanced solids. Pt able to take 1 piece and place PO in right side of oral cavity with independence. Goal 5: Pt will tolerate least restrictive diet without respiratory decline. Chart review does not reveal any documented difficulty on current recommended diet level. WBC/Neutrophils Absolute are WFL at this time. CTAR: x3 sets of x15   Goal 1: The patient will maintain eye contact to speaker positioned left of midline without distractors, min cues. Eye contact [facility] for duration of session. Improved eye contact to L side speakers. No cues required.     Initial min, fading to MI for tabletop activities of scanning to L side in reading/connecting dots tasks. Goal 2: The patient will demonstrate sustained attention to task for 2 minutes with <2 prompts. Pt able to maintain attention to mastication for bolus except for x2 occurrences where he required prompts to redirect back to task. At start of session, pt with tangential comments, however self identified and redirected x1 to begin initial task. Trailmaking task with numbers: Sustained attention throughout entire task without cues. Goal 3: The patient will demonstrate improved inhibition as evidenced by <3 prompts for impulsivity during a task. Cues to inhibit talking while masticating x2. Pt self aware in x2 occurrence and redirected himself to focusing on meal.  Barrier task: Pt demonstrated impulsivity- required cues to give SLP appropriate wait time in between descriptions of card. Also required cues to simplify descriptions as opposed to providing excessive/unnecessary descriptions of image. Goal 4: The patient will demonstrate improved self monitoring/correcting for basic tasks with <mod cues. Pt able to ID when he should not be talking but not consistently able to inhibit as documented above. Pt able to identify when he was going off task/topic and was able to redirect himself in x2 occurrences. Goal 5: The patient will tolerate ongoing cognitive linguistic assessment. Goal not targeted this session. Not targeted this session. Goal 6: The patient will demonstrate comprehensibility in multiple communication environments without cues. Goal not targeted this session. Not directly targeted this session. Other areas targeted:     Education:   Education ongoing re: strategies and rationale for these, risks involved in diet advancement relating to attention/press of speech. Educated re: Rationale for cognitive therapeutic tasks this session and swallowing exercises.     Safety Devices: [x] Call light within reach  [x] Chair alarm activated and connected to nurse call light system  [] Bed alarm activated   [x] Other: Brother and RN present at side. [x] Call light within reach  [x] Chair alarm activated and connected to nurse call light system  [] Bed alarm activated  [] Other:    Assessment: Oropharyngeal dysphagia with fluctuating mild-moderate oral  residue present with tougher consistencies trialed this date. Improved self monitoring of bolus size and rate and utilization of swallowing strategies and techniques. Pt continues to demonstrate difficulty with sustained attention to task with continuous tangential comments, however reduced this date with increased self awareness/redirections. Plan: Continue as per plan of care. Interventions used this date:  [] Speech/Language Treatment  [] Instruction in HEP  [x] Dysphagia Treatment [x] Cognitive Treatment   [] Other:    Discharge recommendations:  [] Home independently  [x] Home with assistance []  24 hour supervision  [] ECF [] Other  Continued Tx Upon Discharge: ? [x] Yes    [] No    [] TBD based on progress while on ARU     [] Vital Stim indicated     [] Other:   Estimated discharge date: February 19th, 2023    Electronically signed by:  Session 1:  Tawny Medellin [facility]  Speech Language Pathology      Yunior Ga M.A., [place]  Speech-Language Pathologist    Session 2:  Jackie Alfonso., [address] [place] [address]  Speech-Language Pathologist    The speech-language pathologist was present, directed the patient's care, made skilled judgment and was responsible for assessment and treatment.

## 2023-02-10 NOTE — PROGRESS NOTES
Department of Physical Medicine & Rehabilitation  Progress Note    Patient Identification:  Alicia Lambert  [id number]  : 1955  Admit date: 2023    Chief Complaint: Acute CVA (cerebrovascular accident) [facility])    Subjective:   Feels well. No new complaints overnight. Working with his ritalin this morning. Showing some movement in his left arm with IR. Slept well last night. Improving daily. ROS: No f/c, n/v, cp     Objective:  Patient Vitals for the past 24 hrs:   BP Temp Temp src Pulse Resp SpO2   02/10/23 0847 115/74 98.3 °F (36.8 °C) Oral 73 16 97 %   23 2044 139/81 98.3 °F (36.8 °C) Oral 70 16 99 %       Const: Alert. No distress, pleasant. HEENT: Normocephalic, atraumatic. Normal sclera/conjunctiva. MMM. CV: Regular rate and rhythm. Resp: No respiratory distress. Lungs CTAB. Abd: Soft, nontender, nondistended, NABS+   Ext: No edema. Neuro: Alert, oriented, appropriately interactive. Psych: Cooperative, appropriate mood and affect    Laboratory data: Available via EMR.    Last 24 hour lab  Recent Results (from the past 24 hour(s))   Basic Metabolic Panel w/ Reflex to MG    Collection Time: 02/10/23  6:23 AM   Result Value Ref Range    Sodium 135 (L) 136 - 145 mmol/L    Potassium reflex Magnesium 4.3 3.5 - 5.1 mmol/L    Chloride 101 99 - 110 mmol/L    CO2 26 21 - 32 mmol/L    Anion Gap 8 3 - 16    Glucose 115 (H) 70 - 99 mg/dL    BUN 13 7 - 20 mg/dL    Creatinine 0.6 (L) 0.8 - 1.3 mg/dL    Est, Glom Filt Rate >60 >60    Calcium 8.9 8.3 - 10.6 mg/dL   CBC auto differential    Collection Time: 02/10/23  6:23 AM   Result Value Ref Range    WBC 6.8 4.0 - 11.0 K/uL    RBC 3.98 (L) 4.20 - 5.90 M/uL    Hemoglobin 12.6 (L) 13.5 - 17.5 g/dL    Hematocrit 37.2 (L) 40.5 - 52.5 %    MCV 93.5 80.0 - 100.0 fL    MCH 31.7 26.0 - 34.0 pg    MCHC 33.9 31.0 - 36.0 g/dL    RDW 12.6 12.4 - 15.4 %    Platelets 925 493 - 143 K/uL    MPV 7.0 5.0 - 10.5 fL    Neutrophils % 59.1 %    Lymphocytes % 29.0 % Monocytes % 8.2 %    Eosinophils % 2.1 %    Basophils % 1.6 %    Neutrophils Absolute 4.0 1.7 - 7.7 K/uL    Lymphocytes Absolute 2.0 1.0 - 5.1 K/uL    Monocytes Absolute 0.6 0.0 - 1.3 K/uL    Eosinophils Absolute 0.1 0.0 - 0.6 K/uL    Basophils Absolute 0.1 0.0 - 0.2 K/uL               Therapy progress:  PT  Position Activity Restriction  Other position/activity restrictions: up as tolerated, up in chair  Objective     Sit to Stand: Maximum Assistance, 2 Person Assistance  Stand to Sit: 2 Person Assistance, Maximum Assistance     OT  PT Equipment Recommendations  Equipment Needed:  (defer)  Other: continue to assess  Toilet - Technique:  (via rolling shower chair)  Equipment Used:  (rolling shower chair)  Toilet Transfers Comments: pt did not have urge to void, however rolled pt in bathroom via rolling shower chair. Max A x 2 to pivot from bed to rolling shower chair. Assessment        SLP          Body mass index is 22.4 kg/m². 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 Patient is on aspirin at home        HTN  Patient is on lisinopril 5mg daily at home  - monitor      HLD:  Patient is on Crestor 20mg and ezetimibe 10mg at home daily  -Continue home meds     Anxiety  Per chart, patient is on Valium 2mg at home. Possible could be contributor to his chest pain yesterday, notes he felt anxious. Denies taking Valium at home, however it is listed as a home med. Ativan 0.5mg once overnight   - prozac      Sleep disturbance  - Trazodone PRN  - benadryl PRN  -improving     Add low dose tizanidine for spasticity   - improving left leg tightness     Start Ritalin this morning for inattention.      Rehab Progress: Improving  Anticipated Dispo: home  Services/DME: [facility] [place]  ELOS: 2/19          BRAN HenleyP.H  PM&R  2/10/2023  10:37 AM

## 2023-02-10 NOTE — PROGRESS NOTES
Physical Therapy  Facility/Department: [facility] ACUTE REHAB UNIT  Rehabilitation Physical Therapy Treatment Note    NAME: Kalyan Ruiz  : 1955 (64 y.o.)  MRN: [id number]  CODE STATUS: Full Code    Date of Service: 2/10/23       Restrictions:   Restrictions/Precautions: Fall Risk;Up as Tolerated     SUBJECTIVE  Subjective  Subjective: Pt  up in recliner when PT arrived. Pt  agreeable to PT  Pain: Intermittent c/o L hip pain throughout session        Post Treatment Pain Screening         OBJECTIVE  PT and OT worked collaboratively to utilize the skills of 2 disciplines while maximizing functional mobility to obtain optimal potential.    Cognition  Arousal/Alertness: Appropriate responses to stimuli  Following Commands: Follows one step commands with repetition; Follows one step commands with increased time  Attention Span: Attends with cues to redirect; Difficulty attending to directions; Difficulty dividing attention  Safety Judgement: Decreased awareness of need for assistance;Decreased awareness of need for safety  Problem Solving: Assistance required to generate solutions;Assistance required to identify errors made;Assistance required to implement solutions  Insights: Decreased awareness of deficits  Initiation: Requires cues for some  Sequencing: Requires cues for some  Cognition Comment: Pt req max VC/ TC to stay focused on task. Pt benefits from controlled environment with min distractions especially with auditory distractions. Deescalation skills used as a means to control his environment.  (  one person giving commands, decreased volume of voice, simple directions as examples) Pt req VC for sequencing tasks  Orientation  Overall Orientation Status: Within Functional Limits    Functional Mobility  Balance  Sitting Balance: Contact guard assistance (Upon entry, pt sitting in the recliner w WB mostly  through the L hip & in a post pelvic tilt, Pt's brief needed to be changed so therapy instructed pt with righting self to the R and to midline by using targeted placement w/ R hand)  Transfers  Surface: Wheelchair;From chair with arms (to/ from standing frame)  Additional Factors: Verbal cues; Hand placement cues; Increased time to complete (cues for R lateral lean)  Sit to Stand  Assistance Level: Moderate assistance;Maximum assistance  Skilled Clinical Factors: Mod/ min A for sit to stand with CGA added once pt is in standing to facilitate midline position  Stand to Sit  Assistance Level: Moderate assistance  Skilled Clinical Factors: VC for hand placement and to use head righting to fcailitate ant/ post WS allowing improved controlled descent  Stand Pivot  Assistance Level: Requires x 2 assistance;Maximum assistance (w/c <> standing frame)  Skilled Clinical Factors: Step by step instructions with reminders of the end result req. ( hand placment, foot placement , head righing with the end result of transf from point A to point B)      Environmental Mobility  Wheelchair  Surface: Level surface  Device: Standard wheelchair;Pressure Relieving Cushion (22\" w/c traded to an 18\" w/c to providing closer boundaries for  the L side. Cushion was also adjusted using a towel/rolled sheet to provide a wedge with ant build up. This minimized pt's tendency to ext hips for sliding ant in w/c)  Additional Factors: Verbal cues; Increased time to complete (VCs for L attention and avoiding objects in wilder)  Assistance Required to Manage Parts: All wheelchair parts  Propulsion Method: Right upper extremity;Right lower extremity  Propulsion Quality: Slow velocity; Short strokes; Veers left  Propulsion Distance: 175'  Skilled Clinical Factors: MAX  VCs for L attention and staying on R side of hallwayas a compensatory technique to avoid environmental barriers      Neuromuscular Education  Functional Movement Patterns: Therapy utilized a standing frame to increase prolonged WB through BLES.  Therapy instructed pt with using the lever on the R and positioning the frame in an upright position. Pt then instructed with standing on own for the last 15-20 degrees of standing. Pt's posure is protracted on the R and retracted on L especially at hips. Therapy worked collaboratively instructing pt to retrieve cards on the R that would facilitate derotation of body while promoting midline position . Pt alos instructed with visually tracking the cards instead of ramdomly reaching. Pt req to focus. Pt patricia ~30 minutes in the standing frame with WB though the LUE and LE . Pt fatigued by end of session             ASSESSMENT/PROGRESS TOWARDS GOALS       Assessment  Assessment: Pt presents with poor attention and L side in attention. Pt is extremely verbal during session but became more engaged when using deescalation skills. Pt demo improved posture in w/c following changes made to the w/c. Pt patricia the standing frame well demo active movement with the LLE. Pt appears well motivated to improve. Pt is well below baseline and would benefit from continued therapy to maximize potential and increase functional mobility towards Ind to allow for a safer d/c to home.   Activity Tolerance: Patient limited by fatigue;Treatment limited secondary to decreased cognition;Patient limited by endurance  Discharge Recommendations: 24 hour supervision or assist;Continue to assess pending progress;Home with Home health PT  PT Equipment Recommendations  Other: Ongoing assessment    Goals  Patient Goals   Patient Goals : to go home  Short Term Goals  Time Frame for Short Term Goals: 2 weeks (all ongoing)  Short Term Goal 1: Pt will complete bed mobility with moderate assist  Short Term Goal 2: Pt will complete sit<>stand transfer wtih moderate assist  Short Term Goal 3: Pt will propel self in manual w/c for 150' with minimal assist.- met 2/2  Short Term Goal 4: Pt will ambulate 10' with moderate assist.  Long Term Goals  Time Frame for Long Term Goals : 4 weeks (all ongoing)  Long Term Goal 1: Pt will complete bed mobility with CGA  Long Term Goal 2: Pt will complete sit<>Stand transfers with CGA  Long Term Goal 3: Pt will propel self in manual w/c for 250' with suprvision  Long Term Goal 4: Pt will ambulate 48' with CGA & LRAD    PLAN OF CARE/SAFETY  Physcial Therapy Plan  Days Per Week: 5 Days  Hours Per Day: 1 hour  Current Treatment Recommendations: Strengthening;Balance training;ROM; Functional mobility training;Neuromuscular re-education; Safety education & training;Patient/Caregiver education & training;Transfer training; Endurance training;Home exercise program;Gait training;Equipment evaluation, education, & procurement;Positioning; Therapeutic activities  Safety Devices  Type of Devices: Call light within reach;Nurse notified;Gait belt;Patient at risk for falls; Chair alarm in place; Left in chair  Restraints  Restraints Initially in Place: No    EDUCATION  Education  Education Given To: Patient  Education Provided: Mobility Training;Transfer Training;Energy Conservation; Fall Prevention Strategies  Education Provided Comments: Therapy educated pt on the purpose of increased WB to facilitae increased muscle tone in L extrem. (this included the use of the standing frame. Therapy also recommended that pt sit in the w/c to facilitate more of a midline position with proximal WB .   Education Method: Verbal  Barriers to Learning: Cognition  Education Outcome: Verbalized understanding;Continued education needed        Therapy Time   Individual Concurrent Group Co-treatment   Time In       1245   Time Out       1410   Minutes       [address] [place] [address], 02/10/23 at 4:25 PM

## 2023-02-10 NOTE — PROGRESS NOTES
Pt awake in his recliner eating breakfast and visiting with his brother. Physical assessment and vital signs as charted. Pt currently denies experiencing any pain at this time. Call light placed within reach. RN will continue to monitor Pt.

## 2023-02-10 NOTE — PROGRESS NOTES
Occupational Therapy  Facility/Department: [facility] ACUTE REHAB UNIT  Rehabilitation Occupational Therapy Daily Treatment Note    Date: 2/10/23  Patient Name: Delgado Burks       Room: 4159/8194-90  MRN: [id number]  Account: [de-identified]   : 1955  (68 y.o.) Gender: male                    Past Medical History:  has a past medical history of Anxiety, Chest pain, Depression, Encounter for imaging to screen for metal prior to MRI, Hyperlipidemia, Hypertension, and Wears glasses. Past Surgical History:   has a past surgical history that includes [place] tooth extraction; Colonoscopy (2009); Cardiac catheterization (2008); Finger trigger release (3-); and Coronary angioplasty with stent. Restrictions  Restrictions/Precautions: Fall Risk;Up as Tolerated    Subjective  Subjective: Pt seated in recliner w/ family present upon OT arrival. Co-tx indicated in order to maximize therapeutic potential.  Restrictions/Precautions: Fall Risk;Up as Tolerated             Objective     Cognition  Arousal/Alertness: Appropriate responses to stimuli  Following Commands: Follows one step commands with repetition; Follows one step commands with increased time  Attention Span: Attends with cues to redirect; Difficulty attending to directions; Difficulty dividing attention  Safety Judgement: Decreased awareness of need for assistance;Decreased awareness of need for safety  Problem Solving: Assistance required to generate solutions;Assistance required to identify errors made;Assistance required to implement solutions  Insights: Decreased awareness of deficits  Initiation: Requires cues for some  Sequencing: Requires cues for some  Cognition Comment: Pt tangential at times, requiring cues to redirect. Pt w/ poor awareness of L side.   Orientation  Overall Orientation Status: Within Functional Limits         ADL  Lower Extremity Dressing  Assistance Level: Requires x 2 assistance  Skilled Clinical Factors: Assist to thread BLE thru pants. Pt completed stand w/ assist of 1 while another therapist adjusted brief and pulled up pants over hips. Putting On/Taking Off Footwear  Assistance Level: Maximum assistance  Skilled Clinical Factors: Assist to don socks and shoes. Toileting  Assistance Level: Requires x 2 assistance  Skilled Clinical Factors: Pt incontinent of urine in brief. Assist x2 for stand and to doff soiled brief and place new brief. Functional Mobility  Device: Wheelchair  Assistance Level: Contact guard assist;Minimal assistance  Skilled Clinical Factors: Pt self propelled w/ RUE and RLE from room -> gym w/ CGA and min A, verbal cues for environmental awareness and obstacle avoidance. Transfers  Surface: From chair with arms; Wheelchair  Additional Factors: Verbal cues; Hand placement cues; Increased time to complete  Sit to Stand  Assistance Level: Contact guard assist;Maximum assistance; Requires x 2 assistance  Skilled Clinical Factors: Pt completed 2 STS from recliner during LE dressing w/ 2 helpers (max A x1 and CGA of another). Stand to Sit  Assistance Level: Contact guard assist  Skilled Clinical Factors: Pt required CGA for controlled descent into recliner/w/c. Bed To/From Chair  Technique: Sit pivot;Stand pivot  Assistance Level: Requires x 2 assistance;Contact guard assist;Maximum assistance  Stand Pivot  Assistance Level: Requires x 2 assistance;Maximum assistance;Contact guard assist  Skilled Clinical Factors: Pt t/f recliner --> w/c w/ max A x1 and CGA of another toward L side. Sit Pivot  Assistance Level: Requires x 2 assistance;Contact guard assist;Maximum assistance  Skilled Clinical Factors:  Max A x1 and CGA x1 t/f w/c <-> standing frame     Weight Bearing  Weight Bearing Technique: Yes  RUE Weight Bearing: Forearm standing  LUE Weight Bearing: Forearm standing  Response To Weight Bearing Technique: Pt in standing frame, WB thru LUE in forearm standing ~30 minutes in addition to WB thru BLE w/ focus on weight shifting and crossing midline. OT Exercises  Static Standing Balance Exercises: Pt in standing frame ~30 mins, WB thru LUE and BLE, pt required to turn toward R side and reach up and posteriorly to retrieve card w/ focus on upright posture in addition to rotation of scaps and facilitation of core. Completed w/ RUE in supported and unsupported on table. Assessment  Assessment  Assessment: Pt tolerated session well. Pt participated well in standing frame w/ focus on WB thru LLE/LUE and correcting posture. Pt also given smaller w/c to improve trunk support. Arm trough placed on w/c for support for LUE. Contraction observed in bicep this date w/ IR/ER. Pt would continue to benefit from skilled therapy services in order to maximize independence w/ ADLs and fxl mobility. Cont OT POC. Activity Tolerance: Patient tolerated treatment well  Discharge Recommendations: 24 hour supervision or assist;Continue to assess pending progress;Home with Home health OT  Safety Devices  Safety Devices in place: Yes  Type of devices: Call light within reach; Chair alarm in place; Left in chair    Patient Education  Education  Education Given To: Patient; Family  Education Provided: Role of Therapy;Plan of Care;Family Education;Mobility Training;Transfer Training; Safety; Fall Prevention Strategies  Education Method: Demonstration;Verbal  Barriers to Learning: Cognition  Education Outcome: Verbalized understanding;Demonstrated understanding    Plan  Occupational Therapy Plan  Times Per Week: 5 days per week, 75 mins each  Current Treatment Recommendations: Strengthening;ROM;Balance training;Functional mobility training; Endurance training;Self-Care / ADL; Patient/Caregiver education & training;Neuromuscular re-education;Cognitive/Perceptual training; Safety education & training    Goals  Patient Goals   Patient goals : go home with my wife  Short Term Goals  Time Frame for Short Term Goals: By 2 weeks  Short Term Goal 1: Pt will complete LB dressing with mod A  Short Term Goal 2: Pt will complete toileting with mod A  Short Term Goal 3: Pt will complete toilet and functional transfers with mod A  Short Term Goal 4: Pt will complete UB dressing with min A  Short Term Goal 5: Pt will complete UE HEP x 10 reps for improved R UE coordination for ADLs  Long Term Goals  Time Frame for Long Term Goals : By 4 weeks  Long Term Goal 1: Pt will complete LB dressing with CGA  Long Term Goal 2: Pt will complete toileting with CGA  Long Term Goal 3: Pt will complete toilet and functional transfers with CGA. Long Term Goal 4: Pt will complete UB dressing with SPV.   Long Term Goal 5: Pt will complete simple IADL with LRAD at mod I             Therapy Time   Individual Concurrent Group Co-treatment   Time In       1245   Time Out       1410   Minutes       85       Timed Code Treatment Minutes:  85 min     Total Treatment Minutes: 85 min     Grazyna Mosley OT

## 2023-02-11 PROCEDURE — 97530 THERAPEUTIC ACTIVITIES: CPT

## 2023-02-11 PROCEDURE — 97129 THER IVNTJ 1ST 15 MIN: CPT

## 2023-02-11 PROCEDURE — 1280000000 HC REHAB R&B

## 2023-02-11 PROCEDURE — 97130 THER IVNTJ EA ADDL 15 MIN: CPT

## 2023-02-11 PROCEDURE — 99232 SBSQ HOSP IP/OBS MODERATE 35: CPT | Performed by: PHYSICAL MEDICINE & REHABILITATION

## 2023-02-11 PROCEDURE — 92526 ORAL FUNCTION THERAPY: CPT

## 2023-02-11 PROCEDURE — 97535 SELF CARE MNGMENT TRAINING: CPT

## 2023-02-11 PROCEDURE — 6360000002 HC RX W HCPCS: Performed by: PHYSICAL MEDICINE & REHABILITATION

## 2023-02-11 PROCEDURE — 97112 NEUROMUSCULAR REEDUCATION: CPT

## 2023-02-11 PROCEDURE — 6370000000 HC RX 637 (ALT 250 FOR IP): Performed by: PHYSICAL MEDICINE & REHABILITATION

## 2023-02-11 PROCEDURE — 99233 SBSQ HOSP IP/OBS HIGH 50: CPT | Performed by: NURSE PRACTITIONER

## 2023-02-11 PROCEDURE — 97542 WHEELCHAIR MNGMENT TRAINING: CPT

## 2023-02-11 PROCEDURE — 92507 TX SP LANG VOICE COMM INDIV: CPT

## 2023-02-11 RX ORDER — METHYLPHENIDATE HYDROCHLORIDE 10 MG/1
10 TABLET ORAL EVERY MORNING
Status: DISCONTINUED | OUTPATIENT
Start: 2023-02-12 | End: 2023-02-24 | Stop reason: HOSPADM

## 2023-02-11 RX ADMIN — TIZANIDINE 2 MG: 4 TABLET ORAL at 20:12

## 2023-02-11 RX ADMIN — Medication 5 MG: at 20:12

## 2023-02-11 RX ADMIN — EZETIMIBE 10 MG: 10 TABLET ORAL at 10:56

## 2023-02-11 RX ADMIN — NYSTATIN 500000 UNITS: 100000 SUSPENSION ORAL at 10:56

## 2023-02-11 RX ADMIN — ROSUVASTATIN CALCIUM 40 MG: 20 TABLET, FILM COATED ORAL at 10:56

## 2023-02-11 RX ADMIN — NYSTATIN 500000 UNITS: 100000 SUSPENSION ORAL at 16:28

## 2023-02-11 RX ADMIN — FLUOXETINE 10 MG: 10 CAPSULE ORAL at 10:56

## 2023-02-11 RX ADMIN — PANTOPRAZOLE SODIUM 40 MG: 40 TABLET, DELAYED RELEASE ORAL at 06:11

## 2023-02-11 RX ADMIN — ACETAMINOPHEN 650 MG: 325 TABLET, FILM COATED ORAL at 20:12

## 2023-02-11 RX ADMIN — NYSTATIN 500000 UNITS: 100000 SUSPENSION ORAL at 18:50

## 2023-02-11 RX ADMIN — NYSTATIN 500000 UNITS: 100000 SUSPENSION ORAL at 20:12

## 2023-02-11 RX ADMIN — DICLOFENAC SODIUM 4 G: 10 GEL TOPICAL at 10:56

## 2023-02-11 RX ADMIN — ASPIRIN 81 MG 81 MG: 81 TABLET ORAL at 10:54

## 2023-02-11 RX ADMIN — SENNOSIDES AND DOCUSATE SODIUM 2 TABLET: 50; 8.6 TABLET ORAL at 20:13

## 2023-02-11 RX ADMIN — ENOXAPARIN SODIUM 40 MG: 100 INJECTION SUBCUTANEOUS at 10:55

## 2023-02-11 RX ADMIN — DICLOFENAC SODIUM 4 G: 10 GEL TOPICAL at 20:13

## 2023-02-11 RX ADMIN — METHYLPHENIDATE HYDROCHLORIDE 5 MG: 10 TABLET ORAL at 06:11

## 2023-02-11 RX ADMIN — TRAZODONE HYDROCHLORIDE 50 MG: 50 TABLET ORAL at 20:12

## 2023-02-11 RX ADMIN — SENNOSIDES AND DOCUSATE SODIUM 2 TABLET: 50; 8.6 TABLET ORAL at 10:56

## 2023-02-11 ASSESSMENT — PAIN DESCRIPTION - LOCATION
LOCATION: HIP
LOCATION: HIP

## 2023-02-11 ASSESSMENT — PAIN DESCRIPTION - PAIN TYPE
TYPE: ACUTE PAIN
TYPE: ACUTE PAIN

## 2023-02-11 ASSESSMENT — PAIN DESCRIPTION - ORIENTATION
ORIENTATION: LEFT
ORIENTATION: LEFT

## 2023-02-11 ASSESSMENT — PAIN DESCRIPTION - DESCRIPTORS
DESCRIPTORS: DISCOMFORT
DESCRIPTORS: ACHING;DISCOMFORT

## 2023-02-11 ASSESSMENT — PAIN SCALES - GENERAL
PAINLEVEL_OUTOF10: 3
PAINLEVEL_OUTOF10: 2
PAINLEVEL_OUTOF10: 2

## 2023-02-11 ASSESSMENT — PAIN DESCRIPTION - ONSET: ONSET: ON-GOING

## 2023-02-11 ASSESSMENT — PAIN DESCRIPTION - FREQUENCY: FREQUENCY: INTERMITTENT

## 2023-02-11 NOTE — PROGRESS NOTES
Shift assessment complete. VSS. Patient A/O x4, confused at times, Slurred speech at times. Patient C/O 2/10 Left hip pain PRN Tylenol and Zanaflex given. Medications given crushed in pudding, tolerated well, patient reminded to turn head to the left to swallow and at midline. Safety measures in place. Call light and over bed table within reach. Hourly rounding and visual checks in place. Will continue to monitor.   Vitals:    02/10/23 2100   BP: 123/76   Pulse: 65   Resp: 16   Temp: 98.1 °F (36.7 °C)   SpO2: 98%

## 2023-02-11 NOTE — PROGRESS NOTES
Department of Physical Medicine & Rehabilitation  Progress Note    Patient Identification:  Arielle Vela  [id number]  : 1955  Admit date: 2023    Chief Complaint: Acute CVA (cerebrovascular accident) [facility])    Subjective:   Doing well today. No new issues overnight. He is participating well in therapy. Still very talkative and easily distractible. More internal rotation of his left arm today. Showing improvement with each therapy session. ROS: No f/c, n/v, cp     Objective:  Patient Vitals for the past 24 hrs:   BP Temp Temp src Pulse Resp SpO2   23 1037 106/71 98.3 °F (36.8 °C) Oral 78 16 99 %   02/10/23 2100 123/76 98.1 °F (36.7 °C) Oral 65 16 98 %       Const: Alert. No distress, pleasant. HEENT: Normocephalic, atraumatic. Normal sclera/conjunctiva. MMM. CV: Regular rate and rhythm. Resp: No respiratory distress. Lungs CTAB. Abd: Soft, nontender, nondistended, NABS+   Ext: No edema. Neuro: Alert, oriented, appropriately interactive. Psych: Cooperative, appropriate mood and affect    Laboratory data: Available via EMR. Last 24 hour lab  No results found for this or any previous visit (from the past 24 hour(s)). Therapy progress:  PT  Position Activity Restriction  Other position/activity restrictions: up as tolerated, up in chair  Objective     Sit to Stand: Maximum Assistance, 2 Person Assistance  Stand to Sit: 2 Person Assistance, Maximum Assistance     OT  PT Equipment Recommendations  Equipment Needed:  (defer)  Other: Ongoing assessment  Toilet - Technique:  (via rolling shower chair)  Equipment Used:  (rolling shower chair)  Toilet Transfers Comments: pt did not have urge to void, however rolled pt in bathroom via rolling shower chair. Max A x 2 to pivot from bed to rolling shower chair. Assessment        SLP          Body mass index is 22.4 kg/m².     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 Patient is on aspirin at home        HTN  Patient is on lisinopril 5mg daily at home  - monitor      HLD:  Patient is on Crestor 20mg and ezetimibe 10mg at home daily  -Continue home meds     Anxiety  Per chart, patient is on Valium 2mg at home. Possible could be contributor to his chest pain yesterday, notes he felt anxious. Denies taking Valium at home, however it is listed as a home med. Ativan 0.5mg once overnight   - prozac      Sleep disturbance  - Trazodone PRN  - benadryl PRN  -improving     Add low dose tizanidine for spasticity   - improving left leg tightness     Start Ritalin this morning for inattention.  - increase dose to 10mg     Rehab Progress: Improving  Anticipated Dispo: home  Services/DME: [facility] [place]  ELOS: 2/19          Aruna Patel D.O. M.P.H  PM&R  2/11/2023  11:24 AM

## 2023-02-11 NOTE — PLAN OF CARE
Problem: Discharge Planning  Goal: Discharge to home or other facility with appropriate resources  Outcome: Progressing     Problem: Skin/Tissue Integrity  Goal: Absence of new skin breakdown  Description: 1. Monitor for areas of redness and/or skin breakdown  2. Assess vascular access sites hourly  3. Every 4-6 hours minimum:  Change oxygen saturation probe site  4. Every 4-6 hours:  If on nasal continuous positive airway pressure, respiratory therapy assess nares and determine need for appliance change or resting period.   Outcome: Progressing     Problem: ABCDS Injury Assessment  Goal: Absence of physical injury  Outcome: Progressing     Problem: Safety - Adult  Goal: Free from fall injury  Outcome: Progressing     Problem: Pain  Goal: Verbalizes/displays adequate comfort level or baseline comfort level  Outcome: Progressing  Verbalizes/displays adequate comfort level or baseline comfort level: Encourage patient to monitor pain and request assistance     Problem: Nutrition Deficit:  Goal: Optimize nutritional status  Outcome: Progressing     Problem: Chronic Conditions and Co-morbidities  Goal: Patient's chronic conditions and co-morbidity symptoms are monitored and maintained or improved  Outcome: Progressing

## 2023-02-11 NOTE — PROGRESS NOTES
Oriented x 4. Assist x 2 to transfer, squat-pivot, or Stedy. Assist x 2 for incontinence care. Bladder incontinent and continent. Pills in pudding, Prozac capsule whole in pudding, other tablets crushed in pudding. 1:1 with meals, occasional cueing. Patient very talkative this morning. Dtr visited in the morning, wife in afternoon. Call light in reach, alarm on for safety.

## 2023-02-11 NOTE — PROGRESS NOTES
STROKE PROGRESS NOTE       Patient Name: Kalyan Ruiz YOB: 1955   Sex: Male Age: 74 yrs     CC / Reason for Consult: Stroke follow up    Subjective / ROS / Updates over last 24 hours:  Patient doing well in rehab, seems to be in good spirits        HISTORY     Allergies Allergies   Allergen Reactions    Lipitor      myalgia    Tamiflu [Oseltamivir Phosphate] Rash      Diet ADULT DIET; Dysphagia - Minced and Moist  ADULT ORAL NUTRITION SUPPLEMENT; Breakfast, Dinner; Standard High Calorie/High Protein Oral Supplement   Isolation No active isolations     LABS   Metabolic Panel Recent Labs     02/10/23  0623   *   K 4.3      CO2 26   BUN 13   CREATININE 0.6*   GLUCOSE 115*   CALCIUM 8.9      CBC / Coags Recent Labs     02/10/23  0623   WBC 6.8   RBC 3.98*   HGB 12.6*   HCT 37.2*         Other Lab Results   Component Value Date/Time    LABA1C 5.5 01/27/2023 05:52 AM    LDLCALC 71 01/27/2023 05:52 AM    TRIG 97 01/27/2023 05:52 AM    COVID19 Not Detected 01/19/2022 03:20 PM     No results found for: PHENYTOIN, KEPPRA, LACOSA, LAMO, VALPROATE, LACTSEPSIS, LACTA     CURRENT SCHEDULED MEDICATIONS   Inpatient Medications     [START ON 2/12/2023] methylphenidate, 10 mg, Oral, QAM    sennosides-docusate sodium, 2 tablet, Oral, BID    diclofenac sodium, 4 g, Topical, BID    FLUoxetine, 10 mg, Oral, Daily    nystatin, 5 mL, Oral, 4x Daily    pantoprazole, 40 mg, Oral, QAM AC    aspirin, 81 mg, Oral, Daily    ezetimibe, 10 mg, Oral, Daily    rosuvastatin, 40 mg, Oral, Daily    enoxaparin, 40 mg, SubCUTAneous, Daily    melatonin, 5 mg, Oral, Nightly   Infusions      Antibiotics   Recent Abx Admin        No antibiotic orders with administrations found.                       DIAGNOSTICS     Reviewed hypercoag labs w/ family  Discussed results of ECHO and MRI of brain     PHYSICAL EXAMINATION     PHYSICAL EXAM:  Vitals:    02/09/23 2044 02/10/23 0847 02/10/23 2100 02/11/23 1037   BP: 139/81 115/74 123/76 106/71   Pulse: 70 73 65 78   Resp: 16 16 16 16   Temp: 98.3 °F (36.8 °C) 98.3 °F (36.8 °C) 98.1 °F (36.7 °C) 98.3 °F (36.8 °C)   TempSrc: Oral Oral Oral Oral   SpO2: 99% 97% 98% 99%   Weight:       Height:             Const: Alert. No distress, pleasant. HEENT: Normocephalic, atraumatic. CV: Warm, Well perfused   Resp: No respiratory distress. .   Abd: Soft, nondistended  Ext: No edema. Neuro: Alert, oriented, appropriately interactive. Psych: Cooperative, appropriate mood and affect    ASSESSMENT & RECOMMENDATIONS     IMPRESSION & PLAN:  Patient is a 73 y/o M w/ R MCA ischemic stroke s/p thrombectomy, no in rehab recovering well. Stroke Etiology & Plan: At this point, no clear source as been identified. Patient is half way through a 30 day event monitor. F/u hypercoag labs all WNL. If 30 day event monitor is negative then would recommend loop monitor. Continue ASA  Continue Statin  DVT Prophylaxis: [facility]  Neurology Outpatient Follow up: 3 months post discharge    Stroke education provided this visit: Yes, discussed patients personal risk factors and stroke prevention measures. Patient specific modifiable stroke risk factors:  HTN - well controlled current off all medications  HLD - on statin   Encouraged to resume exercise and clean eating diet once discharged. LISSET Davis - CNP   Neurology & Neurocritical Care   2/11/2023 6:43 PM    I spent 55 minutes in the care of this patient. Over 50% of that time was in face-to-face counseling regarding disease process, diagnostic testing, preventative measures, and answering patient and family questions.

## 2023-02-11 NOTE — PLAN OF CARE
Problem: Discharge Planning  Goal: Discharge to home or other facility with appropriate resources  Outcome: Progressing  Note: Patient called a friend about building a wheelchair ramp at his house. Problem: Skin/Tissue Integrity  Goal: Absence of new skin breakdown  Description: 1. Monitor for areas of redness and/or skin breakdown  2. Assess vascular access sites hourly  3. Every 4-6 hours minimum:  Change oxygen saturation probe site  4. Every 4-6 hours:  If on nasal continuous positive airway pressure, respiratory therapy assess nares and determine need for appliance change or resting period. Outcome: Progressing  Note: Bruises lessening, skin intact. Problem: Safety - Adult  Goal: Free from fall injury  Outcome: Progressing  Note: Patient waits for assistance with transfer. Squat-pivot x 2 from chair to bed, tolerated fairly well. Problem: Pain  Goal: Verbalizes/displays adequate comfort level or baseline comfort level  Outcome: Progressing  Note: Voltaren gel applied to left hip decreased pain. Problem: Nutrition Deficit:  Goal: Optimize nutritional status  Outcome: Progressing  Note: Patient practices swallow techniques taught by SLP.       Problem: Chronic Conditions and Co-morbidities  Goal: Patient's chronic conditions and co-morbidity symptoms are monitored and maintained or improved  Outcome: Progressing  Flowsheets (Taken 2/11/2023 0314)  Care Plan - Patient's Chronic Conditions and Co-Morbidity Symptoms are Monitored and Maintained or Improved: Monitor and assess patient's chronic conditions and comorbid symptoms for stability, deterioration, or improvement

## 2023-02-11 NOTE — PROGRESS NOTES
Speech Language Pathology  ACUTE REHAB UNIT  SPEECH/LANGUAGE PATHOLOGY      [x] Daily  [] Weekly Care Conference Note  [] Discharge    Patient:Jason Rae  GRD:[id number]  Rehab Dx/Hx: Acute CVA (cerebrovascular accident) ([facility] [address] [I63.9]    Precautions: [x] Aspiration  [x] Fall risk  [] Sternal  [] Seizure [] Hip  [] Weight Bearing [] Other  ST Dx: [] Aphasia  [x] Dysarthria  [] Apraxia   [x] Oropharyngeal dysphagia [x] Cognitive Impairment  [] Other:   Date of Admit: 1/29/2023  Room #: 3102/3102-01  Date: 2/11/2023          Current Diet Order:ADULT DIET; Dysphagia - Minced and Moist  ADULT ORAL NUTRITION SUPPLEMENT; Breakfast, Dinner; Standard High Calorie/High Protein Oral Supplement   Recommended Form of Meds: Meds in puree  Compensatory Swallowing Strategies :  (Small bites, no pharyngeal neck extension, left head turn with thins, check for pocketing on left, 1:1 assist for small bolus size)   Previous MBS: 1/26/23  Oral Phase  Mild-moderate dysfunction characterized by incomplete labial seal with anterior spillage, slowed/reduced mastication, mild stasis. Pharyngeal Phase  Moderate dysfunction characterized by impairments in timing, strength and coordination with premature bolus loss, reduced base of tongue retraction, delayed/reduced hyolaryngeal mechanics, and reduced pharyngeal constriction. Resulted in vallecular/pyriform/pharyngeal residue, as well as compromised airway protection with penetration and gross tracheal aspiration of thin and mildly thick liquids; strong reactive cough present but did not fully clear. Chin tuck strategy did not eliminate aspiration, however cued head turn LEFT with small straw sips was effective . Double swallow helped clear residual. Of note, throughout study pt with tendency to tilt head posteriorly which further exacerbated bolus control; benefited from cues for neutral positioning.   Upper Esophageal Screen  Indirectly assessed; appeared unremarkable. 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, music, [employer]    Dentition: Adequate  Vision  Vision:  (not wearing glasses due to nose injury)  Vision Exceptions: Wears glasses at all times  Hearing  Hearing: Within functional limits  Barriers toward progress: Severity of impairments      Date: 2/11/2023     Tx session 1   Total Timed Code Min 30   Total Treatment Minutes 45   Individual Treatment Minutes 45   Group Treatment Minutes 0   Co-Treat Minutes 0   Brief Exception: N/A   Pain None   Pain Intervention: [] RN notified  [] Repositioned  [] Intervention offered and patient declined  [x] N/A  [] Other:   Subjective:     Pt upright and daughter present for duration of session. Pt with significantly increased posterior pushing this date; repositioned x2 to remediate. Objective / Goals:    Goal 1: Pt will demonstrate adequate oral containment of liquids and solids across meal without cueing. Patient tolerated NMES via VitalStim placement 4a (targeting orbicularis oris, buccinator and superior pharyngeal constrictor) @ 3.0 on right and 7.0 mA on left x 30 minutes with good contraction throughout. During activation, pt suddenly yelled out \"God that hurts\"!! Intensity decreased and then slowly increased after a few minutes and pt tolerated well. This is likely effective of increased sensation. X3 instances of anterior liquid loss were noted across multiple trials of thins via cup edge. Cues for effortful labial seal were provided and appeared to assist.    Goal 2: Pt will tolerate trials of solids with adequate mastication and propulsion as evidenced by min residue after the swallow across mealtime assessment.    Completed trials of soft solids this date with significantly improved oral clearance with trace to no residue after the swallow. Cues for neutral head positioning, right sided placement, over mastication and effortful swallows were utilized. Goal 3: Pt will tolerate thin liquids with head in neutral position without s/s associated with aspiration. X1 instance of throat clearing following thins via cup edge with head in neutral positioning. Noted pt with few instances of single swallows with thin liquids vs previous consistently visualized x2. Pt demonstrates wet vocal quality and coughing x1 with trials of soft solid trials (janna cracker mixed with pudding) and endorsed globus sensation. Cued coughing + swallow followed by liquid rinse were effective in clearing. Cueing for hard/effortful swallows with all textures this date. Goal 4: Pt will demonstrate safe feeding behavior as evidenced by small bolus size / appropriate rate without cues. Pt with improved bolus size and rate control with puree (daughter present and reports previously taking very large bolus size requiring assist). The patient was given soft & bite sized solids due to nature of bolus so unable to assess. Goal 5: Pt will tolerate least restrictive diet without respiratory decline. Chart review does not reveal any documented difficulty on current recommended diet level. No labs this date to review. No changes in respiratory status per chart. Lingual coating persists with maybe mild improvement. Goal 1: The patient will maintain eye contact to speaker positioned left of midline without distractors, min cues. Appropriate eye contact throughout. Modify goal next week. Pt required min-mod cues to scan left to right for functional med management task. Goal 2: The patient will demonstrate sustained attention to task for 2 minutes with <2 prompts. Not directly measured. Cues for progressing through meal were required; unclear if due to presence of SLP.     Goal 3: The patient will demonstrate improved inhibition as evidenced by <3 prompts for impulsivity during a task. Pt required >3 prompts for decreasing talking during mastication / oral prep. Goal 4: The patient will demonstrate improved self monitoring/correcting for basic tasks with <mod cues. Pt required mod cues for identifying errors on medication task as well as his own errors with sequential scanning. Goal 5: The patient will tolerate ongoing cognitive linguistic assessment. Goal not targeted this session. Goal 6: The patient will demonstrate comprehensibility in multiple communication environments without cues. Goal not targeted this session. Other areas targeted:    Education:   Pts daughter was trained on CTAR and recommended completion Saturday and Sunday. Safety Devices: [x] Call light within reach  [x] Chair alarm activated and connected to nurse call light system  [] Bed alarm activated   [x] Other: Brother and RN present at side. Assessment: Oropharyngeal dysphagia per MBSS, increased s/s associated with increased texture this date. The patient demonstrates primary impairment of inattention with multiple instances of disinhibition in the form of verbalizations during times of required silence for safety (I.e. chewing and swallowing). At this time, a treatment effect is not noted from neurostimulant and pt appears to be demonstrating press of speech behavior; daughter is present and agrees. Plan: Continue as per plan of care.       Interventions used this date:  [] Speech/Language Treatment  [] Instruction in HEP  [x] Dysphagia Treatment [x] Cognitive Treatment   [] Other:    Discharge recommendations:  [] Home independently  [x] Home with assistance []  24 hour supervision  [] ECF [] Other  Continued Tx Upon Discharge: ? [x] Yes    [] No    [] TBD based on progress while on ARU     [] Vital Stim indicated     [] Other:   Estimated discharge date: February 19th, 2023    Electronically signed by:  Bc Chappell M.A., [facility]  Speech-Language Pathologist

## 2023-02-11 NOTE — PROGRESS NOTES
Physical Therapy  Facility/Department: [facility] ACUTE REHAB UNIT  Rehabilitation Physical Therapy Treatment Note    NAME: Jose Gibson  : 1955 (82 y.o.)  MRN: [id number]  CODE STATUS: Full Code    Date of Service: 23     Restrictions:  Restrictions/Precautions: Fall Risk;Up as Tolerated     SUBJECTIVE  Subjective  Subjective: pt coming out of bathroom with nursing staff on PT arrival, agreeable to therapy session. Pain: no c/o pain          OBJECTIVE  Cognition  Arousal/Alertness: Appropriate responses to stimuli  Following Commands: Follows one step commands with repetition; Follows one step commands with increased time  Attention Span: Attends with cues to redirect; Difficulty attending to directions; Difficulty dividing attention  Safety Judgement: Decreased awareness of need for assistance;Decreased awareness of need for safety  Problem Solving: Assistance required to generate solutions;Assistance required to identify errors made;Assistance required to implement solutions  Insights: Decreased awareness of deficits  Initiation: Requires cues for some  Sequencing: Requires cues for some  Cognition Comment: Pt req max VC/ TC to stay focused on task. Pt benefits from controlled environment with min distractions especially with auditory distractions. Deescalation skills used as a means to control his environment. (one person giving commands, decreased volume of voice, simple directions as examples) Pt req VC for sequencing tasks  Orientation  Overall Orientation Status: Within Functional Limits    Functional Mobility  Sit Pivot  Assistance Level: Maximum assistance; Moderate assistance; Requires x 2 assistance  Skilled Clinical Factors: pt perf sqpt wc<>standing frame with cues for anterior weight shift, max + mod A in and out of frame      Environmental Mobility  Wheelchair  Surface: Level surface  Device: Standard wheelchair;Pressure Relieving Cushion (22\" w/c traded to an 18\" w/c to providing closer boundaries for  the L side. Cushion was also adjusted using a towel/rolled sheet to provide a wedge with ant build up. This minimized pt's tendency to ext hips for sliding ant in w/c)  Additional Factors: Verbal cues; Increased time to complete (VCs for L attention and avoiding objects in wilder)  Assistance Required to Manage Parts: All wheelchair parts  Assistance Level for Propulsion: Minimal assistance (Occasional assist for obstacle negotiation in hallway)  Propulsion Method: Right upper extremity;Right lower extremity  Propulsion Quality: Slow velocity; Short strokes; Veers left  Propulsion Distance: 175'x2  Skilled Clinical Factors: MAX  VCs for L attention and staying on R side of hallwayas a compensatory technique to avoid environmental barriers      Neuromuscular Education  Functional Movement Patterns: Therapy utilized a standing frame to increase prolonged WB through BLES. Therapy instructed pt with using the lever on the R and positioning the frame in an upright position. Pt then instructed on completing partial STS for last 15-20 degrees of standing. Mirror used for visual feedback of midline alignment. Completed several reps of partial STS in tandem with \"on\" cycle of NMES applied to L quad at 75mA, 10/20, 50bps, for 15min. In standing, pt then perf reaching to R to grasp card, placing on L side of mirror for inc WBing thru LLE/LUE. Pt patricia ~20 minutes in the standing frame with WB though the LUE and LE. Pt fatigued by end of session             ASSESSMENT/PROGRESS TOWARDS GOALS       Assessment  Assessment: Pt presents with poor attention and L side in attention. Pt is extremely verbal during session but became more engaged when using deescalation skills. Pt demo improved posture in w/c following changes made to the w/c. Pt patricia the standing frame well demo active movement with the LLE. Pt appears well motivated to improve.  Pt is well below baseline and would benefit from continued therapy to maximize potential and increase functional mobility towards Ind to allow for a safer d/c to home. Activity Tolerance: Patient limited by fatigue;Treatment limited secondary to decreased cognition;Patient limited by endurance  Discharge Recommendations: 24 hour supervision or assist;Continue to assess pending progress;Home with Home health PT  PT Equipment Recommendations  Equipment Needed: Yes  Mobility Devices: Wheelchair  Other: pt would benefit from custom manual wheelchair at [place], this PT will defer to patient's primary therapy team on vendor selection. Goals  Patient Goals   Patient Goals : to go home  Short Term Goals  Time Frame for Short Term Goals: 2 weeks (all ongoing)  Short Term Goal 1: Pt will complete bed mobility with moderate assist  Short Term Goal 2: Pt will complete sit<>stand transfer wtih moderate assist  Short Term Goal 3: Pt will propel self in manual w/c for 150' with minimal assist.- met 2/2  Short Term Goal 4: Pt will ambulate 10' with moderate assist.  Long Term Goals  Time Frame for Long Term Goals : 4 weeks (all ongoing)  Long Term Goal 1: Pt will complete bed mobility with CGA  Long Term Goal 2: Pt will complete sit<>Stand transfers with CGA  Long Term Goal 3: Pt will propel self in manual w/c for 250' with suprvision  Long Term Goal 4: Pt will ambulate 48' with CGA & LRAD    PLAN OF CARE/SAFETY  Physcial Therapy Plan  Days Per Week: 5 Days  Hours Per Day: 1 hour  Current Treatment Recommendations: Strengthening;Balance training;ROM; Functional mobility training;Neuromuscular re-education; Safety education & training;Patient/Caregiver education & training;Transfer training; Endurance training;Home exercise program;Gait training;Equipment evaluation, education, & procurement;Positioning; Therapeutic activities  Safety Devices  Type of Devices: Call light within reach;Nurse notified;Gait belt;Patient at risk for falls; Chair alarm in place; Left in chair  Restraints  Restraints Initially in Place: No    EDUCATION  Education  Education Given To: Patient  Education Provided: Mobility Training;Transfer Training;Energy Conservation; Fall Prevention Strategies  Education Provided Comments: Therapy educated pt on the purpose of increased WB to facilitae increased muscle tone in L extrem. (this included the use of the standing frame. Therapy also recommended that pt sit in the w/c to facilitate more of a midline position with proximal WB .   Education Method: Verbal  Barriers to Learning: Cognition  Education Outcome: Verbalized understanding;Continued education needed        Therapy Time   Individual Concurrent Group Co-treatment   Time In       0930   Time Out       1030   Minutes       60      Timed Code Treatment Minutes:60    Total Treatment Minutes:60      Baljit Zamorano PT, DPT, NCS, CSRS

## 2023-02-11 NOTE — PROGRESS NOTES
Occupational Therapy  Facility/Department: [facility] ACUTE REHAB UNIT  Rehabilitation Occupational Therapy Daily Treatment Note    Date: 23  Patient Name: Delgado Burks       Room: 7932/1874-72  MRN: [id number]  Account: [de-identified]   : 1955  (68 y.o.) Gender: male                    Past Medical History:  has a past medical history of Anxiety, Chest pain, Depression, Encounter for imaging to screen for metal prior to MRI, Hyperlipidemia, Hypertension, and Wears glasses. Past Surgical History:   has a past surgical history that includes [place] tooth extraction; Colonoscopy (2009); Cardiac catheterization (2008); Finger trigger release (3-); and Coronary angioplasty with stent. Restrictions  Restrictions/Precautions: Fall Risk;Up as Tolerated  Other position/activity restrictions: up as tolerated, up in chair    Subjective  Subjective: Pt seated on [facility] with RN and PCA at OT arrival, pt. being transported back to /. Pt pleasant and agreeable to participation in co-tx this date. Co-tx indicated in order to maximize therapeutic potentials. Restrictions/Precautions: Fall Risk;Up as Tolerated             Objective     Cognition  Arousal/Alertness: Appropriate responses to stimuli  Following Commands: Follows one step commands with repetition; Follows one step commands with increased time  Attention Span: Attends with cues to redirect; Difficulty attending to directions; Difficulty dividing attention  Safety Judgement: Decreased awareness of need for assistance;Decreased awareness of need for safety  Problem Solving: Assistance required to generate solutions;Assistance required to identify errors made;Assistance required to implement solutions  Insights: Decreased awareness of deficits  Initiation: Requires cues for some  Sequencing: Requires cues for some  Cognition Comment: Pt req max VC/ TC to stay focused on task.  Pt benefits from controlled environment with min distractions especially with auditory distractions. Pt req VC for sequencing tasks  Orientation  Overall Orientation Status: Within Functional Limits  Orientation Level: Oriented to place;Oriented to situation;Oriented to person         ADL  Grooming/Oral Hygiene  Assistance Level: Minimal assistance;Verbal cues; Set-up; Increased time to complete  Skilled Clinical Factors: in order to incorporate LUE into functional routine, pt. instructed to place toothpaste in L hand as stabilizer while twisting off cap w/ R hand. Pt able to initiate task, however, required min A for maximum placement of toothpaste. Pt able to apply toothpaste with R hand. Brushed teeth seated in w/c at sink, intermittent VCs for attention to task as pt has difficutly dividing attention. Pt requesting to use mouthwash but unable to locate on sink. VCs for attention to L side; pt placed mouthwash into L hand and used R to twist off cap. Pt left mouthwash bottle situated in L hand and required VCs to remove from hand before leaving the sink. Functional Mobility  Device: Wheelchair  Activity: To/From therapy gym  Assistance Level: Contact guard assist;Minimal assistance  Skilled Clinical Factors: Pt self propelled w/ RUE and RLE from room -> gym w/ CGA and min A, verbal cues for environmental awareness and obstacle avoidance. Transfers  Surface: From chair with arms; Wheelchair  Additional Factors: Verbal cues; Hand placement cues; Increased time to complete  Sit Pivot  Assistance Level: Maximum assistance; Moderate assistance; Requires x 2 assistance  Skilled Clinical Factors: Max A x1 + Mod A x1 w/c <> standing frame, VCs for hand placement, forward lean, and LE placement   Weight Bearing  Weight Bearing Technique: Yes  RUE Weight Bearing: Forearm standing  LUE Weight Bearing: Forearm standing  Response To Weight Bearing Technique: OT/PT utilized standing frame to increase prolonged WB through BLEs.  Pt instructed to use RUE to push level to achieve upright position in standing frame with increased time to complete. Once seat height achieved, pt instructed to complete partial STS w/ utilization of mirror to promote improved awareness of posture and midline. E-stim utilized to facilitate improved neuromuscular performane, pt several reps of partial STS in tandem with \"on\" cycle of NMES applied to L quad at 75mA, 10/20, 50bps, for 15min. When in standing, pt instructed to grasp cards with RUE and cross midline to L side of mirror for placement to promote weight shifting/bearing on LUE and LLE. Pt engaged in activity for approx. 20 minutes total.     Assessment  Assessment  Assessment: Pt tolerated tx session well this date, participating in stance with use of standing frame and LE e-stim w/ focus on WB through LUE/LLE to reach across midline. Would benefit from continued skilled OT services while on ARU to maximize independence with ADLs and functionla mobility. Cont OT POC  Activity Tolerance: Patient limited by fatigue;Treatment limited secondary to decreased cognition;Patient limited by endurance  Discharge Recommendations: 24 hour supervision or assist;Continue to assess pending progress;Home with Home health OT  OT Equipment Recommendations  Equipment Needed: Yes  ADL Assistive Devices: Shower Chair with back; Toileting - Raised Toilet Seat with arms;Grab Bars - toilet;Grab Bars - shower  Other: continue to assess for additional AE needs  Safety Devices  Safety Devices in place: Yes  Type of devices: Call light within reach; Chair alarm in place; Left in chair    Patient Education  Education  Education Given To: Patient; Family  Education Provided: Role of Therapy;Plan of Care;Family Education;Mobility Training;Transfer Training; Safety; Fall Prevention Strategies  Education Method: Demonstration;Verbal  Barriers to Learning: Cognition  Education Outcome: Verbalized understanding;Demonstrated understanding    Plan  Occupational Therapy Plan  Times Per Week: 5 days per week, 75 mins each  Current Treatment Recommendations: Strengthening;ROM;Balance training;Functional mobility training; Endurance training;Self-Care / ADL; Patient/Caregiver education & training;Neuromuscular re-education;Cognitive/Perceptual training; Safety education & training    Goals  Patient Goals   Patient goals : go home with my wife  Short Term Goals  Time Frame for Short Term Goals: By 2 weeks - all goals ongoing  Short Term Goal 1: Pt will complete LB dressing with mod A  Short Term Goal 2: Pt will complete toileting with mod A  Short Term Goal 3: Pt will complete toilet and functional transfers with mod A  Short Term Goal 4: Pt will complete UB dressing with min A  Short Term Goal 5: Pt will complete UE HEP x 10 reps for improved R UE coordination for ADLs    AM-PAC Score               Therapy Time   Individual Concurrent Group Co-treatment   Time In       0930   Time Out       1030   Minutes       60   Timed Code Treatment Minutes: [facility], OT

## 2023-02-12 PROCEDURE — 6360000002 HC RX W HCPCS: Performed by: PHYSICAL MEDICINE & REHABILITATION

## 2023-02-12 PROCEDURE — 1280000000 HC REHAB R&B

## 2023-02-12 PROCEDURE — 6370000000 HC RX 637 (ALT 250 FOR IP): Performed by: PHYSICAL MEDICINE & REHABILITATION

## 2023-02-12 PROCEDURE — 99232 SBSQ HOSP IP/OBS MODERATE 35: CPT | Performed by: PHYSICAL MEDICINE & REHABILITATION

## 2023-02-12 RX ADMIN — TIZANIDINE 2 MG: 4 TABLET ORAL at 07:51

## 2023-02-12 RX ADMIN — NYSTATIN 500000 UNITS: 100000 SUSPENSION ORAL at 13:24

## 2023-02-12 RX ADMIN — Medication 5 MG: at 21:41

## 2023-02-12 RX ADMIN — SENNOSIDES AND DOCUSATE SODIUM 2 TABLET: 50; 8.6 TABLET ORAL at 21:43

## 2023-02-12 RX ADMIN — ENOXAPARIN SODIUM 40 MG: 100 INJECTION SUBCUTANEOUS at 07:56

## 2023-02-12 RX ADMIN — ASPIRIN 81 MG 81 MG: 81 TABLET ORAL at 07:51

## 2023-02-12 RX ADMIN — TRAZODONE HYDROCHLORIDE 50 MG: 50 TABLET ORAL at 21:42

## 2023-02-12 RX ADMIN — DICLOFENAC SODIUM 4 G: 10 GEL TOPICAL at 08:15

## 2023-02-12 RX ADMIN — EZETIMIBE 10 MG: 10 TABLET ORAL at 07:51

## 2023-02-12 RX ADMIN — SENNOSIDES AND DOCUSATE SODIUM 2 TABLET: 50; 8.6 TABLET ORAL at 07:51

## 2023-02-12 RX ADMIN — PANTOPRAZOLE SODIUM 40 MG: 40 TABLET, DELAYED RELEASE ORAL at 05:25

## 2023-02-12 RX ADMIN — NYSTATIN 500000 UNITS: 100000 SUSPENSION ORAL at 07:50

## 2023-02-12 RX ADMIN — ACETAMINOPHEN 650 MG: 325 TABLET, FILM COATED ORAL at 07:51

## 2023-02-12 RX ADMIN — ROSUVASTATIN CALCIUM 40 MG: 20 TABLET, FILM COATED ORAL at 08:14

## 2023-02-12 RX ADMIN — NYSTATIN 500000 UNITS: 100000 SUSPENSION ORAL at 21:42

## 2023-02-12 RX ADMIN — DICLOFENAC SODIUM 4 G: 10 GEL TOPICAL at 21:42

## 2023-02-12 RX ADMIN — FLUOXETINE 10 MG: 10 CAPSULE ORAL at 07:50

## 2023-02-12 RX ADMIN — METHYLPHENIDATE HYDROCHLORIDE 10 MG: 10 TABLET ORAL at 05:25

## 2023-02-12 RX ADMIN — NYSTATIN 500000 UNITS: 100000 SUSPENSION ORAL at 16:53

## 2023-02-12 RX ADMIN — TIZANIDINE 2 MG: 4 TABLET ORAL at 21:41

## 2023-02-12 ASSESSMENT — PAIN DESCRIPTION - PAIN TYPE: TYPE: ACUTE PAIN

## 2023-02-12 ASSESSMENT — PAIN DESCRIPTION - DESCRIPTORS: DESCRIPTORS: SPASM

## 2023-02-12 ASSESSMENT — PAIN DESCRIPTION - RADICULAR PAIN: RADICULAR_PAIN: ABSENT

## 2023-02-12 ASSESSMENT — PAIN DESCRIPTION - ORIENTATION: ORIENTATION: LEFT

## 2023-02-12 ASSESSMENT — PAIN - FUNCTIONAL ASSESSMENT: PAIN_FUNCTIONAL_ASSESSMENT: ACTIVITIES ARE NOT PREVENTED

## 2023-02-12 ASSESSMENT — PAIN DESCRIPTION - LOCATION
LOCATION: WRIST
LOCATION: LEG

## 2023-02-12 ASSESSMENT — PAIN SCALES - GENERAL
PAINLEVEL_OUTOF10: 2
PAINLEVEL_OUTOF10: 5
PAINLEVEL_OUTOF10: 6

## 2023-02-12 ASSESSMENT — PAIN DESCRIPTION - FREQUENCY: FREQUENCY: INTERMITTENT

## 2023-02-12 NOTE — PROGRESS NOTES
Department of Physical Medicine & Rehabilitation  Progress Note    Patient Identification:  Franco Sample  [id number]  : 1955  Admit date: 2023    Chief Complaint: Acute CVA (cerebrovascular accident) [facility])    Subjective:   Resting in his chair today on exam. Continues to be very chatty. He is asking about side effects of electrical stimulation on his hand. Discussed with wife about use to ritalin and will monitor effects today. No other complaints. ROS: No f/c, n/v, cp     Objective:  Patient Vitals for the past 24 hrs:   BP Temp Temp src Pulse Resp SpO2   23 0730 113/71 98.1 °F (36.7 °C) Oral 71 16 97 %   23 2000 118/64 98.6 °F (37 °C) Oral 71 16 96 %   23 1037 106/71 98.3 °F (36.8 °C) Oral 78 16 99 %       Const: Alert. No distress, pleasant. HEENT: Normocephalic, atraumatic. Normal sclera/conjunctiva. MMM. CV: Regular rate and rhythm. Resp: No respiratory distress. Lungs CTAB. Abd: Soft, nontender, nondistended, NABS+   Ext: No edema. Neuro: Alert, oriented, appropriately interactive. Psych: Cooperative, appropriate mood and affect    Laboratory data: Available via EMR. Last 24 hour lab  No results found for this or any previous visit (from the past 24 hour(s)). Therapy progress:  PT  Position Activity Restriction  Other position/activity restrictions: up as tolerated, up in chair  Objective     Sit to Stand: Maximum Assistance, 2 Person Assistance  Stand to Sit: 2 Person Assistance, Maximum Assistance     OT  PT Equipment Recommendations  Equipment Needed: Yes  Mobility Devices: Wheelchair  Other: pt would benefit from custom manual wheelchair at [place], this PT will defer to patient's primary therapy team on vendor selection. Toilet - Technique:  (via rolling shower chair)  Equipment Used:  (rolling shower chair)  Toilet Transfers Comments: pt did not have urge to void, however rolled pt in bathroom via rolling shower chair.  Max A x 2 to pivot from bed to rolling shower chair. Assessment        SLP          Body mass index is 22.4 kg/m². 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 Patient is on aspirin at home        HTN  Patient is on lisinopril 5mg daily at home  - monitor      HLD:  Patient is on Crestor 20mg and ezetimibe 10mg at home daily  -Continue home meds     Anxiety  Per chart, patient is on Valium 2mg at home. Possible could be contributor to his chest pain yesterday, notes he felt anxious. Denies taking Valium at home, however it is listed as a home med. Ativan 0.5mg once overnight   - prozac      Sleep disturbance  - Trazodone PRN  - benadryl PRN  -improving     Add low dose tizanidine for spasticity   - improving left leg tightness     Start Ritalin for inattention.  - increase dose to 10mg   - monitor effects today     Rehab Progress: Improving  Anticipated Dispo: home  Services/DME: [facility]  ELOS: 2/19          BRAN KateP.H  PM&R  2/12/2023  10:31 AM

## 2023-02-12 NOTE — PROGRESS NOTES
Shift assessment complete. VSS. Patient A/O x4, confused at times, Slurred speech at times. Patient C/O 2/10 Left hip pain PRN Tylenol and Zanaflex given. Medications given crushed in pudding, tolerated well, patient reminded to turn head to the left to swallow liquids and at midline for solids. Safety measures in place. Call light and over bed table within reach. Hourly rounding and visual checks in place. Will continue to monitor.   Vitals:    02/11/23 2000   BP: 118/64   Pulse: 71   Resp: 16   Temp: 98.6 °F (37 °C)   SpO2: 96%

## 2023-02-12 NOTE — PLAN OF CARE
Problem: Discharge Planning  Goal: Discharge to home or other facility with appropriate resources  2/12/2023 1018 by Ofelia Real RN  Outcome: Progressing  Discharge to home or other facility with appropriate resources: Identify barriers to discharge with patient and caregiver     Problem: Skin/Tissue Integrity  Goal: Absence of new skin breakdown  Description: 1. Monitor for areas of redness and/or skin breakdown  2. Assess vascular access sites hourly  3. Every 4-6 hours minimum:  Change oxygen saturation probe site  4. Every 4-6 hours:  If on nasal continuous positive airway pressure, respiratory therapy assess nares and determine need for appliance change or resting period.   2/12/2023 1018 by Mikaela Frost RN  Outcome: Progressing    Problem: ABCDS Injury Assessment  Goal: Absence of physical injury  2/12/2023 1018 by Mikaela Frost RN  Outcome: Progressing    Problem: Safety - Adult  Goal: Free from fall injury  2/12/2023 1018 by Mikaela Frost RN  Outcome: Progressing    Problem: Pain  Goal: Verbalizes/displays adequate comfort level or baseline comfort level  2/12/2023 1018 by Mikaela Frost RN  Outcome: Progressing  Verbalizes/displays adequate comfort level or baseline comfort level: Encourage patient to monitor pain and request assistance     Problem: Nutrition Deficit:  Goal: Optimize nutritional status  2/12/2023 1018 by Mikaela Frost RN  Outcome: Progressing    Problem: Chronic Conditions and Co-morbidities  Goal: Patient's chronic conditions and co-morbidity symptoms are monitored and maintained or improved  2/12/2023 1018 by Mikaela Frost RN  Outcome: Progressing  Care Plan - Patient's Chronic Conditions and Co-Morbidity Symptoms are Monitored and Maintained or Improved: Monitor and assess patient's chronic conditions and comorbid symptoms for stability, deterioration, or improvement

## 2023-02-12 NOTE — PLAN OF CARE
Problem: Discharge Planning  Goal: Discharge to home or other facility with appropriate resources  Outcome: Progressing  Note: Patient called a friend about building a wheelchair ramp at his house. Problem: Skin/Tissue Integrity  Goal: Absence of new skin breakdown  Description: 1. Monitor for areas of redness and/or skin breakdown  2. Assess vascular access sites hourly  3. Every 4-6 hours minimum:  Change oxygen saturation probe site  4. Every 4-6 hours:  If on nasal continuous positive airway pressure, respiratory therapy assess nares and determine need for appliance change or resting period. Outcome: Progressing  Note: Bruises lessening, skin intact. Problem: ABCDS Injury Assessment  Goal: Absence of physical injury  Outcome: Progressing     Problem: Safety - Adult  Goal: Free from fall injury  Outcome: Progressing  Note: Patient waits for assistance with transfer. Squat-pivot x 2 from chair to bed, tolerated fairly well. Problem: Pain  Goal: Verbalizes/displays adequate comfort level or baseline comfort level  Verbalizes/displays adequate comfort level or baseline comfort level: Encourage patient to monitor pain and request assistance  Outcome: Progressing  Note: Voltaren gel applied to left hip decreased pain. Problem: Nutrition Deficit:  Goal: Optimize nutritional status  Outcome: Progressing  Note: Patient practices swallow techniques taught by SLP.       Problem: Chronic Conditions and Co-morbidities  Goal: Patient's chronic conditions and co-morbidity symptoms are monitored and maintained or improved  Outcome: Progressing  Care Plan - Patient's Chronic Conditions and Co-Morbidity Symptoms are Monitored and Maintained or Improved: Monitor and assess patient's chronic conditions and comorbid symptoms for stability, deterioration, or improvement

## 2023-02-13 ENCOUNTER — APPOINTMENT (OUTPATIENT)
Dept: GENERAL RADIOLOGY | Age: 68
DRG: 057 | End: 2023-02-13
Attending: PHYSICAL MEDICINE & REHABILITATION
Payer: MEDICARE

## 2023-02-13 LAB
ANION GAP SERPL CALCULATED.3IONS-SCNC: 8 MMOL/L (ref 3–16)
BASOPHILS ABSOLUTE: 0 K/UL (ref 0–0.2)
BASOPHILS RELATIVE PERCENT: 0.7 %
BUN BLDV-MCNC: 12 MG/DL (ref 7–20)
CALCIUM SERPL-MCNC: 9.2 MG/DL (ref 8.3–10.6)
CHLORIDE BLD-SCNC: 100 MMOL/L (ref 99–110)
CO2: 28 MMOL/L (ref 21–32)
CREAT SERPL-MCNC: 0.7 MG/DL (ref 0.8–1.3)
EOSINOPHILS ABSOLUTE: 0.1 K/UL (ref 0–0.6)
EOSINOPHILS RELATIVE PERCENT: 1.8 %
GFR SERPL CREATININE-BSD FRML MDRD: >60 ML/MIN/{1.73_M2}
GLUCOSE BLD-MCNC: 107 MG/DL (ref 70–99)
HCT VFR BLD CALC: 36.1 % (ref 40.5–52.5)
HEMOGLOBIN: 12.3 G/DL (ref 13.5–17.5)
LYMPHOCYTES ABSOLUTE: 1.9 K/UL (ref 1–5.1)
LYMPHOCYTES RELATIVE PERCENT: 32.7 %
MCH RBC QN AUTO: 32.1 PG (ref 26–34)
MCHC RBC AUTO-ENTMCNC: 34 G/DL (ref 31–36)
MCV RBC AUTO: 94.5 FL (ref 80–100)
MONOCYTES ABSOLUTE: 0.5 K/UL (ref 0–1.3)
MONOCYTES RELATIVE PERCENT: 8.3 %
NEUTROPHILS ABSOLUTE: 3.3 K/UL (ref 1.7–7.7)
NEUTROPHILS RELATIVE PERCENT: 56.5 %
PDW BLD-RTO: 12.8 % (ref 12.4–15.4)
PLATELET # BLD: 346 K/UL (ref 135–450)
PMV BLD AUTO: 7.2 FL (ref 5–10.5)
POTASSIUM REFLEX MAGNESIUM: 4.2 MMOL/L (ref 3.5–5.1)
RBC # BLD: 3.82 M/UL (ref 4.2–5.9)
SODIUM BLD-SCNC: 136 MMOL/L (ref 136–145)
WBC # BLD: 5.8 K/UL (ref 4–11)

## 2023-02-13 PROCEDURE — 36415 COLL VENOUS BLD VENIPUNCTURE: CPT

## 2023-02-13 PROCEDURE — 92526 ORAL FUNCTION THERAPY: CPT

## 2023-02-13 PROCEDURE — 6370000000 HC RX 637 (ALT 250 FOR IP): Performed by: PHYSICAL MEDICINE & REHABILITATION

## 2023-02-13 PROCEDURE — 97110 THERAPEUTIC EXERCISES: CPT

## 2023-02-13 PROCEDURE — 97129 THER IVNTJ 1ST 15 MIN: CPT

## 2023-02-13 PROCEDURE — 1280000000 HC REHAB R&B

## 2023-02-13 PROCEDURE — 97112 NEUROMUSCULAR REEDUCATION: CPT | Performed by: PHYSICAL THERAPIST

## 2023-02-13 PROCEDURE — 74230 X-RAY XM SWLNG FUNCJ C+: CPT

## 2023-02-13 PROCEDURE — 92611 MOTION FLUOROSCOPY/SWALLOW: CPT

## 2023-02-13 PROCEDURE — 85025 COMPLETE CBC W/AUTO DIFF WBC: CPT

## 2023-02-13 PROCEDURE — 80048 BASIC METABOLIC PNL TOTAL CA: CPT

## 2023-02-13 PROCEDURE — 97112 NEUROMUSCULAR REEDUCATION: CPT

## 2023-02-13 PROCEDURE — 99232 SBSQ HOSP IP/OBS MODERATE 35: CPT | Performed by: PHYSICAL MEDICINE & REHABILITATION

## 2023-02-13 PROCEDURE — 97542 WHEELCHAIR MNGMENT TRAINING: CPT | Performed by: PHYSICAL THERAPIST

## 2023-02-13 PROCEDURE — 97530 THERAPEUTIC ACTIVITIES: CPT | Performed by: PHYSICAL THERAPIST

## 2023-02-13 PROCEDURE — 97530 THERAPEUTIC ACTIVITIES: CPT

## 2023-02-13 PROCEDURE — 97130 THER IVNTJ EA ADDL 15 MIN: CPT

## 2023-02-13 PROCEDURE — 6360000002 HC RX W HCPCS: Performed by: PHYSICAL MEDICINE & REHABILITATION

## 2023-02-13 RX ORDER — TIZANIDINE 4 MG/1
4 TABLET ORAL EVERY 6 HOURS PRN
Status: DISCONTINUED | OUTPATIENT
Start: 2023-02-13 | End: 2023-02-24 | Stop reason: HOSPADM

## 2023-02-13 RX ORDER — POLYVINYL ALCOHOL 14 MG/ML
1 SOLUTION/ DROPS OPHTHALMIC PRN
Status: DISCONTINUED | OUTPATIENT
Start: 2023-02-13 | End: 2023-02-24 | Stop reason: HOSPADM

## 2023-02-13 RX ADMIN — ASPIRIN 81 MG 81 MG: 81 TABLET ORAL at 09:19

## 2023-02-13 RX ADMIN — NYSTATIN 500000 UNITS: 100000 SUSPENSION ORAL at 18:45

## 2023-02-13 RX ADMIN — NYSTATIN 500000 UNITS: 100000 SUSPENSION ORAL at 09:20

## 2023-02-13 RX ADMIN — TIZANIDINE 4 MG: 4 TABLET ORAL at 12:31

## 2023-02-13 RX ADMIN — ACETAMINOPHEN 650 MG: 325 TABLET, FILM COATED ORAL at 09:19

## 2023-02-13 RX ADMIN — PANTOPRAZOLE SODIUM 40 MG: 40 TABLET, DELAYED RELEASE ORAL at 06:26

## 2023-02-13 RX ADMIN — FLUOXETINE 10 MG: 10 CAPSULE ORAL at 09:19

## 2023-02-13 RX ADMIN — SENNOSIDES AND DOCUSATE SODIUM 2 TABLET: 50; 8.6 TABLET ORAL at 09:19

## 2023-02-13 RX ADMIN — ACETAMINOPHEN 650 MG: 325 TABLET, FILM COATED ORAL at 22:07

## 2023-02-13 RX ADMIN — EZETIMIBE 10 MG: 10 TABLET ORAL at 09:19

## 2023-02-13 RX ADMIN — DICLOFENAC SODIUM 4 G: 10 GEL TOPICAL at 22:07

## 2023-02-13 RX ADMIN — DICLOFENAC SODIUM 4 G: 10 GEL TOPICAL at 09:29

## 2023-02-13 RX ADMIN — Medication 5 MG: at 22:06

## 2023-02-13 RX ADMIN — NYSTATIN 500000 UNITS: 100000 SUSPENSION ORAL at 12:32

## 2023-02-13 RX ADMIN — TRAMADOL HYDROCHLORIDE 25 MG: 50 TABLET, COATED ORAL at 06:26

## 2023-02-13 RX ADMIN — TRAZODONE HYDROCHLORIDE 50 MG: 50 TABLET ORAL at 22:06

## 2023-02-13 RX ADMIN — TIZANIDINE 4 MG: 4 TABLET ORAL at 22:06

## 2023-02-13 RX ADMIN — ENOXAPARIN SODIUM 40 MG: 100 INJECTION SUBCUTANEOUS at 09:18

## 2023-02-13 RX ADMIN — SENNOSIDES AND DOCUSATE SODIUM 2 TABLET: 50; 8.6 TABLET ORAL at 22:07

## 2023-02-13 RX ADMIN — NYSTATIN 500000 UNITS: 100000 SUSPENSION ORAL at 22:07

## 2023-02-13 RX ADMIN — ROSUVASTATIN CALCIUM 40 MG: 20 TABLET, FILM COATED ORAL at 09:23

## 2023-02-13 RX ADMIN — METHYLPHENIDATE HYDROCHLORIDE 10 MG: 10 TABLET ORAL at 06:26

## 2023-02-13 RX ADMIN — TRAMADOL HYDROCHLORIDE 25 MG: 50 TABLET, COATED ORAL at 23:05

## 2023-02-13 ASSESSMENT — PAIN DESCRIPTION - LOCATION
LOCATION: ARM;COCCYX
LOCATION: HIP
LOCATION: LEG

## 2023-02-13 ASSESSMENT — PAIN SCALES - GENERAL
PAINLEVEL_OUTOF10: 4
PAINLEVEL_OUTOF10: 6
PAINLEVEL_OUTOF10: 6
PAINLEVEL_OUTOF10: 2
PAINLEVEL_OUTOF10: 6
PAINLEVEL_OUTOF10: 5
PAINLEVEL_OUTOF10: 6
PAINLEVEL_OUTOF10: 6

## 2023-02-13 ASSESSMENT — PAIN - FUNCTIONAL ASSESSMENT
PAIN_FUNCTIONAL_ASSESSMENT: ACTIVITIES ARE NOT PREVENTED

## 2023-02-13 ASSESSMENT — PAIN DESCRIPTION - ORIENTATION
ORIENTATION: LEFT

## 2023-02-13 ASSESSMENT — PAIN DESCRIPTION - DESCRIPTORS
DESCRIPTORS: SPASM
DESCRIPTORS: ACHING
DESCRIPTORS: ACHING
DESCRIPTORS: DULL;ACHING
DESCRIPTORS: ACHING

## 2023-02-13 ASSESSMENT — PAIN DESCRIPTION - PAIN TYPE
TYPE: ACUTE PAIN
TYPE: CHRONIC PAIN
TYPE: CHRONIC PAIN

## 2023-02-13 ASSESSMENT — PAIN DESCRIPTION - FREQUENCY
FREQUENCY: INTERMITTENT
FREQUENCY: INTERMITTENT

## 2023-02-13 ASSESSMENT — PAIN DESCRIPTION - ONSET
ONSET: ON-GOING
ONSET: ON-GOING

## 2023-02-13 ASSESSMENT — PAIN DESCRIPTION - RADICULAR PAIN: RADICULAR_PAIN: ABSENT

## 2023-02-13 NOTE — CARE COORDINATION
SW continues to follow Pt's progress. Home Eval is scheduled for Wednesday 2/15 at noon. FUAD spoke with Danica Shields, PT, this morning regarding Pt's need for a home wheelchair. Danica Shields will get back with FUAD after working with Pt today.     Kallie Mtz, [facility]  Case Management  [phone]

## 2023-02-13 NOTE — PLAN OF CARE
Problem: Discharge Planning  Goal: Discharge to home or other facility with appropriate resources  Outcome: Progressing     Problem: Skin/Tissue Integrity  Goal: Absence of new skin breakdown  Description: 1. Monitor for areas of redness and/or skin breakdown  2. Assess vascular access sites hourly  3. Every 4-6 hours minimum:  Change oxygen saturation probe site  4. Every 4-6 hours:  If on nasal continuous positive airway pressure, respiratory therapy assess nares and determine need for appliance change or resting period.   Outcome: Progressing     Problem: ABCDS Injury Assessment  Goal: Absence of physical injury  Outcome: Progressing  Flowsheets (Taken 2/12/2023 2240)  Absence of Physical Injury: Implement safety measures based on patient assessment     Problem: Safety - Adult  Goal: Free from fall injury  Outcome: Progressing  Flowsheets (Taken 2/12/2023 2240)  Free From Fall Injury: Instruct family/caregiver on patient safety     Problem: Pain  Goal: Verbalizes/displays adequate comfort level or baseline comfort level  Outcome: Progressing     Problem: Nutrition Deficit:  Goal: Optimize nutritional status  Outcome: Progressing     Problem: Chronic Conditions and Co-morbidities  Goal: Patient's chronic conditions and co-morbidity symptoms are monitored and maintained or improved  Outcome: Progressing

## 2023-02-13 NOTE — PROGRESS NOTES
Department of Physical Medicine & Rehabilitation  Progress Note    Patient Identification:  Jarek Mojica  [id number]  : 1955  Admit date: 2023    Chief Complaint: Acute CVA (cerebrovascular accident) [facility])    Subjective:   Feels well this morning but having some pain in his left wrist. He also had leg spasticity last night which was painful. Attention is slightly improved today. Asking to address spasticity today. Seen in his room with his wife. ROS: No f/c, n/v, cp     Objective:  Patient Vitals for the past 24 hrs:   BP Temp Temp src Pulse Resp SpO2   23 0900 102/66 97.7 °F (36.5 °C) Oral 74 17 97 %   23 0626 -- -- -- -- 17 --   23 115/73 98.2 °F (36.8 °C) Oral 62 17 97 %       Const: Alert. No distress, pleasant. HEENT: Normocephalic, atraumatic. Normal sclera/conjunctiva. MMM. CV: Regular rate and rhythm. Resp: No respiratory distress. Lungs CTAB. Abd: Soft, nontender, nondistended, NABS+   Ext: No edema. Neuro: Alert, oriented, appropriately interactive. Psych: Cooperative, appropriate mood and affect    Laboratory data: Available via EMR.    Last 24 hour lab  Recent Results (from the past 24 hour(s))   Basic Metabolic Panel w/ Reflex to MG    Collection Time: 23  6:12 AM   Result Value Ref Range    Sodium 136 136 - 145 mmol/L    Potassium reflex Magnesium 4.2 3.5 - 5.1 mmol/L    Chloride 100 99 - 110 mmol/L    CO2 28 21 - 32 mmol/L    Anion Gap 8 3 - 16    Glucose 107 (H) 70 - 99 mg/dL    BUN 12 7 - 20 mg/dL    Creatinine 0.7 (L) 0.8 - 1.3 mg/dL    Est, Glom Filt Rate >60 >60    Calcium 9.2 8.3 - 10.6 mg/dL   CBC auto differential    Collection Time: 23  6:12 AM   Result Value Ref Range    WBC 5.8 4.0 - 11.0 K/uL    RBC 3.82 (L) 4.20 - 5.90 M/uL    Hemoglobin 12.3 (L) 13.5 - 17.5 g/dL    Hematocrit 36.1 (L) 40.5 - 52.5 %    MCV 94.5 80.0 - 100.0 fL    MCH 32.1 26.0 - 34.0 pg    MCHC 34.0 31.0 - 36.0 g/dL    RDW 12.8 12.4 - 15.4 %    Platelets 346 135 - 450 K/uL    MPV 7.2 5.0 - 10.5 fL    Neutrophils % 56.5 %    Lymphocytes % 32.7 %    Monocytes % 8.3 %    Eosinophils % 1.8 %    Basophils % 0.7 %    Neutrophils Absolute 3.3 1.7 - 7.7 K/uL    Lymphocytes Absolute 1.9 1.0 - 5.1 K/uL    Monocytes Absolute 0.5 0.0 - 1.3 K/uL    Eosinophils Absolute 0.1 0.0 - 0.6 K/uL    Basophils Absolute 0.0 0.0 - 0.2 K/uL                 Therapy progress:  PT  Position Activity Restriction  Other position/activity restrictions: up as tolerated, up in chair  Objective     Sit to Stand: Maximum Assistance, 2 Person Assistance  Stand to Sit: 2 Person Assistance, Maximum Assistance     OT  PT Equipment Recommendations  Equipment Needed: Yes  Mobility Devices: Wheelchair  Other: pt would benefit from custom manual wheelchair at [place], this PT will defer to patient's primary therapy team on vendor selection. Toilet - Technique:  (via rolling shower chair)  Equipment Used:  (rolling shower chair)  Toilet Transfers Comments: pt did not have urge to void, however rolled pt in bathroom via rolling shower chair. Max A x 2 to pivot from bed to rolling shower chair. Assessment        SLP          Body mass index is 22.4 kg/m². 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 Patient is on aspirin at home        HTN  Patient is on lisinopril 5mg daily at home  - monitor      HLD:  Patient is on Crestor 20mg and ezetimibe 10mg at home daily  -Continue home meds     Anxiety  Per chart, patient is on Valium 2mg at home. Possible could be contributor to his chest pain yesterday, notes he felt anxious. Denies taking Valium at home, however it is listed as a home med.  Ativan 0.5mg once overnight   - prozac      Sleep disturbance  - Trazodone PRN  - benadryl PRN  -improving     Add low dose tizanidine for spasticity   - improving left leg tightness   - increase to 4mg q6h    Start Ritalin for inattention.  - increase dose to 10mg   - monitor effects         Rehab Progress: Improving  Anticipated Dispo: home  Services/DME: [facility]  ELOS: 2/19          BRAN PopePEliH  PM&R  2/13/2023  11:58 AM

## 2023-02-13 NOTE — PROCEDURES
INSTRUMENTAL SWALLOW REPORT  MODIFIED BARIUM SWALLOW    NAME: Trudy Bryan   : 1955  MRN: [id number]       Date of Eval: 2023     Ordering Physician: Dr. Dae Fernandez  Radiologist: Dr. Roxie Hansen     Referring Diagnosis(es): Referring Diagnosis: Dysphagia    Past Medical History:  has a past medical history of Anxiety, Chest pain, Depression, Encounter for imaging to screen for metal prior to MRI, Hyperlipidemia, Hypertension, and Wears glasses. Past Surgical History:  has a past surgical history that includes [place] tooth extraction; Colonoscopy (2009); Cardiac catheterization (2008); Finger trigger release (3-); and Coronary angioplasty with stent. Date of Prior Study: 2023  Type of Study: Repeat MBS  Results of Prior Study: 2023  Oral Phase  Mild-moderate dysfunction characterized by incomplete labial seal with anterior spillage, slowed/reduced mastication, mild stasis. Pharyngeal Phase  Moderate dysfunction characterized by impairments in timing, strength and coordination with premature bolus loss, reduced base of tongue retraction, delayed/reduced hyolaryngeal mechanics, and reduced pharyngeal constriction. Resulted in vallecular/pyriform/pharyngeal residue, as well as compromised airway protection with penetration and gross tracheal aspiration of thin and mildly thick liquids; strong reactive cough present but did not fully clear. Chin tuck strategy did not eliminate aspiration, however cued head turn LEFT with small straw sips was effective . Double swallow helped clear residual. Of note, throughout study pt with tendency to tilt head posteriorly which further exacerbated bolus control; benefited from cues for neutral positioning. Upper Esophageal Screen  Indirectly assessed; appeared unremarkable.     Patient Complaints/Reason for Referral:  Trudy Bryan was referred for a MBS to assess the efficiency of his/her swallow function, assess for aspiration, and to make recommendations regarding safe dietary consistencies, effective compensatory strategies, and safe eating environment. Onset of problem:   Date of Onset: 1/25/2023  Behavior/Cognition/Vision/Hearing:  Behavior/Cognition: Alert; Cooperative;Pleasant mood    Impressions:  Pt demonstrates a mild/moderate oropharyngeal dysphagia. Oral phase with reduced bolus cohesion resulting in head of bolus in pyriform sinus with tail of bolus in oral cavity with liquids without cues. Pt demo'd ability to complete oral hold of thin via tsp with cues but loss to floor of mouth. Pt with persistent delayed swallow initiation (inconsistent) to pyriform sinus with liquids, pudding spilling over top of epiglottis (did not fill vallecular space). Pt continues to demonstrate mildly reduced hyolaryngeal excursion initially with absent epiglottic distention progressing to full distention and there is mildly reduced tongue base retraction. Pt demonstrated mild residue in valleculae and pyriform sinus with x1 instance of residue from vallecula to posterior pharyngeal wall. Residue does increased with viscosity. Use of cued liquid rinse and cued effortful swallows reduced pharyngeal residue mildly. There was no aspiration or penetration during study. Attempted mendelsohn maneuver following study with model, tactile cues and verbal cues; pt not stimulable this date. Patient Position: Lateral and  Upright  Consistencies Administered: Regular; Thin straw; Thin teaspoon; Thin cup (Pudding)    Dysphagia Outcome Severity Scale: Level 4: Mild moderate dysphagia- Intermittent supervision/cueing. One - two diet consistencies restricted  Penetration-Aspiration Scale (PAS): 1 - Material does not enter the airway    Cognitive treatment -   Completed functional pathfinding exercises to radiology (unfamiliar location) with written directions. The patient required min cues for reading due to impulsivity and x1 instance of left inattention.  Pt required similar amount of cues for giving verbal instructions due to reading ahead/sequencing. Pt able to complete return path without written directions with cue required x1 in familiar setting in finding his room. Recommended Diet:  Solid consistency: Soft and Bite-Sized  Liquid consistency: Thin  Liquid administration via: Cup;Straw  Medication administration: Meds in puree    Safe Swallow Protocol:  Supervision: Close  Compensatory Swallowing Strategies :  (Neutral head positioning, placement of solid PO on right, one bite at a time, check for pocketing and oral residue. Benefits from use of mirror.)    Recommendations/Treatment  Requires SLP Intervention: Yes  Recommendations: F/U MBS  D/C Recommendations: Ongoing speech therapy is recommended during this hospitalization     Recommended Exercises:    Therapeutic Interventions: Diet tolerance monitoring;Patient/Family education;Pharyngeal exercises     Education: Images and recommendations were reviewed with pt/spouse following this exam.   Patient Education: educated on anatomy and physiology of swallowing, diet level recommendations, compensation strategies, recommend 1:1 assist, eliminate distractions. Patient Education Response: Demonstrated understanding;Needs reinforcement    Safety Devices  Safety Devices in place: Yes  Type of devices: Call light within reach (Spouse present)    Goals:    Short Term:  Goal 1: Pt will demonstrate adequate oral containment of liquids and solids across meal without cueing - GOAL MET. Pt will demonstrate self feeding without labial residue across mealtime assessment without cues. Goal 2: Pt will tolerate trials of solids with adequate mastication and propulsion as evidenced by min residue after the swallow across mealtime assessment. - GOAL MET; Pt will tolerate trials of advanced textures with adequate oral clearance as evidenced by <min oral residue and no reports of globus sensation across two mealtime assessments.   Goal 3: Pt will tolerate thin liquids with head in neutral position without s/s associated with aspiration - GOAL MET PER MBS; D/C GOAL  Goal 4: Pt will demonstrate safe feeding behavior as evidenced by small bolus size / appropriate rate without cues. Goal 5: Pt will tolerate least restrictive diet without respiratory decline.     Pain   None indicated    Therapy Time:   Individual Concurrent Group Co-treatment   Time In  1115         Time Out  1200         Minutes  30 minutes / 15 minutes cognitive treatment            Timed Code Treatment Minutes: 15 Minutes  Total Treatment Time: 45 minutes    Roderick Barragan M.A., [id number] [facility].[id number]  Speech-Language Pathologist

## 2023-02-13 NOTE — PROGRESS NOTES
Occupational Therapy  Facility/Department: [facility] ACUTE REHAB UNIT  Rehabilitation Occupational Therapy Daily Treatment Note    Date: 23  Patient Name: Annmarie Naylor       Room: 1032/2849-68  MRN: [id number]  Account: [de-identified]   : 1955  (68 y.o.) Gender: male                    Past Medical History:  has a past medical history of Anxiety, Chest pain, Depression, Encounter for imaging to screen for metal prior to MRI, Hyperlipidemia, Hypertension, and Wears glasses. Past Surgical History:   has a past surgical history that includes [place] tooth extraction; Colonoscopy (2009); Cardiac catheterization (2008); Finger trigger release (3-); and Coronary angioplasty with stent. Restrictions  Restrictions/Precautions: Fall Risk;Up as Tolerated    Subjective  Subjective: Pt seated in w/c w/ wife present upon OT arrival. Co-tx indicated in order to maximize therapeutic potential.  Restrictions/Precautions: Fall Risk;Up as Tolerated             Objective     Cognition  Following Commands: Follows one step commands with repetition; Follows one step commands with increased time  Attention Span: Attends with cues to redirect; Difficulty attending to directions; Difficulty dividing attention  Safety Judgement: Decreased awareness of need for assistance;Decreased awareness of need for safety  Problem Solving: Assistance required to generate solutions;Assistance required to identify errors made;Assistance required to implement solutions  Insights: Decreased awareness of deficits  Initiation: Requires cues for some  Sequencing: Requires cues for some  Cognition Comment: Max VCs for attention to task. Orientation  Overall Orientation Status: Within Functional Limits  Orientation Level: Oriented to place;Oriented to situation;Oriented to person         ADL  Putting On/Taking Off Footwear  Assistance Level: Maximum assistance  Skilled Clinical Factors: Assist to doff B shoes.           Functional Mobility  Device: Wheelchair  Activity: To/From therapy gym  Assistance Level: Contact guard assist;Minimal assistance  Skilled Clinical Factors: Pt self propelled w/ RUE and RLE from room -> gym w/ CGA and min A, verbal cues for environmental awareness and obstacle avoidance, completed in 1 min 54 s. Bridging  Assistance Level: Minimal assistance  Skilled Clinical Factors: Assist to stabilize LLE during bridge to scoot up in bed and adjust hips. Sit to Supine  Assistance Level: Moderate assistance  Skilled Clinical Factors: Assist to hook BLE, pt able to assist in moving to bed, assist to facilitate trunk. Transfers  Surface: Wheelchair; To mat;From mat; To bed  Additional Factors: Verbal cues; Hand placement cues  Sit to Stand  Assistance Level: Moderate assistance;Maximum assistance  Skilled Clinical Factors: Pt completed 1 STS from w/c to adjust cushion w/ assist x1 and 2 STS from edge of mat w/ assist to block LLE. Pt w/ significant L lateral and posterior lean. Stand to Sit  Assistance Level: Contact guard assist  Skilled Clinical Factors: Pt required CGA for controlled descent onto surface. Bed To/From Chair  Technique: Sit pivot  Assistance Level: Moderate assistance  Skilled Clinical Factors: Pt complete sit pivot t/f w/c --> bed toward R side w/ mod A x1. Sit Pivot  Assistance Level: Moderate assistance  Skilled Clinical Factors: Pt t/f w/c <-> edge of mat w/ min-mod A x1 toward both R and L sides. Neuromuscular Education  Neuromuscular education: Yes  Trunk Control: Pt sitting edge of mat, required to lean anteriorly to touch therapist's hands and slowly return to midline/neutral. Pt completed ~10x w/ verbal cues. Pt w/ occasional L lateral lean w/ tactile cues to self correct. Completed to increase trunk control. Cues required for pacing.   Weight Bearing  Weight Bearing Technique: Yes  RUE Weight Bearing: Prone  LUE Weight Bearing: Prone  Response To Weight Bearing Technique: Pt WB thru B forearms on mat table in prone, assist to adjust LUE into 90 degrees flexion. Pt maintainde ~1-2 mins w/ cues for head position. Pt reporting pain in L shoulder and unable to tolerate position. OT Exercises  A/AROM Exercises: In prone on mat table, pt required to flex LLE and extend against gravity. Using powder table, pt's LLE and LUE supported in sidelying. Mirror used for visual feedback. Pt required to complete LLE exercises while LUE placed in air cast using wheeled arm support w/ focus on shoulder flexion/extension reps. Pt completed x12 w/ AAROM observed during flexion w/ more PROM required for extension. Completed w/o wheeled arm support w/ pt requiring more assistance into flexion positioning. Pt completed x10 w/ increased facilitiation required. Assessment  Assessment  Assessment: Pt tolerated session well w/ continued return of ROM observed in LLE. Pt completed transfers w/ less assistance this date demo'ing improved sitting balance and midline orientation. Pt would continue to benefit from skilled OT services to maximize independence w/ ADLs and fxl transfers. Recommend 30 min individual sessions w/ 60 min cotx. Activity Tolerance: Patient tolerated treatment well  Discharge Recommendations: 24 hour supervision or assist;Continue to assess pending progress;Home with Home health OT  Safety Devices  Safety Devices in place: Yes  Type of devices: Left in bed;Call light within reach; Bed alarm in place    Patient Education  Education  Education Given To: Patient; Family  Education Provided: Role of Therapy;Plan of Care;Family Education;Mobility Training;Transfer Training; Safety; Fall Prevention Strategies  Education Method: Demonstration;Verbal  Barriers to Learning: Cognition  Education Outcome: Verbalized understanding;Demonstrated understanding    Plan  Occupational Therapy Plan  Times Per Week: 5 days per week, 90 mins  Current Treatment Recommendations: Strengthening;ROM;Balance training;Functional mobility training; Endurance training;Self-Care / ADL; Patient/Caregiver education & training;Neuromuscular re-education;Cognitive/Perceptual training; Safety education & training    Goals  Patient Goals   Patient goals : go home with my wife  Short Term Goals  Time Frame for Short Term Goals: By 2 weeks - all goals ongoing  Short Term Goal 1: Pt will complete LB dressing with mod A  Short Term Goal 2: Pt will complete toileting with mod A  Short Term Goal 3: Pt will complete toilet and functional transfers with mod A  Short Term Goal 4: Pt will complete UB dressing with min A  Short Term Goal 5: Pt will complete UE HEP x 10 reps for improved R UE coordination for ADLs  Long Term Goals  Time Frame for Long Term Goals : By 4 weeks  Long Term Goal 1: Pt will complete LB dressing with CGA  Long Term Goal 2: Pt will complete toileting with CGA  Long Term Goal 3: Pt will complete toilet and functional transfers with CGA. Long Term Goal 4: Pt will complete UB dressing with SPV.   Long Term Goal 5: Pt will complete simple IADL with LRAD at mod I        Therapy Time   Individual Concurrent Group Co-treatment   Time In       1245   Time Out       1415   Minutes       90       Timed Code Treatment Minutes:  90 min     Total Treatment Minutes: 90 min     Angela Bryson OT

## 2023-02-13 NOTE — PROGRESS NOTES
Oriented x 4. Assist x2 with stedy or squat-pivot. 1:1 with meals for safety, swallow precautions, diet upgraded to soft/bite sized today after Modified Barium Swallow test. Urinary cont/incontinent, assist with urinal, toilet q 2 hours. Call light in reach. Alarm on for safety.

## 2023-02-13 NOTE — PROGRESS NOTES
Speech Language Pathology  ACUTE REHAB UNIT  SPEECH/LANGUAGE PATHOLOGY      [x] Daily  [] Weekly Care Conference Note  [] Discharge    Patient:Jason Dominguez  LGS:[id number]  Rehab Dx/Hx: Acute CVA (cerebrovascular accident) ([facility] [address] [I63.9]    Precautions: [x] Aspiration  [x] Fall risk  [] Sternal  [] Seizure [] Hip  [] Weight Bearing [] Other  ST Dx: [] Aphasia  [x] Dysarthria  [] Apraxia   [x] Oropharyngeal dysphagia [x] Cognitive Impairment  [] Other:   Date of Admit: 1/29/2023  Room #: 3102/3102-01  Date: 2/13/2023          Current Diet Order:ADULT DIET; Dysphagia - Minced and Moist  ADULT ORAL NUTRITION SUPPLEMENT; Breakfast, Dinner; Standard High Calorie/High Protein Oral Supplement   Recommended Form of Meds: Meds in puree  Compensatory Swallowing Strategies : Neutral head positioning, placement of solid PO on right, one bite at a time, check for pocketing and oral residue. Benefits from use of mirror. Previous MBS: 1/26/23  Oral Phase  Mild-moderate dysfunction characterized by incomplete labial seal with anterior spillage, slowed/reduced mastication, mild stasis. Pharyngeal Phase  Moderate dysfunction characterized by impairments in timing, strength and coordination with premature bolus loss, reduced base of tongue retraction, delayed/reduced hyolaryngeal mechanics, and reduced pharyngeal constriction. Resulted in vallecular/pyriform/pharyngeal residue, as well as compromised airway protection with penetration and gross tracheal aspiration of thin and mildly thick liquids; strong reactive cough present but did not fully clear. Chin tuck strategy did not eliminate aspiration, however cued head turn LEFT with small straw sips was effective . Double swallow helped clear residual. Of note, throughout study pt with tendency to tilt head posteriorly which further exacerbated bolus control; benefited from cues for neutral positioning.   Upper Esophageal Screen  Indirectly assessed; appeared unremarkable. 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, music, [employer]    Dentition: Adequate  Vision  Vision:  (not wearing glasses due to nose injury)  Vision Exceptions: Wears glasses at all times  Hearing  Hearing: Within functional limits  Barriers toward progress: Severity of impairments      Date: 2/13/2023     Tx session 1   Total Timed Code Min 30   Total Treatment Minutes 55   Individual Treatment Minutes 55   Group Treatment Minutes 0   Co-Treat Minutes 0   Brief Exception: N/A   Pain None reported   Pain Intervention: [] RN notified  [] Repositioned  [] Intervention offered and patient declined  [x] N/A  [] Other:   Subjective:     Pt upright in chair and agreeable to therapy. Wife present during session. Objective / Goals:    Goal 1: Pt will demonstrate adequate oral containment of liquids and solids across meal without cueing. Patient tolerated NMES via VitalStim placement 4a (targeting orbicularis oris, buccinator and superior pharyngeal constrictor) @ 2.0 on right and 7.0 mA on left x 39 minutes. Anterior loss of soft & bite sized solids x3 and thins x2    Cued fast/effortful swallows x41   Goal 2: Pt will tolerate trials of solids with adequate mastication and propulsion as evidenced by min residue after the swallow across mealtime assessment. Trials of soft & bite sized solids with improved min oral residue on lingual surface and oral cavity compared to prior session. Pt continues to endorse that \"food is stuck in back molars\". Lingual sweep: x7    Finger sweep: x6    Goal 3: Pt will tolerate thin liquids with head in neutral position without s/s associated with aspiration.    Thins via cup edge tolerated without overt s/s associated with aspiration 13/17 opportunities; Throat clears/cough: x4   Goal 4: Pt will demonstrate safe feeding behavior as evidenced by small bolus size / appropriate rate without cues. Rate control is appropriate but bolus size continues to be mildly large intermittently. Pt received inappropriate food on his tray for current diet during afternoon and evening meal yesterday. Pt able to identify food was unsafe and did not consume. Goal 5: Pt will tolerate least restrictive diet without respiratory decline. Chart review does not reveal any documented difficulty on current recommended diet level. Goal 1: The patient will maintain eye contact to speaker positioned left of midline without distractors, min cues. Independent. D/C Goal.    Goal 2: The patient will demonstrate sustained attention to task for 2 minutes with <2 prompts. Pt required continuous prompts to inhibit talking/tangential comments while masticating during AM meal.    Goal 3: The patient will demonstrate improved inhibition as evidenced by <3 prompts for impulsivity during a task. Significant difficulty with inhibiting talking during mastication. Goal 4: The patient will demonstrate improved self monitoring/correcting for basic tasks with <mod cues. Pt is able to identify consistently that he should not be talking but can not inhibit / DC. Pt demo'd some use of hand raising prior to speaking but this was not utilized during eating times but did demo some improved pragmatics. Goal 5: The patient will tolerate ongoing cognitive linguistic assessment. Goal not targeted this session. Goal 6: The patient will demonstrate comprehensibility in multiple communication environments without cues. Goal not targeted this session. Other areas targeted:    Education:   Education ongoing regarding dysphagia and attention.  Educated on recommendation for diet advancement with known risks related to reduced bolus control and reduced inhibiting during mealtime that can be reduced with compensatory strategies / assist of a helper. Pt was left with this texture of solids to complete with spouse who endorsed comfort and comprehension. Safety Devices: [x] Call light within reach  [x] Chair alarm activated and connected to nurse call light system  [] Bed alarm activated   [x] Other:  Spouse present   Assessment: Potentially improved attention but demonstrating increased press of speech (pt with awareness of this). The pt demonstrated improved oral clearance this date at compared to previous date. Cognitive impairment characterized by inattention and executive functioning. Plan: Continue as per plan of care. Repeat MBSS later this date with diet advancement. Interventions used this date:  [] Speech/Language Treatment  [] Instruction in HEP  [x] Dysphagia Treatment [x] Cognitive Treatment   [] Other:    Discharge recommendations:  [] Home independently  [x] Home with assistance []  24 hour supervision  [] ECF [] Other  Continued Tx Upon Discharge: ? [x] Yes    [] No    [] TBD based on progress while on ARU     [] Vital Stim indicated     [] Other:   Estimated discharge date: February 19th, 2023    Electronically signed by:  Yee Kam, [address] [place] [address] Language Pathology      Corrine Hobbs M.A., [place]  Speech-Language Pathologist    The speech-language pathologist was present, directed the patient's care, made skilled judgment and was responsible for assessment and treatment.

## 2023-02-13 NOTE — PROGRESS NOTES
Physical Therapy  Facility/Department: [facility] ACUTE REHAB UNIT  Rehabilitation Physical Therapy Treatment Note    NAME: Alicia Lambert  : 1955 (82 y.o.)  MRN: [id number]  CODE STATUS: Full Code    Date of Service: 23       Restrictions:  Restrictions/Precautions: Fall Risk;Up as Tolerated  Position Activity Restriction  Other position/activity restrictions: up as tolerated, up in chair     SUBJECTIVE  Subjective  Subjective: Pt sitting  up in w/c when PT arrived. Pt  agreeable to PT  Pain: Intermittent c/o pain in L hip and L shld        Post Treatment Pain Screening         OBJECTIVE  PT and OT worked collaboratively to utilize the skills of 2 disciplines while maximizing functional mobility to obtain optimal potential.    Cognition  Following Commands: Follows one step commands with repetition; Follows one step commands with increased time  Attention Span: Attends with cues to redirect; Difficulty attending to directions; Difficulty dividing attention  Safety Judgement: Decreased awareness of need for assistance;Decreased awareness of need for safety  Problem Solving: Assistance required to generate solutions;Assistance required to identify errors made;Assistance required to implement solutions  Insights: Decreased awareness of deficits  Initiation: Requires cues for some  Sequencing: Requires cues for some  Cognition Comment: Pt cont  req  VC/ TC to stay focused on task. Pt benefits from controlled environment with min distractions especially with auditory distractions. Deescalation skills used as a means to control his environment.  (  one person giving commands, decreased volume of voice, simple directions as examples) Pt req VC for sequencing tasks  Orientation  Overall Orientation Status: Within Functional Limits  Orientation Level: Oriented to place;Oriented to situation;Oriented to person    Functional Mobility  Bed Mobility  Additional Factors:  (Bed mobility practiced on the mat table and only it > supine practiced both on mat and in bed)  Bridging  Skilled Clinical Factors: Used for  scooting both laterally and caudal > cephalo. Req A for positioning the LLE to complete bridge  Roll Left  Assistance Level: Contact guard assist  Skilled Clinical Factors: VC for sequencing and controlling the pace of the transition  Roll Right  Assistance Level: Minimal assistance  Skilled Clinical Factors: VC for sequencing and controlling the pace of the transition as well as proper positioning of the LUE  Sit to Supine  Assistance Level: Minimal assistance;Contact guard assist  Skilled Clinical Factors: VC for sequencing and controlling the pace of the transition Min A/ CGA to clear the LLE onto mat  Supine to Sit  Assistance Level: Moderate assistance;Minimal assistance  Skilled Clinical Factors: From R sidelying position w/VC for sequencing and controlling the pace of the transition  Scooting  Assistance Level: Moderate assistance  Skilled Clinical Factors: In sitting , req mod A  with lat WS to advance hips to the EOS  Balance  Sitting Balance: Contact guard assistance (Pt performed controlled trunk flex while sitting on the EOM. Pt req VC for midline. Pt demo delayed \"righting \" reactions with tendency to lean excessively to the L. Therapy insuticted pt with propping on the R elbow to facilitate midline shift to the R.)  Standing Balance  Activity: Mod A x1 with standing unsupported. Performed at the end of session. Pt demo a significant midline shirt to the L with inability to right self back to midline. As fatigued increased, pt demo \"pushing\"  Transfers  Surface: To mat;From mat; Wheelchair  Stand to [employer] Level:  Moderate assistance  Skilled Clinical Factors: VC for hand placement and to use head righting to fcailitate ant/ post WS allowing improved controlled descent  Sit Pivot  Assistance Level: Minimal assistance;Contact guard assist  Skilled Clinical Factors: Removed w/c arm rests and instructed pt with sit pivot both to and from w/c <>mat. Emphasis placed on perf at a very slow pace to control impulsive movements      Environmental Mobility  Wheelchair  Surface: Level surface  Device: Standard wheelchair;Pressure Relieving Cushion (22\" w/c traded to an 18\" w/c to providing closer boundaries for  the L side. Cushion was also adjusted using a towel/rolled sheet to provide a wedge with ant build up. This minimized pt's tendency to ext hips for sliding ant in w/c)  Additional Factors: Verbal cues; Increased time to complete (VCs for L attention and avoiding objects in wilder)  Assistance Required to Manage Parts: All wheelchair parts  Assistance Level for Propulsion: Contact guard assist (Occasional assist for obstacle negotiation in hallway)  Propulsion Method: Right upper extremity;Right lower extremity  Propulsion Quality: Short strokes; Veers left  Propulsion Distance: 175'x2  Skilled Clinical Factors: MAX  VCs for L attention and staying on R side of hallwayas a compensatory technique to avoid environmental barriers. As a means for pt to stay focused, Therapy instructed pt with w/c propulsion and gtting timed. Pt completed 175' in 1 min and 54 secs      Neuromuscular Education  Neuromuscular Comments: Therapy instructed pt with positioning ( GABINO) while performin LE ex ( this included hamstring curls which req A on LLE and contrlled knee ext ( Pt able to complete but demo decreased control) Pt also demo AAROM with L DF . Rosa 10 reps  Weight Bearing  LUE Weight Bearing: Prone  Therapy used the power board in R sidelying with LUE and LLE supported. Pt instructed with PROM> AAROM and then AROM. Pt demo active knee ext and hip ext with min flex noted in L hip flex. An air splint with a roller used for LUE ( Refer to OT note for status. )             ASSESSMENT/PROGRESS TOWARDS GOALS       Assessment  Assessment: Pt presents with poor attention and L side in attention.  Pt more engaged when using deescalation skills and was able to participate with greater ease. Pt demo active movement in the L extrem on this date with use of the powder board. Tone in LLE increased which carried over with sitting balance tasks. Pt appears well motivated to improve. Pt is well below baseline and would benefit from continued therapy to maximize potential and increase functional mobility towards Ind to allow for a safer d/c to home. Activity Tolerance: Patient limited by fatigue;Treatment limited secondary to decreased cognition;Patient limited by endurance; Patient limited by pain  Discharge Recommendations: 24 hour supervision or assist;Continue to assess pending progress;Home with Home health PT  PT Equipment Recommendations  Equipment Needed: Yes  Other: 18' light weight w/c with flip back arm rests and standard leg rests. RUE arm trough also recommended    Goals  Patient Goals   Patient Goals : to go home  Short Term Goals  Time Frame for Short Term Goals: 2 weeks (all ongoing)  Short Term Goal 1: Pt will complete bed mobility with moderate assist  Short Term Goal 2: Pt will complete sit<>stand transfer wtih moderate assist  Short Term Goal 3: Pt will propel self in manual w/c for 150' with minimal assist.- met 2/2  Short Term Goal 4: Pt will ambulate 10' with moderate assist.  Long Term Goals  Time Frame for Long Term Goals : 4 weeks (all ongoing)  Long Term Goal 1: Pt will complete bed mobility with CGA  Long Term Goal 2: Pt will complete sit<>Stand transfers with CGA  Long Term Goal 3: Pt will propel self in manual w/c for 250' with suprvision  Long Term Goal 4: Pt will ambulate 48' with CGA & LRAD    PLAN OF CARE/SAFETY  Physcial Therapy Plan  Days Per Week: 5 Days  Hours Per Day: 1 hour  Current Treatment Recommendations: Strengthening;Balance training;ROM; Functional mobility training;Neuromuscular re-education; Safety education & training;Patient/Caregiver education & training;Transfer training; Endurance training;Home exercise program;Gait training;Equipment evaluation, education, & procurement;Positioning; Therapeutic activities  Safety Devices  Type of Devices: Call light within reach;Nurse notified;Gait belt;Patient at risk for falls; Chair alarm in place; Left in chair  Restraints  Restraints Initially in Place: No    EDUCATION  Education  Education Given To: Patient  Education Provided: Mobility Training;Transfer Training;Energy Conservation; Fall Prevention Strategies  Education Provided Comments: Therapy educated pt on the purpose of increased WB to facilitae increased muscle tone in L extrem. (this included the use of the standing frame. Therapy also recommended that pt sit in the w/c to facilitate more of a midline position with proximal WB .   Education Method: Verbal  Barriers to Learning: Cognition  Education Outcome: Verbalized understanding;Continued education needed        Therapy Time   Individual Concurrent Group Co-treatment   Time In       1245   Time Out       1415   Minutes       [address] [place], [place], 02/13/23 at 5:37 PM

## 2023-02-14 PROCEDURE — 99232 SBSQ HOSP IP/OBS MODERATE 35: CPT | Performed by: PHYSICAL MEDICINE & REHABILITATION

## 2023-02-14 PROCEDURE — 97112 NEUROMUSCULAR REEDUCATION: CPT | Performed by: PHYSICAL THERAPIST

## 2023-02-14 PROCEDURE — 97129 THER IVNTJ 1ST 15 MIN: CPT

## 2023-02-14 PROCEDURE — 97112 NEUROMUSCULAR REEDUCATION: CPT

## 2023-02-14 PROCEDURE — 6360000002 HC RX W HCPCS: Performed by: PHYSICAL MEDICINE & REHABILITATION

## 2023-02-14 PROCEDURE — 97530 THERAPEUTIC ACTIVITIES: CPT

## 2023-02-14 PROCEDURE — 92526 ORAL FUNCTION THERAPY: CPT

## 2023-02-14 PROCEDURE — 1280000000 HC REHAB R&B

## 2023-02-14 PROCEDURE — 97530 THERAPEUTIC ACTIVITIES: CPT | Performed by: PHYSICAL THERAPIST

## 2023-02-14 PROCEDURE — 6370000000 HC RX 637 (ALT 250 FOR IP): Performed by: PHYSICAL MEDICINE & REHABILITATION

## 2023-02-14 PROCEDURE — 97542 WHEELCHAIR MNGMENT TRAINING: CPT | Performed by: PHYSICAL THERAPIST

## 2023-02-14 PROCEDURE — 97535 SELF CARE MNGMENT TRAINING: CPT

## 2023-02-14 PROCEDURE — 97130 THER IVNTJ EA ADDL 15 MIN: CPT

## 2023-02-14 RX ADMIN — PANTOPRAZOLE SODIUM 40 MG: 40 TABLET, DELAYED RELEASE ORAL at 06:31

## 2023-02-14 RX ADMIN — NYSTATIN 500000 UNITS: 100000 SUSPENSION ORAL at 20:38

## 2023-02-14 RX ADMIN — POLYVINYL ALCOHOL 1 DROP: 14 SOLUTION/ DROPS OPHTHALMIC at 21:55

## 2023-02-14 RX ADMIN — TIZANIDINE 4 MG: 4 TABLET ORAL at 20:39

## 2023-02-14 RX ADMIN — FLUOXETINE 10 MG: 10 CAPSULE ORAL at 07:50

## 2023-02-14 RX ADMIN — ENOXAPARIN SODIUM 40 MG: 100 INJECTION SUBCUTANEOUS at 07:50

## 2023-02-14 RX ADMIN — SENNOSIDES AND DOCUSATE SODIUM 2 TABLET: 50; 8.6 TABLET ORAL at 07:50

## 2023-02-14 RX ADMIN — DICLOFENAC SODIUM 4 G: 10 GEL TOPICAL at 20:39

## 2023-02-14 RX ADMIN — NYSTATIN 500000 UNITS: 100000 SUSPENSION ORAL at 08:05

## 2023-02-14 RX ADMIN — DICLOFENAC SODIUM 4 G: 10 GEL TOPICAL at 07:56

## 2023-02-14 RX ADMIN — EZETIMIBE 10 MG: 10 TABLET ORAL at 07:50

## 2023-02-14 RX ADMIN — METHYLPHENIDATE HYDROCHLORIDE 10 MG: 10 TABLET ORAL at 06:31

## 2023-02-14 RX ADMIN — ROSUVASTATIN CALCIUM 40 MG: 20 TABLET, FILM COATED ORAL at 12:33

## 2023-02-14 RX ADMIN — NYSTATIN 500000 UNITS: 100000 SUSPENSION ORAL at 17:51

## 2023-02-14 RX ADMIN — TRAZODONE HYDROCHLORIDE 50 MG: 50 TABLET ORAL at 20:38

## 2023-02-14 RX ADMIN — ASPIRIN 81 MG 81 MG: 81 TABLET ORAL at 07:50

## 2023-02-14 RX ADMIN — Medication 5 MG: at 20:39

## 2023-02-14 RX ADMIN — SENNOSIDES AND DOCUSATE SODIUM 2 TABLET: 50; 8.6 TABLET ORAL at 20:38

## 2023-02-14 RX ADMIN — NYSTATIN 500000 UNITS: 100000 SUSPENSION ORAL at 12:33

## 2023-02-14 ASSESSMENT — PAIN SCALES - GENERAL
PAINLEVEL_OUTOF10: 0
PAINLEVEL_OUTOF10: 0

## 2023-02-14 NOTE — PLAN OF CARE
Problem: Discharge Planning  Goal: Discharge to home or other facility with appropriate resources  Outcome: Progressing     Problem: Skin/Tissue Integrity  Goal: Absence of new skin breakdown  Description: 1. Monitor for areas of redness and/or skin breakdown  2. Assess vascular access sites hourly  3. Every 4-6 hours minimum:  Change oxygen saturation probe site  4. Every 4-6 hours:  If on nasal continuous positive airway pressure, respiratory therapy assess nares and determine need for appliance change or resting period.   Outcome: Progressing     Problem: ABCDS Injury Assessment  Goal: Absence of physical injury  Outcome: Progressing     Problem: Safety - Adult  Goal: Free from fall injury  Outcome: Progressing     Problem: Pain  Goal: Verbalizes/displays adequate comfort level or baseline comfort level  Outcome: Progressing     Problem: Chronic Conditions and Co-morbidities  Goal: Patient's chronic conditions and co-morbidity symptoms are monitored and maintained or improved  Outcome: Progressing

## 2023-02-14 NOTE — PROGRESS NOTES
Occupational Therapy  Facility/Department: [facility] ACUTE REHAB UNIT  Rehabilitation Occupational Therapy Daily Treatment Note    Date: 23  Patient Name: Anthony Coffey       Room: 9082/7780-22  MRN: [id number]  Account: [de-identified]   : 1955  (68 y.o.) Gender: male                    Past Medical History:  has a past medical history of Anxiety, Chest pain, Depression, Encounter for imaging to screen for metal prior to MRI, Hyperlipidemia, Hypertension, and Wears glasses. Past Surgical History:   has a past surgical history that includes [place] tooth extraction; Colonoscopy (2009); Cardiac catheterization (2008); Finger trigger release (3-); and Coronary angioplasty with stent. Restrictions  Restrictions/Precautions: Fall Risk;Up as Tolerated    Subjective  Subjective: Pt seated in w/c w/ brother present. Co-tx indicated in order to maximize therapeutic potential.  Restrictions/Precautions: Fall Risk;Up as Tolerated             Objective     Cognition  Arousal/Alertness: Appropriate responses to stimuli  Following Commands: Follows one step commands with repetition; Follows one step commands with increased time  Attention Span: Attends with cues to redirect; Difficulty attending to directions; Difficulty dividing attention  Safety Judgement: Decreased awareness of need for assistance;Decreased awareness of need for safety  Problem Solving: Assistance required to generate solutions;Assistance required to identify errors made;Assistance required to implement solutions  Insights: Decreased awareness of deficits  Initiation: Requires cues for some  Sequencing: Requires cues for some  Orientation  Overall Orientation Status: Within Functional Limits             Functional Mobility  Device: Platform walker; Wheelchair  Activity: To/From therapy gym  Assistance Level: Contact guard assist;Minimal assistance; Requires x 2 assistance  Skilled Clinical Factors: Pt self propelled w/ RUE and RLE from room -> gym w/ CGA and min A, verbal cues for environmental awareness and obstacle avoidance. Standing from edge of mat to [facility] walker, pt took step foward and backwards x4 before taking reciprocal steps forwad and backwards x4 each. Pt required assist x2 to complete. Pt fatigued at end. Transfers  Surface: To mat;From mat; To chair with arms; Wheelchair  Additional Factors: Verbal cues; Hand placement cues  Sit to Stand  Assistance Level: Minimal assistance  Skilled Clinical Factors: Pt completed STS from mat table and chair w/ arms w/ min A and verbal cues for hand placement and pacing. Stand to Sit  Assistance Level: Contact guard assist  Skilled Clinical Factors: Pt required CGA for controlled descent onto surface. Bed To/From Chair  Technique: Sit pivot  Assistance Level: Minimal assistance;Contact guard assist  Sit Pivot  Assistance Level: Minimal assistance;Contact guard assist  Skilled Clinical Factors: Pt completed sit pivot transfer w/ CGA-min A to both R and L sides. Neuromuscular Education  Trunk Control: Pt sitting edge of mat, required to reach anteriorly to floor level to retrieve cones w/ RUE and place on L side while maintaining core stability and resuming midline positioning. Pt required CGA-mod A in order to maintain balance. Weight Bearing  Weight Bearing Technique: Yes  RUE Weight Bearing: Extended arm seated  LUE Weight Bearing: Extended arm seated  Response To Weight Bearing Technique: Attempted to WB thru RUE during trunk control activity but pt w/ increased pain and tone in RUE and unable to tolerate. Additional Activities  Pt in standing w/ support x1, required to reach toward R side to retrieve cones on table and give to therapist in a variety of planes on R side. Pt then took seated rest break before completing activity again, but stacking cones posteriorly in order to facilitate protraction/retraction.     Assessment  Assessment  Assessment: Pt tolerated session well, progressing to use of dandy walker for ambulation this date. Pt demo's improved midline awareness in sitting and standing. Increased tone and pain observed in RUE, limiting WB abilities. Pt continues to benefit from skilled OT services to maximize independence w/ ADLs and fxl transfers. Cont OT POC. Activity Tolerance: Patient tolerated treatment well  Discharge Recommendations: 24 hour supervision or assist;Continue to assess pending progress;Home with Home health OT  Safety Devices  Safety Devices in place: Yes  Type of devices: Left in bed;Call light within reach; Bed alarm in place      Second session:  Pt seated in w/c w/ brother and sister present. Pt denied pain, agreeable to tx. Pt agreeable to toilet transfer. Therapist retrieved BSC to place over toilet and adjusted appropriately. Pt completed several STS from w/c to grab bar, pt using RUE on vertical grab bar to pull self up requiring mod A and Vcs for upright posture and midline orientation. Pt then completed stand pivot t/f w/c --> BSC placed over toilet w/ mod-max Ax2to facilitate pivot to L. In stance, 1 helper steadied pt while another helper pulled pants/brief down. Pt incontinent of urine in brief. Assist to thread BLE thru new brief and rip off soiled brief. Pt completed STS from bedside commode via pushing from [facility] handle w/ min A (much improved than using grab bar across body). Pt left seated in w/c w/ alarm on and call light in reach. SLP present. Pt tolerated session well, requiring increased assistance than anticipated for toilet transfer, possibly d/t BLE fatigue from earlier session. Pt would benefit from continued skilled therapy services in order to maximize fxl independence w/ ADLs and transfers. Cont OT POC. Patient Education  Education  Education Given To: Patient; Family  Education Provided: Role of Therapy;Plan of Care;Family Education;Mobility Training;Transfer Training; Safety; Fall Prevention Strategies  Education Method: Demonstration;Verbal  Barriers to Learning: Cognition  Education Outcome: Verbalized understanding;Demonstrated understanding    Plan  Occupational Therapy Plan  Times Per Week: 5 days per week, 90 mins  Current Treatment Recommendations: Strengthening;ROM;Balance training;Functional mobility training; Endurance training;Self-Care / ADL; Patient/Caregiver education & training;Neuromuscular re-education;Cognitive/Perceptual training; Safety education & training    Goals  Patient Goals   Patient goals : go home with my wife  Short Term Goals  Time Frame for Short Term Goals: By 2 weeks - all goals ongoing  Short Term Goal 1: Pt will complete LB dressing with mod A  Short Term Goal 2: Pt will complete toileting with mod A  Short Term Goal 3: Pt will complete toilet and functional transfers with mod A  Short Term Goal 4: Pt will complete UB dressing with min A  Short Term Goal 5: Pt will complete UE HEP x 10 reps for improved R UE coordination for ADLs  Long Term Goals  Time Frame for Long Term Goals : By 4 weeks  Long Term Goal 1: Pt will complete LB dressing with CGA  Long Term Goal 2: Pt will complete toileting with CGA  Long Term Goal 3: Pt will complete toilet and functional transfers with CGA. Long Term Goal 4: Pt will complete UB dressing with SPV.   Long Term Goal 5: Pt will complete simple IADL with LRAD at mod I          Therapy Time   Individual Concurrent Group Co-treatment   Time In       1100   Time Out       1200   Minutes       60      Therapy Time   Individual Concurrent Group Co-treatment   Time In  1245        Time Out  1328        Minutes  43          Timed Code Treatment Minutes: 60 + 43 = 103 min     Total Treatment Minutes: 103 min        Ninoska Agudelo OT

## 2023-02-14 NOTE — PROGRESS NOTES
Speech Language Pathology  ACUTE REHAB UNIT  SPEECH/LANGUAGE PATHOLOGY      [x] Daily  [] Weekly Care Conference Note  [] Discharge    Patient:Jason Arreola  ZBN:[id number]  Rehab Dx/Hx: Acute CVA (cerebrovascular accident) ([facility] [address] [I63.9]    Precautions: [x] Aspiration  [x] Fall risk  [] Sternal  [] Seizure [] Hip  [] Weight Bearing [] Other  ST Dx: [] Aphasia  [x] Dysarthria  [] Apraxia   [x] Oropharyngeal dysphagia [x] Cognitive Impairment  [] Other:   Date of Admit: 1/29/2023  Room #: 3102/3102-01  Date: 2/14/2023          Current Diet Order:ADULT ORAL NUTRITION SUPPLEMENT; Breakfast, Dinner; Standard High Calorie/High Protein Oral Supplement  ADULT DIET; Dysphagia - Soft and Bite Sized   Recommended Form of Meds: Meds in puree  Compensatory Swallowing Strategies : Neutral head positioning, placement of solid PO on right, one bite at a time, check for pocketing and oral residue. Benefits from use of mirror. Previous MBS: 1/26/23  Oral Phase  Mild-moderate dysfunction characterized by incomplete labial seal with anterior spillage, slowed/reduced mastication, mild stasis. Pharyngeal Phase  Moderate dysfunction characterized by impairments in timing, strength and coordination with premature bolus loss, reduced base of tongue retraction, delayed/reduced hyolaryngeal mechanics, and reduced pharyngeal constriction. Resulted in vallecular/pyriform/pharyngeal residue, as well as compromised airway protection with penetration and gross tracheal aspiration of thin and mildly thick liquids; strong reactive cough present but did not fully clear. Chin tuck strategy did not eliminate aspiration, however cued head turn LEFT with small straw sips was effective . Double swallow helped clear residual. Of note, throughout study pt with tendency to tilt head posteriorly which further exacerbated bolus control; benefited from cues for neutral positioning.   Upper Esophageal Screen  Indirectly assessed; appeared unremarkable. Repeat MBS: 2/13/23  Pt demonstrates a mild/moderate oropharyngeal dysphagia. Oral phase with reduced bolus cohesion resulting in head of bolus in pyriform sinus with tail of bolus in oral cavity with liquids without cues. Pt demo'd ability to complete oral hold of thin via tsp with cues but loss to floor of mouth. Pt with persistent delayed swallow initiation (inconsistent) to pyriform sinus with liquids, pudding spilling over top of epiglottis (did not fill vallecular space). Pt continues to demonstrate mildly reduced hyolaryngeal excursion initially with absent epiglottic distention progressing to full distention and there is mildly reduced tongue base retraction. Pt demonstrated mild residue in valleculae and pyriform sinus with x1 instance of residue from vallecula to posterior pharyngeal wall. Residue does increased with viscosity. Use of cued liquid rinse and cued effortful swallows reduced pharyngeal residue mildly. There was no aspiration or penetration during study. Attempted mendelsohn maneuver following study with model, tactile cues and verbal cues; pt not stimulable this date.      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[facility] [employer]: projects around house, music, [employer]    Dentition: Adequate  Vision  Vision:  (not wearing glasses due to nose injury)  Vision Exceptions: Wears glasses at all times  Hearing  Hearing: Within functional limits  Barriers toward progress: Severity of impairments      Date: 2/14/2023      Tx session 1 Tx session 2   Total Timed Code Min 25 30   Total Treatment Minutes 45 45   Individual Treatment Minutes 45 45   Group Treatment Minutes 0 0   Co-Treat Minutes 0 0   Brief Exception: N/A N/A   Pain None reported None indicated    Pain Intervention: [] RN notified  [] Repositioned  [] Intervention offered and patient declined  [x] N/A  [] Other: [] RN notified  [] Repositioned  [] Intervention offered and patient declined  [x] N/A  [] Other:   Subjective:     Pt seated upright in chair agreeable to session at this time. Pts brother present during session. Pt seated upright in wheelchair finishing with OT upon start of session. Pts family present during session. Objective / Goals:     New goal s/p repeat MBS: Pt will demonstrate self feeding without labial residue across mealtime assessment without cues. X2 occurrences of labial residue during AM meal. Pt utilized mirror to insert PO into right side of oral cavity and monitor any labial residue. Pt endorses that there is \"leakage by my lip\" after consuming beverage, however SLP did not observe any anterior loss across all trials. Pt demonstrated self feeding without any labial residue by independently utilizing strategy to place PO into right side of oral cavity. New goal s/p repeat MBS: Pt will tolerate trials of advanced textures with adequate oral clearance as evidenced by <min oral residue and no reports of globus sensation across two mealtime assessments No advanced textures trialed during this session. Pt presented with min oral residue during AM meal on lingual surface and some PO pocketing. Pt independently utilized the following strategies:    Lingual sweep: x6    Finger sweep: x5    Liquid wash: x5    Effortful swallows: x52    Pt endorses that  \"everything went down ok\". No trials of advanced textures this session. CTAR: x3 sets of x15    Effortful swallows: x17    Lingual sweep: x5    Finger sweep: x4   Goal 4: Pt will demonstrate safe feeding behavior as evidenced by small bolus size / appropriate rate without cues. Min cue x2 to reduce bolus size on spoon. Across remainder of meal appropriate bite sizes noted. X2 occurrences of taking multiple subsequent bites prior to swallowing initial bite. Required cues to swallow prior to next. Goal 5: Pt will tolerate least restrictive diet without respiratory decline. No overt s/s penetration/aspiration this date across all PO. WBC and Neutrophils Absolute WNL. No overt s/s penetration/aspiration this date across all PO. Goal 2: The patient will demonstrate sustained attention to task for 2 minutes with <2 prompts. Continued press of speech and tangential comments throughout meal. >2 redirections required. Required multiple repetitions of task instructions from one to the next. Reduced attention and working memory noted. Barrier task and divided attention task alternating letters and digits: Pt sustained attention to task for >2 minutes. Goal 3: The patient will demonstrate improved inhibition as evidenced by <3 prompts for impulsivity during a task. No impulsivity noted during this session. Quick-pace to complete activity listed below. Required min-mod cues to take time and slow processing for increased accuracy. Goal 4: The patient will demonstrate improved self monitoring/correcting for basic tasks with <mod cues. Pt aware of excessive talking in x4 occurrences, however was only able to redirect self x1 (required x3 cues from SLP and pt's family). Improved self monitoring this session, however for both barrier task and alternating letters and digits task: Pt required prompts to correct and simplify descriptions. Goal 5: The patient will tolerate ongoing cognitive linguistic assessment. Not targeted this session. Targeted via sustained/divided attention, and L side attention task with alternating numbers/letters. Completed with 20% accuracy, increased to 100% given mod-max cues. Goal 6: The patient will demonstrate comprehensibility in multiple communication environments without cues. Not targeted this session. Not directly targeted this session. Other areas targeted:     Education:   Educated re:  Importance of continued oral hygiene, swallowing strategies and continued self monitoring of sustained attention to task while eating. Educated re: Maintaining head in neutral position while swallowing and utilizing swallow strategies. SLP discussed attention remaining as barrier to performing tasks with accuracy and recommended pausing and slowing down. Safety Devices: [x] Call light within reach  [x] Chair alarm activated and connected to nurse call light system  [] Bed alarm activated   [x] Other: brother present [x] Call light within reach  [x] Chair alarm activated and connected to nurse call light system  [] Bed alarm activated   [x] Other: Family present   Assessment: Pt continues to demonstrate improvement and MI with utilizing swallow strategies. Reduced attention continues to present as a barrier to safe swallowing behavior. Pt sustained attention to cognitive therapeutic tasks this date, however demonstrated impulsivity during session with cues for self monitoring required. Plan: Continue as per plan of care. Interventions used this date:  [] Speech/Language Treatment  [] Instruction in HEP  [x] Dysphagia Treatment [x] Cognitive Treatment   [] Other:    Discharge recommendations:  [] Home independently  [x] Home with assistance []  24 hour supervision  [] ECF [] Other  Continued Tx Upon Discharge: ? [x] Yes    [] No    [] TBD based on progress while on ARU     [] Vital Stim indicated     [] Other:   Estimated discharge date: February 24th, 2023    Electronically signed by:  Hill Meredith, [address] [place] [address] Language Pathology      Ewa Goodrich M.A., [address] [place] [address]  Speech-Language Pathologist    The speech-language pathologist was present, directed the patient's care, made skilled judgment and was responsible for assessment and treatment.

## 2023-02-14 NOTE — PROGRESS NOTES
Pt awake in his recliner. Physical assessment and vital signs as charted. Pt currently denies experiencing any pain at this time. Call light placed within reach. RN will continue to monitor Pt.

## 2023-02-14 NOTE — PROGRESS NOTES
Physical Therapy  Facility/Department: [facility] ACUTE REHAB UNIT  Rehabilitation Physical Therapy Treatment Note    NAME: Anabel Hooks  : 1955 (37 y.o.)  MRN: [id number]  CODE STATUS: Full Code    Date of Service: 23       Restrictions:  Restrictions/Precautions: Fall Risk;Up as Tolerated  Position Activity Restriction  Other position/activity restrictions: up as tolerated, up in chair     SUBJECTIVE  Subjective  Subjective: Pt sitting  up in w/c when PT arrived. Pt  agreeable to PT  Pain: Intermittent c/o pain in L hip,L shld and LUE when  WB in sitting        Post Treatment Pain Screening         OBJECTIVE  Cognition  Arousal/Alertness: Appropriate responses to stimuli  Following Commands: Follows one step commands with repetition; Follows one step commands with increased time  Attention Span: Attends with cues to redirect; Difficulty attending to directions; Difficulty dividing attention  Safety Judgement: Decreased awareness of need for assistance;Decreased awareness of need for safety  Problem Solving: Assistance required to generate solutions;Assistance required to identify errors made;Assistance required to implement solutions  Insights: Decreased awareness of deficits  Initiation: Requires cues for some  Sequencing: Requires cues for some  Cognition Comment: Pt cont  req  VC/ TC to stay focused on task. Pt benefits from controlled environment with min distractions especially with auditory distractions. Deescalation skills used as a means to control his environment.  (  one person giving commands, decreased volume of voice, simple directions as examples) Pt req VC for sequencing tasks  Orientation  Overall Orientation Status: Within Functional Limits  Orientation Level: Oriented to place;Oriented to situation;Oriented to person    Functional Mobility  Bed Mobility  Additional Factors:  (Bed mobility practiced in bed)  Bridging  Skilled Clinical Factors: Req A for stabilizing  the LLE to complete bridge when performing LB clothing management  Roll Left  Skilled Clinical Factors: VC for sequencing and controlling the pace of the transition. Therapy instructed with positioning BUES in which RUE supported L wrist while positioning R thumb in the L hand to maintain the arch of the hand. Roll Right  Assistance Level: Minimal assistance  Skilled Clinical Factors: VC for sequencing and controlling the pace of the transition as well as proper positioning of the LUE  Sit to Supine  Assistance Level: Minimal assistance;Contact guard assist  Skilled Clinical Factors: VC for sequencing and controlling the pace of the transition Min A/ CGA to clear the LLE onto bed  Supine to Sit  Assistance Level: Minimal assistance  Skilled Clinical Factors: From L  sidelying position w/VC for sequencing and controlling the pace of the transition, facilitating trunk ascension req min A  Scooting  Assistance Level: Minimal assistance  Skilled Clinical Factors: In sitting , req min A  with lat WS to advance L  hip to the EOS  Note, bed mobility performed for pt to change briefs and pants 2/2 to bladder incontinence. Pt aware he had to urinate but 2/2 to urgency, was unable to make it completely. Pt assisted with LB clothing management addressing the R side but max A req for the L side. Pt was able to attend to pericare with  set up. Briefs/pants removed with pt changing to clean briefs and pants. Socks initiated by placing over toes, pt able to complete. Shoes donned while sitting up Req additional time with significant VC to stay focused on task. Balance  Sitting Balance: Contact guard assistance (Pt performed controlled trunk flex while sitting on the EOM. Pt req VC for midline. Pt demo delayed \"righting \" reactions with tendency to lean excessively to the L.  Therapy instructed  pt with propping on the R elbow to facilitate midline shift to R.)  Standing Balance  Activity: Varied from min A > Mod A x1 with standing unsupported with therapist facilitating midline. Pt req VC/ TC for pacing to control movements  Transfers  Surface: Wheelchair; To bed;From bed  Additional Factors: Hand placement cues; Verbal cues; Increased time to complete  Bed To/From Chair  Assistance Level: Contact guard assist;Minimal assistance  Skilled Clinical Factors: w/c<>bed with min A/ CGA for a sit pivot transf, req VC / TC for pacing the transition. Performed 2x leading with both sides  Stand Pivot  Skilled Clinical Factors: Step by step instructions with reminders of the end result req. ( hand placment, foot placement , head righing with the end result of transf from point A to point B)  Sit Pivot  Assistance Level: Minimal assistance;Contact guard assist  Skilled Clinical Factors: Removed w/c arm rests and instructed pt with sit pivot both to and from w/c <>mat. Emphasis placed on perf at a very slow pace to control impulsive movements      Environmental Mobility  Wheelchair  Surface: Level surface  Device: Standard wheelchair;Pressure Relieving Cushion (22\" w/c traded to an 18\" w/c to providing closer boundaries for  the L side. Cushion was also adjusted using a towel/rolled sheet to provide a wedge with ant build up. This minimized pt's tendency to ext hips for sliding ant in w/c)  Additional Factors: Verbal cues; Increased time to complete (VCs for L attention and avoiding objects in wilder)  Assistance Required to Manage Parts: All wheelchair parts;Brakes (Brake extension placed on the L with white table to assist pt with visually being able to locate it . Pt req Intermittent VC/ TC to locate the R brake)  Assistance Level for Propulsion: Contact guard assist (Occasional assist for obstacle negotiation in hallway)  Propulsion Method: Right upper extremity;Right lower extremity  Propulsion Quality: Short strokes; Veers left  Propulsion Distance: 175'x2  Skilled Clinical Factors: MAX  VCs for L attention and staying on R side of hallwayas a compensatory technique to avoid environmental barriers. As a means for pt to stay focused, Therapy instructed pt with w/c propulsion and gtting timed. Pt completed 175' in 1 min and 37secs Noted improvement from yesterday. Second session: Pt sitting in w/c when therapy arrived. Pt agreeable to therapy. PT and OT worked collaboratively to utilize the skills of 2 disciplines while maximizing functional mobility to obtain optimal potential.    Wheelchair  Surface: Level surface  Device: Standard wheelchair;Pressure Relieving Cushion Additional Factors: Verbal cues; Increased time to complete (VCs for L attention and avoiding objects in wilder)  Assistance Required to Manage Parts: All wheelchair parts;Brakes (Brake extension placed on the L with white table to assist pt with visually being able to locate it . Pt req Intermittent VC/ TC to locate the R brake)  Assistance Level for Propulsion: Contact guard assist (Occasional assist for obstacle negotiation in hallway)  Propulsion Method: Right upper extremity;Right lower extremity  Propulsion Quality: Short strokes; Veers left  Propulsion Distance: 175'x2  Transfers  Surface: Wheelchair; To bed;From bed  Additional Factors: Hand placement cues; Verbal cues; Increased time to complete  Sit to Stand  Assistance Level: Minimal assistance; Moderate assistance  Skilled Clinical Factors: Min/ Mod  A for sit to stand with CGA added once pt is in standing to facilitate midline position  Stand to Sit  Assistance Level: Moderate assistance;Minimal assistance  Skilled Clinical Factors: VC for hand placement and to use head righting to fcailitate ant/ post WS allowing improved controlled descent  Sitting balance: Pt sat on elevated EOM and retrieved objects from the floor increasing WB through BLES while obtaining and maintaining midline. Req S up to  moderate A  to keep midline with transitions.    Standing Balance  Activity: Varied from min A > Mod A x1 with standing unsupported with therapist facilitating midline. Pt req VC/ TC for pacing to control movements. To increase midline positioning, pt instructed to reach to the R side of midline for objects positioned to facilitate ant ant and R WS. Pt retrieved objects and then instructed to place in therapists hand which facilitated LLE act/ assist quad . Pt ol well . Pt progressed to standing with TAE RW. Stepped forwards and backwards ~4 steps each . L shoe with sleeve placed on it for frictionless surface to promote active advancement of LLE. Pt patricia 4 steps with  TAE walker with reciprocating steps with assist x 2    Pt returned to room with call light and phone placed within reach. Chair alarm reactivated. ASSESSMENT/PROGRESS TOWARDS GOALS       Assessment  Assessment: Pt presents with decreased  attention and L side in attention. Pt more engaged when using deescalation skills and was able to participate with greater ease. Pt demo active movement in both the LUE and the LE Tone in LLE increased which carried over with sitting balance and standing balance  tasks. Pt appears very motivated to improve. Pt is well below baseline and would benefit from continued therapy to maximize potential and increase functional mobility towards Ind to allow for a safer d/c to home. Activity Tolerance: Patient limited by fatigue;Treatment limited secondary to decreased cognition;Patient limited by endurance; Patient limited by pain  Discharge Recommendations: 24 hour supervision or assist;Continue to assess pending progress;Home with Home health PT  PT Equipment Recommendations  Equipment Needed: Yes  Other: 18' light weight w/c with flip back arm rests and standard leg rests.  RUE arm trough also recommended    Goals  Patient Goals   Patient Goals : to go home  Short Term Goals  Time Frame for Short Term Goals: 2 weeks (all ongoing)  Short Term Goal 1: Pt will complete bed mobility with moderate assist  Short Term Goal 2: Pt will complete sit<>stand transfer wtih moderate assist  Short Term Goal 3: Pt will propel self in manual w/c for 150' with minimal assist.- met 2/2  Short Term Goal 4: Pt will ambulate 10' with moderate assist.  Long Term Goals  Time Frame for Long Term Goals : 4 weeks (all ongoing)  Long Term Goal 1: Pt will complete bed mobility with CGA  Long Term Goal 2: Pt will complete sit<>Stand transfers with CGA  Long Term Goal 3: Pt will propel self in manual w/c for 250' with suprvision  Long Term Goal 4: Pt will ambulate 48' with CGA & LRAD    PLAN OF CARE/SAFETY  Physcial Therapy Plan  Days Per Week: 5 Days  Hours Per Day: 1 hour  Current Treatment Recommendations: Strengthening;Balance training;ROM; Functional mobility training;Neuromuscular re-education; Safety education & training;Patient/Caregiver education & training;Transfer training; Endurance training;Home exercise program;Gait training;Equipment evaluation, education, & procurement;Positioning; Therapeutic activities  Safety Devices  Type of Devices: Call light within reach;Nurse notified;Gait belt;Patient at risk for falls; Chair alarm in place; Left in chair  Restraints  Restraints Initially in Place: No    EDUCATION  Education  Education Given To: Patient  Education Provided: Mobility Training;Transfer Training;Energy Conservation; Fall Prevention Strategies  Education Provided Comments: Therapy educated pt on the purpose of increased WB to facilitae increased muscle tone in L extrem. (this included the use of the standing frame. Therapy also recommended that pt sit in the w/c to facilitate more of a midline position with proximal WB .   Education Method: Verbal  Barriers to Learning: Cognition  Education Outcome: Verbalized understanding;Continued education needed        Therapy Time   Individual Concurrent Group Co-treatment   Time In 0930         Time Out 56         Minutes 60            Second Session Therapy Time:   Individual Concurrent Group Co-treatment   Time In       1100   Time Out       1200 Minutes       60     Timed Code Treatment Minutes:  02+88    Total Treatment Minutes:   [facility] [place], 02/14/23 at 5:43 PM

## 2023-02-14 NOTE — PROGRESS NOTES
Comprehensive Nutrition Assessment    RECOMMENDATIONS:  PO Diet: Dysphagia-Soft and Bite Sized  ONS: Ensure Plus HP bid  Nutrition Education: No recommendation at this time       NUTRITION ASSESSMENT:   Nutritional summary & status: Follow up: Pt's diet advanced to Dysphagia-Soft and Bited Sized Diet on 2/13 following MBS. Meal intake 1-25% and 26-50%. Po intake may improve with diet advancement. Continues to receive Ensure plus bid for additional calories and protein with varied intake of %. Difficult to assess weight trends due to wide fluctuations. Will continue to use previous wt for assessment of nutrition needs. Continue with current nutrition interventions. Admission/PMH: Admit; Acute CVA  PMHx: CAD, HTN, HLD    MALNUTRITION ASSESSMENT  Context of Malnutrition: Acute Illness   Malnutrition Status: At risk for malnutrition (Comment)  Findings of the 6 clinical characteristics of malnutrition (Minimum of 2 out of 6 clinical characteristics is required to make the diagnosis of moderate or severe Protein Calorie Malnutrition based on AND/ASPEN Guidelines):  Energy Intake:  Mild decrease in energy intake (Comment)  Weight Loss:  No significant weight loss     Body Fat Loss:  No significant body fat loss     Muscle Mass Loss:  No significant muscle mass loss    Fluid Accumulation:  No significant fluid accumulation     Strength:  Not Performed    NUTRITION DIAGNOSIS   Inadequate oral intake, Increased nutrient needs related to increase demand for energy/nutrients as evidenced by intake 26-50%, intake 51-75%, other (comment) (extended hospital stay(rehab))    Nutrition Monitoring and Evaluation:   Food/Nutrient Intake Outcomes:  Food and Nutrient Intake, Supplement Intake  Physical Signs/Symptoms Outcomes:  Biochemical Data, Nutrition Focused Physical Findings, Weight     OBJECTIVE DATA: Significant to nutrition assessment  Nutrition Related Findings: Labs reviewed. glu 107. LBM 2/13. LLE trace edema. LUE non pitting edema. Wounds: None  Nutrition Goals: PO intake 75% or greater, prior to discharge     [place]; Breakfast, Dinner; Standard High Calorie/High Protein Oral Supplement  ADULT DIET; Dysphagia - Soft and Bite Sized  PO Intake: 26-50%, 1-25%   PO Supplement Intake:%, 51-75%, 26-50%  Additional Sources of Calories/IVF:n/a     ANTHROPOMETRICS  Current Height: 6' 1\" (185.4 cm)  Current Weight: 169 lb 12.1 oz (77 kg)    Admission weight: 181 lb 10.5 oz (82.4 kg)  Ideal Body Weight (IBW): 184 lbs  (84 kg)    Usual Bodyweight     BMI: 22.4    COMPARATIVE STANDARDS  Energy (kcal):  4962-6477 (25-30 kcal/CBW/82 kg)     Protein (g):   (1.2-1.5 gm/CBW/82 kg)       Fluid (mL/day):  2954-2558 (1 ml/kcal) or per provider    The patient will be monitored per nutrition standards of care. Consult dietitian if additional nutrition interventions are needed prior to RD reassessment.      Rika Ramos, [address]:  [phone]  Office:  [phone]

## 2023-02-14 NOTE — CARE COORDINATION
CM met with pt at bedside. Pt is aware dc date has been pushed back to 2/24. Pt is also aware home eval will not be on 2/15 but the following week. Pt's wife was not at bedside, but has been made aware of the change.

## 2023-02-14 NOTE — PROGRESS NOTES
Department of Physical Medicine & Rehabilitation  Progress Note    Patient Identification:  Kalyan Ruiz  [id number]  : 1955  Admit date: 2023    Chief Complaint: Acute CVA (cerebrovascular accident) [facility])    Subjective:   Doing better today. No new complaints overnight. He is working hard in therapy and slept much better last night. Less spasticity today. Leg extension is returning. ROS: No f/c, n/v, cp     Objective:  Patient Vitals for the past 24 hrs:   BP Temp Temp src Pulse Resp SpO2   23 0744 107/65 98.1 °F (36.7 °C) Oral 64 16 98 %   23 2335 -- -- -- -- 17 --   23 2305 -- -- -- -- 17 --   23 2230 (!) 117/54 97.8 °F (36.6 °C) Oral 63 17 97 %       Const: Alert. No distress, pleasant. HEENT: Normocephalic, atraumatic. Normal sclera/conjunctiva. MMM. CV: Regular rate and rhythm. Resp: No respiratory distress. Lungs CTAB. Abd: Soft, nontender, nondistended, NABS+   Ext: No edema. Neuro: Alert, oriented, appropriately interactive. Psych: Cooperative, appropriate mood and affect    Laboratory data: Available via EMR. Last 24 hour lab  No results found for this or any previous visit (from the past 24 hour(s)). Therapy progress:  PT  Position Activity Restriction  Other position/activity restrictions: up as tolerated, up in chair  Objective     Sit to Stand: Maximum Assistance, 2 Person Assistance  Stand to Sit: 2 Person Assistance, Maximum Assistance     OT  PT Equipment Recommendations  Equipment Needed: Yes  Mobility Devices: Wheelchair  Other: 18' light weight w/c with flip back arm rests and standard leg rests. RUE arm trough also recommended  Toilet - Technique:  (via rolling shower chair)  Equipment Used:  (rolling shower chair)  Toilet Transfers Comments: pt did not have urge to void, however rolled pt in bathroom via rolling shower chair. Max A x 2 to pivot from bed to rolling shower chair.   Assessment        SLP          Body mass index is 22.4 kg/m². 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 Patient is on aspirin at home        HTN  Patient is on lisinopril 5mg daily at home  - monitor      HLD:  Patient is on Crestor 20mg and ezetimibe 10mg at home daily  -Continue home meds     Anxiety  Per chart, patient is on Valium 2mg at home. Possible could be contributor to his chest pain yesterday, notes he felt anxious. Denies taking Valium at home, however it is listed as a home med. Ativan 0.5mg once overnight   - prozac      Sleep disturbance  - Trazodone PRN  - benadryl PRN  -improving     Add low dose tizanidine for spasticity   - improving left leg tightness   - increase to 4mg q6h    Start Ritalin for inattention.  - increase dose to 10mg   - monitor effects         Rehab Progress: Improving  Anticipated Dispo: home  Services/DME: [facility]  ELOS: 2/19          Tony Linder D.O. M.P.H  PM&R  2/14/2023  10:23 AM

## 2023-02-14 NOTE — PROGRESS NOTES
Pt in bed, alert and oriented. VSS. 6/10 pain reported, pain meds given. All safety measures are in place. Call light within reach. Will continue to monitor.     Vitals:    02/13/23 2230   BP: (!) 117/54   Pulse: 63   Resp: 17   Temp: 97.8 °F (36.6 °C)   SpO2: 97%

## 2023-02-15 LAB
ANION GAP SERPL CALCULATED.3IONS-SCNC: 8 MMOL/L (ref 3–16)
BASOPHILS ABSOLUTE: 0 K/UL (ref 0–0.2)
BASOPHILS RELATIVE PERCENT: 0.5 %
BUN BLDV-MCNC: 17 MG/DL (ref 7–20)
CALCIUM SERPL-MCNC: 9.4 MG/DL (ref 8.3–10.6)
CHLORIDE BLD-SCNC: 101 MMOL/L (ref 99–110)
CO2: 29 MMOL/L (ref 21–32)
CREAT SERPL-MCNC: 0.7 MG/DL (ref 0.8–1.3)
EOSINOPHILS ABSOLUTE: 0.1 K/UL (ref 0–0.6)
EOSINOPHILS RELATIVE PERCENT: 1.6 %
GFR SERPL CREATININE-BSD FRML MDRD: >60 ML/MIN/{1.73_M2}
GLUCOSE BLD-MCNC: 107 MG/DL (ref 70–99)
HCT VFR BLD CALC: 38.5 % (ref 40.5–52.5)
HEMOGLOBIN: 12.9 G/DL (ref 13.5–17.5)
LYMPHOCYTES ABSOLUTE: 1.9 K/UL (ref 1–5.1)
LYMPHOCYTES RELATIVE PERCENT: 30.2 %
MCH RBC QN AUTO: 31.8 PG (ref 26–34)
MCHC RBC AUTO-ENTMCNC: 33.4 G/DL (ref 31–36)
MCV RBC AUTO: 95.2 FL (ref 80–100)
MONOCYTES ABSOLUTE: 0.4 K/UL (ref 0–1.3)
MONOCYTES RELATIVE PERCENT: 6.6 %
NEUTROPHILS ABSOLUTE: 3.9 K/UL (ref 1.7–7.7)
NEUTROPHILS RELATIVE PERCENT: 61.1 %
PDW BLD-RTO: 12.8 % (ref 12.4–15.4)
PLATELET # BLD: 343 K/UL (ref 135–450)
PMV BLD AUTO: 7.5 FL (ref 5–10.5)
POTASSIUM REFLEX MAGNESIUM: 4.1 MMOL/L (ref 3.5–5.1)
RBC # BLD: 4.04 M/UL (ref 4.2–5.9)
SODIUM BLD-SCNC: 138 MMOL/L (ref 136–145)
WBC # BLD: 6.4 K/UL (ref 4–11)

## 2023-02-15 PROCEDURE — 6360000002 HC RX W HCPCS: Performed by: PHYSICAL MEDICINE & REHABILITATION

## 2023-02-15 PROCEDURE — 92526 ORAL FUNCTION THERAPY: CPT

## 2023-02-15 PROCEDURE — 97530 THERAPEUTIC ACTIVITIES: CPT

## 2023-02-15 PROCEDURE — 1280000000 HC REHAB R&B

## 2023-02-15 PROCEDURE — 36415 COLL VENOUS BLD VENIPUNCTURE: CPT

## 2023-02-15 PROCEDURE — 97535 SELF CARE MNGMENT TRAINING: CPT

## 2023-02-15 PROCEDURE — 97129 THER IVNTJ 1ST 15 MIN: CPT

## 2023-02-15 PROCEDURE — 97110 THERAPEUTIC EXERCISES: CPT

## 2023-02-15 PROCEDURE — 97530 THERAPEUTIC ACTIVITIES: CPT | Performed by: PHYSICAL THERAPIST

## 2023-02-15 PROCEDURE — 97130 THER IVNTJ EA ADDL 15 MIN: CPT

## 2023-02-15 PROCEDURE — 80048 BASIC METABOLIC PNL TOTAL CA: CPT

## 2023-02-15 PROCEDURE — 99232 SBSQ HOSP IP/OBS MODERATE 35: CPT | Performed by: PHYSICAL MEDICINE & REHABILITATION

## 2023-02-15 PROCEDURE — 6370000000 HC RX 637 (ALT 250 FOR IP): Performed by: PHYSICAL MEDICINE & REHABILITATION

## 2023-02-15 PROCEDURE — 85025 COMPLETE CBC W/AUTO DIFF WBC: CPT

## 2023-02-15 RX ORDER — BISACODYL 10 MG
10 SUPPOSITORY, RECTAL RECTAL DAILY PRN
Status: DISCONTINUED | OUTPATIENT
Start: 2023-02-15 | End: 2023-02-24 | Stop reason: HOSPADM

## 2023-02-15 RX ADMIN — SENNOSIDES AND DOCUSATE SODIUM 2 TABLET: 50; 8.6 TABLET ORAL at 10:03

## 2023-02-15 RX ADMIN — EZETIMIBE 10 MG: 10 TABLET ORAL at 10:04

## 2023-02-15 RX ADMIN — FLUOXETINE 10 MG: 10 CAPSULE ORAL at 10:02

## 2023-02-15 RX ADMIN — DICLOFENAC SODIUM 4 G: 10 GEL TOPICAL at 19:47

## 2023-02-15 RX ADMIN — Medication 5 MG: at 19:49

## 2023-02-15 RX ADMIN — Medication 10 MG: at 14:39

## 2023-02-15 RX ADMIN — ACETAMINOPHEN 650 MG: 325 TABLET, FILM COATED ORAL at 06:28

## 2023-02-15 RX ADMIN — NYSTATIN 500000 UNITS: 100000 SUSPENSION ORAL at 12:45

## 2023-02-15 RX ADMIN — ASPIRIN 81 MG 81 MG: 81 TABLET ORAL at 10:04

## 2023-02-15 RX ADMIN — NYSTATIN 500000 UNITS: 100000 SUSPENSION ORAL at 10:01

## 2023-02-15 RX ADMIN — NYSTATIN 500000 UNITS: 100000 SUSPENSION ORAL at 17:14

## 2023-02-15 RX ADMIN — PANTOPRAZOLE SODIUM 40 MG: 40 TABLET, DELAYED RELEASE ORAL at 06:22

## 2023-02-15 RX ADMIN — DICLOFENAC SODIUM 4 G: 10 GEL TOPICAL at 10:07

## 2023-02-15 RX ADMIN — METHYLPHENIDATE HYDROCHLORIDE 10 MG: 10 TABLET ORAL at 06:22

## 2023-02-15 RX ADMIN — ROSUVASTATIN CALCIUM 40 MG: 20 TABLET, FILM COATED ORAL at 09:57

## 2023-02-15 RX ADMIN — NYSTATIN 500000 UNITS: 100000 SUSPENSION ORAL at 19:49

## 2023-02-15 RX ADMIN — TRAZODONE HYDROCHLORIDE 50 MG: 50 TABLET ORAL at 19:49

## 2023-02-15 RX ADMIN — ENOXAPARIN SODIUM 40 MG: 100 INJECTION SUBCUTANEOUS at 10:05

## 2023-02-15 RX ADMIN — MAGNESIUM HYDROXIDE 30 ML: 400 SUSPENSION ORAL at 12:45

## 2023-02-15 RX ADMIN — ACETAMINOPHEN 650 MG: 325 TABLET, FILM COATED ORAL at 19:47

## 2023-02-15 RX ADMIN — TIZANIDINE 4 MG: 4 TABLET ORAL at 19:47

## 2023-02-15 RX ADMIN — SENNOSIDES AND DOCUSATE SODIUM 2 TABLET: 50; 8.6 TABLET ORAL at 19:48

## 2023-02-15 RX ADMIN — POLYETHYLENE GLYCOL 3350 17 G: 17 POWDER, FOR SOLUTION ORAL at 12:46

## 2023-02-15 ASSESSMENT — PAIN DESCRIPTION - LOCATION
LOCATION: ARM
LOCATION: ARM
LOCATION: HIP

## 2023-02-15 ASSESSMENT — PAIN DESCRIPTION - PAIN TYPE
TYPE: CHRONIC PAIN
TYPE: ACUTE PAIN

## 2023-02-15 ASSESSMENT — PAIN DESCRIPTION - ORIENTATION
ORIENTATION: LEFT

## 2023-02-15 ASSESSMENT — PAIN SCALES - GENERAL
PAINLEVEL_OUTOF10: 3
PAINLEVEL_OUTOF10: 5
PAINLEVEL_OUTOF10: 7
PAINLEVEL_OUTOF10: 6
PAINLEVEL_OUTOF10: 3
PAINLEVEL_OUTOF10: 0

## 2023-02-15 ASSESSMENT — PAIN - FUNCTIONAL ASSESSMENT
PAIN_FUNCTIONAL_ASSESSMENT: ACTIVITIES ARE NOT PREVENTED
PAIN_FUNCTIONAL_ASSESSMENT: ACTIVITIES ARE NOT PREVENTED

## 2023-02-15 ASSESSMENT — PAIN DESCRIPTION - DESCRIPTORS
DESCRIPTORS: ACHING;DULL
DESCRIPTORS: ACHING
DESCRIPTORS: ACHING

## 2023-02-15 ASSESSMENT — PAIN DESCRIPTION - FREQUENCY
FREQUENCY: INTERMITTENT
FREQUENCY: CONTINUOUS

## 2023-02-15 ASSESSMENT — PAIN DESCRIPTION - ONSET
ONSET: ON-GOING
ONSET: ON-GOING

## 2023-02-15 NOTE — PROGRESS NOTES
Speech Language Pathology  ACUTE REHAB UNIT  SPEECH/LANGUAGE PATHOLOGY      [x] Daily  [] Weekly Care Conference Note  [] Discharge    Patient:Jason Castellanos  GGQ:[id number]  Rehab Dx/Hx: Acute CVA (cerebrovascular accident) ([facility] [address] [I63.9]    Precautions: [x] Aspiration  [x] Fall risk  [] Sternal  [] Seizure [] Hip  [] Weight Bearing [] Other  ST Dx: [] Aphasia  [x] Dysarthria  [] Apraxia   [x] Oropharyngeal dysphagia [x] Cognitive Impairment  [] Other:   Date of Admit: 1/29/2023  Room #: 3102/3102-01  Date: 2/15/2023          Current Diet Order:ADULT ORAL NUTRITION SUPPLEMENT; Breakfast, Dinner; Standard High Calorie/High Protein Oral Supplement  ADULT DIET; Dysphagia - Soft and Bite Sized   Recommended Form of Meds: Meds in puree  Compensatory Swallowing Strategies : Neutral head positioning, placement of solid PO on right, one bite at a time, check for pocketing and oral residue. Benefits from use of mirror. Previous MBS: 1/26/23  Oral Phase  Mild-moderate dysfunction characterized by incomplete labial seal with anterior spillage, slowed/reduced mastication, mild stasis. Pharyngeal Phase  Moderate dysfunction characterized by impairments in timing, strength and coordination with premature bolus loss, reduced base of tongue retraction, delayed/reduced hyolaryngeal mechanics, and reduced pharyngeal constriction. Resulted in vallecular/pyriform/pharyngeal residue, as well as compromised airway protection with penetration and gross tracheal aspiration of thin and mildly thick liquids; strong reactive cough present but did not fully clear. Chin tuck strategy did not eliminate aspiration, however cued head turn LEFT with small straw sips was effective . Double swallow helped clear residual. Of note, throughout study pt with tendency to tilt head posteriorly which further exacerbated bolus control; benefited from cues for neutral positioning.   Upper Esophageal Screen  Indirectly assessed; appeared unremarkable. Repeat MBS: 2/13/23  Pt demonstrates a mild/moderate oropharyngeal dysphagia. Oral phase with reduced bolus cohesion resulting in head of bolus in pyriform sinus with tail of bolus in oral cavity with liquids without cues. Pt demo'd ability to complete oral hold of thin via tsp with cues but loss to floor of mouth. Pt with persistent delayed swallow initiation (inconsistent) to pyriform sinus with liquids, pudding spilling over top of epiglottis (did not fill vallecular space). Pt continues to demonstrate mildly reduced hyolaryngeal excursion initially with absent epiglottic distention progressing to full distention and there is mildly reduced tongue base retraction. Pt demonstrated mild residue in valleculae and pyriform sinus with x1 instance of residue from vallecula to posterior pharyngeal wall. Residue does increased with viscosity. Use of cued liquid rinse and cued effortful swallows reduced pharyngeal residue mildly. There was no aspiration or penetration during study. Attempted mendelsohn maneuver following study with model, tactile cues and verbal cues; pt not stimulable this date.      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 Pain None indicated. Pain Intervention: [] RN notified  [] Repositioned  [] Intervention offered and patient declined  [x] N/A  [] Other: [] RN notified  [] Repositioned  [] Intervention offered and patient declined  [] N/A  [] Other:   Subjective:     Pt upright in chair. Attends session alone. N/A   Objective / Goals:     New goal s/p repeat MBS: Pt will demonstrate self feeding without labial residue across mealtime assessment without cues. Patient tolerated NMES via VitalStim placement 4a (targeting orbicularis oris, buccinator and superior pharyngeal constrictor) @ 3.0 mA on right, @ 7.5 mA on left x 30 minutes. Good contraction noted. Pt with x3 occurrences of left side labial residue due to placement of PO in mid/left side of oral cavity. SLP cued pt to place PO into right side of oral cavity to avoid labial residue. No further instances of labial residue noted for duration of session. N/A   New goal s/p repeat MBS: Pt will tolerate trials of advanced textures with adequate oral clearance as evidenced by <min oral residue and no reports of globus sensation across two mealtime assessments Completed trials of soft solids  - x12. Denies globus sensation throughout initial trials. Pt then stated \"it's just goo - you know when your food mixed with your saliva\" and demo'd throat clearing. SLP noted x4 instances of min residue on lingual surface. Pt utilized independent use of lingual sweeping and finger sweeping during session. SLP utilized laryngeal tension strategy with pt to increase cough strength. N/A   Goal 4: Pt will demonstrate safe feeding behavior as evidenced by small bolus size / appropriate rate without cues. Pt verbalizing compensation techniques. Pt independently making smaller pieces of advanced PO trial.  N/A   Goal 5: Pt will tolerate least restrictive diet without respiratory decline. X1 throat clearing following thin liquids and x3 with solid PO. Cues for neutral head positioning, fast/effortful swallowing, correct bolus placement throughout. N/A   N/A   Goal 2: The patient will demonstrate sustained attention to task for 2 minutes with <2 prompts. Pt demonstrated improved attention during AM meal this session. Required x2 cues to inhibit talking while masticating. N/A   Goal 3: The patient will demonstrate improved inhibition as evidenced by <3 prompts for impulsivity during a task. Significantly improved inhibition with pt not responding when SLP speaking about pt to others in room or adding to conversational turn. Pt inhibited tangential comments x2 during session. N/A   Goal 4: The patient will demonstrate improved self monitoring/correcting for basic tasks with <mod cues. Raised hand x1 to make a comment and x1 occurrence of pt inhibiting tangential comments and redirecting attention to meal.  N/A   Goal 5: The patient will tolerate ongoing cognitive linguistic assessment. Goal not targeted this session. N/A   Goal 6: The patient will demonstrate comprehensibility in multiple communication environments without cues. Goal not targeted this session. N/A   Other areas targeted:     Education:   Education ongoing regarding skills targeted as above. N/A   Safety Devices: [x] Call light within reach  [x] Chair alarm activated and connected to nurse call light system  [] Bed alarm activated   [] Other:  [] Call light within reach  [] Chair alarm activated and connected to nurse call light system  [] Bed alarm activated   [] Other:    Assessment: Lingual coating remains on posterior portion of tongue. Oropharyngeal dysphagia but pt without reports of pharyngeal globus sensation initially. Ongoing cognitive-linguistic impairment characterized by disinhibition and inattention but improvements are noted. Plan: Continue as per plan of care.       Interventions used this date:  [] Speech/Language Treatment  [] Instruction in HEP  [x] Dysphagia Treatment [x] Cognitive Treatment   [] Other:    Discharge recommendations:  [] Home independently  [x] Home with assistance []  24 hour supervision  [] ECF [] Other  Continued Tx Upon Discharge: ? [x] Yes    [] No    [] TBD based on progress while on ARU     [] Vital Stim indicated     [] Other:   Estimated discharge date: February 24th, 2023    Electronically signed by:  Willard Ramirez [address] [place] [address] Language Pathology      Forest Fleischer, M.A., [place]  Speech-Language Pathologist    The speech-language pathologist was present, directed the patient's care, made skilled judgment and was responsible for assessment and treatment.

## 2023-02-15 NOTE — PROGRESS NOTES
Pt. Sleeping comfortable thus far, VSS, A&O X 4, afebrile, easy to arouse, no s/sx of distress noted. Call light and over bed table within reach, hourly rounding and frequent visual checks in place.

## 2023-02-15 NOTE — PROGRESS NOTES
Department of Physical Medicine & Rehabilitation  Progress Note    Patient Identification:  Lindsay Ramos  [id number]  : 1955  Admit date: 2023    Chief Complaint: Acute CVA (cerebrovascular accident) [facility])    Subjective:   Continues to work hard in therapy today. He is slightly more focused with ritalin today in SLP. Continue to show improvement. Will adjust his discharge date until late next week to maximize his time in the ARU.   ROS: No f/c, n/v, cp     Objective:  Patient Vitals for the past 24 hrs:   BP Temp Temp src Pulse Resp SpO2   02/15/23 0745 (!) 92/54 (!) 95.8 °F (35.4 °C) Oral -- 16 99 %   23 109/69 97.9 °F (36.6 °C) Oral 70 16 95 %       Const: Alert. No distress, pleasant. HEENT: Normocephalic, atraumatic. Normal sclera/conjunctiva. MMM. CV: Regular rate and rhythm. Resp: No respiratory distress. Lungs CTAB. Abd: Soft, nontender, nondistended, NABS+   Ext: No edema. Neuro: Alert, oriented, appropriately interactive. Psych: Cooperative, appropriate mood and affect    Laboratory data: Available via EMR.    Last 24 hour lab  Recent Results (from the past 24 hour(s))   Basic Metabolic Panel w/ Reflex to MG    Collection Time: 02/15/23  6:40 AM   Result Value Ref Range    Sodium 138 136 - 145 mmol/L    Potassium reflex Magnesium 4.1 3.5 - 5.1 mmol/L    Chloride 101 99 - 110 mmol/L    CO2 29 21 - 32 mmol/L    Anion Gap 8 3 - 16    Glucose 107 (H) 70 - 99 mg/dL    BUN 17 7 - 20 mg/dL    Creatinine 0.7 (L) 0.8 - 1.3 mg/dL    Est, Glom Filt Rate >60 >60    Calcium 9.4 8.3 - 10.6 mg/dL   CBC auto differential    Collection Time: 02/15/23  6:41 AM   Result Value Ref Range    WBC 6.4 4.0 - 11.0 K/uL    RBC 4.04 (L) 4.20 - 5.90 M/uL    Hemoglobin 12.9 (L) 13.5 - 17.5 g/dL    Hematocrit 38.5 (L) 40.5 - 52.5 %    MCV 95.2 80.0 - 100.0 fL    MCH 31.8 26.0 - 34.0 pg    MCHC 33.4 31.0 - 36.0 g/dL    RDW 12.8 12.4 - 15.4 %    Platelets 787 425 - 944 K/uL    MPV 7.5 5.0 - 10.5 fL Neutrophils % 61.1 %    Lymphocytes % 30.2 %    Monocytes % 6.6 %    Eosinophils % 1.6 %    Basophils % 0.5 %    Neutrophils Absolute 3.9 1.7 - 7.7 K/uL    Lymphocytes Absolute 1.9 1.0 - 5.1 K/uL    Monocytes Absolute 0.4 0.0 - 1.3 K/uL    Eosinophils Absolute 0.1 0.0 - 0.6 K/uL    Basophils Absolute 0.0 0.0 - 0.2 K/uL                   Therapy progress:  PT  Position Activity Restriction  Other position/activity restrictions: up as tolerated, up in chair  Objective     Sit to Stand: Maximum Assistance, 2 Person Assistance  Stand to Sit: 2 Person Assistance, Maximum Assistance     OT  PT Equipment Recommendations  Equipment Needed: Yes  Mobility Devices: Wheelchair  Other: 18' light weight w/c with flip back arm rests and standard leg rests. RUE arm trough also recommended  Toilet - Technique:  (via rolling shower chair)  Equipment Used:  (rolling shower chair)  Toilet Transfers Comments: pt did not have urge to void, however rolled pt in bathroom via rolling shower chair. Max A x 2 to pivot from bed to rolling shower chair. Assessment        SLP          Body mass index is 22.4 kg/m². 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 Patient is on aspirin at home        HTN  Patient is on lisinopril 5mg daily at home  - monitor      HLD:  Patient is on Crestor 20mg and ezetimibe 10mg at home daily  -Continue home meds     Anxiety  Per chart, patient is on Valium 2mg at home. Possible could be contributor to his chest pain yesterday, notes he felt anxious. Denies taking Valium at home, however it is listed as a home med.  Ativan 0.5mg once overnight   - prozac      Sleep disturbance  - Trazodone PRN  - benadryl PRN  -improving     Add low dose tizanidine for spasticity   - improving left leg tightness   - increase to 4mg q6h    Start Ritalin for inattention.  - increase dose to 10mg   - monitor effects       Extended stay in ARU before discharge home       Rehab Progress: Improving  Anticipated Dispo: home  Services/DME: [place]  ELOS: 2/24          BRAN AustinP.H  PM&R  2/15/2023  8:30 AM

## 2023-02-15 NOTE — PROGRESS NOTES
Occupational Therapy  Facility/Department: [facility] ACUTE REHAB UNIT  Rehabilitation Occupational Therapy Daily Treatment Note    Date: 2/15/23  Patient Name: Sean Stanford       Room: 3688/5849-52  MRN: [id number]  Account: [de-identified]   : 1955  (68 y.o.) Gender: male                    Past Medical History:  has a past medical history of Anxiety, Chest pain, Depression, Encounter for imaging to screen for metal prior to MRI, Hyperlipidemia, Hypertension, and Wears glasses. Past Surgical History:   has a past surgical history that includes [place] tooth extraction; Colonoscopy (2009); Cardiac catheterization (2008); Finger trigger release (3-); and Coronary angioplasty with stent. Restrictions  Restrictions/Precautions: Fall Risk;Up as Tolerated    Subjective  Subjective: Pt seated in w/c upon OT arrival w/ wife present and nursing students present. Pt reporting constipation. Restrictions/Precautions: Fall Risk;Up as Tolerated             Objective     Cognition  Arousal/Alertness: Appropriate responses to stimuli  Following Commands: Follows one step commands with repetition; Follows one step commands with increased time  Attention Span: Attends with cues to redirect; Difficulty attending to directions; Difficulty dividing attention  Safety Judgement: Decreased awareness of need for assistance;Decreased awareness of need for safety  Problem Solving: Assistance required to generate solutions;Assistance required to identify errors made;Assistance required to implement solutions  Insights: Decreased awareness of deficits  Initiation: Requires cues for some  Sequencing: Requires cues for some  Orientation  Overall Orientation Status: Within Functional Limits         ADL  Toileting  Assistance Level: Requires x 2 assistance  Skilled Clinical Factors: Pt reported the urge to have a bowel movement. Required assist x2 to manage pants. Pt unsuccessful on commode w/ increased time.   Toilet Transfers  Technique: Stand pivot  Equipment: Raised toilet seat with arms  Additional Factors: Increased time to complete;Cues for hand placement;Verbal cues  Assistance Level: Requires x 2 assistance  Skilled Clinical Factors: Pt t/f w/c <-> BSC placed over toilet w/ assist x2. Functional Mobility  Device: Wheelchair  Assistance Level: Contact guard assist;Minimal assistance  Skilled Clinical Factors: Pt self propelled w/ RUE and RLE from room -> elevators w/ CGA and min A, verbal cues for environmental awareness and obstacle avoidance. Transfers  Surface: Raised toilet Seat;Wheelchair  Additional Factors: Verbal cues; Hand placement cues  Sit to Stand  Assistance Level: Moderate assistance; Requires x 2 assistance  Skilled Clinical Factors: Pt required mod A x1 to stand from w/c and BSC when pushing up w/ RUE from [facility] rail and w/c arm rest. Required assist x2 when needing to pivot to R and L. Stand to Sit  Assistance Level: Contact guard assist  Skilled Clinical Factors: Pt required CGA for controlled descent onto surface. Stand Pivot  Assistance Level: Requires x 2 assistance  Skilled Clinical Factors: Assist x2 to manage pivot to/from [facility]. Weight Bearing  Weight Bearing Technique: Yes  RUE Weight Bearing: Forearm seated  LUE Weight Bearing: Forearm seated  Response To Weight Bearing Technique: Pt sitting in w/c, arm trough removed, WB thru L forearm for 10 seconds, x5. Pt reporting pain in hand and required cues to tolerate. OT Exercises  PROM Exercises: PROM completed while pt in seated in w/c. Completed elbow flexion/extension, IR/ER, shoulder flexion to 90 degrees only/extension, shoulder abduction/adduction to 90 degrees only, and wrist flexion/extension. Completed x10 w/ 1-2 second holds. Demo'ed to wife to complete outside of therapist. Pt tolerated exercises well. Assessment  Assessment  Assessment: Pt tolerated session well this date, agreeable to going outside to get some fresh air. Completed PROM exercises and WB exercises while outside. Demo'ed these to wife to complete outside of scheduled therapy. Wife verbalized understanding. Pt reporting constipation and the urge to have a bowel movement, but unsuccessful w/ increased time, therefore, limiting therapy outside of bathroom. Pt continues to benefit from skilled OT services to maximize independence w/ ADLs and fxl transers. Cont OT POC. Activity Tolerance: Patient tolerated treatment well  Discharge Recommendations: 24 hour supervision or assist;Continue to assess pending progress;Home with Home health OT  Safety Devices  Safety Devices in place: Yes  Type of devices: Call light within reach; Chair alarm in place; Left in chair    Second session:  Pt seated in w/c upon OT arrival w/ wife present. Pt agreeable to session, reports continued constipation. RN administered medication. Denied pain. Co-tx indicated in order to maximize therapeutic potential. Therapist pushed pt in w/c to gym in order to preserve time. Pt completed STS from w/c w/ min-mod A x1 w/ LUE supported through gait belt. Pt w/ fair-good midline orientation. Therefore, felt pt was appropriate to take steps w/ Shukri José Antonio walker. Pt resumed seated w/ CGA. Completed STS from w/c to Shukri José Antonio walker w/ min-mod A x1 w/ assist to support LUE. Pt appears anxious and required cues to redirect to task and pace self. Pt reporting the need to have a bowel movement urgently. Therapist pushed pt back to room. Assist x2 for stand pivot t/f w/c <-> BSC placed over toilet. Assist to remove pants, pt significantly leaning toward L side. Pt sat on [facility] for increased amount of time w/o success. Pt then completed transfer back to w/c and agreeable to attempting Shukri José Antonio walker again. Upon return back to therapy gym and standing 1x to Shukri José Antonio walker, pt again reporting the need to have a bowel movement. Pt again pushed back to room and assisted to toilet w/ assist x2. Pt left w/ nursing at this time.  Pt tolerated session well but very limited d/t constipation and anxiety about bowel movements. Pt would continue to benefit from skilled therapy in order to  increase independence w/ ADLs and fxl transfers. Pt left on commode w/ assistance. Denied further needs at this time. Patient Education  Education  Education Given To: Patient; Family  Education Provided: Role of Therapy;Plan of Care;Family Education;Mobility Training;Transfer Training; Safety; Fall Prevention Strategies; Home Exercise Program  Education Method: Demonstration;Verbal  Barriers to Learning: Cognition  Education Outcome: Verbalized understanding;Demonstrated understanding    Plan  Occupational Therapy Plan  Times Per Week: 5 days per week, 90 mins  Current Treatment Recommendations: Strengthening;ROM;Balance training;Functional mobility training; Endurance training;Self-Care / ADL; Patient/Caregiver education & training;Neuromuscular re-education;Cognitive/Perceptual training; Safety education & training    Goals  Patient Goals   Patient goals : go home with my wife  Short Term Goals  Time Frame for Short Term Goals: By 2 weeks - all goals ongoing  Short Term Goal 1: Pt will complete LB dressing with mod A  Short Term Goal 2: Pt will complete toileting with mod A  Short Term Goal 3: Pt will complete toilet and functional transfers with mod A  Short Term Goal 4: Pt will complete UB dressing with min A  Short Term Goal 5: Pt will complete UE HEP x 10 reps for improved R UE coordination for ADLs  Long Term Goals  Time Frame for Long Term Goals : By 4 weeks  Long Term Goal 1: Pt will complete LB dressing with CGA  Long Term Goal 2: Pt will complete toileting with CGA  Long Term Goal 3: Pt will complete toilet and functional transfers with CGA. Long Term Goal 4: Pt will complete UB dressing with SPV.   Long Term Goal 5: Pt will complete simple IADL with LRAD at mod I        Therapy Time   Individual Concurrent Group Co-treatment   Time In 1115         Time Out 1200 Minutes 45             Therapy Time   Individual Concurrent Group Co-treatment   Time In 8432      8328   Time Out 4753      1340   Minutes 8      47     Timed Code Treatment Minutes:  45 + 8 + 47 = 100     Total Treatment Minutes: 100 min     Camille Velasquez, OT

## 2023-02-15 NOTE — PATIENT CARE CONFERENCE
Inpatient Rehabilitation  Weekly Team Conference Note  [facility]  [address]  [phone]  Patient Name: Tosha         MRN: [id number]    : 1955  (74 y.o.)  Gender: male   Referring Practitioner: Zeb Polk DO  Diagnosis: CVA  The team conference for this patient was held on 2023 at 10:30am by:  Zeb Vargas.  DO Jam    Current/Goal QM SCORES  QM Current/Goal Score   Eating CARE Score: 5 / Discharge Goal: Independent   Oral Hygiene CARE Score: 3 / Discharge Goal: Independent   Shower/Bathing CARE Score: 1 / Discharge Goal: Supervision or touching assistance   UB Dressing CARE Score: 3 / Discharge Goal: Independent   LB Dressing CARE Score: 1 / Discharge Goal: Supervision or touching assistance   Putting on/off Footwear CARE Score: 2 / Discharge Goal: Independent   Toileting Hygiene CARE Score: 1 / Discharge Goal: Supervision or touching assistance   Bladder Continence Bladder Continence: Incontinent daily    Bowel Continence Bowel Continence: Not rated    Toilet Transfers CARE Score: 1 / Discharge Goal: Supervision or touching assistance   Shower/Bathe Self  CARE Score: 1 / Discharge Goal: Supervision or touching assistance   Rolling Left and Right CARE Score: 2 / Discharge Goal: Supervision or touching assistance   Sit to Lying CARE Score: 2 / Discharge Goal: Supervision or touching assistance   Lying to Sitting on Bedside CARE Score: 1 / Discharge Goal: Supervision or touching assistance   Sit to Stand CARE Score: 3 / Discharge Goal: Supervision or touching assistance   Chair/Bed to Chair Transfer CARE Score: 1 / Discharge Goal: Supervision or touching assistance   Car Transfers CARE Score: 88 / Discharge Goal: Supervision or touching assistance   Walk 10 Feet CARE Score: 88 / Discharge Goal: Partial/moderate assistance   Walk 50 Feet with Two Turns CARE Score: 88 /     Walk 150 Feet CARE Score: 88 / Discharge Goal: Partial/moderate assistance   Walk 10 Feet on Uneven Surfaces CARE Score: 88 / Discharge Goal: Not Attempted   1 Step (Curb) CARE Score: 88 / Discharge Goal: Not Attempted   4 Steps CARE Score: 88 / Discharge Goal: Not Attempted   12 Steps CARE Score: 88 / Discharge Goal: Not Attempted   Picking up Object from Floor CARE Score: 88 / Discharge Goal: Partial/moderate assistance   Wheel 50 Feet with 2 Turns CARE Score: 3 / Discharge Goal: Supervision or touching assistance   Type         [] Manual        [] Motorized        [] N/A   Wheel 150 Feet CARE Score: 3 / Discharge Goal: Supervision or touching assistance   Type         [] Manual        [] Motorized        [] N/A     NURSING:  A&Ox: Level of Consciousness: Alert (0)  Orientation Level: Oriented X4  Harris Fall Risk Score: Harris Total Score: 60  Admission BIMS: 12   [] Unable to complete BIMS on Admission, Reasoning:   Wounds/Incisions/Ulcers: N/A  Medication Review: with patient  Pain: PRN Zanaflex and Ultram, scheduled   Consultations: n/a  Imaging: below   FL MODIFIED BARIUM SWALLOW W VIDEO   Final Result   1. Mild oral dysphagia. 2. No penetration or aspiration with any consistency. XR HIP 2-3 VW W PELVIS LEFT   Final Result   1. Mild-moderate left hip osteoarthrosis. Active Comorbid Conditions: anxiety, depression, HTN  Systems Review:   Renal: low Cr, Dialysis:  Type: n/a, Frequency: n/a  Neurological: Left Flaccid and numb  Musculoskeletal: left side weakness and flaccid  Respiratory: WNL  Cardiac/Circulatory/Peripheral Vascular: BLE non pitting edema, ZIO patch  Abnormal/Relevant Test Results:   Abnormal/Relevant Lab Values:   CBC:   Recent Labs     02/15/23  0641   WBC 6.4   HGB 12.9*   HCT 38.5*   MCV 95.2        BMP:   Recent Labs     02/15/23  0640      K 4.1      CO2 29   BUN 17   CREATININE 0.7*     PT/INR: No results for input(s): PROTIME, INR in the last 72 hours. APTT: No results for input(s):  APTT in the last 72 hours.  Liver Profile:  Lab Results   Component Value Date/Time    AST 24 01/25/2023 07:39 PM    ALT 20 01/25/2023 07:39 PM    BILITOT <0.2 01/25/2023 07:39 PM    ALKPHOS 47 01/25/2023 07:39 PM     Lab Results   Component Value Date/Time    CHOL 140 01/27/2023 05:52 AM    HDL 50 01/27/2023 05:52 AM    TRIG 97 01/27/2023 05:52 AM     Recent Labs     02/15/23  0641   WBC 6.4   HGB 12.9*   HCT 38.5*      MCV 95.2     Recent Labs     02/15/23  0640      K 4.1      CO2 29   BUN 17   CREATININE 0.7*     No results for input(s): AST, ALT, ALB, BILIDIR, BILITOT, ALKPHOS in the last 72 hours. No results for input(s): MG in the last 72 hours. Recent Labs     02/15/23  0641   WBC 6.4   HGB 12.9*   HCT 38.5*        Recent Labs     02/15/23  0640      K 4.1      CO2 29   BUN 17   CREATININE 0.7*   CALCIUM 9.4     No results for input(s): AST, ALT, BILIDIR, BILITOT, ALKPHOS in the last 72 hours. No results for input(s): INR in the last 72 hours. No results for input(s): [place] in the last 72 hours. PHYSICAL THERAPY:  Bed Mobility:      Transfers:  Sit to Stand: Maximum Assistance, 2 Person Assistance  Stand to Sit: 2 Person Assistance, Maximum Assistance    Ambulation  More Ambulation?: No         OCCUPATIONAL THERAPY:  ADL  Feeding: Setup  Feeding Skilled Clinical Factors: assist to open packages  Grooming: Minimal assistance  Grooming Skilled Clinical Factors: assist to place toothpaste onto toothbrush, assist to manage supplies  UE Bathing: Moderate assistance  UE Bathing Skilled Clinical Factors: seated in rolling shower chair, max verbal cueing to wash L UE, assist to lift UE to wash underarm  LE Bathing: Moderate assistance  LE Bathing Skilled Clinical Factors: assist to wash lower LEs. Pt able to wash [place] area and upper LEs with cueing.   UE Dressing: Maximum assistance  UE Dressing Skilled Clinical Factors: assist to place L UE into shirt, overhead, and bringing R UE into shirt, pull down over trunk  LE Dressing: Dependent/Total  LE Dressing Skilled Clinical Factors: assist from 2 helpers to pull up over hips. Pt able to assist threading R LE into pants, assist for L LE  Toileting: Dependent/Total  Toileting Skilled Clinical Factors: via rolling shower chair placed over toilet  Additional Comments: Bathing and dressing completed as listed above. Pt required extensive cueing to attend to L UE and for sequencing. Pt seated on rolling shower chair 2/2 extensive posterior lean and pushing to pts L while seaed EOB. Pt impulsive during transfers. Required cueing to attend to tasks, noted pt to be easily distracted during transfers and ADLs. Seated in bedside chair for oral care. Toilet Transfers: Toilet Transfers  Toilet - Technique:  (via rolling shower chair)  Equipment Used:  (rolling shower chair)  Toilet Transfer: Dependent/Total  Toilet Transfers Comments: pt did not have urge to void, however rolled pt in bathroom via rolling shower chair. Max A x 2 to pivot from bed to rolling shower chair. Tub/ShowerTransfers:          SPEECH THERAPY:  Lingual coating remains on posterior portion of tongue. Oropharyngeal dysphagia but pt without reports of pharyngeal globus sensation initially. Ongoing cognitive-linguistic impairment characterized by disinhibition and inattention but improvements are noted. NUTRITION:  Current Weight: 169 lb 12.1 oz (77 kg) BMI (Calculated): 22.4  Current diet order: Current diet and supplement order: ADULT ORAL NUTRITION SUPPLEMENT; Breakfast, Dinner; Standard High Calorie/High Protein Oral Supplement  ADULT DIET; Dysphagia - Soft and Bite Sized         Feeding: Needs assist, Needs set up  Room Service: Selective   NSG Recorded PO: PO Meals Eaten (%): 76 - 100% PO Supplement (%): 76 - 100%  Malnutrition Status Malnutrition Status: At risk for malnutrition (Comment)    CASE MANAGEMENT:  Psychosocial/Behavioral Issues:   Assessment:   We are planning DC home with spouse. A Home Eval will be arranged for early next week. Pt will need a wheelchair and skilled Home Care. Patient/Family Education provided by team:  Role of PT/OT, Nursing, ADL retraining, transfer training, stroke prevention and recovery    Patient Goals for Rehab stay:  1. To go home with wife     Rehab Team Goals for patient for the Upcoming Week:  1. Increase independent for compensation techniques at mealtime  2. Increase independence w/ ADLs    Barriers to Progress/Attainment of Goals & Efforts/Interventions to remove Barriers:  Cognitive impairments - ongoing cognitive therapy  2. Decreased balance and LUE hemiplegia impacting ADLs - neuro re-education, ADL retraining    Discharge Plan:  Estimated Length of Stay: 25 days  Destination: home health  Services at Discharge: [facility], Occupational Therapy, Speech Therapy, and Nursing 3x week  Equipment at Discharge: Continue to assess  Community Resources:   Factors facilitating achievement of predicted outcomes: Family support, Motivated, Cooperative, Pleasant, and Good insight into deficits  Barriers to the achievement of predicted outcomes: Decreased endurance, Decreased sensation, Decreased proprioception, Upper extremity weakness, Lower extremity weakness, and Stairs at home    Special Needs in the Upcoming Week:   [x] Family/Caregiver Education  [] Home visit  []Therapeutic Pass [] Consults:_______   [] Family/Caregiver Training  [] Stroke Risk Factor Education     [] Other;_______     TEAM SUMMARY: Will continue with current poc & goals until anticipated d/c date of 2/24/2023.     MD:   Stroke Risk Factors:   [] N/A for this patient  [x]HTN  []  Diabetes  [x] Hyperlipidemia  []Obesity BMI >25  [] Atrial Fibrillation [] Smoker (current)  [] Smoker (quit in last 12 months)  [] Sleep Apnea [] Other:     Risk for Readmission: Moderate (10-19)    Justification for Continued Stay:   Criteria for continued IRF stay: Based on my medical assessment of the patient and review of information from the interdisciplinary team, as part of this weekly team conference, the patient continues to meet the following criteria for IRF level of care:  [x] The patient requires 24-hour rehabilitation nursing care   [x] The patient requires an intensive rehabilitation therapy program  [x] The patient requires active and ongoing intervention of multiple therapy disciplines  [x] The patient requires continued physician supervision by a rehabilitation physician  [x] The patient requires an intensive and coordinated interdisciplinary team approach to the delivery of rehabilitative care    Medical Necessity-continued close physician medical management is required for:   [] Cardiac/Circulatory dysfunction  [] Respiratory/Pulmonary dysfunction  [] Integumentary complications  [] Peripheral Vascular dysfunction  [] Musculoskeletal dysfunction  [x] Neurological dysfunction d/t:  [x] CVA  [] SCI  [] TBI  [] Other: __________  [] Renal dysfunction  [] Hematologic dysfunction    [] Endocrine disorders  [] GI disorders     [] Genito-Urinary dysfunction    Assessment/Plan:  [x] The patient is making good progression towards their LTG's, is actively participating in, and has a reasonable expectation to continue to benefit from the intensive rehabilitation program.  [] The estimated discharge date has been changed from initial team conference due to:   [] The estimated discharge destination has been changed from initial team conference due to:     Rehab Team Members in attendance for Team Conference:  ARU Supervisor/PPS Coordinator:  Farida Castellanos, PT, DPT    Social Work:  Sajan Flowers, RN    Nursing:  Favian Quach, JUANITA García, JUANITA    Therapy:  Connor Juarez, PT  Emily Courtney, PT, DPT  Araceli Lamar PT, DPT     Nahomi Baptiste, OTR/L  Dmitry Jurado, OTR/L    DANA Yang, SLP    Nutrition:  Anurag Mackenzie RD:    [place]: [phone]  Office:  [phone]      I approve the established interdisciplinary plan of care as documented within the medical record of Cindy Barbour.     MD Signature Alpha Sat, D.O. M.P.H  PM&R  2/16/2023  11:03 AM

## 2023-02-15 NOTE — PROGRESS NOTES
Pt a/o x4. VSS. Denies N/V. NIH 11. Pt reports discomfort in L arm, medicating w/ prn tylenol as needed. Pt is an assist x2 w/ gait belt, walker to stand and pivot. Pt attempting to have a bm, prn glycolax and milk of mag given.      Roseline Barrett RN

## 2023-02-15 NOTE — PROGRESS NOTES
Physical Therapy  Facility/Department: [facility] ACUTE REHAB UNIT  Rehabilitation Physical Therapy Treatment Note    NAME: Cindy Barbour  : 1955 (43 y.o.)  MRN: [id number]  CODE STATUS: Full Code    Date of Service: 2/15/23       Restrictions:  Restrictions/Precautions: Fall Risk;Up as Tolerated  Position Activity Restriction  Other position/activity restrictions: up as tolerated, up in chair     SUBJECTIVE  Subjective  Subjective: Pt sitting  up in w/c when PT arrived. Pt  agreeable to PT. Pt requested to use the restroom  Pain: Pt c/o \"L bicep pain that woke him up last pm\"        Post Treatment Pain Screening         OBJECTIVE  Cognition  Arousal/Alertness: Appropriate responses to stimuli  Following Commands: Follows one step commands with repetition; Follows one step commands with increased time  Attention Span: Attends with cues to redirect; Difficulty attending to directions; Difficulty dividing attention  Safety Judgement: Decreased awareness of need for assistance;Decreased awareness of need for safety  Problem Solving: Assistance required to generate solutions;Assistance required to identify errors made;Assistance required to implement solutions  Insights: Decreased awareness of deficits  Initiation: Requires cues for some  Sequencing: Requires cues for some  Cognition Comment: Pt cont  req  VC/ TC to stay focused on task. Pt benefits from controlled environment with min distractions especially with auditory distractions. Deescalation skills used as a means to control his environment. (  one person giving commands, decreased volume of voice, simple directions as examples) Pt req VC for sequencing tasks  Orientation  Overall Orientation Status: Within Functional Limits  Orientation Level: Oriented to place;Oriented to situation;Oriented to person    Functional Mobility  Balance  Sitting Balance: Supervision (Sat on commode with S only.  Maintained midline w/o difficulty.)  Standing Balance: Minimal assistance (CGA mostly with min A req fir standing unsupported. PT facilitated erect posture with shld contacts)  Standing Balance  Activity: PT instructed pt with standing with initial contact req min A and then pt progressed to CGA/ Min A while perfo standing ( both in the BR when doing LB clothing management) and when changing out w/cs. Pt provided w/ VC for R hand placement used for WS to the R and then once obtained, pt was able to hold with CGA/ Min A  Transfers  Surface: Wheelchair; To bed;Raised toilet Seat  Additional Factors: Hand placement cues; Verbal cues  Sit to Stand  Assistance Level: Minimal assistance  Skilled Clinical Factors: Min/  for sit to stand. Req midline cues via hand position plus manual contacts to hips. Stand to Sit  Assistance Level: Minimal assistance  Skilled Clinical Factors: VC for hand placement and to use head righting to fcailitate ant/ post WS allowing improved controlled descent  Stand Pivot  Assistance Level: Minimal assistance  Skilled Clinical Factors: Step by step instructions with reminders of the end result req. ( hand placment, foot placement , head righing with the end result of transf from point A to point B)Transf to the L with min A and to the R with min A/ CGA with step by step instructions      Environmental Mobility  Wheelchair  Surface: Level surface  Device: Standard wheelchair;Pressure Relieving Cushion (22\" w/c traded to an 18\" w/c to providing closer boundaries for  the L side. Cushion was also adjusted using a towel/rolled sheet to provide a wedge with ant build up. This minimized pt's tendency to ext hips for sliding ant in w/c)  Additional Factors: Verbal cues; Increased time to complete (VCs for L attention and avoiding objects in wilder)  Assistance Required to Manage Parts: All wheelchair parts;Brakes (Brake extension placed on the L with white table to assist pt with visually being able to locate it . Pt req Intermittent VC/ TC to locate the R brake)  Assistance Level for Propulsion: Supervision (Occasional assist for obstacle negotiation in hallway)  Propulsion Method: Right upper extremity;Right lower extremity  Propulsion Quality: Short strokes; Veers left  Propulsion Distance: 175'x2  Skilled Clinical Factors: MAX  VCs for L attention and staying on R side of hallwayas a compensatory technique to avoid environmental barriers. As a means for pt to stay focused, Therapy instructed pt with w/c propulsion and gtting timed. As a strengthening ex/ endurance activity , PT instructed pt with use of the seated stepper. Pt utilized BLES and RUE and obtained/maintained a pace that lit the screen up tx 2 minutes. Pt rested and patricia an additional 1 min. Pt rested and then instructed with using only LES x 90 secs. Pt very encouraged by his ability to do the seated stepper      Second session: Pt sitting in w/c when PT arrived. Pt agreeable to therapy  PT and OT worked collaboratively to utilize the skills of 2 disciplines while maximizing functional mobility to obtain optimal potential.    Therapy initiated STS tranf with from w/c with min A x1. Pt able to obtain midline fairly quick so progressed to standing w/ TAE RW. Unfortunately, pt became very anxious reporting that he needed to have a BM ( fairly urgent) Pt transported back to room. W/c > ETS with min/mod A in which pt impulsively was moving to get to the toilet. Pt sat on toilet for a significant amount of time w/o success. LB clothing management ( refer to OT note) . Pt agin attempted standing with the TAE RW with the urgency for a BM reappearing. Pt again transported back to room and positioned on commode. Nsg arrived for S                ASSESSMENT/PROGRESS TOWARDS GOALS       Assessment  Assessment: Pt presents with decreased  attention and L side in attention. Pt more engaged when using deescalation skills and was able to participate with greater ease.  Pt demo active movement in both the LUE and the LE . Most of time spent in the BR with pt 's constipation. Pt was very anxious on this date in both the am and in the pm 2/2 to the potential to have a BM in which pt was fearful of bowels losing control. Pt appears very motivated to improve. Pt is well below baseline and would benefit from continued therapy to maximize potential and increase functional mobility towards Ind to allow for a safer d/c to home. Activity Tolerance: Patient limited by fatigue;Treatment limited secondary to decreased cognition;Patient limited by endurance; Patient limited by pain  Discharge Recommendations: 24 hour supervision or assist;Continue to assess pending progress;Home with Home health PT  PT Equipment Recommendations  Equipment Needed: Yes  Other: 18' light weight w/c with flip back arm rests and standard leg rests. RUE arm trough also recommended    Goals  Patient Goals   Patient Goals : to go home  Short Term Goals  Time Frame for Short Term Goals: 2 weeks  Short Term Goal 1: Pt will complete bed mobility with moderate assist(Not addressed on this date)  Short Term Goal 2: Pt will complete sit<>stand transfer wtih moderate assist. Goal achieved  Short Term Goal 3: Pt will propel self in manual w/c for 150' with minimal assist.- met 2/2  Short Term Goal 4: Pt will ambulate 10' with moderate assist.Goal not achieved  Long Term Goals  Time Frame for Long Term Goals : 4 weeks (all ongoing)  Long Term Goal 1: Pt will complete bed mobility with CGA  Long Term Goal 2: Pt will complete sit<>Stand transfers with CGA  Long Term Goal 3: Pt will propel self in manual w/c for 250' with suprvision  Long Term Goal 4: Pt will ambulate 48' with CGA & LRAD    PLAN OF CARE/SAFETY  Physcial Therapy Plan  Days Per Week: 5 Days  Hours Per Day: 1 hour  Current Treatment Recommendations: Strengthening;Balance training;ROM; Functional mobility training;Neuromuscular re-education; Safety education & training;Patient/Caregiver education & training;Transfer training; Endurance training;Home exercise program;Gait training;Equipment evaluation, education, & procurement;Positioning; Therapeutic activities  Safety Devices  Type of Devices: Call light within reach;Nurse notified;Gait belt;Patient at risk for falls; Chair alarm in place; Left in chair  Restraints  Restraints Initially in Place: No    EDUCATION  Education  Education Given To: Patient  Education Provided: Mobility Training;Transfer Training;Energy Conservation; Fall Prevention Strategies  Education Provided Comments: Therapy educated pt on the purpose of increased WB to facilitae increased muscle tone in L extrem. (this included the use of the standing frame. Therapy also recommended that pt sit in the w/c to facilitate more of a midline position with proximal WB .   Education Method: Verbal  Barriers to Learning: Cognition  Education Outcome: Verbalized understanding;Continued education needed        Therapy Time   Individual Concurrent Group Co-treatment   Time In 0845         Time Out 0945         Minutes 60         Second Session Therapy Time:   Individual Concurrent Group Co-treatment   Time In       1253   Time Out       1340   Minutes       47     Timed Code Treatment Minutes:  18+21    Total Treatment Minutes:   [address], PT, 02/15/23 at 5:41 PM

## 2023-02-15 NOTE — PLAN OF CARE
Problem: Discharge Planning  Goal: Discharge to home or other facility with appropriate resources  Outcome: Progressing  Flowsheets (Taken 2/14/2023 2037)  Discharge to home or other facility with appropriate resources: Identify discharge learning needs (meds, wound care, etc)     Problem: Skin/Tissue Integrity  Goal: Absence of new skin breakdown  Description: 1. Monitor for areas of redness and/or skin breakdown  2. Assess vascular access sites hourly  3. Every 4-6 hours minimum:  Change oxygen saturation probe site  4. Every 4-6 hours:  If on nasal continuous positive airway pressure, respiratory therapy assess nares and determine need for appliance change or resting period. Outcome: Progressing     Problem: ABCDS Injury Assessment  Goal: Absence of physical injury  Outcome: Progressing   Pt remains free from accidental injury during this stay on the ARU. Will continue to monitor pt and assess per schedule and prn. Problem: Safety - Adult  Goal: Free from fall injury  Outcome: Progressing   Pt remains free from falls during this stay on the ARU. 1:1 and education provided on the importance of using call light to ask for assistance prior to transfers and ambulation. Pt voices understanding. Will continue to monitor and re-educate as needed.

## 2023-02-15 NOTE — PROGRESS NOTES
Received report with off-going RN. Agreed to plan of care. Patient attempting to have a BM. Suppository given.

## 2023-02-15 NOTE — PLAN OF CARE
Problem: Safety - Adult  Goal: Free from fall injury  2/15/2023 1124 by Lynn Kang  Outcome: Progressing  Note: Patient free from falls/ injury during duration of shift. Bed alarm on, call light w/in reach, bed in lowest position, non-skid socks on and fall sign posted outside door. Problem: Pain  Goal: Verbalizes/displays adequate comfort level or baseline comfort level  2/15/2023 1124 by Lynn Kang  Outcome: Progressing  Note: Pt reports discomfort to left arm. Pt medicated w/ prn tylenol when requested. Will continue to assess pain level and administer prn medications as ordered.

## 2023-02-16 PROCEDURE — 6370000000 HC RX 637 (ALT 250 FOR IP): Performed by: PHYSICAL MEDICINE & REHABILITATION

## 2023-02-16 PROCEDURE — 92526 ORAL FUNCTION THERAPY: CPT

## 2023-02-16 PROCEDURE — 6360000002 HC RX W HCPCS: Performed by: PHYSICAL MEDICINE & REHABILITATION

## 2023-02-16 PROCEDURE — 97032 APPL MODALITY 1+ESTIM EA 15: CPT

## 2023-02-16 PROCEDURE — 97110 THERAPEUTIC EXERCISES: CPT

## 2023-02-16 PROCEDURE — 97116 GAIT TRAINING THERAPY: CPT | Performed by: PHYSICAL THERAPIST

## 2023-02-16 PROCEDURE — 99233 SBSQ HOSP IP/OBS HIGH 50: CPT | Performed by: PHYSICAL MEDICINE & REHABILITATION

## 2023-02-16 PROCEDURE — 97530 THERAPEUTIC ACTIVITIES: CPT | Performed by: PHYSICAL THERAPIST

## 2023-02-16 PROCEDURE — 97530 THERAPEUTIC ACTIVITIES: CPT

## 2023-02-16 PROCEDURE — 1280000000 HC REHAB R&B

## 2023-02-16 PROCEDURE — 97129 THER IVNTJ 1ST 15 MIN: CPT

## 2023-02-16 PROCEDURE — 97130 THER IVNTJ EA ADDL 15 MIN: CPT

## 2023-02-16 RX ADMIN — ASPIRIN 81 MG 81 MG: 81 TABLET ORAL at 09:26

## 2023-02-16 RX ADMIN — ROSUVASTATIN CALCIUM 40 MG: 20 TABLET, FILM COATED ORAL at 09:25

## 2023-02-16 RX ADMIN — TIZANIDINE 4 MG: 4 TABLET ORAL at 04:08

## 2023-02-16 RX ADMIN — ENOXAPARIN SODIUM 40 MG: 100 INJECTION SUBCUTANEOUS at 09:26

## 2023-02-16 RX ADMIN — DICLOFENAC SODIUM 4 G: 10 GEL TOPICAL at 22:18

## 2023-02-16 RX ADMIN — ACETAMINOPHEN 650 MG: 325 TABLET, FILM COATED ORAL at 09:33

## 2023-02-16 RX ADMIN — SENNOSIDES AND DOCUSATE SODIUM 2 TABLET: 50; 8.6 TABLET ORAL at 22:16

## 2023-02-16 RX ADMIN — NYSTATIN 500000 UNITS: 100000 SUSPENSION ORAL at 09:26

## 2023-02-16 RX ADMIN — Medication 5 MG: at 22:17

## 2023-02-16 RX ADMIN — EZETIMIBE 10 MG: 10 TABLET ORAL at 09:26

## 2023-02-16 RX ADMIN — FLUOXETINE 10 MG: 10 CAPSULE ORAL at 09:26

## 2023-02-16 RX ADMIN — TRAZODONE HYDROCHLORIDE 50 MG: 50 TABLET ORAL at 22:16

## 2023-02-16 RX ADMIN — ACETAMINOPHEN 650 MG: 325 TABLET, FILM COATED ORAL at 16:19

## 2023-02-16 RX ADMIN — NYSTATIN 500000 UNITS: 100000 SUSPENSION ORAL at 22:17

## 2023-02-16 RX ADMIN — METHYLPHENIDATE HYDROCHLORIDE 10 MG: 10 TABLET ORAL at 05:04

## 2023-02-16 RX ADMIN — DIPHENHYDRAMINE HYDROCHLORIDE 25 MG: 25 TABLET ORAL at 04:08

## 2023-02-16 RX ADMIN — TIZANIDINE 4 MG: 4 TABLET ORAL at 22:16

## 2023-02-16 RX ADMIN — DICLOFENAC SODIUM 4 G: 10 GEL TOPICAL at 09:25

## 2023-02-16 RX ADMIN — PANTOPRAZOLE SODIUM 40 MG: 40 TABLET, DELAYED RELEASE ORAL at 05:04

## 2023-02-16 RX ADMIN — SENNOSIDES AND DOCUSATE SODIUM 2 TABLET: 50; 8.6 TABLET ORAL at 09:26

## 2023-02-16 RX ADMIN — NYSTATIN 500000 UNITS: 100000 SUSPENSION ORAL at 16:20

## 2023-02-16 ASSESSMENT — PAIN DESCRIPTION - ORIENTATION
ORIENTATION: LEFT
ORIENTATION: LEFT;UPPER

## 2023-02-16 ASSESSMENT — PAIN - FUNCTIONAL ASSESSMENT
PAIN_FUNCTIONAL_ASSESSMENT: ACTIVITIES ARE NOT PREVENTED
PAIN_FUNCTIONAL_ASSESSMENT: ACTIVITIES ARE NOT PREVENTED
PAIN_FUNCTIONAL_ASSESSMENT: PREVENTS OR INTERFERES SOME ACTIVE ACTIVITIES AND ADLS

## 2023-02-16 ASSESSMENT — PAIN DESCRIPTION - LOCATION
LOCATION: HIP
LOCATION: HIP
LOCATION: LEG
LOCATION: ARM

## 2023-02-16 ASSESSMENT — PAIN SCALES - GENERAL
PAINLEVEL_OUTOF10: 6
PAINLEVEL_OUTOF10: 2
PAINLEVEL_OUTOF10: 5
PAINLEVEL_OUTOF10: 2
PAINLEVEL_OUTOF10: 6

## 2023-02-16 ASSESSMENT — PAIN DESCRIPTION - ONSET: ONSET: ON-GOING

## 2023-02-16 ASSESSMENT — PAIN DESCRIPTION - DESCRIPTORS
DESCRIPTORS: ACHING
DESCRIPTORS: SPASM
DESCRIPTORS: DISCOMFORT

## 2023-02-16 ASSESSMENT — PAIN DESCRIPTION - FREQUENCY: FREQUENCY: CONTINUOUS

## 2023-02-16 NOTE — PROGRESS NOTES
Occupational Therapy  Facility/Department: [facility] ACUTE REHAB UNIT  Rehabilitation Occupational Therapy Daily Treatment Note    Date: 23  Patient Name: Anton Coker       Room: 3062/5240-91  MRN: [id number]  Account: [de-identified]   : 1955  (68 y.o.) Gender: male                    Past Medical History:  has a past medical history of Anxiety, Chest pain, Depression, Encounter for imaging to screen for metal prior to MRI, Hyperlipidemia, Hypertension, and Wears glasses. Past Surgical History:   has a past surgical history that includes [place] tooth extraction; Colonoscopy (2009); Cardiac catheterization (2008); Finger trigger release (3-); and Coronary angioplasty with stent. Restrictions  Restrictions/Precautions: Fall Risk;Up as Tolerated    Subjective  Subjective: Pt seated in w/c w/ brother present. Co-tx indicated in order to maximize therapeutic listening. Restrictions/Precautions: Fall Risk;Up as Tolerated             Objective     Cognition  Arousal/Alertness: Appropriate responses to stimuli  Following Commands: Follows one step commands with repetition; Follows one step commands with increased time  Attention Span: Attends with cues to redirect; Difficulty attending to directions; Difficulty dividing attention  Safety Judgement: Decreased awareness of need for assistance;Decreased awareness of need for safety  Problem Solving: Assistance required to generate solutions;Assistance required to identify errors made;Assistance required to implement solutions  Insights: Decreased awareness of deficits  Initiation: Requires cues for some  Sequencing: Requires cues for some  Orientation  Overall Orientation Status: Within Functional Limits  Orientation Level: Oriented to place;Oriented to situation;Oriented to person              Functional Mobility  Device: Platform walker  Assistance Level: Requires x 2 assistance  Skilled Clinical Factors: Pt ambulated 18\" w/ Peggi Canary walker w/ assist x1 (1 helper facilitating hips while another helper assisted in stabilizing walker). Pt took seated rest break. Pt then ambulated ~37\" + 29\" w/ assist as previously stated. Pt required min A x1 of the helper facilitiating the walker when pt was looking at BLE during steps but mod A x1 when looking upright at Exit sign. PT facilitating hips during ambulation. Sit to Stand  Assistance Level: Requires x 2 assistance;Minimal assistance  Skilled Clinical Factors: Pt completed at least 5 STS from w/c to [facility] walker w/ min A x1 w/ verbal cues for hand placement and pacing. Another helper present to stabilize the [facility] walker. Stand to Sit  Assistance Level: Contact guard assist  Skilled Clinical Factors: Pt required CGA for controlled descent onto surface. OT Exercises  Static Standing Balance Exercises: Pt stood w/ [facility] walker w/ min-mod A requiring tactile cueing in order to sustain midline positioning, using walker as indicator w/ pt laterally leaning to R side requiring cues to weight shift and/or placement of BLE to gain NAVJOT. Assessment  Assessment  Assessment: Pt tolerated session well, ambulating w/ [facility] walker this date. Continues to demonstrate decreased balance and motor planning. Pt required additional verbal cues this date for attention to task, pt perseverating on anxiety and overall slow progress (per his perspective). Pt continues to make progress w/ STS requiring less assistance but cues for hand placement and foot placement. Pt continues to benefit from skilled OT services to maximize independence w/ ADLs and fxl transfers. Cont OT POC. Activity Tolerance: Patient tolerated evaluation without incident;Patient tolerated treatment well  Discharge Recommendations: 24 hour supervision or assist;Continue to assess pending progress;Home with Home health OT      Second session:  Pt seated in w/c upon OT arrival. Brother present. Pt denied pain and agreeable to session.  Pt t/f w/c <-> edge of mat w/ mod A x1 toward R side. Seated on mat table, pt WB thru L hand ~1 min using wooden step to support. Pt reporting significant pain, more tolerable to WB thru forearm. Pt agreeable to estim. See below for parameters. Paired w/ PROM in order to facilitate ROM. During elbow flexion, pt was able to push into extension ~50% of the time w/ cues. Upon return to room, pt reporting urgent need to urinate. Assist to remove brief and pants and place urinal w/ pt seated in w/c. Pt voided 150mL. Pt left seated in w/c w/ chair alarm on and call light in reach. Pt tolerated session well this date, more tolerable to estim w/ less pain. Music was played in order to offer encouragement and provide distraction. Pt continues to require skilled therapy services in order to increase independence w/ ADLs and fxl transfers. Cont OT POC. Left; Upper armElectrical Stimulation Location. Left; Upper arm. Electrical Stimulation Action Estim applied to L biceps to facilitate elbow flexion for 10 mins. Estim applied to L triceps to facilitate elbow extension for 5 mins. Electrical Stimulation Parameters 40--55 mA CC, 5 seconds on/5 seconds off for biceps; 45 mA CC, 5 seconds on/5 seconds off for triceps. Electrical Stimulation Goals Neuromuscular Facilitation           Patient Education  Education  Education Given To: Patient; Family  Education Provided: Role of Therapy;Plan of Care;Family Education;Mobility Training;Transfer Training; Safety; Fall Prevention Strategies; Home Exercise Program  Education Method: Demonstration;Verbal  Barriers to Learning: Cognition  Education Outcome: Verbalized understanding;Demonstrated understanding    Safety Devices  Safety Devices in place: Yes  Type of devices: Call light within reach; Chair alarm in place; Left in chair    Plan  Occupational Therapy Plan  Times Per Week: 5 days per week, 90 mins  Current Treatment Recommendations: Strengthening;ROM;Balance training;Functional mobility training; Endurance training;Self-Care / ADL; Patient/Caregiver education & training;Neuromuscular re-education;Cognitive/Perceptual training; Safety education & training    Goals  Patient Goals   Patient goals : go home with my wife  Short Term Goals  Time Frame for Short Term Goals: By 2 weeks - all goals ongoing  Short Term Goal 1: Pt will complete LB dressing with mod A  Short Term Goal 2: Pt will complete toileting with mod A  Short Term Goal 3: Pt will complete toilet and functional transfers with mod A  Short Term Goal 4: Pt will complete UB dressing with min A  Short Term Goal 5: Pt will complete UE HEP x 10 reps for improved R UE coordination for ADLs  Long Term Goals  Time Frame for Long Term Goals : By 4 weeks  Long Term Goal 1: Pt will complete LB dressing with CGA  Long Term Goal 2: Pt will complete toileting with CGA  Long Term Goal 3: Pt will complete toilet and functional transfers with CGA. Long Term Goal 4: Pt will complete UB dressing with SPV.   Long Term Goal 5: Pt will complete simple IADL with LRAD at mod I          Therapy Time   Individual Concurrent Group Co-treatment   Time In       1115   Time Out       1200   Minutes       45       Therapy Time   Individual Concurrent Group Co-treatment   Time In  1245        Time Out  1330        Minutes  45          Timed Code Treatment Minutes: 45 + 45 = 90 min     Total Treatment Minutes: 90 min     Angela Bryson OT

## 2023-02-16 NOTE — PROGRESS NOTES
Pt in bed, alert and oriented. VSS. 6/10 pain reported in lt hip, does not want Ultram, gave Tylenol, Voltaren, and a muscle relaxer. All safety measures are in place. Call light within reach. Will continue to monitor.     Vitals:    02/15/23 1947   BP: 121/75   Pulse: 77   Resp: 16   Temp: 97.7 °F (36.5 °C)   SpO2: 96%

## 2023-02-16 NOTE — CARE COORDINATION
CM following. Pt plans to dc home with spouse and home care on 2/24. Pt will need wheelchair for dc.

## 2023-02-16 NOTE — PROGRESS NOTES
Speech Language Pathology  ACUTE REHAB UNIT  SPEECH/LANGUAGE PATHOLOGY      [x] Daily  [x] Weekly Care Conference Note  [] Discharge    Patient:Jeffrey A Barnett Krabbe  JVI:[id number]  Rehab Dx/Hx: Acute CVA (cerebrovascular accident) ([facility] [address] [I63.9]    Precautions: [x] Aspiration  [x] Fall risk  [] Sternal  [] Seizure [] Hip  [] Weight Bearing [] Other  ST Dx: [] Aphasia  [x] Dysarthria  [] Apraxia   [x] Oropharyngeal dysphagia [x] Cognitive Impairment  [] Other:   Date of Admit: 1/29/2023  Room #: 3102/3102-01  Date: 2/16/2023          Current Diet Order:ADULT ORAL NUTRITION SUPPLEMENT; Breakfast, Dinner; Standard High Calorie/High Protein Oral Supplement  ADULT DIET; Dysphagia - Soft and Bite Sized   Recommended Form of Meds: Meds in puree  Compensatory Swallowing Strategies : Neutral head positioning, placement of solid PO on right, one bite at a time, check for pocketing and oral residue. Benefits from use of mirror. Previous MBS: 1/26/23  Oral Phase  Mild-moderate dysfunction characterized by incomplete labial seal with anterior spillage, slowed/reduced mastication, mild stasis. Pharyngeal Phase  Moderate dysfunction characterized by impairments in timing, strength and coordination with premature bolus loss, reduced base of tongue retraction, delayed/reduced hyolaryngeal mechanics, and reduced pharyngeal constriction. Resulted in vallecular/pyriform/pharyngeal residue, as well as compromised airway protection with penetration and gross tracheal aspiration of thin and mildly thick liquids; strong reactive cough present but did not fully clear. Chin tuck strategy did not eliminate aspiration, however cued head turn LEFT with small straw sips was effective . Double swallow helped clear residual. Of note, throughout study pt with tendency to tilt head posteriorly which further exacerbated bolus control; benefited from cues for neutral positioning.   Upper Esophageal Screen  Indirectly assessed; appeared unremarkable. Repeat MBS: 2/13/23  Pt demonstrates a mild/moderate oropharyngeal dysphagia. Oral phase with reduced bolus cohesion resulting in head of bolus in pyriform sinus with tail of bolus in oral cavity with liquids without cues. Pt demo'd ability to complete oral hold of thin via tsp with cues but loss to floor of mouth. Pt with persistent delayed swallow initiation (inconsistent) to pyriform sinus with liquids, pudding spilling over top of epiglottis (did not fill vallecular space). Pt continues to demonstrate mildly reduced hyolaryngeal excursion initially with absent epiglottic distention progressing to full distention and there is mildly reduced tongue base retraction. Pt demonstrated mild residue in valleculae and pyriform sinus with x1 instance of residue from vallecula to posterior pharyngeal wall. Residue does increased with viscosity. Use of cued liquid rinse and cued effortful swallows reduced pharyngeal residue mildly. There was no aspiration or penetration during study. Attempted mendelsohn maneuver following study with model, tactile cues and verbal cues; pt not stimulable this date. 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   None indicated    Pain Intervention: [] RN notified  [] Repositioned  [] Intervention offered and patient declined  [x] N/A  [] Other: [] RN notified  [] Repositioned  [] Intervention offered and patient declined  [x] N/A  [] Other:    Subjective:     Pt seated upright in wheelchair. Brother present during second half of session. Pt seated upright in wheelchair. Brother present. Objective / Goals:      New goal s/p repeat MBS: Pt will demonstrate self feeding without labial residue across mealtime assessment without cues. Labial residue noted x2, however very trace amounts. When cued by SLP to use lingual sweep on outer portion of lips, pt able to effectively clear. No labial residue noted with trials this session. PROGRESSING; CONTINUE   New goal s/p repeat MBS: Pt will tolerate trials of advanced textures with adequate oral clearance as evidenced by <min oral residue and no reports of globus sensation across two mealtime assessments Not directly targeted via advanced solids. Trace oral residue remaining with soft and bite sized solids. Cleared well with MI use of lingual sweep, finger sweep and liquid wash. Effortful swallows: x79 Trialed x22 small bites regular solids (lays chips). Pt demonstrated appropriate mastication and complete oral clearance with independent use of lingual/finger sweep and liquid wash. No cues from SLP required. Trace-no residue remaining across all trials. CTAR: x3 sets x15 PROGRESSING; CONTINUE   Goal 4: Pt will demonstrate safe feeding behavior as evidenced by small bolus size / appropriate rate without cues. X1 cue to reduce bite size. X1 cue to break large chip in half prior to placing in oral cavity. PROGRESSING; CONTINUE   Goal 5: Pt will tolerate least restrictive diet without respiratory decline. Throat clearing x2  Strong cough x1, after completion of swallow thin liquid. No overt s/s penetration/aspiration. WBC and Neutrophils Absolute WNL.  PROGRESSING; CONTINUE   Goal 2: The patient will demonstrate sustained attention to task for 2 minutes with <2 prompts. Improved attention at start, however as session progressed and increased distractions arose, increased redirections required. Improved attention for >2 minutes during targeted tasks. Pt redirected self x2 with independence. PROGRESSING; CONTINUE   Goal 3: The patient will demonstrate improved inhibition as evidenced by <3 prompts for impulsivity during a task. Improved internal self monitoring of inhibiting talking while PO in oral cavity. Only x2 instances throughout session where SLP prompted pt via verbal cue. Reduced talking with PO and pt stopped self in x3/3 occurrences when needed. During scanning task, pt initially starting with slow steady pace, however towards the end very rapid, impulsive rate noted. Min verbal/visual cues to re-attempt and reduce impulsivity. PROGRESSING; CONTINUE   Goal 4: The patient will demonstrate improved self monitoring/correcting for basic tasks with <mod cues. Improved monitoring for use of swallow strategies being used correctly and as needed given initial min, fading to MI at end of meal. Min cues to re-scan through tasks and ID errors. Cues fading to MI with subsequent trials. PROGRESSING; CONTINUE   Goal 5: The patient will tolerate ongoing cognitive linguistic assessment. Not targeted this session. Not targeted this session. CONTINUE   Goal 6: The patient will demonstrate comprehensibility in multiple communication environments without cues. Not targeted this session. Not targeted this session. PROGRESSING; CONTINUE   Other areas targeted: At end of session, pt stated \"I'm going to get myself back in bed\" and repositioned self to face towards bed in wheelchair. SLP sat next to pt and asked if safe to get up by self. Pt stated \"well I think I can do it\". SLP re-asked question and pt looked/read sign on wall, \"Please call, don't fall\".  SLP provided education regarding progress with transfers etc, however still requiring assistance for safety. Pt able to demonstrate teach back and appropriate use of call button to ask/receive help. Brother present and aware of information discussed. Education:   Educated re: swallow strategies, goals being targeted, progress made. Educated re: cognitive tasks targeted, swallow strategies/exercises. Safety Devices: [x] Call light within reach  [x] Chair alarm activated and connected to nurse call light system  [] Bed alarm activated   [x] Other: brother present [x] Call light within reach  [x] Chair alarm activated and connected to nurse call light system  [] Bed alarm activated   [x] Other: brother present. Assessment: Pt presents with known oropharyngeal dysphagia per MBS. Improved independence with use of swallow strategies. Reduced attention and press of speech continues to be pt's biggest barriers to task completion. Plan: Continue as per plan of care.        Interventions used this date:  [] Speech/Language Treatment  [] Instruction in HEP  [x] Dysphagia Treatment [x] Cognitive Treatment   [] Other:    Discharge recommendations:  [] Home independently  [x] Home with assistance []  24 hour supervision  [] ECF [] Other  Continued Tx Upon Discharge: ? [x] Yes    [] No    [] TBD based on progress while on ARU     [] Vital Stim indicated     [] Other:   Estimated discharge date: February 24th, 2023    Electronically signed by:  Georgette Kraft M.A., [facility]  Speech-Language Pathologist

## 2023-02-16 NOTE — PROGRESS NOTES
02/16/23 1022   Encounter Summary   Encounter Overview/Reason  Spiritual/Emotional Needs   Service Provided For: Patient; Family  (met w/ pt's brother in [place] prior to visiting pt)   Referral/Consult From: [facility]/kimberly community; Spouse;Friends/neighbors; Family members   Last Encounter    (es 2/16)   Complexity of Encounter Moderate   Begin Time 0930   End Time  1000   Total Time Calculated 30 min   Assessment/Intervention/Outcome   Assessment Coping; Other (Comment)  (expressed need for spiritual support. see note)   Intervention Active listening;Discussed belief system/[place] practices/kimberly;Discussed illness injury and its impact; Discussed meaning/purpose;Discussed relationship with God;Explored/Affirmed feelings, thoughts, concerns;Prayer (assurance of)/[place]   Outcome Connection/Belonging;Coping;Engaged in conversation;Expressed feelings, needs, and concerns;Expressed Gratitude;Receptive   Plan and Referrals   Plan/Referrals Other (Comment)  (as needed)   Patient has a lot of support from [place], family, and community. He is grateful for that. Strongly aware of God's presence in his life. He spoke today of the challenges he is facing (personal and kimberly related, which is fully intertwined for him). He struggles with finding patience. Is frustrated (as ) that electronics are fixed so simply and logically, but this type of recovery is less linear and takes an unknown amount of time. He said that he sometimes feels disconnected from God when frustrated or feels inpatient or not motivated. He fights those feelings and does PT/OT/ST \"for his family\"- he wants to be there for grandkids. We talked about how he has so many people cheering him on, he is grateful for that, but no one can fully understand what he is going through (except maybe God). No one has been in his shoes. He was apologetic for being \"negative\" when staff and I have been so supportive.   I told him that he should feel free to confide in me and talk about the challenges. Even though I can't know what he is going through, it's sometimes good to talk with someone outside of family. I care, but don't have the same complicated feelings that family might have when he shares frustrations and doubts. He was receptive to that. We had prayer together.

## 2023-02-16 NOTE — PROGRESS NOTES
Physical Therapy  Facility/Department: [facility] ACUTE REHAB UNIT  Rehabilitation Physical Therapy Treatment Note    NAME: Juanita Nuñez  : 1955 (34 y.o.)  MRN: [id number]  CODE STATUS: Full Code    Date of Service: 23       Restrictions:  Restrictions/Precautions: Fall Risk;Up as Tolerated  Position Activity Restriction  Other position/activity restrictions: up as tolerated, up in chair     SUBJECTIVE  Subjective  Subjective: Pt sitting  up in w/c when PT arrived. Pt  agreeable to PT. Pt reported that he had success with his bowels last pm and is no longer \"miserable\"  Pain: Intermittent c/o L hip pain and l shld pain with certan movements        Post Treatment Pain Screening         OBJECTIVE  Cognition  Arousal/Alertness: Appropriate responses to stimuli  Following Commands: Follows one step commands with repetition; Follows one step commands with increased time  Attention Span: Attends with cues to redirect; Difficulty attending to directions; Difficulty dividing attention  Safety Judgement: Decreased awareness of need for assistance;Decreased awareness of need for safety  Problem Solving: Assistance required to generate solutions;Assistance required to identify errors made;Assistance required to implement solutions  Insights: Decreased awareness of deficits  Initiation: Requires cues for some  Sequencing: Requires cues for some  Cognition Comment: Pt cont  req  VC/ TC to stay focused on task. Pt benefits from controlled environment with min distractions especially with auditory distractions. Deescalation skills used as a means to control his environment. (  one person giving commands, decreased volume of voice, simple directions as examples) Pt req VC for sequencing tasks  Orientation  Overall Orientation Status: Within Functional Limits  Orientation Level: Oriented to place;Oriented to situation;Oriented to person    Functional Mobility  Balance  Sitting Balance: Supervision (Sat on commode with S only. Maintained midline w/o difficulty.)  Standing Balance: Minimal assistance (CGA mostly with min A req for standing unsupported. PT facilitated erect posture with VC/ TC including manual contacts on L hip and throughout upper trunk to facilitate midline and symmetry)  Standing Balance  Activity: PT instructed pt with standing with initial contact req min A and then pt progressed to CGA/ Min A while performing  standing (noted in the BR when doing LB clothing management)Pt provided w/ VC for R hand placement used for WS to the R and then once obtained, pt was able to hold with CGA/ Min A. PT placed cards on a waist high surface R of moidline in which a WS was necessary to retrieve the cards. Pt then instructed to go from the R of midline to more central w/o promoting any  WS to L of midline. Pt retrieved multiple cards and placed with CGA/ MIN A w/ VC for pacing task. Pt patricia ~4.5 minutes in standing at a time. Pt then requested to use the restroom  Transfers  Surface: Wheelchair;Raised toilet Seat;From mat; To mat  Additional Factors: Hand placement cues; Verbal cues  Sit to Stand  Assistance Level: Minimal assistance;Contact guard assist  Skilled Clinical Factors: Min/  for sit to stand. Req midline cues via hand position plus manual contacts to hips. VC for sequencing the task was necessary  Stand to Sit  Assistance Level: Minimal assistance;Contact guard assist  Skilled Clinical Factors: VC for hand placement and to use head righting to fcailitate ant/ post WS allowing improved controlled descent. req max VC for sequencing but only CGA/ Min  to complete  Stand Pivot  Assistance Level: Minimal assistance;Contact guard assist (( w/c <>toilet, w/c <>mat table))  Skilled Clinical Factors: Step by step instructions with reminders of the end result req.  ( hand placment, foot placement , head righing with the end result of transf from point A to point B)Transf to the R with min A / CGA and to the L  with min A/  with step by step instructions      Environmental Mobility  Ambulation  Surface: Level surface  Device:  (TAE RW)  Distance: 30', 20' x2  Additional Factors: Verbal cues; Increased time to complete  Assistance Level: Requires x 2 assistance;Dependent (Mod/ min A x1 for facilitation of L swing through and min A for controlling the Advancement of the TAE walker.)  Gait Deviations: Slow robb;Decreased trunk rotation;Path deviations;Decreased step length bilateral;Decreased weight shift left;Narrow base of support  Skilled Clinical Factors: Therapy instructed pt with a step through gt pattern although pt tended to do a combination of step through/ step to gt pattern. To facilitate a step through, PT instructed pt with taking a designated # of steps in a 20' pathway. Pt req 37 steps initially and was able to reduce to 29 steps  Wheelchair  Surface: Level surface  Device: Standard wheelchair;Pressure Relieving Cushion (22\" w/c traded to an 18\" w/c to providing closer boundaries for  the L side. Cushion was also adjusted using a towel/rolled sheet to provide a wedge with ant build up. This minimized pt's tendency to ext hips for sliding ant in w/c)  Additional Factors: Verbal cues; Increased time to complete (VCs for L attention and avoiding objects in wilder)  Assistance Required to Manage Parts: All wheelchair parts;Brakes (Brake extension placed on the L with white table to assist pt with visually being able to locate it . Pt req Intermittent VC/ TC to locate the R brake)  Assistance Level for Propulsion: Supervision (Occasional assist for obstacle negotiation in hallway)  Propulsion Method: Right upper extremity;Right lower extremity  Propulsion Quality: Short strokes; Veers left  Propulsion Distance: 175'x2  Skilled Clinical Factors: Min VCs for L attention and staying on R side of hallwayas a compensatory technique to avoid environmental barriers.  Pt's brother , Adele Muñoz present and has been educated on A pt when pt is propelling w/c on the unit. Bhumika Reyes pt propelling w/c back to room. Second session: Pt sitting in w/c when PT arrived. Pt agreeable to therapy. PT and OT worked collaboratively to utilize the skills of 2 disciplines while maximizing functional mobility to obtain optimal potential.    Transfers  Surface: Wheelchair;( STS to M[place]ila)   Additional Factors: Hand placement cues; Verbal cues  Sit to Stand  Assistance Level: Minimal assistance  Skilled Clinical Factors: Min/  for sit to stand. Req midline cues via hand position plus manual contacts to hips. VC for sequencing the task  and for pacing the task was necessary  Stand to Sit TAE RW >w/c ( had 2 episodes of stand to sit with poor eccentric control req min A . Otherwise CGA with VC for the sequencing)  Assistance Level: Minimal assistance;Contact guard assist  Skilled Clinical Factors: Req max VC for sequencing but only CGA/ Min  to complete      Environmental Mobility  Ambulation  Surface: Level surface  Device:  (TAE RW)  Distance: 30', 20' x2  Additional Factors: Verbal cues; Increased time to complete  Assistance Level: Requires x 2 assistance;Dependent (Mod/ min A x1 for facilitation of L swing through and min A for controlling the Advancement of the TAE walker and maintaining the LUE intact on the RW.)  Gait Deviations: Slow robb;Decreased trunk rotation;Path deviations;Decreased step length bilateral;Decreased weight shift left;Narrow base of support  Skilled Clinical Factors: Therapy instructed pt with a step through gt pattern although pt tended to do a combination of step through/ step to gt pattern. To facilitate a step through, PT instructed pt with taking a designated # of steps in a 20' pathway. Pt req 37 steps initially and was able to reduce to 29 steps  Pt instructed with standing with a wider NAVJOT with the TAE RW and controlling it with upper trunk ( mostly the RUE) Pt patricia for ~ 3 minutes .     Wheelchair  Surface: Level surface  Device: Standard wheelchair;Pressure Relieving Cushion (22\" w/c traded to an 18\" w/c to providing closer boundaries for  the L side. Cushion was also adjusted using a towel/rolled sheet to provide a wedge with ant build up. This minimized pt's tendency to ext hips for sliding ant in w/c)  Additional Factors: Verbal cues;Increased time to complete (VCs for L attention and avoiding objects in wilder)  Assistance Required to Manage Parts: All wheelchair parts;Brakes (Brake extension placed on the L with white table to assist pt with visually being able to locate it .Pt req Intermittent VC/ TC to locate the R brake)  Assistance Level for Propulsion: Supervision (Occasional assist for obstacle negotiation in hallway)  Propulsion Method: Right upper extremity;Right lower extremity  Propulsion Quality: Short strokes;Veers left  Propulsion Distance: 175'x2  Skilled Clinical Factors: Min VCs for L attention and staying on R side of hallwayas a compensatory technique to avoid environmental barriers. Pt's brother , Kendall present and has been educated on A pt when pt is propelling w/c on the unit. Kendall S pt propelling w/c back to room.   Pt returned to room in w/c with call light and phone placed within reach. Chair alarm reactivated.              ASSESSMENT/PROGRESS TOWARDS GOALS       Assessment  Assessment: Pt presents with decreased  attention and L side in attention. Pt more engaged when using deescalation skills and was able to participate with greater ease. Pt demo active movement in both the LUE and the LE . Pt verbalized his anxiety on this date reporting that he is disappointed that he is not yet ready for d/c. Pt also expressed that he is happy to get any extra time here since he knows its only going to be a better for him in the long run.    Pt appears very motivated to improve but also appears to be getting \"depressed\" as his new reality becomes more apparent. Dr. Polk notified regarding this change. . Pt is well below baseline  and would benefit from continued therapy to maximize potential and increase functional mobility towards Ind to allow for a safer d/c to home. Activity Tolerance: Patient limited by fatigue;Treatment limited secondary to decreased cognition;Patient limited by endurance; Patient limited by pain  Discharge Recommendations: 24 hour supervision or assist;Continue to assess pending progress;Home with Home health PT  PT Equipment Recommendations  Equipment Needed: Yes  Other: 18' light weight w/c with flip back arm rests and standard leg rests. RUE arm trough also recommended    Goals  Patient Goals   Patient Goals : to go home  Short Term Goals  Time Frame for Short Term Goals: 2 weeks  Short Term Goal 1: Pt will complete bed mobility with moderate assist(Not addressed on this date)  Short Term Goal 2: Pt will complete sit<>stand transfer wtih moderate assist. Goal achieved  Short Term Goal 3: Pt will propel self in manual w/c for 150' with minimal assist.- met 2/2  Short Term Goal 4: Pt will ambulate 10' with moderate assist.Goal not achieved  Long Term Goals  Time Frame for Long Term Goals : 4 weeks (all ongoing)  Long Term Goal 1: Pt will complete bed mobility with CGA  Long Term Goal 2: Pt will complete sit<>Stand transfers with CGA  Long Term Goal 3: Pt will propel self in manual w/c for 250' with suprvision  Long Term Goal 4: Pt will ambulate 48' with CGA & LRAD    PLAN OF CARE/SAFETY  Physcial Therapy Plan  Days Per Week: 5 Days  Hours Per Day: 1 hour  Current Treatment Recommendations: Strengthening;Balance training;ROM; Functional mobility training;Neuromuscular re-education; Safety education & training;Patient/Caregiver education & training;Transfer training; Endurance training;Home exercise program;Gait training;Equipment evaluation, education, & procurement;Positioning; Therapeutic activities  Safety Devices  Type of Devices: Call light within reach;Nurse notified;Gait belt;Patient at risk for falls; Chair alarm in place; Left in chair  Restraints  Restraints Initially in Place: No    EDUCATION  Education  Education Given To: Patient  Education Provided: Mobility Training;Transfer Training;Energy Conservation; Fall Prevention Strategies  Education Provided Comments: PT cont with education on midline position from head to toes to facilitate sitting/ standing balance.   Education Method: Verbal  Barriers to Learning: Cognition  Education Outcome: Verbalized understanding;Continued education needed        Therapy Time   Individual Concurrent Group Co-treatment   Time In 0945         Time Out 4317         [address] Time:   Individual Concurrent Group Co-treatment   Time In       8157   Time Out       1200   Minutes       45   Timed Code Treatment Minutes:  65+93    Total Treatment Minutes:   Canelo 91, PT, 02/16/23 at 2:28 PM

## 2023-02-16 NOTE — PROGRESS NOTES
Department of Physical Medicine & Rehabilitation  Progress Note    Patient Identification:  Rubens Snow  [id number]  : 1955  Admit date: 2023    Chief Complaint: Acute CVA (cerebrovascular accident) [facility])    Subjective:   No new complaints overnight. Seen in PT this morning. He is going to stay in the ARU for another 7 days to improve overall function before going home. More internal rotation and quad strength today in his LUE and LLE. Working hard in therapy. Team conference today    ROS: No f/c, n/v, cp     Objective:  Patient Vitals for the past 24 hrs:   BP Temp Temp src Pulse Resp SpO2   23 0915 113/74 97.7 °F (36.5 °C) Oral 84 16 100 %   23 0400 117/74 -- -- 73 -- 100 %   02/15/23 1947 121/75 97.7 °F (36.5 °C) Oral 77 16 96 %   02/15/23 1400 -- -- -- 88 -- --       Const: Alert. No distress, pleasant. HEENT: Normocephalic, atraumatic. Normal sclera/conjunctiva. MMM. CV: Regular rate and rhythm. Resp: No respiratory distress. Lungs CTAB. Abd: Soft, nontender, nondistended, NABS+   Ext: No edema. Neuro: Alert, oriented, appropriately interactive. Psych: Cooperative, appropriate mood and affect    Laboratory data: Available via EMR. Last 24 hour lab  No results found for this or any previous visit (from the past 24 hour(s)). Therapy progress:  PT  Position Activity Restriction  Other position/activity restrictions: up as tolerated, up in chair  Objective     Sit to Stand: Maximum Assistance, 2 Person Assistance  Stand to Sit: 2 Person Assistance, Maximum Assistance     OT  PT Equipment Recommendations  Equipment Needed: Yes  Mobility Devices: Wheelchair  Other: 18' light weight w/c with flip back arm rests and standard leg rests.  RUE arm trough also recommended  Toilet - Technique:  (via rolling shower chair)  Equipment Used:  (rolling shower chair)  Toilet Transfers Comments: pt did not have urge to void, however rolled pt in bathroom via rolling shower chair. Max A x 2 to pivot from bed to rolling shower chair. Assessment        SLP          Body mass index is 22.4 kg/m². 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 Patient is on aspirin at home        HTN  Patient is on lisinopril 5mg daily at home  - monitor      HLD:  Patient is on Crestor 20mg and ezetimibe 10mg at home daily  -Continue home meds     Anxiety  Per chart, patient is on Valium 2mg at home. Possible could be contributor to his chest pain yesterday, notes he felt anxious. Denies taking Valium at home, however it is listed as a home med. Ativan 0.5mg once overnight   - prozac      Sleep disturbance  - Trazodone PRN  - benadryl PRN  -improving     Add low dose tizanidine for spasticity   - improving left leg tightness   - increase to 4mg q6h    Start Ritalin for inattention. - increase dose to 10mg   - monitor effects       Extended stay in ARU before discharge home       Rehab Progress: Improving  Anticipated Dispo: home  Services/DME: [facility]: 2/24    Team conference was held today on the patient and discussed directly with the patient utilizing their entire treatment team. Please see separate team note for details. Total treatment time for today's care >50 min. >50% of time spent counseling with patient and coordinating care.              Jyothi Oliver D.O. M.P.H  PM&R  2/16/2023  10:25 AM

## 2023-02-16 NOTE — PLAN OF CARE
Problem: Discharge Planning  Goal: Discharge to home or other facility with appropriate resources  Outcome: Progressing     Problem: Skin/Tissue Integrity  Goal: Absence of new skin breakdown  Description: 1. Monitor for areas of redness and/or skin breakdown  2. Assess vascular access sites hourly  3. Every 4-6 hours minimum:  Change oxygen saturation probe site  4. Every 4-6 hours:  If on nasal continuous positive airway pressure, respiratory therapy assess nares and determine need for appliance change or resting period.   Outcome: Progressing     Problem: ABCDS Injury Assessment  Goal: Absence of physical injury  Outcome: Progressing     Problem: Safety - Adult  Goal: Free from fall injury  2/15/2023 2015 by Jan Jenkins RN  Outcome: Progressing     Problem: Pain  Goal: Verbalizes/displays adequate comfort level or baseline comfort level  2/15/2023 2015 by Jan Jenkins RN  Outcome: Progressing     Problem: Chronic Conditions and Co-morbidities  Goal: Patient's chronic conditions and co-morbidity symptoms are monitored and maintained or improved  Outcome: Progressing

## 2023-02-17 DIAGNOSIS — I49.8 OTHER CARDIAC ARRHYTHMIA: Primary | ICD-10-CM

## 2023-02-17 LAB
ANION GAP SERPL CALCULATED.3IONS-SCNC: 10 MMOL/L (ref 3–16)
BASOPHILS ABSOLUTE: 0 K/UL (ref 0–0.2)
BASOPHILS RELATIVE PERCENT: 0.7 %
BUN BLDV-MCNC: 12 MG/DL (ref 7–20)
CALCIUM SERPL-MCNC: 9.4 MG/DL (ref 8.3–10.6)
CHLORIDE BLD-SCNC: 102 MMOL/L (ref 99–110)
CO2: 26 MMOL/L (ref 21–32)
CREAT SERPL-MCNC: 0.7 MG/DL (ref 0.8–1.3)
EOSINOPHILS ABSOLUTE: 0.1 K/UL (ref 0–0.6)
EOSINOPHILS RELATIVE PERCENT: 1.6 %
GFR SERPL CREATININE-BSD FRML MDRD: >60 ML/MIN/{1.73_M2}
GLUCOSE BLD-MCNC: 100 MG/DL (ref 70–99)
HCT VFR BLD CALC: 38 % (ref 40.5–52.5)
HEMOGLOBIN: 12.8 G/DL (ref 13.5–17.5)
LYMPHOCYTES ABSOLUTE: 1.9 K/UL (ref 1–5.1)
LYMPHOCYTES RELATIVE PERCENT: 32.2 %
MCH RBC QN AUTO: 31.9 PG (ref 26–34)
MCHC RBC AUTO-ENTMCNC: 33.7 G/DL (ref 31–36)
MCV RBC AUTO: 94.6 FL (ref 80–100)
MONOCYTES ABSOLUTE: 0.5 K/UL (ref 0–1.3)
MONOCYTES RELATIVE PERCENT: 7.9 %
NEUTROPHILS ABSOLUTE: 3.5 K/UL (ref 1.7–7.7)
NEUTROPHILS RELATIVE PERCENT: 57.6 %
PDW BLD-RTO: 12.9 % (ref 12.4–15.4)
PLATELET # BLD: 276 K/UL (ref 135–450)
PMV BLD AUTO: 7.7 FL (ref 5–10.5)
POTASSIUM REFLEX MAGNESIUM: 4.2 MMOL/L (ref 3.5–5.1)
RBC # BLD: 4.01 M/UL (ref 4.2–5.9)
SODIUM BLD-SCNC: 138 MMOL/L (ref 136–145)
WBC # BLD: 6 K/UL (ref 4–11)

## 2023-02-17 PROCEDURE — 99232 SBSQ HOSP IP/OBS MODERATE 35: CPT | Performed by: PHYSICAL MEDICINE & REHABILITATION

## 2023-02-17 PROCEDURE — 80048 BASIC METABOLIC PNL TOTAL CA: CPT

## 2023-02-17 PROCEDURE — 97116 GAIT TRAINING THERAPY: CPT | Performed by: PHYSICAL THERAPIST

## 2023-02-17 PROCEDURE — 97530 THERAPEUTIC ACTIVITIES: CPT

## 2023-02-17 PROCEDURE — 6370000000 HC RX 637 (ALT 250 FOR IP): Performed by: PHYSICAL MEDICINE & REHABILITATION

## 2023-02-17 PROCEDURE — 6360000002 HC RX W HCPCS: Performed by: PHYSICAL MEDICINE & REHABILITATION

## 2023-02-17 PROCEDURE — 85025 COMPLETE CBC W/AUTO DIFF WBC: CPT

## 2023-02-17 PROCEDURE — 97535 SELF CARE MNGMENT TRAINING: CPT

## 2023-02-17 PROCEDURE — 97130 THER IVNTJ EA ADDL 15 MIN: CPT

## 2023-02-17 PROCEDURE — 97129 THER IVNTJ 1ST 15 MIN: CPT

## 2023-02-17 PROCEDURE — 1280000000 HC REHAB R&B

## 2023-02-17 PROCEDURE — 2580000003 HC RX 258: Performed by: PHYSICAL MEDICINE & REHABILITATION

## 2023-02-17 PROCEDURE — 36415 COLL VENOUS BLD VENIPUNCTURE: CPT

## 2023-02-17 PROCEDURE — 97530 THERAPEUTIC ACTIVITIES: CPT | Performed by: PHYSICAL THERAPIST

## 2023-02-17 PROCEDURE — 92526 ORAL FUNCTION THERAPY: CPT

## 2023-02-17 RX ORDER — FLUOXETINE HYDROCHLORIDE 20 MG/1
20 CAPSULE ORAL DAILY
Status: DISCONTINUED | OUTPATIENT
Start: 2023-02-18 | End: 2023-02-24 | Stop reason: HOSPADM

## 2023-02-17 RX ADMIN — PANTOPRAZOLE SODIUM 40 MG: 40 TABLET, DELAYED RELEASE ORAL at 06:34

## 2023-02-17 RX ADMIN — METHYLPHENIDATE HYDROCHLORIDE 10 MG: 10 TABLET ORAL at 06:34

## 2023-02-17 RX ADMIN — NYSTATIN 500000 UNITS: 100000 SUSPENSION ORAL at 20:35

## 2023-02-17 RX ADMIN — DICLOFENAC SODIUM 4 G: 10 GEL TOPICAL at 10:40

## 2023-02-17 RX ADMIN — TIZANIDINE 4 MG: 4 TABLET ORAL at 06:40

## 2023-02-17 RX ADMIN — ROSUVASTATIN CALCIUM 40 MG: 20 TABLET, FILM COATED ORAL at 10:39

## 2023-02-17 RX ADMIN — ENOXAPARIN SODIUM 40 MG: 100 INJECTION SUBCUTANEOUS at 10:39

## 2023-02-17 RX ADMIN — DICLOFENAC SODIUM 4 G: 10 GEL TOPICAL at 20:35

## 2023-02-17 RX ADMIN — NYSTATIN 500000 UNITS: 100000 SUSPENSION ORAL at 10:39

## 2023-02-17 RX ADMIN — TIZANIDINE 4 MG: 4 TABLET ORAL at 20:35

## 2023-02-17 RX ADMIN — NYSTATIN 500000 UNITS: 100000 SUSPENSION ORAL at 13:59

## 2023-02-17 RX ADMIN — TRAZODONE HYDROCHLORIDE 50 MG: 50 TABLET ORAL at 20:35

## 2023-02-17 RX ADMIN — SENNOSIDES AND DOCUSATE SODIUM 2 TABLET: 50; 8.6 TABLET ORAL at 10:38

## 2023-02-17 RX ADMIN — SENNOSIDES AND DOCUSATE SODIUM 2 TABLET: 50; 8.6 TABLET ORAL at 20:35

## 2023-02-17 RX ADMIN — EZETIMIBE 10 MG: 10 TABLET ORAL at 10:38

## 2023-02-17 RX ADMIN — ASPIRIN 81 MG 81 MG: 81 TABLET ORAL at 10:38

## 2023-02-17 RX ADMIN — SODIUM CHLORIDE, PRESERVATIVE FREE 10 ML: 5 INJECTION INTRAVENOUS at 10:39

## 2023-02-17 RX ADMIN — FLUOXETINE 10 MG: 10 CAPSULE ORAL at 10:39

## 2023-02-17 RX ADMIN — ACETAMINOPHEN 650 MG: 325 TABLET, FILM COATED ORAL at 23:15

## 2023-02-17 RX ADMIN — Medication 5 MG: at 20:35

## 2023-02-17 ASSESSMENT — PAIN SCALES - GENERAL
PAINLEVEL_OUTOF10: 5
PAINLEVEL_OUTOF10: 7
PAINLEVEL_OUTOF10: 6
PAINLEVEL_OUTOF10: 0
PAINLEVEL_OUTOF10: 4

## 2023-02-17 ASSESSMENT — PAIN - FUNCTIONAL ASSESSMENT
PAIN_FUNCTIONAL_ASSESSMENT: ACTIVITIES ARE NOT PREVENTED
PAIN_FUNCTIONAL_ASSESSMENT: PREVENTS OR INTERFERES SOME ACTIVE ACTIVITIES AND ADLS
PAIN_FUNCTIONAL_ASSESSMENT: ACTIVITIES ARE NOT PREVENTED

## 2023-02-17 ASSESSMENT — PAIN DESCRIPTION - DESCRIPTORS
DESCRIPTORS: THROBBING
DESCRIPTORS: ACHING;DISCOMFORT;CRAMPING
DESCRIPTORS: ACHING;DISCOMFORT

## 2023-02-17 ASSESSMENT — PAIN DESCRIPTION - ORIENTATION
ORIENTATION: LEFT

## 2023-02-17 ASSESSMENT — PAIN DESCRIPTION - LOCATION
LOCATION: TOE (COMMENT WHICH ONE)
LOCATION: HIP;WRIST
LOCATION: WRIST

## 2023-02-17 NOTE — PROGRESS NOTES
Department of Physical Medicine & Rehabilitation  Progress Note    Patient Identification:  Jason Fontanez  [id number]  : 1955  Admit date: 2023    Chief Complaint: Acute CVA (cerebrovascular accident) ([facility]CC)    Subjective:   Feeling better today and working on his left arm improvement. Tricep strength improvement. Working hard in therapy. No new complaints overnight. Seen in his room today with his wife.     ROS: No f/c, n/v, cp     Objective:  Patient Vitals for the past 24 hrs:   BP Temp Temp src Pulse Resp SpO2   23 1037 106/72 97.2 °F (36.2 °C) Oral 65 16 98 %   23 2216 102/63 97.9 °F (36.6 °C) Oral 69 16 96 %       Const: Alert. No distress, pleasant.   HEENT: Normocephalic, atraumatic. Normal sclera/conjunctiva. MMM.   CV: Regular rate and rhythm.   Resp: No respiratory distress. Lungs CTAB.   Abd: Soft, nontender, nondistended, NABS+   Ext: No edema.   Neuro: Alert, oriented, appropriately interactive.   Psych: Cooperative, appropriate mood and affect    Laboratory data: Available via EMR.   Last 24 hour lab  Recent Results (from the past 24 hour(s))   Basic Metabolic Panel w/ Reflex to MG    Collection Time: 23  6:49 AM   Result Value Ref Range    Sodium 138 136 - 145 mmol/L    Potassium reflex Magnesium 4.2 3.5 - 5.1 mmol/L    Chloride 102 99 - 110 mmol/L    CO2 26 21 - 32 mmol/L    Anion Gap 10 3 - 16    Glucose 100 (H) 70 - 99 mg/dL    BUN 12 7 - 20 mg/dL    Creatinine 0.7 (L) 0.8 - 1.3 mg/dL    Est, Glom Filt Rate >60 >60    Calcium 9.4 8.3 - 10.6 mg/dL   CBC auto differential    Collection Time: 23  6:49 AM   Result Value Ref Range    WBC 6.0 4.0 - 11.0 K/uL    RBC 4.01 (L) 4.20 - 5.90 M/uL    Hemoglobin 12.8 (L) 13.5 - 17.5 g/dL    Hematocrit 38.0 (L) 40.5 - 52.5 %    MCV 94.6 80.0 - 100.0 fL    MCH 31.9 26.0 - 34.0 pg    MCHC 33.7 31.0 - 36.0 g/dL    RDW 12.9 12.4 - 15.4 %    Platelets 276 135 - 450 K/uL    MPV 7.7 5.0 - 10.5 fL    Neutrophils % 57.6 %     Lymphocytes % 32.2 %    Monocytes % 7.9 %    Eosinophils % 1.6 %    Basophils % 0.7 %    Neutrophils Absolute 3.5 1.7 - 7.7 K/uL    Lymphocytes Absolute 1.9 1.0 - 5.1 K/uL    Monocytes Absolute 0.5 0.0 - 1.3 K/uL    Eosinophils Absolute 0.1 0.0 - 0.6 K/uL    Basophils Absolute 0.0 0.0 - 0.2 K/uL                     Therapy progress:  PT  Position Activity Restriction  Other position/activity restrictions: up as tolerated, up in chair  Objective     Sit to Stand: Maximum Assistance, 2 Person Assistance  Stand to Sit: 2 Person Assistance, Maximum Assistance     OT  PT Equipment Recommendations  Equipment Needed: Yes  Mobility Devices: Wheelchair  Other: 18' light weight w/c with flip back arm rests and standard leg rests. RUE arm trough also recommended  Toilet - Technique:  (via rolling shower chair)  Equipment Used:  (rolling shower chair)  Toilet Transfers Comments: pt did not have urge to void, however rolled pt in bathroom via rolling shower chair. Max A x 2 to pivot from bed to rolling shower chair. Assessment        SLP          Body mass index is 22.4 kg/m². 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 Patient is on aspirin at home        HTN  Patient is on lisinopril 5mg daily at home  - monitor      HLD:  Patient is on Crestor 20mg and ezetimibe 10mg at home daily  -Continue home meds     Anxiety  Per chart, patient is on Valium 2mg at home. Possible could be contributor to his chest pain yesterday, notes he felt anxious. Denies taking Valium at home, however it is listed as a home med.  Ativan 0.5mg once overnight   - prozac      Sleep disturbance  - Trazodone PRN  - benadryl PRN  -improving     Add low dose tizanidine for spasticity   - improving left leg tightness   - increase to 4mg q6h    Start Ritalin for inattention.  - increase dose to 10mg   - monitor effects       Extended stay in ARU before discharge home       Rehab Progress: Improving  Anticipated Dispo: home  Services/DME: [facility]  ELOS: 2/24            BRAN DesirPEliH  PM&R  2/17/2023  12:43 PM

## 2023-02-17 NOTE — PLAN OF CARE
Problem: Safety - Adult  Goal: Free from fall injury  2/17/2023 0955 by Elena Gutierres  Outcome: Progressing  Note: Patient free from falls/ injury during duration of shift. Bed alarm on, call light w/in reach, bed in lowest position, non-skid socks on and fall sign posted outside door. Problem: Pain  Goal: Verbalizes/displays adequate comfort level or baseline comfort level  2/17/2023 0955 by Elena Gutierres  Outcome: Progressing  Note: No complaints of pain at this time. Will continue to monitor.

## 2023-02-17 NOTE — PROGRESS NOTES
Pt a/o x4. VSS. Denies N/V. Nih- 11. No complaints of pain at this time. Pt is a x2 assist to pivot to wheelchair. Reporting some L toe discomfort. Will continue to monitor patient.      Luanne Caceres RN

## 2023-02-17 NOTE — PROGRESS NOTES
Physical Therapy  Facility/Department: [facility] ACUTE REHAB UNIT  Rehabilitation Physical Therapy Treatment Note    NAME: Cindy Barbour  : 1955 (74 y.o.)  MRN: [id number]  CODE STATUS: Full Code    Date of Service: 23       Restrictions:   Restrictions/Precautions: Fall Risk;Up as Tolerated, untethered in w/c on unit w/ family members who have been trained     SUBJECTIVE  Subjective  Subjective: Pt sitting  up in w/c when PT arrived. Pt  agreeable to PT. Pt requested to use the restroom before session was started. ( Pt's wife present for session)  Pain: Intermittent c/o great toe pain on the L foot        Post Treatment Pain Screening         OBJECTIVE  Cognition  Arousal/Alertness: Appropriate responses to stimuli  Following Commands: Follows one step commands with repetition; Follows one step commands with increased time  Attention Span: Attends with cues to redirect; Difficulty attending to directions; Difficulty dividing attention  Safety Judgement: Decreased awareness of need for assistance;Decreased awareness of need for safety  Problem Solving: Assistance required to generate solutions;Assistance required to identify errors made;Assistance required to implement solutions  Insights: Decreased awareness of deficits  Initiation: Requires cues for some  Sequencing: Requires cues for some  Cognition Comment: Pt cont  req  VC/ TC to stay focused on task. Pt benefits from controlled environment with min distractions especially with auditory distractions. Deescalation skills used as a means to control his environment. (  one person giving commands, decreased volume of voice, simple directions as examples) Pt req VC for sequencing tasks  Orientation  Overall Orientation Status: Within Functional Limits    Functional Mobility  Balance  Sitting Balance: Supervision (Sat on commode with S only.  Maintained midline w/o difficulty.)  Standing Balance: Minimal assistance (CGA mostly with min A req for standing unsupported. PT facilitated erect posture with VC/ TC including manual contacts on L hip and throughout upper trunk to facilitate midline and symmetry)  Transfers  Surface: Wheelchair (w/c <>standing with lite gait and with toileting ( urinated in urinal while in standing with A of wife and therapists.)  Additional Factors: Hand placement cues; Verbal cues  Sit to Stand  Assistance Level: Minimal assistance;Contact guard assist  Skilled Clinical Factors: Min/  for sit to stand. Req midline cues via hand position plus manual contacts to hips. VC for sequencing the task was necessary. Also performed with use of lite gait  Stand to Sit  Assistance Level: Minimal assistance;Contact guard assist  Skilled Clinical Factors: VC for hand placement and to use head righting to fcailitate ant/ post WS allowing improved controlled descent. req max VC for sequencing but only CGA/ Min  to complete  Stand Pivot  Assistance Level: Minimal assistance;Contact guard assist (( w/c <>toilet, w/c <>mat table))  Skilled Clinical Factors: Step by step instructions with reminders of the end result req. ( hand placment, foot placement , head righing with the end result of transf from point A to point B)Transf to the R with min A / CGA and to the L  with min A/  with step by step instructions      Environmental Mobility  Wheelchair  Surface: Level surface  Additional Factors: Verbal cues; Increased time to complete  Assistance Required to Manage Parts: All wheelchair parts;Brakes  Assistance Level for Propulsion: Supervision  Propulsion Method: Right upper extremity;Right lower extremity  Propulsion Distance: Short distances going from room to hallway in prep for use of the lite gait  Skilled Clinical Factors: Note, therapy instructed pt's wife with Supervising pt while performing w/c laps with the intent to increase overall mobility and endurance.  Pt is allowed to be untethered from wall in w/c on unit with family members who have been trained on Sup for this task. ( so far, pt's brother Al Gonzalez and pt's wife)      Neuromuscular Education  Neuromuscular Comments: Pt worked collaboratively w/ PT/OT with use of the lite gait. Additional time req for set up to insure optimal use which req pt to move STS multiple times from w/c level, extended standing while donning the harness in which pt was able to maintain midline with VC/ TC. As pt fatigued, pt tends to rotate resulting in decreased balance and the LLE intermittently going into flex. Pt amb ~125' with the ( LG) with significant improvement with gt fluency as pt was able to advance the LLE with increased ease( note ( sleeve used on the L forefoot for decreased friction) To optimize the motor learning, PT assisted with proper WS when pt was stepping. OT supported the L elbow/ hand on the unit  and pt's wife assisted with linear guidance of the LG. Pt then verbalized that he needed to urinate again in which harness was removed and then reapplied req additional time. Pt wasled again x 70' with decreased quality of gt 2/2 to fatigue  especially with the RLE. ASSESSMENT/PROGRESS TOWARDS GOALS       Assessment  Assessment: Pt presents with decreased  attention and L side in attention. Pt more engaged when using deescalation skills and was able to participate with greater ease. The use of the LG appeared effective. Pt req use of the urinal 2x within 1 session req time and energy on pt's part that is very frustrating to pt. Pt appears very motivated to improve. Pt is well below baseline and would benefit from continued therapy to maximize potential and increase functional mobility towards Ind to allow for a safer d/c to home. Activity Tolerance: Patient limited by fatigue;Treatment limited secondary to decreased cognition;Patient limited by endurance; Patient limited by pain  Discharge Recommendations: 24 hour supervision or assist;Continue to assess pending progress;Home with Home health PT  PT Equipment Recommendations  Equipment Needed: Yes  Other: 18' light weight w/c with flip back arm rests and standard leg rests. RUE arm trough also recommended    Goals  Patient Goals   Patient Goals : to go home  Short Term Goals  Time Frame for Short Term Goals: 2 weeks  Short Term Goal 1: Pt will complete bed mobility with moderate assist(Not addressed on this date)  Short Term Goal 2: Pt will complete sit<>stand transfer wtih moderate assist. Goal achieved  Short Term Goal 3: Pt will propel self in manual w/c for 150' with minimal assist.- met 2/2  Short Term Goal 4: Pt will ambulate 10' with moderate assist.Goal not achieved  Long Term Goals  Time Frame for Long Term Goals : 4 weeks (all ongoing)  Long Term Goal 1: Pt will complete bed mobility with CGA  Long Term Goal 2: Pt will complete sit<>Stand transfers with CGA  Long Term Goal 3: Pt will propel self in manual w/c for 250' with suprvision  Long Term Goal 4: Pt will ambulate 48' with CGA & LRAD    PLAN OF CARE/SAFETY  Physcial Therapy Plan  Days Per Week: 5 Days  Hours Per Day: 1 hour  Current Treatment Recommendations: Strengthening;Balance training;ROM; Functional mobility training;Neuromuscular re-education; Safety education & training;Patient/Caregiver education & training;Transfer training; Endurance training;Home exercise program;Gait training;Equipment evaluation, education, & procurement;Positioning; Therapeutic activities  Safety Devices  Type of Devices: Call light within reach;Nurse notified;Gait belt;Patient at risk for falls; Chair alarm in place; Left in chair  Restraints  Restraints Initially in Place: No    EDUCATION  Education  Education Given To: Patient  Education Provided: Mobility Training;Transfer Training;Energy Conservation; Fall Prevention Strategies  Education Provided Comments: PT cont with education on the use of the LG as a motor learning tool to facilitate improved gt dynamics.   Education Method: Verbal  Barriers to Learning: Cognition  Education Outcome: Verbalized understanding;Continued education needed        Therapy Time   Individual Concurrent Group Co-treatment   Time In       0845   Time Out       [facility] [place], 02/17/23 at 3:53 PM

## 2023-02-17 NOTE — PROGRESS NOTES
Occupational Therapy  Facility/Department: [facility] ACUTE REHAB UNIT  Rehabilitation Occupational Therapy Daily Treatment Note    Date: 23  Patient Name: Jl Moon       Room: 8237/4017-21  MRN: [id number]  Account: [de-identified]   : 1955  (68 y.o.) Gender: male                    Past Medical History:  has a past medical history of Anxiety, Chest pain, Depression, Encounter for imaging to screen for metal prior to MRI, Hyperlipidemia, Hypertension, and Wears glasses. Past Surgical History:   has a past surgical history that includes [place] tooth extraction; Colonoscopy (2009); Cardiac catheterization (2008); Finger trigger release (3-); and Coronary angioplasty with stent. Restrictions  Restrictions/Precautions: Fall Risk;Up as Tolerated    Subjective  Subjective: Pt seated in w/c upon OT arrival w/ wife present. Co-tx indicated in order to maximize therapeutic potential.  Restrictions/Precautions: Fall Risk;Up as Tolerated             Objective     Cognition  Arousal/Alertness: Appropriate responses to stimuli  Following Commands: Follows one step commands with repetition; Follows one step commands with increased time  Attention Span: Attends with cues to redirect; Difficulty attending to directions; Difficulty dividing attention  Safety Judgement: Decreased awareness of need for assistance;Decreased awareness of need for safety  Problem Solving: Assistance required to generate solutions;Assistance required to identify errors made;Assistance required to implement solutions  Insights: Decreased awareness of deficits  Initiation: Requires cues for some  Sequencing: Requires cues for some  Orientation  Overall Orientation Status: Within Functional Limits         ADL  Putting On/Taking Off Footwear  Assistance Level: Maximum assistance  Skilled Clinical Factors: Assist to doff B socks and don new socks/gym shoes.   Toileting  Assistance Level: Requires x 2 assistance  Skilled Clinical Factors: Pt reporting the need to urinate. In standing from w/c using elevated bed height to support RUE, 1 helper assisted in steadying while other helper assisted in removing briefs/pants and placing urinal.          Functional Mobility  Device:  (Lite gait)  Assistance Level: Requires x 2 assistance  Skilled Clinical Factors: Pt ambulated 125\" in Lite gait w/ 3 helpers present. Wife stood in front of pt to assist w/ upright posture. OT facilitated WB thru L hand on handle bar and facilitated elbow to ensure appropriate positioning. PT assisted w/ weight shifting and movement of Lite gait. Pt tolerated well, required seated rest break d/t toileting needs. Pt then ambulated 70\" on second bout requiring additional assistance for weight shifting and midline orientation. Pt took seated rest break. Initiated a few steps w/o Lite gait w/ PT facilitating RUE while OT stood on L side to appropriately handle LUE. Sit to Stand  Assistance Level: Minimal assistance; Moderate assistance  Skilled Clinical Factors: Pt requires min-mod A to stand from w/c either to bed height, w/o device, Lite gait, etc. Assist to handle LUE in stands to protect shoulder. Multiple stands completed this date, specifically to adjust and place harness for Lite gait. Stand to Sit  Assistance Level: Contact guard assist  Skilled Clinical Factors: Pt required CGA for controlled descent onto surface. Assessment  Assessment  Assessment: Pt tolerated session well, ambulating w/ Lite gait this date. Pt also continues to demo improvement w/ bladder management, knowing when he needs to void and being able to use urinal w/ assistance to promote urinary continence. Pt continues to demo decreased balance and motor planning w/ fxl ambulation. Pt continues to benefit from skilled OT services to maximize independence w/ ADLs and fxl transfers. Cont OT POC.   Activity Tolerance: Patient tolerated treatment well;Patient tolerated evaluation without incident  Discharge Recommendations: 24 hour supervision or assist;Continue to assess pending progress;Home with Home health OT  Safety Devices  Safety Devices in place: Yes  Type of devices: Chair alarm in place    Patient Education  Education  Education Given To: Patient; Family  Education Provided: Role of Therapy;Plan of Care;Family Education;Mobility Training;Transfer Training; Safety; Fall Prevention Strategies; Home Exercise Program  Education Method: Demonstration;Verbal  Barriers to Learning: Cognition  Education Outcome: Verbalized understanding;Demonstrated understanding    Plan  Occupational Therapy Plan  Times Per Week: 5 days per week, 90 mins  Current Treatment Recommendations: Strengthening;ROM;Balance training;Functional mobility training; Endurance training;Self-Care / ADL; Patient/Caregiver education & training;Neuromuscular re-education;Cognitive/Perceptual training; Safety education & training    Goals  Patient Goals   Patient goals : go home with my wife  Short Term Goals  Time Frame for Short Term Goals: By 2 weeks - all goals ongoing  Short Term Goal 1: Pt will complete LB dressing with mod A  Short Term Goal 2: Pt will complete toileting with mod A  Short Term Goal 3: Pt will complete toilet and functional transfers with mod A  Short Term Goal 4: Pt will complete UB dressing with min A  Short Term Goal 5: Pt will complete UE HEP x 10 reps for improved R UE coordination for ADLs  Long Term Goals  Time Frame for Long Term Goals : By 4 weeks  Long Term Goal 1: Pt will complete LB dressing with CGA  Long Term Goal 2: Pt will complete toileting with CGA  Long Term Goal 3: Pt will complete toilet and functional transfers with CGA. Long Term Goal 4: Pt will complete UB dressing with SPV.   Long Term Goal 5: Pt will complete simple IADL with LRAD at mod I        Therapy Time   Individual Concurrent Group Co-treatment   Time In       0845   Time Out       1023   Minutes       98       Timed Code Treatment Minutes:  98 min     Total Treatment Minutes: [place] 46 min     [facility], OT

## 2023-02-17 NOTE — PROGRESS NOTES
Pt in bed, alert and oriented. VSS. 6/10 pain reported in lt arm and hip, pain meds given. All safety measures are in place. Call light within reach. Will continue to monitor.     Vitals:    02/16/23 2216   BP: 102/63   Pulse: 69   Resp: 16   Temp: 97.9 °F (36.6 °C)   SpO2: 96%

## 2023-02-17 NOTE — PROGRESS NOTES
Speech Language Pathology  ACUTE REHAB UNIT  SPEECH/LANGUAGE PATHOLOGY      [x] Daily  [] Weekly Care Conference Note  [] Discharge    Patient:Jason Hickman  CVB:[id number]  Rehab Dx/Hx: Acute CVA (cerebrovascular accident) ([facility] [address] [I63.9]    Precautions: [x] Aspiration  [x] Fall risk  [] Sternal  [] Seizure [] Hip  [] Weight Bearing [] Other  ST Dx: [] Aphasia  [x] Dysarthria  [] Apraxia   [x] Oropharyngeal dysphagia [x] Cognitive Impairment  [] Other:   Date of Admit: 1/29/2023  Room #: 3102/3102-01  Date: 2/17/2023          Current Diet Order:ADULT ORAL NUTRITION SUPPLEMENT; Breakfast, Dinner; Standard High Calorie/High Protein Oral Supplement  ADULT DIET; Dysphagia - Soft and Bite Sized   Recommended Form of Meds: Meds in puree  Compensatory Swallowing Strategies : Neutral head positioning, placement of solid PO on right, one bite at a time, check for pocketing and oral residue. Benefits from use of mirror. Previous MBS: 1/26/23  Oral Phase  Mild-moderate dysfunction characterized by incomplete labial seal with anterior spillage, slowed/reduced mastication, mild stasis. Pharyngeal Phase  Moderate dysfunction characterized by impairments in timing, strength and coordination with premature bolus loss, reduced base of tongue retraction, delayed/reduced hyolaryngeal mechanics, and reduced pharyngeal constriction. Resulted in vallecular/pyriform/pharyngeal residue, as well as compromised airway protection with penetration and gross tracheal aspiration of thin and mildly thick liquids; strong reactive cough present but did not fully clear. Chin tuck strategy did not eliminate aspiration, however cued head turn LEFT with small straw sips was effective . Double swallow helped clear residual. Of note, throughout study pt with tendency to tilt head posteriorly which further exacerbated bolus control; benefited from cues for neutral positioning.   Upper Esophageal Screen  Indirectly assessed; appeared unremarkable. Repeat MBS: 2/13/23  Pt demonstrates a mild/moderate oropharyngeal dysphagia. Oral phase with reduced bolus cohesion resulting in head of bolus in pyriform sinus with tail of bolus in oral cavity with liquids without cues. Pt demo'd ability to complete oral hold of thin via tsp with cues but loss to floor of mouth. Pt with persistent delayed swallow initiation (inconsistent) to pyriform sinus with liquids, pudding spilling over top of epiglottis (did not fill vallecular space). Pt continues to demonstrate mildly reduced hyolaryngeal excursion initially with absent epiglottic distention progressing to full distention and there is mildly reduced tongue base retraction. Pt demonstrated mild residue in valleculae and pyriform sinus with x1 instance of residue from vallecula to posterior pharyngeal wall. Residue does increased with viscosity. Use of cued liquid rinse and cued effortful swallows reduced pharyngeal residue mildly. There was no aspiration or penetration during study. Attempted mendelsohn maneuver following study with model, tactile cues and verbal cues; pt not stimulable this date. 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 None reported.     Pain Intervention: [] RN notified  [] Repositioned  [] Intervention offered and patient declined  [] N/A  [] Other: [] RN notified  [] Repositioned  [] Intervention offered and patient declined  [x] N/A  [] Other:    Subjective:     Pt upright in chair with spouse present for duration of session. Pt upright in chair and agreeable to session. Pt's family present for session. Objective / Goals:     New goal s/p repeat MBS: Pt will demonstrate self feeding without labial residue across mealtime assessment without cues. Patient tolerated NMES via VitalStim placement 4a (targeting orbicularis oris, buccinator and superior pharyngeal constrictor) @ 3.0 mA on right and 7.0 mA on left x 32 minutes. Good contraction is noted. No labial residue throughout trials but may have been due to more solid textures vs puree. Pt spontaneously utilizing lingual and finger sweep to remove PO from left side. Goal not targeted this session. New goal s/p repeat MBS: Pt will tolerate trials of advanced textures with adequate oral clearance as evidenced by <min oral residue and no reports of globus sensation across two mealtime assessments Trialed soft & bite sized solids only this date. Pt endorsed globus sensation x2 in throat. Goal not targeted this session. Goal 4: Pt will demonstrate safe feeding behavior as evidenced by small bolus size / appropriate rate without cues. Appropriate rate control and bolus size throughout session. Pt endorsed taking 2 bolus at once but it did appear appropriate based on tolerance and improved oral clearance. Goal not targeted this session. Goal 5: Pt will tolerate least restrictive diet without respiratory decline. Pt with x1 instance of coughing with solids reporting \"that one didn't go down all the way\". Pt cued for x49 effortful swallows with soft solids and thin liquids via cup. Goal not targeted this session.     Goal 2: The patient will demonstrate sustained attention to task for 2 minutes with <2 prompts. Requires cues for task continuance during conversational exchanges. Requires cues for time management given time constraints on PO sessions. Few instances of tangents that required cue to return back to task. Goal 3: The patient will demonstrate improved inhibition as evidenced by <3 prompts for impulsivity during a task. Significantly improved. X2 instances of talking during mastication  X1 instance of impulsivity noted with novel leisure activity. X1 occurrence of impulsivity noted during map route task. Goal 4: The patient will demonstrate improved self monitoring/correcting for basic tasks with <mod cues. Pt able to identify his errors of talking during mastication but can not consistently inhibit as above. Readministered understanding medication labels: 70%, increased to 100% when provided with mod cues ; perseveration noted x1. Errors related would have resulted in med administration error. Map Route Task: Completed with 100% accuracy; x1 instance of self correcting error. Very good perspective taking. Min cues for using strategy for left sided attention. Goal 5: The patient will tolerate ongoing cognitive linguistic assessment. Goal not targeted this session. Goal not targeted this session. Goal 6: The patient will demonstrate comprehensibility in multiple communication environments without cues. Goal not targeted this session. Goal not targeted this session. Other areas targeted:     Education:   Education regarding skills targeted this date and improvements that continue to be noted. Education ongoing re: skills targeted and rationale for these.     Safety Devices: [x] Call light within reach  [x] Chair alarm activated and connected to nurse call light system  [] Bed alarm activated   [] Other: brother present [x] Call light within reach  [x] Chair alarm activated and connected to nurse call light system  [] Bed alarm activated   [] Other: brother present. Assessment: Cognitive linguistic impairment characterized by ongoing inattention and impulsivity with significant improvements noted. Pt with good insight identifying errors and characterizing these \"wow I got away from myself there\". MBSS revealed ongoing oropharyngeal dysphagia. Pt demonstrating improved tolerance of advanced textures due to ability to demo safe feeding behaviors. Plan: Continue as per plan of care. Interventions used this date:  [] Speech/Language Treatment  [] Instruction in HEP  [x] Dysphagia Treatment [x] Cognitive Treatment   [] Other:    Discharge recommendations:  [] Home independently  [x] Home with assistance []  24 hour supervision  [] ECF [] Other  Continued Tx Upon Discharge: ? [x] Yes    [] No    [] TBD based on progress while on ARU     [] Vital Stim indicated     [] Other:   Estimated discharge date: February 24th, 2023    Electronically signed by:  Elisa Gallo [facility]  Speech Language Pathology      Barbara Gan M.A., [place]  Speech-Language Pathologist    The speech-language pathologist was present, directed the patient's care, made skilled judgment and was responsible for assessment and treatment.

## 2023-02-18 ENCOUNTER — APPOINTMENT (OUTPATIENT)
Dept: GENERAL RADIOLOGY | Age: 68
DRG: 057 | End: 2023-02-18
Attending: PHYSICAL MEDICINE & REHABILITATION
Payer: MEDICARE

## 2023-02-18 PROCEDURE — 73100 X-RAY EXAM OF WRIST: CPT

## 2023-02-18 PROCEDURE — 6360000002 HC RX W HCPCS: Performed by: PHYSICAL MEDICINE & REHABILITATION

## 2023-02-18 PROCEDURE — 92526 ORAL FUNCTION THERAPY: CPT

## 2023-02-18 PROCEDURE — 99232 SBSQ HOSP IP/OBS MODERATE 35: CPT | Performed by: PHYSICAL MEDICINE & REHABILITATION

## 2023-02-18 PROCEDURE — 97130 THER IVNTJ EA ADDL 15 MIN: CPT

## 2023-02-18 PROCEDURE — 6370000000 HC RX 637 (ALT 250 FOR IP): Performed by: PHYSICAL MEDICINE & REHABILITATION

## 2023-02-18 PROCEDURE — 97129 THER IVNTJ 1ST 15 MIN: CPT

## 2023-02-18 PROCEDURE — 1280000000 HC REHAB R&B

## 2023-02-18 RX ADMIN — NYSTATIN 500000 UNITS: 100000 SUSPENSION ORAL at 12:59

## 2023-02-18 RX ADMIN — DICLOFENAC SODIUM 4 G: 10 GEL TOPICAL at 20:05

## 2023-02-18 RX ADMIN — PANTOPRAZOLE SODIUM 40 MG: 40 TABLET, DELAYED RELEASE ORAL at 05:57

## 2023-02-18 RX ADMIN — ROSUVASTATIN CALCIUM 40 MG: 20 TABLET, FILM COATED ORAL at 09:21

## 2023-02-18 RX ADMIN — DICLOFENAC SODIUM 4 G: 10 GEL TOPICAL at 09:29

## 2023-02-18 RX ADMIN — ACETAMINOPHEN 650 MG: 325 TABLET, FILM COATED ORAL at 20:04

## 2023-02-18 RX ADMIN — TIZANIDINE 4 MG: 4 TABLET ORAL at 20:04

## 2023-02-18 RX ADMIN — EZETIMIBE 10 MG: 10 TABLET ORAL at 09:21

## 2023-02-18 RX ADMIN — NYSTATIN 500000 UNITS: 100000 SUSPENSION ORAL at 20:04

## 2023-02-18 RX ADMIN — ACETAMINOPHEN 650 MG: 325 TABLET, FILM COATED ORAL at 09:26

## 2023-02-18 RX ADMIN — SENNOSIDES AND DOCUSATE SODIUM 2 TABLET: 50; 8.6 TABLET ORAL at 20:04

## 2023-02-18 RX ADMIN — METHYLPHENIDATE HYDROCHLORIDE 10 MG: 10 TABLET ORAL at 05:57

## 2023-02-18 RX ADMIN — DICLOFENAC SODIUM 4 G: 10 GEL TOPICAL at 12:59

## 2023-02-18 RX ADMIN — ENOXAPARIN SODIUM 40 MG: 100 INJECTION SUBCUTANEOUS at 09:21

## 2023-02-18 RX ADMIN — SENNOSIDES AND DOCUSATE SODIUM 2 TABLET: 50; 8.6 TABLET ORAL at 09:21

## 2023-02-18 RX ADMIN — ASPIRIN 81 MG 81 MG: 81 TABLET ORAL at 09:20

## 2023-02-18 RX ADMIN — NYSTATIN 500000 UNITS: 100000 SUSPENSION ORAL at 09:20

## 2023-02-18 RX ADMIN — NYSTATIN 500000 UNITS: 100000 SUSPENSION ORAL at 18:38

## 2023-02-18 RX ADMIN — TRAZODONE HYDROCHLORIDE 50 MG: 50 TABLET ORAL at 20:04

## 2023-02-18 RX ADMIN — Medication 5 MG: at 20:04

## 2023-02-18 RX ADMIN — FLUOXETINE 20 MG: 20 CAPSULE ORAL at 09:21

## 2023-02-18 ASSESSMENT — PAIN SCALES - GENERAL
PAINLEVEL_OUTOF10: 3
PAINLEVEL_OUTOF10: 3

## 2023-02-18 ASSESSMENT — PAIN DESCRIPTION - LOCATION
LOCATION: WRIST
LOCATION: WRIST

## 2023-02-18 ASSESSMENT — PAIN DESCRIPTION - ORIENTATION
ORIENTATION: LEFT
ORIENTATION: LEFT

## 2023-02-18 ASSESSMENT — PAIN DESCRIPTION - DESCRIPTORS
DESCRIPTORS: ACHING
DESCRIPTORS: ACHING

## 2023-02-18 ASSESSMENT — PAIN - FUNCTIONAL ASSESSMENT: PAIN_FUNCTIONAL_ASSESSMENT: ACTIVITIES ARE NOT PREVENTED

## 2023-02-18 ASSESSMENT — PAIN DESCRIPTION - PAIN TYPE: TYPE: CHRONIC PAIN

## 2023-02-18 NOTE — PROGRESS NOTES
Department of Physical Medicine & Rehabilitation  Progress Note    Patient Identification:  Sean Stanford  [id number]  : 1955  Admit date: 2023    Chief Complaint: Acute CVA (cerebrovascular accident) [facility])    Subjective:   Having pain in his left wrist overnight which is waking him up. He would like an xray to see if any issues persist from his fall at home. Improving daily in therapy. No other complaints slept well when not in pain last night. Seen in his room today. ROS: No f/c, n/v, cp     Objective:  Patient Vitals for the past 24 hrs:   BP Temp Temp src Pulse Resp SpO2   23 0900 111/74 98 °F (36.7 °C) Oral 79 18 97 %   23 2030 130/79 97.8 °F (36.6 °C) Oral 68 16 98 %   23 1037 106/72 97.2 °F (36.2 °C) Oral 65 16 98 %       Const: Alert. No distress, pleasant. HEENT: Normocephalic, atraumatic. Normal sclera/conjunctiva. MMM. CV: Regular rate and rhythm. Resp: No respiratory distress. Lungs CTAB. Abd: Soft, nontender, nondistended, NABS+   Ext: No edema. Neuro: Alert, oriented, appropriately interactive. Psych: Cooperative, appropriate mood and affect    Laboratory data: Available via EMR. Last 24 hour lab  No results found for this or any previous visit (from the past 24 hour(s)). Therapy progress:  PT  Position Activity Restriction  Other position/activity restrictions: up as tolerated, up in chair  Objective     Sit to Stand: Maximum Assistance, 2 Person Assistance  Stand to Sit: 2 Person Assistance, Maximum Assistance     OT  PT Equipment Recommendations  Equipment Needed: Yes  Mobility Devices: Wheelchair  Other: 18' light weight w/c with flip back arm rests and standard leg rests. RUE arm trough also recommended  Toilet - Technique:  (via rolling shower chair)  Equipment Used:  (rolling shower chair)  Toilet Transfers Comments: pt did not have urge to void, however rolled pt in bathroom via rolling shower chair.  Max A x 2 to pivot from bed to rolling shower chair. Assessment        SLP          Body mass index is 22.4 kg/m². 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 Patient is on aspirin at home        HTN  Patient is on lisinopril 5mg daily at home  - monitor      HLD:  Patient is on Crestor 20mg and ezetimibe 10mg at home daily  -Continue home meds     Anxiety  Per chart, patient is on Valium 2mg at home. Possible could be contributor to his chest pain yesterday, notes he felt anxious. Denies taking Valium at home, however it is listed as a home med. Ativan 0.5mg once overnight   - prozac      Sleep disturbance  - Trazodone PRN  - benadryl PRN  -improving     Add low dose tizanidine for spasticity   - improving left leg tightness   - increase to 4mg q6h    Start Ritalin for inattention.  - increase dose to 10mg   - monitor effects       Extended stay in ARU before discharge home     Xray left wrist  - Voltaren gel left wrist       Rehab Progress: Improving  Anticipated Dispo: home  Services/DME: [facility]  ELOS: 2/24            BRAN Rae.P.H  PM&R  2/18/2023  10:24 AM

## 2023-02-18 NOTE — PROGRESS NOTES
Shift assessment complete. VSS. Patient A/O x4. Patient C/O 6/10 pain to left hip area and left wrist, pain managed with PRN medications. Patient continues to be x2 stand/pivot to wheelchair. Call light and over bed table within reach. Safety measures in place. Hourly rounding and visual checks in place. Will continue to monitor.    Vitals:    02/17/23 2030   BP: 130/79   Pulse: 68   Resp: 16   Temp: 97.8 °F (36.6 °C)   SpO2: 98%

## 2023-02-19 PROCEDURE — 6370000000 HC RX 637 (ALT 250 FOR IP): Performed by: PHYSICAL MEDICINE & REHABILITATION

## 2023-02-19 PROCEDURE — 1280000000 HC REHAB R&B

## 2023-02-19 PROCEDURE — 6360000002 HC RX W HCPCS: Performed by: PHYSICAL MEDICINE & REHABILITATION

## 2023-02-19 PROCEDURE — 99232 SBSQ HOSP IP/OBS MODERATE 35: CPT | Performed by: PHYSICAL MEDICINE & REHABILITATION

## 2023-02-19 RX ADMIN — NYSTATIN 500000 UNITS: 100000 SUSPENSION ORAL at 19:52

## 2023-02-19 RX ADMIN — DICLOFENAC SODIUM 4 G: 10 GEL TOPICAL at 09:53

## 2023-02-19 RX ADMIN — ASPIRIN 81 MG 81 MG: 81 TABLET ORAL at 09:52

## 2023-02-19 RX ADMIN — TIZANIDINE 4 MG: 4 TABLET ORAL at 19:52

## 2023-02-19 RX ADMIN — PANTOPRAZOLE SODIUM 40 MG: 40 TABLET, DELAYED RELEASE ORAL at 05:56

## 2023-02-19 RX ADMIN — NYSTATIN 500000 UNITS: 100000 SUSPENSION ORAL at 13:40

## 2023-02-19 RX ADMIN — METHYLPHENIDATE HYDROCHLORIDE 10 MG: 10 TABLET ORAL at 05:56

## 2023-02-19 RX ADMIN — EZETIMIBE 10 MG: 10 TABLET ORAL at 09:52

## 2023-02-19 RX ADMIN — NYSTATIN 500000 UNITS: 100000 SUSPENSION ORAL at 09:52

## 2023-02-19 RX ADMIN — ROSUVASTATIN CALCIUM 40 MG: 20 TABLET, FILM COATED ORAL at 09:53

## 2023-02-19 RX ADMIN — Medication 5 MG: at 19:52

## 2023-02-19 RX ADMIN — TRAZODONE HYDROCHLORIDE 50 MG: 50 TABLET ORAL at 19:52

## 2023-02-19 RX ADMIN — DICLOFENAC SODIUM 4 G: 10 GEL TOPICAL at 19:52

## 2023-02-19 RX ADMIN — ACETAMINOPHEN 650 MG: 325 TABLET, FILM COATED ORAL at 19:52

## 2023-02-19 RX ADMIN — ENOXAPARIN SODIUM 40 MG: 100 INJECTION SUBCUTANEOUS at 09:52

## 2023-02-19 RX ADMIN — FLUOXETINE 20 MG: 20 CAPSULE ORAL at 09:52

## 2023-02-19 ASSESSMENT — PAIN DESCRIPTION - LOCATION: LOCATION: WRIST

## 2023-02-19 ASSESSMENT — PAIN SCALES - GENERAL: PAINLEVEL_OUTOF10: 3

## 2023-02-19 ASSESSMENT — PAIN - FUNCTIONAL ASSESSMENT: PAIN_FUNCTIONAL_ASSESSMENT: ACTIVITIES ARE NOT PREVENTED

## 2023-02-19 ASSESSMENT — PAIN DESCRIPTION - DESCRIPTORS: DESCRIPTORS: ACHING

## 2023-02-19 ASSESSMENT — PAIN DESCRIPTION - ORIENTATION: ORIENTATION: LEFT

## 2023-02-19 NOTE — CARE COORDINATION
SW saw Pt and spouse in hallway this morning. Pt in wheelchair doing laps, spouse at Pt's side. Spouse asked about Pt's Home Eval which will be done sometime early this week. Spouse stated if possible, Wednesday would not be the best day to do Home Eval as she needs to be at home.  FUAD explained that I will relay this info in Team Huddle tomorrow and get back with her tomorrow on date of Be June Gotti., [facility]  Case Management  [phone] MOLECULAR PCR

## 2023-02-19 NOTE — PLAN OF CARE
Problem: Discharge Planning  Goal: Discharge to home or other facility with appropriate resources  Recent Flowsheet Documentation  Taken 2/18/2023 2002 by Keyon Milan RN  Discharge to home or other facility with appropriate resources: Identify barriers to discharge with patient and caregiver     Problem: ABCDS Injury Assessment  Goal: Absence of physical injury  Recent Flowsheet Documentation  Taken 2/18/2023 2002 by Keyon Milan RN  Absence of Physical Injury: Implement safety measures based on patient assessment     Problem: Safety - Adult  Goal: Free from fall injury  Recent Flowsheet Documentation  Taken 2/18/2023 2002 by Keyon Milan RN  Free From Fall Injury: Instruct family/caregiver on patient safety     Problem: Pain  Goal: Verbalizes/displays adequate comfort level or baseline comfort level  Recent Flowsheet Documentation  Taken 2/18/2023 2002 by Keyon Milan RN  Verbalizes/displays adequate comfort level or baseline comfort level: Encourage patient to monitor pain and request assistance     Problem: Chronic Conditions and Co-morbidities  Goal: Patient's chronic conditions and co-morbidity symptoms are monitored and maintained or improved  Recent Flowsheet Documentation  Taken 2/18/2023 2002 by Ken Ayala 34 - Patient's Chronic Conditions and Co-Morbidity Symptoms are Monitored and Maintained or Improved: Monitor and assess patient's chronic conditions and comorbid symptoms for stability, deterioration, or improvement

## 2023-02-19 NOTE — PROGRESS NOTES
Department of Physical Medicine & Rehabilitation  Progress Note    Patient Identification:  Anton Coker  [id number]  : 1955  Admit date: 2023    Chief Complaint: Acute CVA (cerebrovascular accident) [facility])    Subjective:   No new issues overnight. He is working on transferring without a steady and already went up and down the halls in his wheelchair. Sim Ledezma in his room today with his wife. Discussed MOA of CVA and prognosis today. ROS: No f/c, n/v, cp     Objective:  Patient Vitals for the past 24 hrs:   BP Temp Temp src Pulse Resp SpO2   23 115/68 97.3 °F (36.3 °C) Oral 78 18 98 %       Const: Alert. No distress, pleasant. HEENT: Normocephalic, atraumatic. Normal sclera/conjunctiva. MMM. CV: Regular rate and rhythm. Resp: No respiratory distress. Lungs CTAB. Abd: Soft, nontender, nondistended, NABS+   Ext: No edema. Neuro: Alert, oriented, appropriately interactive. Psych: Cooperative, appropriate mood and affect    Laboratory data: Available via EMR. Last 24 hour lab  No results found for this or any previous visit (from the past 24 hour(s)). Therapy progress:  PT  Position Activity Restriction  Other position/activity restrictions: up as tolerated, up in chair  Objective     Sit to Stand: Maximum Assistance, 2 Person Assistance  Stand to Sit: 2 Person Assistance, Maximum Assistance     OT  PT Equipment Recommendations  Equipment Needed: Yes  Mobility Devices: Wheelchair  Other: 18' light weight w/c with flip back arm rests and standard leg rests. RUE arm trough also recommended  Toilet - Technique:  (via rolling shower chair)  Equipment Used:  (rolling shower chair)  Toilet Transfers Comments: pt did not have urge to void, however rolled pt in bathroom via rolling shower chair. Max A x 2 to pivot from bed to rolling shower chair. Assessment        SLP          Body mass index is 22.4 kg/m².     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 Patient is on aspirin at home        HTN  Patient is on lisinopril 5mg daily at home  - monitor      HLD:  Patient is on Crestor 20mg and ezetimibe 10mg at home daily  -Continue home meds     Anxiety  Per chart, patient is on Valium 2mg at home. Possible could be contributor to his chest pain yesterday, notes he felt anxious. Denies taking Valium at home, however it is listed as a home med. Ativan 0.5mg once overnight   - prozac      Sleep disturbance  - Trazodone PRN  - benadryl PRN  -improving     Add low dose tizanidine for spasticity   - improving left leg tightness   - increase to 4mg q6h    Start Ritalin for inattention.  - increase dose to 10mg   - monitor effects       Extended stay in ARU before discharge home     Xray left wrist  - Voltaren gel left wrist       Rehab Progress: Improving  Anticipated Dispo: home  Services/DME: [place]  ELOS: 2/24            Alpha Sat, D.O. M.P.H  PM&R  2/19/2023  11:30 AM

## 2023-02-19 NOTE — PROGRESS NOTES
Shift assessment complete. VSS. A/Ox4. Wife at bedside. Fall precautions in place. Denies pain or discomforts. Call light in reach. Will continue to monitor.      Vitals:    02/19/23 0830   BP: 121/61   Pulse: 81   Resp: 17   Temp: 97.3 °F (36.3 °C)   SpO2: 98%

## 2023-02-19 NOTE — PROGRESS NOTES
Pt A & O. VSS. Pt x 2 st pivot. All safety precautions in place. Bed alarm activated. Non skid socks on. No complaints of nausea voiced. PRN Zanaflex and Trazadone for sleep. PRN Tylenol for left wrist pain. Calls out appropriately. Neuro checks remain unchanged. Will cont to monitor.      Vitals:    02/18/23 2002   BP: 115/68   Pulse: 78   Resp: 18   Temp: 97.3 °F (36.3 °C)   SpO2: 98%

## 2023-02-20 ENCOUNTER — TELEPHONE (OUTPATIENT)
Dept: CARDIOLOGY CLINIC | Age: 68
End: 2023-02-20

## 2023-02-20 PROCEDURE — 97116 GAIT TRAINING THERAPY: CPT | Performed by: PHYSICAL THERAPIST

## 2023-02-20 PROCEDURE — 97530 THERAPEUTIC ACTIVITIES: CPT

## 2023-02-20 PROCEDURE — 1280000000 HC REHAB R&B

## 2023-02-20 PROCEDURE — 6370000000 HC RX 637 (ALT 250 FOR IP): Performed by: PHYSICAL MEDICINE & REHABILITATION

## 2023-02-20 PROCEDURE — 99232 SBSQ HOSP IP/OBS MODERATE 35: CPT | Performed by: PHYSICAL MEDICINE & REHABILITATION

## 2023-02-20 PROCEDURE — 97129 THER IVNTJ 1ST 15 MIN: CPT

## 2023-02-20 PROCEDURE — 97535 SELF CARE MNGMENT TRAINING: CPT

## 2023-02-20 PROCEDURE — 97112 NEUROMUSCULAR REEDUCATION: CPT

## 2023-02-20 PROCEDURE — 97530 THERAPEUTIC ACTIVITIES: CPT | Performed by: PHYSICAL THERAPIST

## 2023-02-20 PROCEDURE — 97130 THER IVNTJ EA ADDL 15 MIN: CPT

## 2023-02-20 PROCEDURE — 6360000002 HC RX W HCPCS: Performed by: PHYSICAL MEDICINE & REHABILITATION

## 2023-02-20 PROCEDURE — 92526 ORAL FUNCTION THERAPY: CPT

## 2023-02-20 RX ADMIN — NYSTATIN 500000 UNITS: 100000 SUSPENSION ORAL at 12:45

## 2023-02-20 RX ADMIN — FLUOXETINE 20 MG: 20 CAPSULE ORAL at 08:06

## 2023-02-20 RX ADMIN — NYSTATIN 500000 UNITS: 100000 SUSPENSION ORAL at 20:48

## 2023-02-20 RX ADMIN — TIZANIDINE 4 MG: 4 TABLET ORAL at 01:59

## 2023-02-20 RX ADMIN — PANTOPRAZOLE SODIUM 40 MG: 40 TABLET, DELAYED RELEASE ORAL at 06:12

## 2023-02-20 RX ADMIN — DIPHENHYDRAMINE HYDROCHLORIDE 25 MG: 25 TABLET ORAL at 01:02

## 2023-02-20 RX ADMIN — ENOXAPARIN SODIUM 40 MG: 100 INJECTION SUBCUTANEOUS at 08:07

## 2023-02-20 RX ADMIN — DICLOFENAC SODIUM 4 G: 10 GEL TOPICAL at 08:08

## 2023-02-20 RX ADMIN — DICLOFENAC SODIUM 4 G: 10 GEL TOPICAL at 20:47

## 2023-02-20 RX ADMIN — ACETAMINOPHEN 650 MG: 325 TABLET, FILM COATED ORAL at 12:45

## 2023-02-20 RX ADMIN — METHYLPHENIDATE HYDROCHLORIDE 10 MG: 10 TABLET ORAL at 06:12

## 2023-02-20 RX ADMIN — POLYVINYL ALCOHOL 1 DROP: 14 SOLUTION/ DROPS OPHTHALMIC at 08:08

## 2023-02-20 RX ADMIN — NYSTATIN 500000 UNITS: 100000 SUSPENSION ORAL at 18:01

## 2023-02-20 RX ADMIN — EZETIMIBE 10 MG: 10 TABLET ORAL at 08:06

## 2023-02-20 RX ADMIN — TIZANIDINE 4 MG: 4 TABLET ORAL at 20:47

## 2023-02-20 RX ADMIN — ROSUVASTATIN CALCIUM 40 MG: 20 TABLET, FILM COATED ORAL at 10:33

## 2023-02-20 RX ADMIN — NYSTATIN 500000 UNITS: 100000 SUSPENSION ORAL at 08:06

## 2023-02-20 RX ADMIN — TRAZODONE HYDROCHLORIDE 50 MG: 50 TABLET ORAL at 20:47

## 2023-02-20 RX ADMIN — Medication 5 MG: at 20:48

## 2023-02-20 RX ADMIN — ASPIRIN 81 MG 81 MG: 81 TABLET ORAL at 08:07

## 2023-02-20 RX ADMIN — ACETAMINOPHEN 650 MG: 325 TABLET, FILM COATED ORAL at 20:48

## 2023-02-20 RX ADMIN — TIZANIDINE 4 MG: 4 TABLET ORAL at 14:44

## 2023-02-20 ASSESSMENT — PAIN DESCRIPTION - ORIENTATION
ORIENTATION: RIGHT
ORIENTATION: LEFT

## 2023-02-20 ASSESSMENT — PAIN DESCRIPTION - DESCRIPTORS
DESCRIPTORS: ACHING
DESCRIPTORS: SORE

## 2023-02-20 ASSESSMENT — PAIN DESCRIPTION - PAIN TYPE: TYPE: ACUTE PAIN;CHRONIC PAIN

## 2023-02-20 ASSESSMENT — PAIN DESCRIPTION - FREQUENCY: FREQUENCY: INTERMITTENT

## 2023-02-20 ASSESSMENT — PAIN SCALES - GENERAL
PAINLEVEL_OUTOF10: 5
PAINLEVEL_OUTOF10: 4
PAINLEVEL_OUTOF10: 4
PAINLEVEL_OUTOF10: 2
PAINLEVEL_OUTOF10: 7
PAINLEVEL_OUTOF10: 7

## 2023-02-20 ASSESSMENT — PAIN DESCRIPTION - LOCATION
LOCATION: SHOULDER
LOCATION: SHOULDER
LOCATION: BACK;LEG
LOCATION: SHOULDER
LOCATION: SHOULDER

## 2023-02-20 ASSESSMENT — PAIN DESCRIPTION - ONSET: ONSET: ON-GOING

## 2023-02-20 NOTE — PROGRESS NOTES
Department of Physical Medicine & Rehabilitation  Progress Note    Patient Identification:  Jl Moon  [id number]  : 1955  Admit date: 2023    Chief Complaint: Acute CVA (cerebrovascular accident) [facility])    Subjective:   Continues to work hard in therapy. Showing improvement daily. No new pain overnight. He is going to work on stretching of his left arm today. Able to transfer today with min help. Seen in his room with PT.    ROS: No f/c, n/v, cp     Objective:  Patient Vitals for the past 24 hrs:   BP Temp Temp src Pulse Resp SpO2 Weight   23 0804 110/76 97.6 °F (36.4 °C) Oral 76 16 97 % --   23 0615 -- -- -- -- -- -- 169 lb 12.1 oz (77 kg)   23 1950 121/74 98 °F (36.7 °C) Oral 62 18 98 % --       Const: Alert. No distress, pleasant. HEENT: Normocephalic, atraumatic. Normal sclera/conjunctiva. MMM. CV: Regular rate and rhythm. Resp: No respiratory distress. Lungs CTAB. Abd: Soft, nontender, nondistended, NABS+   Ext: No edema. Neuro: Alert, oriented, appropriately interactive. Psych: Cooperative, appropriate mood and affect    Laboratory data: Available via EMR. Last 24 hour lab  No results found for this or any previous visit (from the past 24 hour(s)). Therapy progress:  PT  Position Activity Restriction  Other position/activity restrictions: up as tolerated, up in chair  Objective     Sit to Stand: Maximum Assistance, 2 Person Assistance  Stand to Sit: 2 Person Assistance, Maximum Assistance     OT  PT Equipment Recommendations  Equipment Needed: Yes  Mobility Devices: Wheelchair  Other: 18' light weight w/c with flip back arm rests and standard leg rests. RUE arm trough also recommended  Toilet - Technique:  (via rolling shower chair)  Equipment Used:  (rolling shower chair)  Toilet Transfers Comments: pt did not have urge to void, however rolled pt in bathroom via rolling shower chair.  Max A x 2 to pivot from bed to rolling shower chair. Assessment        SLP          Body mass index is 22.4 kg/m². 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 Patient is on aspirin at home        HTN  Patient is on lisinopril 5mg daily at home  - monitor      HLD:  Patient is on Crestor 20mg and ezetimibe 10mg at home daily  -Continue home meds     Anxiety  Per chart, patient is on Valium 2mg at home. Possible could be contributor to his chest pain yesterday, notes he felt anxious. Denies taking Valium at home, however it is listed as a home med. Ativan 0.5mg once overnight   - prozac      Sleep disturbance  - Trazodone PRN  - benadryl PRN  -improving     Add low dose tizanidine for spasticity   - improving left leg tightness   - increase to 4mg q6h    Start Ritalin for inattention.  - increase dose to 10mg   - monitor effects       Extended stay in ARU before discharge home     Xray left wrist- negative   - Voltaren gel left wrist     - stretch left wrist and shoulder     Rehab Progress: Improving  Anticipated Dispo: home  Services/DME: [facility]  ELOS: 2/24            Taco Patel D.O. M.P.H  PM&R  2/20/2023  11:16 AM

## 2023-02-20 NOTE — CARE COORDINATION
We discussed Pt's Home Eval in Team Huddle this morning. Home Eval will be tomorrow at 1130. SW met with Pt and spouse at bedside after meeting and discussed above - they are in agreement with plan and spouse will transport Pt. Emotional support provided. SW will continue to follow.      Cinthya Turner, [facility]  Case Management  [phone]

## 2023-02-20 NOTE — PROGRESS NOTES
Occupational Therapy  Facility/Department: [facility] ACUTE REHAB UNIT  Rehabilitation Occupational Therapy Daily Treatment Note    Date: 23  Patient Name: Kalyan Ruiz       Room: 8072/9272-04  MRN: [id number]  Account: [de-identified]   : 1955  (74 y.o.) Gender: male     Referring Practitioner: DO Jam  Diagnosis: Acute ischemic stroke  Additional Pertinent Hx: Pt admitted to ARU on . Hospital course: presented to [facility] 23 with complete left-sided paralysis, left-sided extinction, slurred speech following a fall. He had been vomiting after drinking at a bar, and all of a sudden his friends say he slumped over and fell down. He hit his left side of his head on the bathtub. CT w/o contrast was negative, pt received TNK in emergency room. Transferred to [facility].   Diagnostic cerebral angiogram 2. Mechanical thrombectomy for stroke 3. Right common femoral artery Angio-Seal closure arteriotomy. PMHx includes: anxiety, chest pain, depression, hyperlipidemia, HTN. Past Medical History:  has a past medical history of Anxiety, Chest pain, Depression, Encounter for imaging to screen for metal prior to MRI, Hyperlipidemia, Hypertension, and Wears glasses. Past Surgical History:   has a past surgical history that includes [place] tooth extraction; Colonoscopy (2009); Cardiac catheterization (2008); Finger trigger release (3-); and Coronary angioplasty with stent. Restrictions  Restrictions/Precautions: Fall Risk;Up as Tolerated  Other position/activity restrictions: up as tolerated, up in chair    Subjective  Subjective: Pt seated in wc upon arrival, wheeling himself down the hallway with wife. Restrictions/Precautions: Fall Risk;Up as Tolerated             Objective     Cognition  Arousal/Alertness: Appropriate responses to stimuli  Following Commands: Follows one step commands with repetition; Follows one step commands with increased time  Attention Span: Attends with cues to redirect; Difficulty attending to directions; Difficulty dividing attention  Safety Judgement: Decreased awareness of need for assistance;Decreased awareness of need for safety  Problem Solving: Assistance required to generate solutions;Assistance required to identify errors made;Assistance required to implement solutions  Insights: Decreased awareness of deficits  Initiation: Requires cues for some  Sequencing: Requires cues for some  Cognition Comment: Pt cont  req  VC/ TC to stay focused on task. Pt benefits from controlled environment with min distractions especially with auditory distractions. Deescalation skills used as a means to control his environment. (  one person giving commands, decreased volume of voice, simple directions as examples) Pt req VC for sequencing tasks         ADL  Toileting  Assistance Level: Minimal assistance  Skilled Clinical Factors: Standing with therapist to perform standing balance and L side of clothes while pt holding onto armrest of wc. Assist x 1 today. Toilet Transfers  Technique: Stand pivot; To the left; To the right  Equipment: Raised toilet seat with arms  Additional Factors: Increased time to complete;Cues for hand placement;Verbal cues  Assistance Level: Moderate assistance;Minimal assistance  Skilled Clinical Factors: Pt and wife requested to practice simulated toileting and toilet transfer in preparation for home discharge, as pt will not have grab bars on walls in bathroom at home. Pt completed stand pivot transfer to his L (wife reporting this is how they will do it at home), requiring mod A to pivot and assist to move L LE laterally. Pt then completed sit to stand from [facility] for clothing management up/ down with min A. Pt performed BSC to wc transfer going to his R side, performed with min A- assistance from wife.  Discussed having pt back into bathroom at home, transferring to his R side to get onto the toilet, then standing with wife to perform clothing tasks, then having wife turn wheelchair around so pt transfers from [facility] to wc going to his right as well. Tub/Shower Transfers  Type: Shower (simulated)  Transfer From: Wheelchair  Transfer To: Shower chair with back  Additional Factors: Verbal cues; Without handrails  Assistance Level: Moderate assistance;Minimal assistance  Skilled Clinical Factors: Simulated shower setup for practice for home use. Pt transferred going to his R side with min A, holding onto simulated tiled shower wall on his R hand to step R LE into shower, then sit onto shower chair. OT assisting with bringing L LE over 6\" threshhold into shower. Then OT assisting with bringing L LE out of shower, pt standing from shower chair with min A, and mod A to pivot to his wc on his L side. Wife did not feel comfortable assisting pt with this technique, reporting planning to have [facility] therapy or aides assisting when completing at home. *Possibly in future and during home eval, educate pt to bring R LE into shower first prior to standing, making pivoting easier to sit onto shower chair*          Functional Mobility  Device:  (lite gait)  Skilled Clinical Factors: Pt completed mobility in hallway with lite gait with CGA + min-mod A for weight shifting and LE coordination. OT assisting with postural control and management of L UE while PT focusing on gait and weight shifting. Physical therapist assisted with L LE ankle dorsiflexion ace wrap, pt demonstrated improved coordination and mobility, walking greater than household distance with CGA of one and min A of second. Neuromuscular Education  Facilitation techniques: 1) Assisting with postural control and scapular adduction via manual techniques. Therapist assisted with glenohumeral stability, elevating humerus into glenoid fossa prior to achieving scapular adduction. Encouraged pt to look to R side while adducting scapula.  2) Provided pt with kinesiotaping to his L shoulder for improved tactile cueing in shoulder girdle. Focusing on encouraging scapular adduction and glenohumeral support. Pt and wife educated on purpose of kinesiotaping, verb understanding. Educated pt to remove taping if irritating skin, verb understanding. Assessment  Assessment  Assessment: AM session: Pt demonstrated improved sit to stand transfers, progressing to assist x 1. Requiring assist x 2 for stair negotiation, however noted improvement with second attempt to ascend/ descend stairs. Pt completed mobility in hallway greater than household distance with litegait, progressing with standing balance during mobility. PM session: Pt tolerated funcitonal transfers to [facility], discussed setup for improved safety during transfers at home. Pt and wife verb understanding. Participated in simulated shower transfer, demonstrating min-mod A for in/ out of shower- increased difficulty stepping R LE over threshhold of shower. Pt would possibly have more success if R LE is inside shower prior to standing. Would benefit from continued practice, and likely practice in home environment prior to completing on their own. OT initiated kinesio tape to encourage scapular adduction and glenohumeral support. Pt would benefit from continued OT while in acute care, continue OT POC. Activity Tolerance: Patient limited by fatigue;Treatment limited secondary to decreased cognition;Patient limited by endurance; Patient limited by pain  Discharge Recommendations: 24 hour supervision or assist;Continue to assess pending progress;Home with Home health OT  OT Equipment Recommendations  Equipment Needed: Yes  ADL Assistive Devices: Shower Chair with back; Toileting - Raised Toilet Seat with arms;Grab Bars - toilet;Grab Bars - shower  Other: continue to assess for additional AE needs  Safety Devices  Safety Devices in place: Yes  Type of devices: Chair alarm in place    Patient Education  Education  Education Given To: Patient; Family  Education Provided: Role of Therapy;Plan of Care;Family Education;Mobility Training;Transfer Training; Safety; Fall Prevention Strategies; Home Exercise Program  Education Method: Demonstration;Verbal  Barriers to Learning: Cognition    Plan  Occupational Therapy Plan  Times Per Week: 5 days per week, 90 mins  Current Treatment Recommendations: Strengthening;ROM;Balance training;Functional mobility training; Endurance training;Self-Care / ADL; Patient/Caregiver education & training;Neuromuscular re-education;Cognitive/Perceptual training; Safety education & training    Goals  Patient Goals   Patient goals : go home with my wife  Short Term Goals  Time Frame for Short Term Goals: By 2 weeks - all goals ongoing  Short Term Goal 1: Pt will complete LB dressing with mod A  Short Term Goal 2: Pt will complete toileting with mod A  Short Term Goal 3: Pt will complete toilet and functional transfers with mod A  Short Term Goal 4: Pt will complete UB dressing with min A  Short Term Goal 5: Pt will complete UE HEP x 10 reps for improved R UE coordination for ADLs  Long Term Goals  Time Frame for Long Term Goals : By 4 weeks  Long Term Goal 1: Pt will complete LB dressing with CGA  Long Term Goal 2: Pt will complete toileting with CGA  Long Term Goal 3: Pt will complete toilet and functional transfers with CGA. Long Term Goal 4: Pt will complete UB dressing with SPV. Therapy Time   Individual Concurrent Group Co-treatment   Time In 5314     1115   Time Out 9836     5140   Minutes 60     55   Timed Code Treatment Minutes: Ismael 27.  [address] [facility], OTR/L [id number]

## 2023-02-20 NOTE — PROGRESS NOTES
Comprehensive Nutrition Assessment    RECOMMENDATIONS:  PO Diet: Dysphagia-Soft & Bite Sized  ONS: Ensure Plus HP bid  Nutrition Education: No recommendation at this time       NUTRITION ASSESSMENT:   Nutritional summary & status: Follow up: Pt continues on a Dysphagia-Soft and Bite Sized Diet. Meal intake varied, however, slight improvement at 1-25%, 26-50%, and %. Continues to receive Ensure Enlive HP bid with intake of %. Wt stable at 169 lbs x 2 weeks. Plans to to discharge home later in week. Will continue to follow. Admission/PMH: Admit; Acute CVA  PMHx: CAD, HTN, HLD    MALNUTRITION ASSESSMENT  Context of Malnutrition: Acute Illness   Malnutrition Status: At risk for malnutrition (Comment)  Findings of the 6 clinical characteristics of malnutrition (Minimum of 2 out of 6 clinical characteristics is required to make the diagnosis of moderate or severe Protein Calorie Malnutrition based on AND/ASPEN Guidelines):  Energy Intake:  Mild decrease in energy intake (Comment)  Weight Loss:  No significant weight loss     Body Fat Loss:  No significant body fat loss     Muscle Mass Loss:  No significant muscle mass loss    Fluid Accumulation:  No significant fluid accumulation     Strength:  Not Performed    NUTRITION DIAGNOSIS   Inadequate oral intake, Increased nutrient needs related to increase demand for energy/nutrients as evidenced by intake 26-50%, intake 51-75%, other (comment) (extended hospital stay(rehab))    Nutrition Monitoring and Evaluation:   Food/Nutrient Intake Outcomes:  Food and Nutrient Intake, Supplement Intake  Physical Signs/Symptoms Outcomes:  Biochemical Data, Nutrition Focused Physical Findings, Weight     OBJECTIVE DATA: Significant to nutrition assessment  Nutrition Related Findings: BLE trace edema. LBM 2/19. Glu 100. Lytes wnl.   Wounds: None  Nutrition Goals: PO intake 75% or greater, prior to discharge     [place] [address]; Breakfast, Dinner; Standard High Calorie/High Protein Oral Supplement  ADULT DIET; Dysphagia - Soft and Bite Sized  PO Intake: 1-25%, 26-50%, %   PO Supplement Intake:26-50%, 51-75%, %  Additional Sources of Calories/IVF:n/a     ANTHROPOMETRICS  Current Height: 6' 1\" (185.4 cm)  Current Weight: 169 lb 12.1 oz (77 kg)    Admission weight: 181 lb 10.5 oz (82.4 kg)  Ideal Body Weight (IBW): 184 lbs  (84 kg)    Usual Bodyweight     BMI: 22.4    COMPARATIVE STANDARDS  Energy (kcal):  2133-5974 (25-30 kcal/CBW/77 kg)     Protein (g):   (1.2-1.5 gm/CBW/77 kg)       Fluid (mL/day):  4168-4588 (1ml/kcal) or per MD    The patient will be monitored per nutrition standards of care. Consult dietitian if additional nutrition interventions are needed prior to RD reassessment.      Nancy Joel, [address] [facility]:  [phone]  Office:  [phone]

## 2023-02-20 NOTE — PLAN OF CARE
Problem: Discharge Planning  Goal: Discharge to home or other facility with appropriate resources  2/20/2023 1622 by Douglas Phillips RN  Outcome: Progressing     Problem: Safety - Adult  Goal: Free from fall injury  Outcome: Progressing     Problem: Pain  Goal: Verbalizes/displays adequate comfort level or baseline comfort level  Outcome: Progressing

## 2023-02-20 NOTE — PLAN OF CARE
Problem: Discharge Planning  Goal: Discharge to home or other facility with appropriate resources  Recent Flowsheet Documentation  Taken 2/19/2023 1950 by Michael Kauffman RN  Discharge to home or other facility with appropriate resources: Identify barriers to discharge with patient and caregiver     Problem: ABCDS Injury Assessment  Goal: Absence of physical injury  Recent Flowsheet Documentation  Taken 2/19/2023 1950 by Michael Kauffman RN  Absence of Physical Injury: Implement safety measures based on patient assessment     Problem: Safety - Adult  Goal: Free from fall injury  Recent Flowsheet Documentation  Taken 2/19/2023 1950 by Michael Kauffman RN  Free From Fall Injury: Instruct family/caregiver on patient safety     Problem: Pain  Goal: Verbalizes/displays adequate comfort level or baseline comfort level  Recent Flowsheet Documentation  Taken 2/19/2023 1950 by Michael Kauffman RN  Verbalizes/displays adequate comfort level or baseline comfort level: Encourage patient to monitor pain and request assistance     Problem: Chronic Conditions and Co-morbidities  Goal: Patient's chronic conditions and co-morbidity symptoms are monitored and maintained or improved  Recent Flowsheet Documentation  Taken 2/19/2023 1950 by Ken Jarquin 34 - Patient's Chronic Conditions and Co-Morbidity Symptoms are Monitored and Maintained or Improved: Monitor and assess patient's chronic conditions and comorbid symptoms for stability, deterioration, or improvement

## 2023-02-20 NOTE — PLAN OF CARE
Problem: Discharge Planning  Goal: Discharge to home or other facility with appropriate resources  Outcome: Progressing  Plan of care reviewed with pt and spouse. All questions answered at this time. Problem: Skin/Tissue Integrity  Goal: Absence of new skin breakdown  Outcome: Progressing  Turn and reposition q 2 hrs.

## 2023-02-20 NOTE — PROGRESS NOTES
ACUTE REHAB UNIT  SPEECH/LANGUAGE PATHOLOGY      [x] Daily  [] Weekly Care Conference Note  [] Discharge    Patient:Jason Barragan  OD  Rehab Dx/Hx: Acute CVA (cerebrovascular accident) ([facility] [address] [I63.9]    Precautions: [x] Aspiration  [x] Fall risk  [] Sternal  [] Seizure [] Hip  [] Weight Bearing [] Other  ST Dx: [] Aphasia  [x] Dysarthria  [] Apraxia   [x] Oropharyngeal dysphagia [x] Cognitive Impairment  [] Other:   Date of Admit: 2023  Room #: 3102/3102-01  Date: 2023          Current Diet Order:ADULT ORAL NUTRITION SUPPLEMENT; Breakfast, Dinner; Standard High Calorie/High Protein Oral Supplement  ADULT DIET; Dysphagia - Soft and Bite Sized   Recommended Form of Meds: Meds in puree  Compensatory Swallowing Strategies : Neutral head positioning, placement of solid PO on right, one bite at a time, check for pocketing and oral residue. Benefits from use of mirror. Limit distractions during meal time (only 1 person in room at a time)  Previous MBS: 23  Oral Phase  Mild-moderate dysfunction characterized by incomplete labial seal with anterior spillage, slowed/reduced mastication, mild stasis. Pharyngeal Phase  Moderate dysfunction characterized by impairments in timing, strength and coordination with premature bolus loss, reduced base of tongue retraction, delayed/reduced hyolaryngeal mechanics, and reduced pharyngeal constriction. Resulted in vallecular/pyriform/pharyngeal residue, as well as compromised airway protection with penetration and gross tracheal aspiration of thin and mildly thick liquids; strong reactive cough present but did not fully clear. Chin tuck strategy did not eliminate aspiration, however cued head turn LEFT with small straw sips was effective . Double swallow helped clear residual. Of note, throughout study pt with tendency to tilt head posteriorly which further exacerbated bolus control; benefited from cues for neutral positioning.   Upper Esophageal Screen  Indirectly assessed; appeared unremarkable. Repeat MBS: 2/13/23  Pt demonstrates a mild/moderate oropharyngeal dysphagia. Oral phase with reduced bolus cohesion resulting in head of bolus in pyriform sinus with tail of bolus in oral cavity with liquids without cues. Pt demo'd ability to complete oral hold of thin via tsp with cues but loss to floor of mouth. Pt with persistent delayed swallow initiation (inconsistent) to pyriform sinus with liquids, pudding spilling over top of epiglottis (did not fill vallecular space). Pt continues to demonstrate mildly reduced hyolaryngeal excursion initially with absent epiglottic distention progressing to full distention and there is mildly reduced tongue base retraction. Pt demonstrated mild residue in valleculae and pyriform sinus with x1 instance of residue from vallecula to posterior pharyngeal wall. Residue does increased with viscosity. Use of cued liquid rinse and cued effortful swallows reduced pharyngeal residue mildly. There was no aspiration or penetration during study. Attempted mendelsohn maneuver following study with model, tactile cues and verbal cues; pt not stimulable this date.      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 [employer]: projects around house, music, "[employer]    Dentition: Adequate  Vision  Vision:  (not wearing glasses due to nose injury)  Vision Exceptions: Wears glasses at all times  Hearing  Hearing: Within functional limits  Barriers toward progress: Severity of impairments      Date: 2/20/2023      Tx session 1 Tx session 2   Total Timed Code Min 28 25   Total Treatment Minutes 45 40   Individual Treatment Minutes 45 40   Group Treatment Minutes 0 0   Co-Treat Minutes 0 0   Brief Exception: N/A N/A   Pain None indicated None indicated Pain Intervention: [] RN notified  [] Repositioned  [] Intervention offered and patient declined  [x] N/A  [] Other: [] RN notified  [] Repositioned  [] Intervention offered and patient declined  [x] N/A  [] Other:   Subjective:     Pt seated upright in wheelchair agreeable to session at this time. Spouse present for session. Pt seated upright in wheelchair finishing with OT session. Expressed increased fatigue, however agreeable to session. Objective / Goals:     New goal s/p repeat MBS: Pt will demonstrate self feeding without labial residue across mealtime assessment without cues. Pt reporting sensation of trace labial residue on L, however SLP did not observe any anterior spillage. Labial seals against resistance: x10 sets. Negatively impacted by increased fatigue this session. New goal s/p repeat MBS: Pt will tolerate trials of advanced textures with adequate oral clearance as evidenced by <min oral residue and no reports of globus sensation across two mealtime assessments Trials of regular solids breakfast crackers: x 11 bites. Pt demonstrated appropriate mastication and R sided placement. Pt continues to utilize lingual sweep, finger sweep, toothette to clear and liquid wash for complete oral clearance. Pt continues to endorse \"food feels pocketed on left side\" and \"some liquid escaping on the left\". However, per SLP there were no instances of labial residue or anterior loss this session. Pt reported biting tongue ? X2 on right side. No bleeding of gums/tongue noted. Goal not targeted this session. Goal 4: Pt will demonstrate safe feeding behavior as evidenced by small bolus size / appropriate rate without cues. Pt independently reducing bolus size and cutting up pieces into appropriate size. Required x1 cue to adjust bolus size and x2 cues to slow rate to one bite at a time with trials of advanced regular solids. Not directly targeted this session.    Goal 5: Pt will tolerate least restrictive diet without respiratory decline. Effortful swallows: x66    No s/s of aspiration noted during this session. CTAR: x3 sets of x15       Goal 2: The patient will demonstrate sustained attention to task for 2 minutes with <2 prompts. Pt required x4 prompts to redirect from tangential comments, back to eating AM meal.  Overall improved attention to tasks, x1 redirections required throughout entire session. Goal 3: The patient will demonstrate improved inhibition as evidenced by <3 prompts for impulsivity during a task. Inhibited self to reduce talking while consuming PO with increased independence this date. Initial impulsivity with Connect the Dots Integration task. With remaining 2nd half of tasks, pt with greatly improved wait time for processing and completing information for increased accuracy. Goal 4: The patient will demonstrate improved self monitoring/correcting for basic tasks with <mod cues. Improved self monitoring when utilizing swallow strategies. Pt given min cues, fading to MI as session progressed. Targeted via connecting letters to create sentence+image. Required initial mod cues, fading to MI to complete task with accuracy. Goal 5: The patient will tolerate ongoing cognitive linguistic assessment. Not directly targeted this session. Completed sequencing task with 87% accuracy when provided with min cues. Task terminated due to pt expressing significant exhaustion. Goal 6: The patient will demonstrate comprehensibility in multiple communication environments without cues. Not directly targeted this session. Goal not targeted this session. Other areas targeted:     Education:   Educated re: Rationale for swallow strategies utilized this session. Educated re: Rationale for cognitive therapeutic tasks.     Safety Devices: [x] Call light within reach  [x] Chair alarm activated and connected to nurse call light system  [] Bed alarm activated   [] Other:  [x] Call light within reach  [x] Chair alarm activated and connected to nurse call light system  [] Bed alarm activated   [] Other:    Assessment: Pt with continued motivation during tasks to improve swallowing and attention. Attention during meals continues to pose as a barrier to safe swallowing/eating. Improved self monitoring in 2nd session this date. Plan: Continue as per plan of care. Interventions used this date:  [] Speech/Language Treatment  [] Instruction in HEP  [x] Dysphagia Treatment [x] Cognitive Treatment   [] Other:    Discharge recommendations:  [] Home independently  [x] Home with assistance []  24 hour supervision  [] ECF [] Other  Continued Tx Upon Discharge: ? [x] Yes    [] No    [] TBD based on progress while on ARU     [] Vital Stim indicated     [] Other:   Estimated discharge date: February 24th, 2023    Electronically signed by:  Hill Meredith, [address] [place] [address] Language Pathology     Ewa Goodrich M.A., [address] [facility]  Speech-Language Pathologist    The speech-language pathologist was present, directed the patient's care, made skilled judgment and was responsible for assessment and treatment.

## 2023-02-20 NOTE — PROGRESS NOTES
Responded to call from wife that pt \"is sliding out of chair! \" Upon arrival to room wife holding pt legs up off the floor with pt's buttocks partially on seat of wheelchair. This RN and PCA, Liliana, assisted pt fully back into wheelchair. Pt did not hit the floor. Pt denies hitting head or other injury. Wife is reporting concern of pt's LUE due to his inability to move arm during slip and pt with reported pain 4/10 of LUE/shoulder. Pt reports pain in LUE has been ongoing intermittently and is not new. No noticeable injury to LUE. Will administer tylenol for pain and notify Dr. Saniya Cotto of wife's concern for LUE. Wife states she was attempting to assist pt with urinal when pt started to slide off of the wheelchair after attempting to stand. Educated wife and pt to call staff for assistance before attempting to get out of chair/bed. Wife and pt verbalize understanding.

## 2023-02-20 NOTE — PROGRESS NOTES
Pt A & O. VSS. Pt x 2 st pivot. All safety precautions in place. Bed alarm activated. Non skid socks on. No complaints of nausea voiced. PRN Zanaflex and Trazadone for sleep. PRN Tylenol for left wrist pain. Calls out appropriately. Neuro checks remain unchanged. Will cont to monitor.      Vitals:    02/19/23 1950   BP: 121/74   Pulse: 62   Resp: 18   Temp: 98 °F (36.7 °C)   SpO2: 98%

## 2023-02-20 NOTE — PROGRESS NOTES
Physical Therapy  Facility/Department: [facility] ACUTE REHAB UNIT  Rehabilitation Physical Therapy Treatment Note    NAME: Nguyen Ye  : 1955 (52 y.o.)  MRN: [id number]  CODE STATUS: Full Code    Date of Service: 23       Restrictions:  Restrictions/Precautions: Fall Risk;Up as Tolerated  Position Activity Restriction  Other position/activity restrictions: up as tolerated, up in chair     SUBJECTIVE  Subjective  Subjective: Pt sitting  up in w/c when PT arrived. Pt  agreeable to PT. ( Pt's wife present for session)  Pain: Intermittent c/o L hip pain when positioned in ER  when performing bed mobility tasks        Post Treatment Pain Screening         OBJECTIVE  Cognition  Arousal/Alertness: Appropriate responses to stimuli  Following Commands: Follows one step commands with repetition; Follows one step commands with increased time  Attention Span: Attends with cues to redirect; Difficulty attending to directions; Difficulty dividing attention  Safety Judgement: Decreased awareness of need for assistance;Decreased awareness of need for safety  Problem Solving: Assistance required to generate solutions;Assistance required to identify errors made;Assistance required to implement solutions  Insights: Decreased awareness of deficits  Initiation: Requires cues for some  Sequencing: Requires cues for some  Cognition Comment: Pt cont  req  VC/ TC to stay focused on task. Pt benefits from controlled environment with min distractions especially with auditory distractions. Deescalation skills used as a means to control his environment. (  one person giving commands, decreased volume of voice, simple directions as examples) Pt req VC for sequencing tasks  Orientation  Overall Orientation Status: Within Functional Limits    Functional Mobility  Bed Mobility  Overall Assistance Level: Contact Guard Assist;Minimal Assistance  Additional Factors: Verbal cues; Increased time to complete; Head of bed flat; Without handrails (PT instructed pt / pt's wife with bed mob teaching using only the flat be and no bedrails to simulate home situation)  Bridging  Assistance Level: Contact guard assist  Skilled Clinical Factors: Req A for stabilizing  the LLE to complete bridge when performing scooting in lat direction  Roll Left  Assistance Level: Stand by assist  Skilled Clinical Factors: VC for sequencing and controlling the pace of the transition. Therapy instructed with positioning LUE in an abd position with elbow extended)  in which RUE supported L wrist while positioning R thumb in the L hand to maintain the arch of the hand.)  Roll Right  Assistance Level: Stand by assist  Skilled Clinical Factors: VC for sequencing and controlling the pace of the transition. Therapy instructed pt with rolling supine to R sidelying via position with elbow extended)  in which RUE supported L wrist while positioning R thumb in the L hand to maintain the arch of the handduring the transitional movement  Sit to Supine  Assistance Level: Minimal assistance;Contact guard assist  Skilled Clinical Factors: VC for sequencing and controlling the pace of the transition CGACGA  to be sure that LLE has cleared the bed and is in correct alignment  Supine to Sit  Assistance Level: Contact guard assist  Skilled Clinical Factors: VC for sequencing and controlling the pace of the transition CGA for trunk transition to stabilize once in sitting  Scooting  Assistance Level: Contact guard assist  Skilled Clinical Factors: In sitting , req VC  with lat WS to advance L hip to the EOS  Transfers  Surface: Wheelchair; To bed;From bed  Additional Factors: Verbal cues; Hand placement cues; Increased time to complete (PT instructed pt's wife  with transf w/c <> bed( 4x )  leading with R side.  Initial demo from PT followed by teach back from pt's wife 3x)  Sit to Stand  Assistance Level: Minimal assistance;Contact guard assist  Skilled Clinical Factors: VC for sequencing the task with focus on the set up to make the transition successful. This included pt's hand/ foot placement, scooting to the EOS and ant WS. Stand to Sit  Assistance Level: Contact guard assist  Skilled Clinical Factors: VC for hand placement and sequencing  Bed To/From Chair  Assistance Level: Contact guard assist;Minimal assistance  Skilled Clinical Factors: w/c<>bed with CGA for a modified stand pivot transf, req VC / TC for pacing the transition. Instructed pt to lead with R side only when performing FT      Second session:Pt sitting in w/c and propelled to gym under S of pt's wife. Pt agreeable to therapy. PT and OT worked collaboratively to utilize the skills of 2 disciplines while maximizing functional mobility to obtain optimal potential.    Sit to Stand  Assistance Level: Minimal assistance;Contact guard assist  Skilled Clinical Factors: VC for sequencing the task with focus on the set up to make the transition successful. This included pt's hand/ foot placement, scooting to the EOS and ant WS. Stand to Sit  Assistance Level: Contact guard assist  Skilled Clinical Factors: VC for hand placement and sequencing  Environmental Mobility  Ambulation  Surface: Level surface  Device:  (TAE RW, DF assist with ACE wrap)  Distance: 30', 145'   Additional Factors: Verbal cues; Increased time to complete (L DF assist using an ACE wrap)  Assistance Level: Requires x 2 assistance (Mostly min A x1 with CGA/ Min A of another to manage the TAE RW in a linear manner)  Gait Deviations: Decreased trunk rotation;Path deviations;Decreased step length bilateral;Decreased weight shift left;Narrow base of support  Skilled Clinical Factors: Therapy instructed pt with a step through gt pattern with focus on a consistent heel strike  Stairs  Stair Height: 6''  Device: One handrail (HR on the R)  Number of Stairs: 3, rested ~2 minutes and then performed 3 more  Additional Factors: Verbal cues; Non-reciprocal going down;Non-reciprocal going up;Increased time to complete  Assistance Level: Requires x 2 assistance  Skilled Clinical Factors: Mod/ min A x1 for LLE guidance including the placement of the L foot for both ascension / descension. Req VC for sequencing amb on steps  Wheelchair  Surface: Level surface  Device: Standard wheelchair;Pressure Relieving Cushion (22\" w/c traded to an 18\" w/c to providing closer boundaries for  the L side. Cushion was also adjusted using a towel/rolled sheet to provide a wedge with ant build up. This minimized pt's tendency to ext hips for sliding ant in w/c)  Additional Factors: Verbal cues; Increased time to complete (VCs for L attention and avoiding objects in wilder)  Assistance Required to Manage Parts: All wheelchair parts;Brakes (Brake extension placed on the L with white table to assist pt with visually being able to locate it . Pt req Intermittent VC/ TC to locate the R brake)  Assistance Level for Propulsion: Supervision (Occasional assist for obstacle negotiation in hallway)  Propulsion Method: Right upper extremity;Right lower extremity  Propulsion Quality: Short strokes; Veers left  Propulsion Distance: 175'x1  Skilled Clinical Factors: VCs for L attention and staying on R side of hallwayas a compensatory technique to avoid environmental barriers. Pt's brother , Juan Stoner ( pt's wife )  present and has been educated on A pt when pt is propelling w/c on the unit. Pt remained in w/c with call light and phone placed within reach. Chair alarm reacivated                  ASSESSMENT/PROGRESS TOWARDS GOALS       Assessment  Assessment: Pt demo significant improvement on this date with amb with use of the TAE RW. Pt was highly motivated by the progress,Pt's wife received FT and appeared to demo safe techniques with the skills that were reviewed. PT to cont with the FT throughout this week. Pt appears very motivated to improve.  Pt is well below baseline and would benefit from continued therapy to maximize potential and increase functional mobility towards Ind to allow for a safer d/c to home. Activity Tolerance: Patient limited by fatigue;Treatment limited secondary to decreased cognition;Patient limited by endurance; Patient limited by pain  Discharge Recommendations: 24 hour supervision or assist;Continue to assess pending progress;Home with Home health PT  PT Equipment Recommendations  Equipment Needed: Yes  Other: 16 \"light weight w/c with flip back arm rests and standard leg rests. LUE arm trough also recommended. Pressure releiving cushionalso recommended    Goals  Patient Goals   Patient Goals : to go home  Short Term Goals  Time Frame for Short Term Goals: 2 weeks  Short Term Goal 1: Pt will complete bed mobility with moderate assist(Not addressed on this date)  Short Term Goal 2: Pt will complete sit<>stand transfer wtih moderate assist. Goal achieved  Short Term Goal 3: Pt will propel self in manual w/c for 150' with minimal assist.- met 2/2  Short Term Goal 4: Pt will ambulate 10' with moderate assist.Goal not achieved  Long Term Goals  Time Frame for Long Term Goals : 4 weeks (all ongoing)  Long Term Goal 1: Pt will complete bed mobility with CGA  Long Term Goal 2: Pt will complete sit<>Stand transfers with CGA  Long Term Goal 3: Pt will propel self in manual w/c for 250' with suprvision  Long Term Goal 4: Pt will ambulate 48' with CGA & LRAD    PLAN OF CARE/SAFETY  Physcial Therapy Plan  Days Per Week: 5 Days  Hours Per Day: 1 hour  Current Treatment Recommendations: Strengthening;Balance training;ROM; Functional mobility training;Neuromuscular re-education; Safety education & training;Patient/Caregiver education & training;Transfer training; Endurance training;Home exercise program;Gait training;Equipment evaluation, education, & procurement;Positioning; Therapeutic activities  Safety Devices  Type of Devices: Call light within reach;Nurse notified;Gait belt;Patient at risk for falls; Chair alarm in place; Left in chair  Restraints  Restraints Initially in Place: No    EDUCATION  Education  Education Given To: Patient  Education Provided: Mobility Training;Transfer Training;Energy Conservation; Fall Prevention Strategies  Education Provided Comments: PT performed family training with pt's wife on use of the gt belt, applying the gt belt, handling w/ gt belt , transf ( w/c <>bed ) consistently leading with the R, and on all bed mobility skills.   Education Method: Verbal  Barriers to Learning: Cognition  Education Outcome: Verbalized understanding;Continued education needed        Therapy Time   Individual Concurrent Group Co-treatment   Time In 0915      1115   Time Out 8734      9003   Minutes 72      [address] [place], [place], 02/20/23 at 4:55 PM

## 2023-02-21 PROCEDURE — 1280000000 HC REHAB R&B

## 2023-02-21 PROCEDURE — 99232 SBSQ HOSP IP/OBS MODERATE 35: CPT | Performed by: PHYSICAL MEDICINE & REHABILITATION

## 2023-02-21 PROCEDURE — 97129 THER IVNTJ 1ST 15 MIN: CPT

## 2023-02-21 PROCEDURE — 97530 THERAPEUTIC ACTIVITIES: CPT | Performed by: PHYSICAL THERAPIST

## 2023-02-21 PROCEDURE — 6370000000 HC RX 637 (ALT 250 FOR IP): Performed by: PHYSICAL MEDICINE & REHABILITATION

## 2023-02-21 PROCEDURE — 97535 SELF CARE MNGMENT TRAINING: CPT

## 2023-02-21 PROCEDURE — 97130 THER IVNTJ EA ADDL 15 MIN: CPT

## 2023-02-21 PROCEDURE — 97530 THERAPEUTIC ACTIVITIES: CPT

## 2023-02-21 PROCEDURE — 6360000002 HC RX W HCPCS: Performed by: PHYSICAL MEDICINE & REHABILITATION

## 2023-02-21 PROCEDURE — 92526 ORAL FUNCTION THERAPY: CPT

## 2023-02-21 RX ORDER — MENTHOL 1.4 %
ADHESIVE PATCH, MEDICATED TOPICAL 3 TIMES DAILY PRN
Status: DISCONTINUED | OUTPATIENT
Start: 2023-02-21 | End: 2023-02-24 | Stop reason: HOSPADM

## 2023-02-21 RX ADMIN — ROSUVASTATIN CALCIUM 40 MG: 20 TABLET, FILM COATED ORAL at 10:56

## 2023-02-21 RX ADMIN — PANTOPRAZOLE SODIUM 40 MG: 40 TABLET, DELAYED RELEASE ORAL at 05:59

## 2023-02-21 RX ADMIN — SENNOSIDES AND DOCUSATE SODIUM 2 TABLET: 50; 8.6 TABLET ORAL at 10:55

## 2023-02-21 RX ADMIN — DICLOFENAC SODIUM 4 G: 10 GEL TOPICAL at 10:55

## 2023-02-21 RX ADMIN — EZETIMIBE 10 MG: 10 TABLET ORAL at 10:56

## 2023-02-21 RX ADMIN — NYSTATIN 500000 UNITS: 100000 SUSPENSION ORAL at 10:55

## 2023-02-21 RX ADMIN — DICLOFENAC SODIUM 4 G: 10 GEL TOPICAL at 21:39

## 2023-02-21 RX ADMIN — ENOXAPARIN SODIUM 40 MG: 100 INJECTION SUBCUTANEOUS at 10:56

## 2023-02-21 RX ADMIN — NYSTATIN 500000 UNITS: 100000 SUSPENSION ORAL at 18:56

## 2023-02-21 RX ADMIN — ASPIRIN 81 MG 81 MG: 81 TABLET ORAL at 10:55

## 2023-02-21 RX ADMIN — NYSTATIN 500000 UNITS: 100000 SUSPENSION ORAL at 21:40

## 2023-02-21 RX ADMIN — FLUOXETINE 20 MG: 20 CAPSULE ORAL at 10:55

## 2023-02-21 RX ADMIN — METHYLPHENIDATE HYDROCHLORIDE 10 MG: 10 TABLET ORAL at 05:59

## 2023-02-21 RX ADMIN — ACETAMINOPHEN 650 MG: 325 TABLET, FILM COATED ORAL at 18:56

## 2023-02-21 RX ADMIN — Medication 5 MG: at 21:39

## 2023-02-21 RX ADMIN — TIZANIDINE 4 MG: 4 TABLET ORAL at 21:39

## 2023-02-21 RX ADMIN — TRAZODONE HYDROCHLORIDE 50 MG: 50 TABLET ORAL at 21:39

## 2023-02-21 ASSESSMENT — PAIN SCALES - GENERAL: PAINLEVEL_OUTOF10: 5

## 2023-02-21 ASSESSMENT — PAIN DESCRIPTION - RADICULAR PAIN: RADICULAR_PAIN: ABSENT

## 2023-02-21 NOTE — PROGRESS NOTES
Pt in bed, alert and oriented. VSS. 7/10 pain reported in shoulder, getting pain meds. All safety measures are in place. Call light within reach. Will continue to monitor.     Vitals:    02/20/23 1915   BP: (!) 147/67   Pulse: 74   Resp: 16   Temp: 98.5 °F (36.9 °C)   SpO2: 98%

## 2023-02-21 NOTE — PROGRESS NOTES
Patient A&Ox4, VSS. Neuro checks unchanged. Patient up to the bathroom with moderate, assist, tolerated fairly well. Patient and spouse instructed to call out for needs. Will continue to monitor.

## 2023-02-21 NOTE — PLAN OF CARE
Problem: Discharge Planning  Goal: Discharge to home or other facility with appropriate resources  2/20/2023 1935 by Ayla Louise RN  Outcome: Progressing     Problem: Skin/Tissue Integrity  Goal: Absence of new skin breakdown  Description: 1. Monitor for areas of redness and/or skin breakdown  2. Assess vascular access sites hourly  3. Every 4-6 hours minimum:  Change oxygen saturation probe site  4. Every 4-6 hours:  If on nasal continuous positive airway pressure, respiratory therapy assess nares and determine need for appliance change or resting period.   2/20/2023 1935 by Ayla Louise RN  Outcome: Progressing     Problem: ABCDS Injury Assessment  Goal: Absence of physical injury  Outcome: Progressing     Problem: Safety - Adult  Goal: Free from fall injury  2/20/2023 1935 by Ayla Louise RN  Outcome: Progressing     Problem: Pain  Goal: Verbalizes/displays adequate comfort level or baseline comfort level  2/20/2023 1935 by Ayla Louise RN  Outcome: Progressing     Problem: Chronic Conditions and Co-morbidities  Goal: Patient's chronic conditions and co-morbidity symptoms are monitored and maintained or improved  Outcome: Progressing

## 2023-02-21 NOTE — PROGRESS NOTES
ACUTE REHAB UNIT  SPEECH/LANGUAGE PATHOLOGY      [x] Daily  [] Weekly Care Conference Note  [] Discharge    Patient:Jason Fontanez      :1955  MRN:[id number]  Rehab Dx/Hx: Acute CVA (cerebrovascular accident) ([facility]) [I63.9]    Precautions: [x] Aspiration  [x] Fall risk  [] Sternal  [] Seizure [] Hip  [] Weight Bearing [] Other  ST Dx: [] Aphasia  [x] Dysarthria  [] Apraxia   [x] Oropharyngeal dysphagia [x] Cognitive Impairment  [] Other:   Date of Admit: 2023  Room #: 3102/3102-01  Date: 2023          Current Diet Order:ADULT ORAL NUTRITION SUPPLEMENT; Breakfast, Dinner; Standard High Calorie/High Protein Oral Supplement  ADULT DIET; Dysphagia - Soft and Bite Sized   Recommended Form of Meds: Meds in puree  Compensatory Swallowing Strategies : Neutral head positioning, placement of solid PO on right, one bite at a time, check for pocketing and oral residue. Benefits from use of mirror. Limit distractions during meal time (only 1 person in room at a time)  Previous MBS: 23  Oral Phase  Mild-moderate dysfunction characterized by incomplete labial seal with anterior spillage, slowed/reduced mastication, mild stasis.   Pharyngeal Phase  Moderate dysfunction characterized by impairments in timing, strength and coordination with premature bolus loss, reduced base of tongue retraction, delayed/reduced hyolaryngeal mechanics, and reduced pharyngeal constriction. Resulted in vallecular/pyriform/pharyngeal residue, as well as compromised airway protection with penetration and gross tracheal aspiration of thin and mildly thick liquids; strong reactive cough present but did not fully clear. Chin tuck strategy did not eliminate aspiration, however cued head turn LEFT with small straw sips was effective . Double swallow helped clear residual. Of note, throughout study pt with tendency to tilt head posteriorly which further exacerbated bolus control; benefited from cues for neutral positioning.  Upper  Esophageal Screen  Indirectly assessed; appeared unremarkable. Repeat MBS: 2/13/23  Pt demonstrates a mild/moderate oropharyngeal dysphagia. Oral phase with reduced bolus cohesion resulting in head of bolus in pyriform sinus with tail of bolus in oral cavity with liquids without cues. Pt demo'd ability to complete oral hold of thin via tsp with cues but loss to floor of mouth. Pt with persistent delayed swallow initiation (inconsistent) to pyriform sinus with liquids, pudding spilling over top of epiglottis (did not fill vallecular space). Pt continues to demonstrate mildly reduced hyolaryngeal excursion initially with absent epiglottic distention progressing to full distention and there is mildly reduced tongue base retraction. Pt demonstrated mild residue in valleculae and pyriform sinus with x1 instance of residue from vallecula to posterior pharyngeal wall. Residue does increased with viscosity. Use of cued liquid rinse and cued effortful swallows reduced pharyngeal residue mildly. There was no aspiration or penetration during study. Attempted mendelsohn maneuver following study with model, tactile cues and verbal cues; pt not stimulable this date.      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 [employer] Rebecca: projects around house, music, [place] [employer]    Dentition: Adequate  Vision  Vision:  (not wearing glasses due to nose injury)  Vision Exceptions: Wears glasses at all times  Hearing  Hearing: Within functional limits  Barriers toward progress: Severity of impairments      Date: 2/21/2023      Tx session 1 Tx session 2   Total Timed Code Min 26 30   Total Treatment Minutes 45 30   Individual Treatment Minutes 45 30   Group Treatment Minutes 0 0   Co-Treat Minutes 0 0   Brief Exception: N/A N/A   Pain None indicated None indicated Pain Intervention: [] RN notified  [] Repositioned  [] Intervention offered and patient declined  [x] N/A  [] Other: [] RN notified  [] Repositioned  [] Intervention offered and patient declined  [x] N/A  [] Other:   Subjective:     Pt seated upright in wheelchair agreeable to session at this time. Spouse present. Pt seated upright in wheelchair and agreeable to session. Spouse present. Objective / Goals:     New goal s/p repeat MBS: Pt will demonstrate self feeding without labial residue across mealtime assessment without cues. No labial residue noted during AM meal. Pt independently utilizes mirror during meals and implements swallowing strategies. Pt expressed \"feeling\" oral residue sensation on the L, however none noted per SLP. Not targeted this session. New goal s/p repeat MBS: Pt will tolerate trials of advanced textures with adequate oral clearance as evidenced by <min oral residue and no reports of globus sensation across two mealtime assessments No globus sensation noted with soft and bite sized solids and/or regular solids. Advanced solids trialed (blueberry crackers): x7 bite size pieces. Pt independently used liquid wash, lingual sweep and toothette to clear trace min residue on left side of oral cavity. Not targeted this session. Goal 4: Pt will demonstrate safe feeding behavior as evidenced by small bolus size / appropriate rate without cues. Pt independently adjusted bolus size throughout session. Also demonstrated appropriate rate without cues. Not targeted this session. Goal 5: Pt will tolerate least restrictive diet without respiratory decline. WBC and Neutrophils Absolute: WNL     Effortful swallows: x65 Not targeted this session. Goal 2: The patient will demonstrate sustained attention to task for 2 minutes with <2 prompts.    Pt benefited from increased redirections this AM. Pt aware of tangential comments and attempted to redirect self, however continuous off topic interjections which required SLP. Improved attention as session progressed. Pt cued self to look at note on door directly midline to assist with reducing talking with PO. Pt sustained attention to task without any tangential comments throughout session. Goal 3: The patient will demonstrate improved inhibition as evidenced by <3 prompts for impulsivity during a task. No significant impulsivity noted this session. Pt waited until SLP completed instructions to initiate tasks. Goal 4: The patient will demonstrate improved self monitoring/correcting for basic tasks with <mod cues. Pt identifying reduced attention this session and x3 occurrences of improved self monitoring for implementing safe swallowing (\"Can you please close the door so I don't get distracted? \" And consistently using mirror to inhibit tangential comments and swallow food first). Required x1 min cues to scan to the left side of table to locate pieces of regular solid trial.  Targeted via leisure activity barrier task. Initial reduced self monitoring of words on cards, however as trials progressed pt with greatly improved monitoring and paused self to avoid saying the word. Initial mod, fading to MI to complete correctly. Goal 5: The patient will tolerate ongoing cognitive linguistic assessment. Not targeted this session. Not targeted this session. Goal 6: The patient will demonstrate comprehensibility in multiple communication environments without cues. No instances of reduced comprehension. Pt comprehensible throughout session without cues. Other areas targeted:     Education:   Educated re: pillars of aspiration/ways to reduce/avoid, swallow strategies, and chewing on the right side to avoid biting left lingual surface/buccal cavity. Educated re: Rationale for cognitive therapeutic tasks this session.    Safety Devices: [x] Call light within reach  [x] Chair alarm activated and connected to nurse call light system  [] Bed alarm activated   [] Other:  [x] Call light within reach  [x] Chair alarm activated and connected to nurse call light system  [] Bed alarm activated   [] Other:    Assessment: Pt continues to demonstrate difficulty with attention/focusing with PO, and tangential comments while masticating. However, improved self monitoring and attention noted during sessions this date with cognitive tasks. Plan: Continue as per plan of care. Interventions used this date:  [] Speech/Language Treatment  [] Instruction in HEP  [x] Dysphagia Treatment [x] Cognitive Treatment   [] Other:    Discharge recommendations:  [] Home independently  [x] Home with assistance []  24 hour supervision  [] ECF [] Other  Continued Tx Upon Discharge: ? [x] Yes    [] No    [] TBD based on progress while on ARU     [] Vital Stim indicated     [] Other:   Estimated discharge date: February 24th, 2023    Electronically signed by:  Kaylynn Kam, [address] [place] [address] Language Pathology     Nain Antonio M.A., [address] [place] [address]  Speech-Language Pathologist    The speech-language pathologist was present, directed the patient's care, made skilled judgment and was responsible for assessment and treatment.

## 2023-02-21 NOTE — PROGRESS NOTES
Date: 2/21/2023  Patient: Bryan Price  MRN: [id number]  Primary Diagnosis: No diagnosis found. Home Evaluation Summary    Type of residence:   [x] House  [] [address] [] Apt [] Condo []Assisted Living []Other:     OUTSIDE  Location of parking: [x] Garage [] Carport [] Street [] Parking Lot [] Driveway  Car transfer- Level of assist  [] Independent/Modified independent  []  [employer] w. Transf into the car leading with the L side . Min A for maneuvering the LLE into the car/ Mod A for transf into truck , Pt very anxious and not processing info ( Noted at end of home eval)    Steps to enter-  3 steps (8\" each)   Railing: [x]Right side going up When exiting, pt's w/c brought up to the door jam with pt instructed to position feet on the descending step. Pt used the R hand on the L railing. Pt became very anxious and req max VC for sequencing . Use transport chair at top of stairs, face chair towards door/wall, remove spring load in door so that it stays entirely open, remove throw rugs     INSIDE  Number of floors [x] One [] Two [] Bi level [] Basement [] Other: _______  ( Has multiple floors but only main floor assessed on this date  Bedroom  Door Accessibility? [x]Yes []No     Width/comments: 30\"  Bed type : []Full []Queen [x]Glenn []Twin []Hospital   Approach []: Right [] Left  Bed accessibility comments: bed is electric/adjustable for both head and foot of bed but not for height, 30.5\" high  Recommended that furniture risers be used or that a piece of plywood be placed under the mattress to provide a more sturdy support so that pt did not sit on a lower surface  Maneuverability in room/Safety recommendations: move printed chair to the right so it doesn't block doorway    Bathroom  Door Accessibility?   [x]Yes []No     Width/comments: 26\"  Toilet: [x]Standard                [x]Bedside commode placed over toilet at 21\", bedside commode next to bed at night  Tub/shower [x] Walk in shower with 5\" threshold []Tub/shower unit  [] Tub [] Curtain [] Doors   [] Tub bench [x] Shower chair [x] Hand held shower []Grab bar:______________   [] Other:   Maneuverability in room/Safety recommendations: place removable bathtub rail on tub in front of toilet, remove sliding shower doors and place shower curtain, you may place beach towels on floor to help with water clean up if necessary (remove prior to transfers), use bedside commode over toilet in shower to limit need to stand to wash buttocks and allow patient to hold onto rails during transfers, face bedside commode toward shower head    Kitchen  Door Accessibility? [x]Yes []No     Width/comments: both entry ways are 33\"  Refrigerator/freezer accessible? [x] Yes [] No  Countertops accessible? [x] Yes [] No  Dining table accessible? [x] Yes (in actual kitchen) [] No   Maneuverability in room/Safety recommendations: place necessary items within reach on counter top (cups, plates, etc.), recommend using plastic wear or paper plates/silverware, remove throw rugs, place trash can in different area  For transporting drinks, a w/c cup anthony recommended    Main Living  Door Accessibility?   [x]Yes []No       Maneuverability in room/Safety recommendations: move table in hallway out of pathway, if  rocking chairs,used, place a book or other bermudez surface in the front to avoid the surface being lower allowing greater ease with the transf , use lift chair (recommend using lift feature only as needed)    Additional safety recommendations: recommend installing additional railing on left side of stairwell in garage, recommend using car vs truck for transportation, complete showering in shower with home health initially in order to ensure safety of caregiver and patient, place bedside commode near bed for night time use, ensure pathways are clear of clutter and well lit (purchase night lights if necessary)  2/2 to thick padding w/ carpet, recommend using plastic/ rubber runners throughout the house to allow pt to maneuver w/c w/ greater ease, recommended using the \"swimming noodles\" cut up for protecting corners in home   Note, pts wife and brother Bobby Fernandez) present for assessment. All skills reviewed with them including  collapsing of the w/c with lifting of the w/c frame for lifting into  and out of vehicle. Recommended that pt use w/c in home with continued compensation of being to the far R to avoid obstacles on the L.     Treatment Time:  11:35-11:55 (transfer into car)    11:55-12:30 (driving)    03:82-86:43 (evaluation in home)    14:28-15:00 (driving)    71:46-31:38 (transfer out of car)     Total Treatment Minutes: 158 minutes     Marcella Hassan, MOT, OTR/L, [address] [facility] # [id number]

## 2023-02-21 NOTE — PROGRESS NOTES
Department of Physical Medicine & Rehabilitation  Progress Note    Patient Identification:  Juanita Nuñez  [id number]  : 1955  Admit date: 2023    Chief Complaint: Acute CVA (cerebrovascular accident) [facility])    Subjective:   Seen I his chair this morning. Doing well in therapy. Having progress daily in therapy. He has some knots in his left upper trap which are painful. Wife working on massaging these issues out. ROS: No f/c, n/v, cp     Objective:  Patient Vitals for the past 24 hrs:   BP Temp Temp src Pulse Resp SpO2   23 1915 (!) 147/67 98.5 °F (36.9 °C) Oral 74 16 98 %       Const: Alert. No distress, pleasant. HEENT: Normocephalic, atraumatic. Normal sclera/conjunctiva. MMM. CV: Regular rate and rhythm. Resp: No respiratory distress. Lungs CTAB. Abd: Soft, nontender, nondistended, NABS+   Ext: No edema. Neuro: Alert, oriented, appropriately interactive. Psych: Cooperative, appropriate mood and affect    Laboratory data: Available via EMR. Last 24 hour lab  No results found for this or any previous visit (from the past 24 hour(s)). Therapy progress:  PT  Position Activity Restriction  Other position/activity restrictions: up as tolerated, up in chair  Objective     Sit to Stand: Maximum Assistance, 2 Person Assistance  Stand to Sit: 2 Person Assistance, Maximum Assistance     OT  PT Equipment Recommendations  Equipment Needed: Yes  Mobility Devices: Wheelchair  Other: 16 \"light weight w/c with flip back arm rests and standard leg rests. LUE arm trough also recommended. Pressure releiving cushionalso recommended  Toilet - Technique:  (via rolling shower chair)  Equipment Used:  (rolling shower chair)  Toilet Transfers Comments: pt did not have urge to void, however rolled pt in bathroom via rolling shower chair. Max A x 2 to pivot from bed to rolling shower chair. Assessment        SLP          Body mass index is 22.4 kg/m².     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 Patient is on aspirin at home        HTN  Patient is on lisinopril 5mg daily at home  - monitor      HLD:  Patient is on Crestor 20mg and ezetimibe 10mg at home daily  -Continue home meds     Anxiety  Per chart, patient is on Valium 2mg at home. Possible could be contributor to his chest pain yesterday, notes he felt anxious. Denies taking Valium at home, however it is listed as a home med. Ativan 0.5mg once overnight   - prozac      Sleep disturbance  - Trazodone PRN  - benadryl PRN  -improving     Add low dose tizanidine for spasticity   - improving left leg tightness   - increase to 4mg q6h    Start Ritalin for inattention.  - increase dose to 10mg   - monitor effects       Extended stay in ARU before discharge home     Xray left wrist- negative   - Voltaren gel left wrist     - stretch left wrist and shoulder     - add bengay     Rehab Progress: Improving  Anticipated Dispo: home  Services/DME: [facility]  ELOS: 2/24            Will Seo D.O. M.P.H  PM&R  2/21/2023  10:26 AM

## 2023-02-22 PROCEDURE — 97116 GAIT TRAINING THERAPY: CPT | Performed by: PHYSICAL THERAPIST

## 2023-02-22 PROCEDURE — 92526 ORAL FUNCTION THERAPY: CPT

## 2023-02-22 PROCEDURE — 97130 THER IVNTJ EA ADDL 15 MIN: CPT

## 2023-02-22 PROCEDURE — 6360000002 HC RX W HCPCS: Performed by: PHYSICAL MEDICINE & REHABILITATION

## 2023-02-22 PROCEDURE — 97032 APPL MODALITY 1+ESTIM EA 15: CPT

## 2023-02-22 PROCEDURE — 97535 SELF CARE MNGMENT TRAINING: CPT

## 2023-02-22 PROCEDURE — 97530 THERAPEUTIC ACTIVITIES: CPT | Performed by: PHYSICAL THERAPIST

## 2023-02-22 PROCEDURE — 6370000000 HC RX 637 (ALT 250 FOR IP): Performed by: PHYSICAL MEDICINE & REHABILITATION

## 2023-02-22 PROCEDURE — 99232 SBSQ HOSP IP/OBS MODERATE 35: CPT | Performed by: PHYSICAL MEDICINE & REHABILITATION

## 2023-02-22 PROCEDURE — 97530 THERAPEUTIC ACTIVITIES: CPT

## 2023-02-22 PROCEDURE — 97129 THER IVNTJ 1ST 15 MIN: CPT

## 2023-02-22 PROCEDURE — 1280000000 HC REHAB R&B

## 2023-02-22 RX ADMIN — EZETIMIBE 10 MG: 10 TABLET ORAL at 08:31

## 2023-02-22 RX ADMIN — Medication 5 MG: at 20:25

## 2023-02-22 RX ADMIN — DICLOFENAC SODIUM 4 G: 10 GEL TOPICAL at 10:03

## 2023-02-22 RX ADMIN — TIZANIDINE 4 MG: 4 TABLET ORAL at 20:25

## 2023-02-22 RX ADMIN — NYSTATIN 500000 UNITS: 100000 SUSPENSION ORAL at 20:25

## 2023-02-22 RX ADMIN — DIPHENHYDRAMINE HYDROCHLORIDE 25 MG: 25 TABLET ORAL at 20:25

## 2023-02-22 RX ADMIN — FLUOXETINE 20 MG: 20 CAPSULE ORAL at 08:31

## 2023-02-22 RX ADMIN — NYSTATIN 500000 UNITS: 100000 SUSPENSION ORAL at 08:31

## 2023-02-22 RX ADMIN — SENNOSIDES AND DOCUSATE SODIUM 2 TABLET: 50; 8.6 TABLET ORAL at 20:26

## 2023-02-22 RX ADMIN — ROSUVASTATIN CALCIUM 40 MG: 20 TABLET, FILM COATED ORAL at 10:03

## 2023-02-22 RX ADMIN — SENNOSIDES AND DOCUSATE SODIUM 2 TABLET: 50; 8.6 TABLET ORAL at 08:31

## 2023-02-22 RX ADMIN — NYSTATIN 500000 UNITS: 100000 SUSPENSION ORAL at 14:25

## 2023-02-22 RX ADMIN — ENOXAPARIN SODIUM 40 MG: 100 INJECTION SUBCUTANEOUS at 08:31

## 2023-02-22 RX ADMIN — ACETAMINOPHEN 650 MG: 325 TABLET, FILM COATED ORAL at 08:32

## 2023-02-22 RX ADMIN — PANTOPRAZOLE SODIUM 40 MG: 40 TABLET, DELAYED RELEASE ORAL at 06:54

## 2023-02-22 RX ADMIN — DICLOFENAC SODIUM 4 G: 10 GEL TOPICAL at 20:26

## 2023-02-22 RX ADMIN — TRAZODONE HYDROCHLORIDE 50 MG: 50 TABLET ORAL at 20:26

## 2023-02-22 RX ADMIN — ASPIRIN 81 MG 81 MG: 81 TABLET ORAL at 08:32

## 2023-02-22 RX ADMIN — NYSTATIN 500000 UNITS: 100000 SUSPENSION ORAL at 17:21

## 2023-02-22 RX ADMIN — ACETAMINOPHEN 650 MG: 325 TABLET, FILM COATED ORAL at 18:36

## 2023-02-22 RX ADMIN — METHYLPHENIDATE HYDROCHLORIDE 10 MG: 10 TABLET ORAL at 06:54

## 2023-02-22 ASSESSMENT — PAIN - FUNCTIONAL ASSESSMENT: PAIN_FUNCTIONAL_ASSESSMENT: PREVENTS OR INTERFERES SOME ACTIVE ACTIVITIES AND ADLS

## 2023-02-22 ASSESSMENT — PAIN SCALES - GENERAL
PAINLEVEL_OUTOF10: 6
PAINLEVEL_OUTOF10: 7

## 2023-02-22 ASSESSMENT — PAIN DESCRIPTION - DESCRIPTORS
DESCRIPTORS: ACHING
DESCRIPTORS: ACHING;DISCOMFORT

## 2023-02-22 ASSESSMENT — PAIN DESCRIPTION - ORIENTATION
ORIENTATION: LEFT
ORIENTATION: LEFT

## 2023-02-22 ASSESSMENT — PAIN DESCRIPTION - LOCATION
LOCATION: HIP
LOCATION: HIP

## 2023-02-22 NOTE — PROGRESS NOTES
Occupational Therapy  Facility/Department: [place] [address] ACUTE REHAB UNIT  Rehabilitation Occupational Therapy Daily Treatment Note    Date: 23  Patient Name: Arielle Vela       Room: 3463/6343-78  MRN: [id number]  Account: [de-identified]   : 1955  (68 y.o.) Gender: male                    Past Medical History:  has a past medical history of Anxiety, Chest pain, Depression, Encounter for imaging to screen for metal prior to MRI, Hyperlipidemia, Hypertension, and Wears glasses. Past Surgical History:   has a past surgical history that includes [place] tooth extraction; Colonoscopy (2009); Cardiac catheterization (2008); Finger trigger release (3-); and Coronary angioplasty with stent. Restrictions  Restrictions/Precautions: Fall Risk;Up as Tolerated    Subjective  Subjective: Pt seated in w/c upon OT arrival w/ brother present. Co-tx indicated in order to maximize therapeutic potential.  Restrictions/Precautions: Fall Risk;Up as Tolerated             Objective     Cognition  Arousal/Alertness: Appropriate responses to stimuli  Following Commands: Follows one step commands with repetition; Follows one step commands with increased time  Attention Span: Attends with cues to redirect; Difficulty attending to directions; Difficulty dividing attention  Safety Judgement: Decreased awareness of need for assistance;Decreased awareness of need for safety  Problem Solving: Assistance required to generate solutions;Assistance required to identify errors made;Assistance required to implement solutions  Insights: Decreased awareness of deficits  Initiation: Requires cues for some  Sequencing: Requires cues for some  Orientation  Overall Orientation Status: Within Functional Limits         ADL  Putting On/Taking Off Footwear  Assistance Level: Maximum assistance  Skilled Clinical Factors: Assist to don AFO.           Supine to Sit  Assistance Level: Minimal assistance  Skilled Clinical Factors: Brother completed supine --> sit w/ SPV of OT and PT. Brother provided verbal and tactile cue w/ assist to adjust LLE on bed to assist w/ transition to EOB. Transfers  Surface: From bed; Wheelchair  Additional Factors: Verbal cues; Hand placement cues  Sit to Stand  Assistance Level: Minimal assistance;Contact guard assist  Skilled Clinical Factors: Pt's brother completed t/f EOB --> w/c on R side w/ CGA/min A and SPV of OT and PT. Stand to Sit  Assistance Level: Contact guard assist  Skilled Clinical Factors: Pt required CGA for controlled descent onto surface. Bed To/From Chair  Assistance Level: Minimal assistance;Contact guard assist  Skilled Clinical Factors: Pt's brother completed t/f EOB --> w/c on R side w/ CGA/min A and SPV of OT and PT. Assessment  Assessment  Assessment: Pt tolerated session well. Brother able to demo teachback of bed mobility, transfer to w/c, and stairs w/ less cueing, verbalizing comfort w/ all tasks. Pt continues to demo improvement w/ navigating stairs, trialed ace wrap and AFO, which both assisted w/ pt achieving better NAVJOT and step length when ascending and descending stairs. Pt would benefit from continued OT services in order to increase independence w/ ADLs and fxl transfers. Cont OT POC. Activity Tolerance: Patient tolerated treatment well  Discharge Recommendations: 24 hour supervision or assist;Continue to assess pending progress;Home with Home health OT  Safety Devices  Type of devices: Chair alarm in place;Call light within reach; Left in chair      Second session:  Pt seated in w/c w/ brother present. Pt denied pain and agreeable to session. Discussed using elastic laces for gym shoes and therapist will provide to pt later in day. Verbalized understanding. Pt propelled down to gym. Completed stand pivot t/f w/c <-> edge of mat w/ min A toward R side. Sitting edge of mat, completed PROM exercises IR/ER, elbow flexion/extension, shoulder flexion/extension x3.  Pt w/ increased activation of bicep and triep during exercises w/ increased movement especially w/ extension w/ gravity assist. Completed functional estim as listed below. Attempted wrist extension but poor tolerance d/t pain, therefore, completed wrist flexion only. Pt tolerated session well w/ increased ROM noted in LUE. Encouraged continued stretching to pt and brother and provided education regarding neuro recovery expectations post CVA. Verbalized understanding. Pt would continue to benefit from skilled OT services to increase independence w/ ADLs and fxl transfers. Cont OT POC. Left; Upper arm Electrical Stimulation     Electrical Stimulation Action Estim applied to L wrist flexors to facilitate wrist flexion for 5 mins. Electrical Stimulation Parameters 32 mA CC, 5 seconds on/5 seconds off for biceps   Electrical Stimulation Goals Neuromuscular Facilitation           Patient Education  Education  Education Given To: Patient; Family  Education Provided: Role of Therapy;Plan of Care;Family Education;Mobility Training;Transfer Training; Safety; Fall Prevention Strategies; Home Exercise Program  Education Method: Demonstration;Verbal  Barriers to Learning: Cognition  Education Outcome: Verbalized understanding;Demonstrated understanding    Plan  Occupational Therapy Plan  Times Per Week: 5 days per week, 90 mins  Current Treatment Recommendations: Strengthening;ROM;Balance training;Functional mobility training; Endurance training;Self-Care / ADL; Patient/Caregiver education & training;Neuromuscular re-education;Cognitive/Perceptual training; Safety education & training    Goals  Patient Goals   Patient goals : go home with my wife  Short Term Goals  Time Frame for Short Term Goals: By 2 weeks - all goals ongoing  Short Term Goal 1: Pt will complete LB dressing with mod A  Short Term Goal 2: Pt will complete toileting with mod A  Short Term Goal 3: Pt will complete toilet and functional transfers with mod A  Short Term Goal 4: Pt will complete UB dressing with min A  Short Term Goal 5: Pt will complete UE HEP x 10 reps for improved R UE coordination for ADLs  Long Term Goals  Time Frame for Long Term Goals : By 4 weeks  Long Term Goal 1: Pt will complete LB dressing with CGA  Long Term Goal 2: Pt will complete toileting with CGA  Long Term Goal 3: Pt will complete toilet and functional transfers with CGA. Long Term Goal 4: Pt will complete UB dressing with SPV.   Long Term Goal 5: Pt will complete simple IADL with LRAD at mod I          Therapy Time   Individual Concurrent Group Co-treatment   Time In       1115   Time Out       1211   Minutes       56         Therapy Time   Individual Concurrent Group Co-treatment   Time In  1305        Time Out  1339        Minutes  34           Timed Code Treatment Minutes:  56 + 34 = 90 min     Total Treatment Minutes: 90 min     Camille Velasquez, OT

## 2023-02-22 NOTE — PROGRESS NOTES
ACUTE REHAB UNIT  SPEECH/LANGUAGE PATHOLOGY      [x] Daily  [] Weekly Care Conference Note  [] Discharge    Patient:Jeffrey A Barnett Krabbe  EHN:[id number]  Rehab Dx/Hx: Acute CVA (cerebrovascular accident) ([facility] [address] [I63.9]    Precautions: [x] Aspiration  [x] Fall risk  [] Sternal  [] Seizure [] Hip  [] Weight Bearing [] Other  ST Dx: [] Aphasia  [x] Dysarthria  [] Apraxia   [x] Oropharyngeal dysphagia [x] Cognitive Impairment  [] Other:   Date of Admit: 1/29/2023  Room #: 3102/3102-01  Date: 2/22/2023          Current Diet Order:ADULT ORAL NUTRITION SUPPLEMENT; Breakfast, Dinner; Standard High Calorie/High Protein Oral Supplement  ADULT DIET; Dysphagia - Soft and Bite Sized   Recommended Form of Meds: Meds in puree  Compensatory Swallowing Strategies : Neutral head positioning, placement of solid PO on right, one bite at a time, check for pocketing and oral residue. Benefits from use of mirror. Limit distractions during meal time (only 1 person in room at a time)  Previous MBS: 1/26/23  Oral Phase  Mild-moderate dysfunction characterized by incomplete labial seal with anterior spillage, slowed/reduced mastication, mild stasis. Pharyngeal Phase  Moderate dysfunction characterized by impairments in timing, strength and coordination with premature bolus loss, reduced base of tongue retraction, delayed/reduced hyolaryngeal mechanics, and reduced pharyngeal constriction. Resulted in vallecular/pyriform/pharyngeal residue, as well as compromised airway protection with penetration and gross tracheal aspiration of thin and mildly thick liquids; strong reactive cough present but did not fully clear. Chin tuck strategy did not eliminate aspiration, however cued head turn LEFT with small straw sips was effective . Double swallow helped clear residual. Of note, throughout study pt with tendency to tilt head posteriorly which further exacerbated bolus control; benefited from cues for neutral positioning.   Upper Esophageal Screen  Indirectly assessed; appeared unremarkable. Repeat MBS: 2/13/23  Pt demonstrates a mild/moderate oropharyngeal dysphagia. Oral phase with reduced bolus cohesion resulting in head of bolus in pyriform sinus with tail of bolus in oral cavity with liquids without cues. Pt demo'd ability to complete oral hold of thin via tsp with cues but loss to floor of mouth. Pt with persistent delayed swallow initiation (inconsistent) to pyriform sinus with liquids, pudding spilling over top of epiglottis (did not fill vallecular space). Pt continues to demonstrate mildly reduced hyolaryngeal excursion initially with absent epiglottic distention progressing to full distention and there is mildly reduced tongue base retraction. Pt demonstrated mild residue in valleculae and pyriform sinus with x1 instance of residue from vallecula to posterior pharyngeal wall. Residue does increased with viscosity. Use of cued liquid rinse and cued effortful swallows reduced pharyngeal residue mildly. There was no aspiration or penetration during study. Attempted mendelsohn maneuver following study with model, tactile cues and verbal cues; pt not stimulable this date. 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  None indicated   Pain Intervention: [] RN notified  [] Repositioned  [] Intervention offered and patient declined  [x] N/A  [] Other: [] RN notified  [] Repositioned  [] Intervention offered and patient declined  [x] N/A  [] Other:   Subjective:     Pt upright in chair. Pts brother is present for duration of session. Pt upright in chair with brother present. Pt agreeable to session as usual.    Objective / Goals:     New goal s/p repeat MBS: Pt will demonstrate self feeding without labial residue across mealtime assessment without cues. X3 instances of labial residue noted while sipping liquid beverage from a straw. Goal not targeted this session. New goal s/p repeat MBS: Pt will tolerate trials of advanced textures with adequate oral clearance as evidenced by <min oral residue and no reports of globus sensation across two mealtime assessments Patient tolerated NMES via VitalStim placement 4a (targeting orbicularis oris, buccinator and superior pharyngeal constrictor) @ 2.5 mA on right and 8.0 mA on left x 31 minutes. Min-mod oral residue on left side after initial swallow. Pt is able to independently utilize lingual sweep, liquid rinse or toothette to clear. Pt bit L side of buccal cavity x2. SLP recommended pt to place PO into right side of oral cavity to avoid this. Goal not targeted this session. Goal 4: Pt will demonstrate safe feeding behavior as evidenced by small bolus size / appropriate rate without cues. Pt declined solids on tray due to not having ordered them. Accepted sausage. Goal not targeted this session. Goal 5: Pt will tolerate least restrictive diet without respiratory decline. Not immunocompromised per WBC/Neutrophils Absolute. No decline in respiratory status. There is minimal throat clearing throughout session. Cued fast/effortful swallows x52   Goal not targeted this session.        Goal 2: The patient will demonstrate sustained attention to task for 2 minutes with <2 prompts. Pt required >2 prompts to redirect attention back to swallowing before talking during AM meal.  Min prompt required for continuation on task, however pt was easily redirected and adequately sustained attention to therapy tasks. Goal 3: The patient will demonstrate improved inhibition as evidenced by <3 prompts for impulsivity during a task. No instances of impulsivity noted during this session. Identifying activities from descriptions: 88% accuracy, increased to 100% when given faded min cues. Goal 4: The patient will demonstrate improved self monitoring/correcting for basic tasks with <mod cues. Pt aware of tangential comments while masticating, however unable to inhibit talking unless redirected by SLP or family members multiple times. Independently re-read instructions for task during time of increased difficulty. ID errors from completed directions: 90% accuracy, increased to 100% when given faded min cues. Goal 5: The patient will tolerate ongoing cognitive linguistic assessment. Goal not targeted this session. Goal not targeted this session. Goal 6: The patient will demonstrate comprehensibility in multiple communication environments without cues. Pt comprehensible throughout duration of session. Goal not targeted this session. Other areas targeted:     Education:   Edu re: working toward achieving lingual sweeps with proper coordination to recollect bolus for more effective swallow. However, continued using compensatory oral cavity clearance strategies when needed. Education ongoing re: skills targeted this date. Safety Devices: [x] Call light within reach  [x] Chair alarm activated and connected to nurse call light system  [] Bed alarm activated   [] Other:  [x] Call light within reach  [x] Chair alarm activated and connected to nurse call light system  [] Bed alarm activated   [] Other:    Assessment: Oropharyngeal dysphagia per MBSS.  Continues to be limited by talking during mastication. Improved orbicularis oris movement, limited to no buccinator movement and no levator labii activation is noted on left. Ongoing cognitive impairment with significant improvements in left attention from admission and mild improvements in overall sustained attention. Pt demonstrates disinhibition with pragmatic impairments associated. Plan: Continue as per plan of care. Interventions used this date:  [] Speech/Language Treatment  [] Instruction in HEP  [x] Dysphagia Treatment [x] Cognitive Treatment   [] Other:    Discharge recommendations:  [] Home independently  [x] Home with assistance []  24 hour supervision  [] ECF [] Other  Continued Tx Upon Discharge: ? [x] Yes    [] No    [] TBD based on progress while on ARU     [] Vital Stim indicated     [] Other:   Estimated discharge date: February 24th, 2023    Electronically signed by:  Willard Ramirez, [address] [place] [address] Language Pathology      Forest Fleischer, M.A., [place]  Speech-Language Pathologist    The speech-language pathologist was present, directed the patient's care, made skilled judgment and was responsible for assessment and treatment.

## 2023-02-22 NOTE — PLAN OF CARE
Problem: Pain  Goal: Verbalizes/displays adequate comfort level or baseline comfort level  Verbalizes/displays adequate comfort level or baseline comfort level: Encourage patient to monitor pain and request assistance     Problem: Nutrition Deficit:  Goal: Optimize nutritional status  Flowsheets (Taken 2/22/2023 0148)  Nutrient intake appropriate for improving, restoring, or maintaining nutritional needs: Monitor oral intake, labs, and treatment plans

## 2023-02-22 NOTE — CARE COORDINATION
CM following:  KADE ordered a wheelchair through [place] with [employer]. Order included: 16 \"light weight w/c with flip back arm rests and standard leg rests. LUE arm trough and pressure releiving cushion. [place] will verify that all equipment is available through [employer] and will f/u with . [place] aware of discharge plan for Friday.   Electronically signed by MIRA Huerta on 2/22/2023 at 11:58 AM  [phone]

## 2023-02-22 NOTE — PROGRESS NOTES
Physical Therapy  Facility/Department: [facility] ACUTE REHAB UNIT  Rehabilitation Physical Therapy Treatment Note    NAME: Anton Coker  : 1955 (64 y.o.)  MRN: [id number]  CODE STATUS: Full Code    Date of Service: 23       Restrictions:  Restrictions/Precautions: Fall Risk;Up as Tolerated  Position Activity Restriction  Other position/activity restrictions: up as tolerated, up in chair     SUBJECTIVE  Subjective  Subjective: Pt sitting  up in w/c when PT arrived. Pt  agreeable to PT. ( Pt's brother  present for session)  Pain: Intermittent c/o L hip pain when positioned in ER  when performing bed mobility tasks. (Cont with education for alignment)        Post Treatment Pain Screening         OBJECTIVE  Cognition  Arousal/Alertness: Appropriate responses to stimuli  Following Commands: Follows one step commands with repetition; Follows one step commands with increased time  Attention Span: Attends with cues to redirect; Difficulty attending to directions; Difficulty dividing attention  Safety Judgement: Decreased awareness of need for assistance;Decreased awareness of need for safety  Problem Solving: Assistance required to generate solutions;Assistance required to identify errors made;Assistance required to implement solutions  Insights: Decreased awareness of deficits  Initiation: Requires cues for some  Sequencing: Requires cues for some  Cognition Comment: Pt cont  req  VC/ TC to stay focused on task. Pt benefits from controlled environment with min distractions especially with auditory distractions. Deescalation skills used as a means to control his environment.  (  one person giving commands, decreased volume of voice, simple directions as examples) Pt req VC for sequencing tasks  Orientation  Overall Orientation Status: Within Functional Limits  Orientation Level: Oriented X4    Functional Mobility  Bed Mobility  Overall Assistance Level: Contact Guard Assist;Minimal Assistance  Additional Factors: Verbal cues; Increased time to complete; Head of bed flat; Without handrails (PT instructed pt / pt's brother with bed mob teaching using only the flat bed and no bedrails to simulate home situation)  Bridging  Assistance Level: Contact guard assist  Skilled Clinical Factors: Req A for stabilizing  the LLE to complete bridge when performing scooting in lat direction  Roll Left  Assistance Level: Supervision  Skilled Clinical Factors: VC for sequencing and controlling the pace of the transition. Therapy instructed with positioning LUE in an abd position with elbow extended)  in which RUE supported L wrist while positioning R thumb in the L hand to maintain the arch of the hand.)  Roll Right  Assistance Level: Supervision  Skilled Clinical Factors: VC for sequencing and controlling the pace of the transition. Therapy instructed pt with rolling supine to R sidelying via position with elbow extended)  in which RUE supported L wrist while positioning R thumb in the L hand to maintain the arch of the handduring the transitional movement  Supine to Sit  Assistance Level: Contact guard assist  Skilled Clinical Factors: VC for sequencing and controlling the pace of the transition CGA for trunk transition to stabilize once in sitting  Scooting  Assistance Level: Contact guard assist  Skilled Clinical Factors: In sitting , req VC  with lat WS to advance L hip to the EOS  Balance  Sitting Balance: Stand by assistance  Standing Balance: Contact guard assistance  Transfers  Surface: Wheelchair; To bed;From bed  Additional Factors: Verbal cues; Hand placement cues; Increased time to complete (PT instructed pt's wife  with transf w/c <> bed( 4x )  leading with R side. Initial demo from PT followed by teach back from pt's wife 3x)  Sit to Stand  Assistance Level: Minimal assistance;Contact guard assist  Skilled Clinical Factors: VC for sequencing the task with focus on the set up to make the transition successful.  This included pt's hand/ foot placement, scooting to the EOS and ant WS. PT provided family training to pt's bother so the initial transf req Min A/  2/2 to pt not getting ant WS. Pt progressed  with brother to CGA/ Min A for trnaf w/c <> bed consistently leading with the R side. Stand to Sit  Assistance Level: Contact guard assist  Skilled Clinical Factors: VC for hand placement and sequencing the task. Pt intermittently demo decreased eccentric control  Bed To/From Chair  Technique: Stand pivot  Assistance Level: Contact guard assist;Minimal assistance (w/c <> bed 3x)  Skilled Clinical Factors: w/c<>bed with CGA for a modified stand pivot transf, req VC / TC for pacing the transition. Instructed pt to lead with R side only when performing family training to optimize safety    Second session: Pt sitting in w/c when therapy arrived. Pt agreeable to therapy. PT and OT worked collaboratively to utilize the skills of 2 disciplines while maximizing functional mobility to obtain optimal potential.    Environmental Mobility  Transf   Sit to Stand  Assistance Level: Minimal assistance;Contact guard assist  Skilled Clinical Factors: VC for sequencing the task with focus on the set up to make the transition successful. This included pt's hand/ foot placement, scooting to the EOS and ant WS. PT provided family training to pt's bother so the initial transf req Min A/  2/2 to pt not getting ant WS. Pt progressed  with brother to CGA/ Min A   Stand to Sit  Assistance Level: Contact guard assist  Skilled Clinical Factors: VC for hand placement and sequencing the task. Pt intermittently demo decreased eccentric control    Stairs  Stair Height: 6''  Device: One handrail (On the R only for both up and down, ACE wrap DF and then to a prefabricated L AFO)  Number of Stairs: Performed the 3 steps 4x with seated rests between  Additional Factors: Verbal cues; Non-reciprocal going down;Non-reciprocal going up; Increased time to complete  Assistance Level: Requires x 2 assistance  Skilled Clinical Factors: Practiced 4 x with pt's brother with going up and down the steps. PT instructed pt with using the VC that appeared most effective as well as the best handling techniques ( position of CG, where to hold the gt belt, the amount of A needed to be sure that pt was safe. Pt sat between each trial Each trial, pt's brother improved and reported increased confidence when assisting    Pt returned to room in w/c and remained in w/c with call light and phone placed within reach. Chair alarm reactivated. ASSESSMENT/PROGRESS TOWARDS GOALS       Assessment  Assessment: Therapy cont to provide family training to the primary CG to optimize pt's safety in anticipation of return to home. Following the home eval yesterday, it was indicated that pt req significant trainng for amb on steps to access home. Pt's wife and brother received training on transf and bed mobility. Both CG reported increased comfort with these trasnitional movements. PT recommended that simple commands are given from only one person and most importantly for pt to go SLOW! Pt is well below baseline and would benefit from continued therapy to maximize potential and increase functional mobility towards Ind to allow for a safer d/c to home. Activity Tolerance: Patient limited by fatigue;Treatment limited secondary to decreased cognition;Patient limited by endurance; Patient limited by pain  Discharge Recommendations: 24 hour supervision or assist;Continue to assess pending progress;Home with Home health PT  PT Equipment Recommendations  Equipment Needed: Yes  Other: 16 \"light weight w/c with flip back arm rests and standard leg rests. LUE arm trough also recommended.  Pressure releiving cushion also recommended    Goals  Patient Goals   Patient Goals : to go home  Short Term Goals  Time Frame for Short Term Goals: 2 weeks  Short Term Goal 1: Pt will complete bed mobility with moderate assist(Not addressed on this date)  Short Term Goal 2: Pt will complete sit<>stand transfer wtih moderate assist. Goal achieved  Short Term Goal 3: Pt will propel self in manual w/c for 150' with minimal assist.- met 2/2  Short Term Goal 4: Pt will ambulate 10' with moderate assist.Goal not achieved  Long Term Goals  Time Frame for Long Term Goals : 4 weeks (all ongoing)  Long Term Goal 1: Pt will complete bed mobility with CGA  Long Term Goal 2: Pt will complete sit<>Stand transfers with CGA  Long Term Goal 3: Pt will propel self in manual w/c for 250' with suprvision  Long Term Goal 4: Pt will ambulate 48' with CGA & LRAD    PLAN OF CARE/SAFETY  Physcial Therapy Plan  Days Per Week: 5 Days  Hours Per Day: 1 hour  Current Treatment Recommendations: Strengthening;Balance training;ROM; Functional mobility training;Neuromuscular re-education; Safety education & training;Patient/Caregiver education & training;Transfer training; Endurance training;Home exercise program;Gait training;Equipment evaluation, education, & procurement;Positioning; Therapeutic activities  Safety Devices  Type of Devices: Call light within reach;Nurse notified;Gait belt;Patient at risk for falls; Chair alarm in place; Left in chair  Restraints  Restraints Initially in Place: No    EDUCATION  Education  Education Given To: Patient  Education Provided: Mobility Training;Transfer Training;Energy Conservation; Fall Prevention Strategies  Education Provided Comments: PT performed family training with pt's brother Asif Mancera) on applying the gt belt, handling w/ gt belt , transf ( w/c <>bed )consistently leading with the R, and on all bed mobility skills.   Education Method: Verbal;Teach Back;Demonstration  Barriers to Learning: Cognition  Education Outcome: Verbalized understanding;Continued education needed        Therapy Time   Individual Concurrent Group Co-treatment   Time In [id number]   Time Out [id number]   [id number] 18      13      Total treatment time: 60+56= 116       Katalina Fairbanks, [place], 02/22/23 at 2:56 PM

## 2023-02-22 NOTE — PATIENT CARE CONFERENCE
Inpatient Rehabilitation  Weekly Team Conference Note  The [address] [place] [address],Nob [address] [place] [address]  [phone]  Patient Name: Alicia Lambert        MRN: [id number]    : 1955  (74 y.o.)  Gender: male   Referring Practitioner: Nicole Polk DO  Diagnosis: CVA  The team conference for this patient was held on 2023 at 10:30am by:  Nicole Polk DO    Current/Goal QM SCORES  QM Current/Goal Score   Eating CARE Score: 4 / Discharge Goal: Independent   Oral Hygiene CARE Score: 7 / Discharge Goal: Independent   Shower/Bathing CARE Score: 1 / Discharge Goal: Supervision or touching assistance   UB Dressing CARE Score: 2 / Discharge Goal: Independent   LB Dressing CARE Score: 3 / Discharge Goal: Supervision or touching assistance   Putting on/off Footwear CARE Score: 2 / Discharge Goal: Independent   Toileting Hygiene CARE Score: 1 / Discharge Goal: Supervision or touching assistance   Bladder Continence Bladder Continence: Incontinent daily    Bowel Continence Bowel Continence: Not rated    Toilet Transfers CARE Score: 2 / Discharge Goal: Supervision or touching assistance   Shower/Bathe Self  CARE Score: 1 / Discharge Goal: Supervision or touching assistance   Rolling Left and Right CARE Score: 3 / Discharge Goal: Supervision or touching assistance   Sit to Lying CARE Score: 2 / Discharge Goal: Supervision or touching assistance   Lying to Sitting on Bedside CARE Score: 1 / Discharge Goal: Supervision or touching assistance   Sit to Stand CARE Score: 3 / Discharge Goal: Supervision or touching assistance   Chair/Bed to Chair Transfer CARE Score: 1 / Discharge Goal: Supervision or touching assistance   Car Transfers CARE Score: 88 / Discharge Goal: Supervision or touching assistance   Walk 10 Feet CARE Score: 88 / Discharge Goal: Partial/moderate assistance   Walk 50 Feet with Two Turns CARE Score: 88 /     Walk 150 Feet CARE Score: 88 / Discharge Goal: Partial/moderate assistance   Walk 10 Feet on Uneven Surfaces CARE Score: 88 / Discharge Goal: Not Attempted   1 Step (Curb) CARE Score: 88 / Discharge Goal: Not Attempted   4 Steps CARE Score: 88 / Discharge Goal: Not Attempted   12 Steps CARE Score: 88 / Discharge Goal: Not Attempted   Picking up Object from Floor CARE Score: 88 / Discharge Goal: Partial/moderate assistance   Wheel 50 Feet with 2 Turns CARE Score: 5 / Discharge Goal: Supervision or touching assistance   Type         [] Manual        [] Motorized        [] N/A   Wheel 150 Feet CARE Score: 3 / Discharge Goal: Supervision or touching assistance   Type         [] Manual        [] Motorized        [] N/A     NURSING:  A&Ox: Level of Consciousness: Alert (0)  Orientation Level: Oriented X4  Harris Fall Risk Score: Harris Total Score: 40  Admission BIMS: 10   [] Unable to complete BIMS on Admission, Reasoning:   Wounds/Incisions/Ulcers:   Medication Review: Medications reviewed with patient  Pain: Patients pain to left wrist and left hip being managed with PRN and scheduled medications. Consultations:   Imaging: See Below   XR WRIST LEFT (2 VIEWS)   Final Result   Impression:   No acute fracture. FL MODIFIED BARIUM SWALLOW W VIDEO   Final Result   1. Mild oral dysphagia. 2. No penetration or aspiration with any consistency. XR HIP 2-3 VW W PELVIS LEFT   Final Result   1. Mild-moderate left hip osteoarthrosis. Active Comorbid Conditions:HTN, Anxiety, Hyperlipidemia, depression  Systems Review:   Renal: WDL, Dialysis:  Type: Frequency:   Neurological: Patient is A/Ox4, very talkative, easily gets distracted. Musculoskeletal: Left arm flaccid,  LLE some effort against gravity. Respiratory: WDL  Cardiac/Circulatory/Peripheral Vascular: ZIO Patch  Abnormal/Relevant Test Results: See Below  Abnormal/Relevant Lab Values:   CBC: No results for input(s): WBC, HGB, HCT, MCV, PLT in the last 72 hours.   BMP: No results for input(s): NA, K, CL, CO2, PHOS, BUN, CREATININE, CA in the last 72 hours. PT/INR: No results for input(s): PROTIME, INR in the last 72 hours. APTT: No results for input(s): APTT in the last 72 hours. Liver Profile:  Lab Results   Component Value Date/Time    AST 24 01/25/2023 07:39 PM    ALT 20 01/25/2023 07:39 PM    BILITOT <0.2 01/25/2023 07:39 PM    ALKPHOS 47 01/25/2023 07:39 PM     Lab Results   Component Value Date/Time    CHOL 140 01/27/2023 05:52 AM    HDL 50 01/27/2023 05:52 AM    TRIG 97 01/27/2023 05:52 AM     No results for input(s): WBC, HGB, HCT, PLT, MCV in the last 72 hours. No results for input(s): NA, K, CL, CO2, PHOS, BUN, CREATININE, CA in the last 72 hours. No results for input(s): AST, ALT, ALB, BILIDIR, BILITOT, ALKPHOS in the last 72 hours. No results for input(s): MG in the last 72 hours. No results for input(s): WBC, HGB, HCT, PLT in the last 72 hours. No results for input(s): NA, K, CL, CO2, BUN, CREATININE, CALCIUM, PHOS in the last 72 hours. Invalid input(s): MAGNES  No results for input(s): AST, ALT, BILIDIR, BILITOT, ALKPHOS in the last 72 hours. No results for input(s): INR in the last 72 hours. No results for input(s): [place] Bounds in the last 72 hours. PHYSICAL THERAPY:  Bed Mobility:      Transfers:  Sit to Stand: Maximum Assistance, 2 Person Assistance  Stand to Sit: 2 Person Assistance, Maximum Assistance    Ambulation  More Ambulation?: No         OCCUPATIONAL THERAPY:  ADL  Feeding: Setup  Feeding Skilled Clinical Factors: assist to open packages  Grooming: Minimal assistance  Grooming Skilled Clinical Factors: assist to place toothpaste onto toothbrush, assist to manage supplies  UE Bathing: Moderate assistance  UE Bathing Skilled Clinical Factors: seated in rolling shower chair, max verbal cueing to wash L UE, assist to lift UE to wash underarm  LE Bathing: Moderate assistance  LE Bathing Skilled Clinical Factors: assist to wash lower LEs.  Pt able to wash [place] area and upper LEs with cueing. UE Dressing: Maximum assistance  UE Dressing Skilled Clinical Factors: assist to place L UE into shirt, overhead, and bringing R UE into shirt, pull down over trunk  LE Dressing: Dependent/Total  LE Dressing Skilled Clinical Factors: assist from 2 helpers to pull up over hips. Pt able to assist threading R LE into pants, assist for L LE  Toileting: Dependent/Total  Toileting Skilled Clinical Factors: via rolling shower chair placed over toilet  Additional Comments: Bathing and dressing completed as listed above. Pt required extensive cueing to attend to L UE and for sequencing. Pt seated on rolling shower chair 2/2 extensive posterior lean and pushing to pts L while seaed EOB. Pt impulsive during transfers. Required cueing to attend to tasks, noted pt to be easily distracted during transfers and ADLs. Seated in bedside chair for oral care. Toilet Transfers: Toilet Transfers  Toilet - Technique:  (via rolling shower chair)  Equipment Used:  (rolling shower chair)  Toilet Transfer: Dependent/Total  Toilet Transfers Comments: pt did not have urge to void, however rolled pt in bathroom via rolling shower chair. Max A x 2 to pivot from bed to rolling shower chair. Tub/ShowerTransfers:          SPEECH THERAPY:  Oropharyngeal dysphagia per MBSS. Continues to be limited by talking during mastication. Improved orbicularis oris movement, limited to no buccinator movement and no levator labii activation is noted on left. Ongoing cognitive impairment with significant improvements in left attention from admission and mild improvements in overall sustained attention. Pt demonstrates disinhibition with pragmatic impairments associated. NUTRITION:  Current Weight: 169 lb 12.1 oz (77 kg) BMI (Calculated): 22.4  Current diet order: Current diet and supplement order: ADULT ORAL NUTRITION SUPPLEMENT; Breakfast, Dinner; Standard High Calorie/High Protein Oral Supplement  ADULT DIET;  Dysphagia - Soft and Bite Sized         Feeding: Needs set up, Needs assist, Able to feed self  Room Service: Selective   NSG Recorded PO: PO Meals Eaten (%): 26 - 50% PO Supplement (%): 76 - 100%  Malnutrition Status Malnutrition Status: At risk for malnutrition (Comment)      CASE MANAGEMENT:  Psychosocial/Behavioral Issues:    Assessment: We are planning DC home with spouse. Othella Pucker has been conducted and family will be having a railing added for outside steps at home. Pt's wife and brother have been transfer trained. Pt will need a wheelchair and skilled Home Care. Patient/Family Education provided by team:  Role of PT/OT, Nursing, ADL retraining, transfer training, stroke recovery    Patient Goals for Rehab stay:  1. To go home with wife     Rehab Team Goals for patient for the Upcoming Week:  1. Increase independence w/ ADLs    Barriers to Progress/Attainment of Goals & Efforts/Interventions to remove Barriers:  1. LUE weakness - neuro re education/florence dressing techniques for ADLs    Discharge Plan:  Estimated Length of Stay: 27 days  Destination: home health  Services at Discharge: [address] [place] [address], Occupational Therapy, Speech Therapy, and Nursing 3x week  Equipment at Discharge: w/c (see PT note)  Community Resources:   Factors facilitating achievement of predicted outcomes: Family support, Caregiver support, Motivated, Cooperative, Pleasant, and Sense of humor  Barriers to the achievement of predicted outcomes: Cognitive deficit, Impulsivity, Limited safety awareness, Decreased endurance, Decreased sensation, Decreased proprioception, Upper extremity weakness, and Lower extremity weakness    Special Needs in the Upcoming Week:   [x] Family/Caregiver Education  [] Home visit  []Therapeutic Pass [] Consults:_______   [] Family/Caregiver Training  [] Stroke Risk Factor Education     [] Other;_______     TEAM SUMMARY: Will continue with current poc & goals until anticipated d/c date of 2/24/2023.     MD:   Stroke Risk Factors:   [] N/A for this patient [x] HTN  []  Diabetes  [x] Hyperlipidemia  []Obesity BMI >25  [] Atrial Fibrillation [] Smoker (current)  [] Smoker (quit in last 12 months)  [] Sleep Apnea [] Other:     Risk for Readmission: Moderate (10-19)    Justification for Continued Stay:   Criteria for continued IRF stay:  Based on my medical assessment of the patient and review of information from the interdisciplinary team, as part of this weekly team conference, the patient continues to meet the following criteria for IRF level of care:  [x] The patient requires 24-hour rehabilitation nursing care   [x] The patient requires an intensive rehabilitation therapy program  [x] The patient requires active and ongoing intervention of multiple therapy disciplines  [x] The patient requires continued physician supervision by a rehabilitation physician  [x] The patient requires an intensive and coordinated interdisciplinary team approach to the delivery of rehabilitative care    Medical Necessity-continued close physician medical management is required for:   [] Cardiac/Circulatory dysfunction  [] Respiratory/Pulmonary dysfunction  [] Integumentary complications  [] Peripheral Vascular dysfunction  [] Musculoskeletal dysfunction  [x] Neurological dysfunction d/t:  [x] CVA  [] SCI  [] TBI  [] Other: __________  [] Renal dysfunction  [] Hematologic dysfunction    [] Endocrine disorders  [] GI disorders     [] Genito-Urinary dysfunction    Assessment/Plan:  [x] The patient is making good progression towards their LTG's, is actively participating in, and has a reasonable expectation to continue to benefit from the intensive rehabilitation program.  [] The estimated discharge date has been changed from initial team conference due to:   [] The estimated discharge destination has been changed from initial team conference due to:     Rehab Team Members in attendance for Team Conference:  ARU Supervisor/PPS Coordinator:  Dianna Duong PT, DPT    Therapy Manager/ARU :  Lauren Dsouza, PT, DPT    Social Work:  MONA Madera    Nursing:  Mauro Rhodes, RN    Therapy:  Magdi Lopez, PT  Terry Baptiste, PT, DPT  Yesenia Serum PT, DPT     Denise Ivey, OTR/L  Maya Damon, OTR/L  Fernando Heart, [facility], [facility], SLP  Tavon Jeffries, [facility], SLP    Nutrition:  Dannis Kehr, RD:    Theodore: [phone]  Office:  [phone]      I approve the established interdisciplinary plan of care as documented within the medical record of Blanca Wayne.     MD Kristen Lucas D.O. M.P.H  PM&R  2/23/2023  12:34 PM

## 2023-02-22 NOTE — PROGRESS NOTES
Pt's brother has been at the bedside throughout the day and has helped pt with using the urinal. Pt is very talkative, sometimes speaking over others but quickly realizes what he is doing and apologizes. Pt expressed that it was caused by the stroke and that he did not use to do this. Pt has been feeding himself. Pt has been up in the chair for most of the day. Pt currently denies pain.

## 2023-02-22 NOTE — PLAN OF CARE
Problem: Discharge Planning  Goal: Discharge to home or other facility with appropriate resources  Outcome: Progressing     Problem: Skin/Tissue Integrity  Goal: Absence of new skin breakdown  Description: 1.  Monitor for areas of redness and/or skin breakdown  2.  Assess vascular access sites hourly  3.  Every 4-6 hours minimum:  Change oxygen saturation probe site  4.  Every 4-6 hours:  If on nasal continuous positive airway pressure, respiratory therapy assess nares and determine need for appliance change or resting period.  Outcome: Progressing     Problem: ABCDS Injury Assessment  Goal: Absence of physical injury  Outcome: Progressing     Problem: Safety - Adult  Goal: Free from fall injury  Outcome: Progressing     Problem: Pain  Goal: Verbalizes/displays adequate comfort level or baseline comfort level  2/22/2023 1509 by Maria Guadalupe Bhatt RN  Outcome: Progressing  2/22/2023 0148 by Arlene Melara RN  Flowsheets (Taken 2/19/2023 1950 by Jessica Son RN)  Verbalizes/displays adequate comfort level or baseline comfort level: Encourage patient to monitor pain and request assistance     Problem: Nutrition Deficit:  Goal: Optimize nutritional status  2/22/2023 1509 by Maria Guadalupe Bhatt RN  Outcome: Progressing  2/22/2023 0148 by Arlene Melara RN  Flowsheets (Taken 2/22/2023 0148)  Nutrient intake appropriate for improving, restoring, or maintaining nutritional needs: Monitor oral intake, labs, and treatment plans     Problem: Chronic Conditions and Co-morbidities  Goal: Patient's chronic conditions and co-morbidity symptoms are monitored and maintained or improved  Outcome: Progressing

## 2023-02-23 PROCEDURE — 97535 SELF CARE MNGMENT TRAINING: CPT

## 2023-02-23 PROCEDURE — 99233 SBSQ HOSP IP/OBS HIGH 50: CPT | Performed by: PHYSICAL MEDICINE & REHABILITATION

## 2023-02-23 PROCEDURE — 92526 ORAL FUNCTION THERAPY: CPT

## 2023-02-23 PROCEDURE — 6370000000 HC RX 637 (ALT 250 FOR IP): Performed by: PHYSICAL MEDICINE & REHABILITATION

## 2023-02-23 PROCEDURE — 97130 THER IVNTJ EA ADDL 15 MIN: CPT

## 2023-02-23 PROCEDURE — 97116 GAIT TRAINING THERAPY: CPT | Performed by: PHYSICAL THERAPIST

## 2023-02-23 PROCEDURE — 97530 THERAPEUTIC ACTIVITIES: CPT | Performed by: PHYSICAL THERAPIST

## 2023-02-23 PROCEDURE — 1280000000 HC REHAB R&B

## 2023-02-23 PROCEDURE — 97129 THER IVNTJ 1ST 15 MIN: CPT

## 2023-02-23 PROCEDURE — 6360000002 HC RX W HCPCS: Performed by: PHYSICAL MEDICINE & REHABILITATION

## 2023-02-23 RX ADMIN — EZETIMIBE 10 MG: 10 TABLET ORAL at 08:40

## 2023-02-23 RX ADMIN — TRAZODONE HYDROCHLORIDE 50 MG: 50 TABLET ORAL at 21:12

## 2023-02-23 RX ADMIN — NYSTATIN 500000 UNITS: 100000 SUSPENSION ORAL at 21:05

## 2023-02-23 RX ADMIN — SENNOSIDES AND DOCUSATE SODIUM 2 TABLET: 50; 8.6 TABLET ORAL at 08:40

## 2023-02-23 RX ADMIN — ROSUVASTATIN CALCIUM 40 MG: 20 TABLET, FILM COATED ORAL at 08:40

## 2023-02-23 RX ADMIN — ASPIRIN 81 MG 81 MG: 81 TABLET ORAL at 08:40

## 2023-02-23 RX ADMIN — NYSTATIN 500000 UNITS: 100000 SUSPENSION ORAL at 08:40

## 2023-02-23 RX ADMIN — DICLOFENAC SODIUM 4 G: 10 GEL TOPICAL at 08:41

## 2023-02-23 RX ADMIN — TIZANIDINE 4 MG: 4 TABLET ORAL at 21:05

## 2023-02-23 RX ADMIN — FLUOXETINE 20 MG: 20 CAPSULE ORAL at 08:40

## 2023-02-23 RX ADMIN — DICLOFENAC SODIUM 4 G: 10 GEL TOPICAL at 21:05

## 2023-02-23 RX ADMIN — Medication 5 MG: at 21:05

## 2023-02-23 RX ADMIN — NYSTATIN 500000 UNITS: 100000 SUSPENSION ORAL at 18:33

## 2023-02-23 RX ADMIN — ENOXAPARIN SODIUM 40 MG: 100 INJECTION SUBCUTANEOUS at 08:39

## 2023-02-23 RX ADMIN — PANTOPRAZOLE SODIUM 40 MG: 40 TABLET, DELAYED RELEASE ORAL at 05:49

## 2023-02-23 RX ADMIN — METHYLPHENIDATE HYDROCHLORIDE 10 MG: 10 TABLET ORAL at 05:49

## 2023-02-23 RX ADMIN — ACETAMINOPHEN 650 MG: 325 TABLET, FILM COATED ORAL at 21:12

## 2023-02-23 RX ADMIN — SENNOSIDES AND DOCUSATE SODIUM 2 TABLET: 50; 8.6 TABLET ORAL at 21:05

## 2023-02-23 ASSESSMENT — PAIN DESCRIPTION - PAIN TYPE: TYPE: ACUTE PAIN

## 2023-02-23 ASSESSMENT — PAIN DESCRIPTION - ORIENTATION: ORIENTATION: LEFT

## 2023-02-23 ASSESSMENT — PAIN SCALES - GENERAL
PAINLEVEL_OUTOF10: 0
PAINLEVEL_OUTOF10: 7

## 2023-02-23 ASSESSMENT — PAIN DESCRIPTION - LOCATION: LOCATION: SHOULDER

## 2023-02-23 ASSESSMENT — PAIN DESCRIPTION - DESCRIPTORS: DESCRIPTORS: ACHING;DISCOMFORT

## 2023-02-23 ASSESSMENT — PAIN DESCRIPTION - ONSET: ONSET: ON-GOING

## 2023-02-23 NOTE — PROGRESS NOTES
Shift assessment complete. VSS. Patient A/Ox4. Pain is being managed with PRN and scheduled medications. Patient tolerated medications crushed in pudding. Safety measures in place. Call light and over bed table within reach. Hourly rounding and visual checks in place. Will continue to monitor.    Vitals:    02/22/23 2025   BP: 119/72   Pulse: 77   Resp: 16   Temp: 97.3 °F (36.3 °C)   SpO2: 96%

## 2023-02-23 NOTE — PROGRESS NOTES
Physical Therapy  Facility/Department: [facility] UNIT  Rehabilitation Physical Therapy Treatment Note/ Discharge Summary    NAME: Kalyan Ruiz  : 1955 (53 y.o.)  MRN: [id number]  CODE STATUS: Full Code    Date of Service: 23       Restrictions:  Restrictions/Precautions: Fall Risk;Up as Tolerated  Position Activity Restriction  Other position/activity restrictions: up as tolerated, up in chair     SUBJECTIVE  Subjective  Subjective: Pt coming out of BR on [address] when PT arrived. Pt agreeable  to therapy . Pt started PT session on EOB. Pain: Intermittent c/o L hip pain when positioned in ER  when performing bed mobility tasks. (Cont with education for alignment)        Post Treatment Pain Screening         OBJECTIVE  Cognition  Arousal/Alertness: Appropriate responses to stimuli  Following Commands: Follows one step commands with repetition; Follows one step commands with increased time  Attention Span: Attends with cues to redirect; Difficulty attending to directions; Difficulty dividing attention  Safety Judgement: Decreased awareness of need for assistance;Decreased awareness of need for safety  Problem Solving: Assistance required to generate solutions;Assistance required to identify errors made;Assistance required to implement solutions  Insights: Decreased awareness of deficits  Initiation: Requires cues for some  Sequencing: Requires cues for some  Cognition Comment: Pt cont  req  VC/ TC to stay focused on task. Pt benefits from controlled environment with min distractions especially with auditory distractions. Deescalation skills used as a means to control his environment.  (  one person giving commands, decreased volume of voice, simple directions as examples) Pt req VC for sequencing tasks  Orientation  Overall Orientation Status: Within Functional Limits  Orientation Level: Oriented X4    Functional Mobility  Bed Mobility  Overall Assistance Level: Contact Guard Assist;Minimal Assistance  Additional Factors: Verbal cues; Increased time to complete; Head of bed flat; Without handrails (Flat bed and no bedrails to simulate home situation)  Bridging  Assistance Level: Contact guard assist  Skilled Clinical Factors: Req A for stabilizing  the LLE to complete bridge when performing scooting in lat direction  Roll Left  Assistance Level: Supervision  Skilled Clinical Factors: VC for sequencing and controlling the pace of the transition. Therapy instructed with positioning LUE in an abd position with elbow extended)  in which RUE supported L wrist while positioning R thumb in the L hand to maintain the arch of the hand.)  Roll Right  Assistance Level: Supervision  Skilled Clinical Factors: VC for sequencing and controlling the pace of the transition. Therapy instructed pt with rolling supine to R sidelying via position with elbow extended)  in which RUE supported L wrist while positioning R thumb in the L hand to maintain the arch of the handduring the transitional movement  Sit to Supine  Assistance Level: Minimal assistance  Skilled Clinical Factors: VC for sequencing and controlling the pace of the transition Min A / CGA  to be sure that LLE has cleared the bed and is in correct alignment. Pt demo increased difficulty with transitioning on this date 2/2 to increased tone in LLE noted  Supine to Sit  Assistance Level: Contact guard assist  Skilled Clinical Factors: VC for sequencing and controlling the pace of the transition CGA for trunk transition to stabilize once in sitting  Scooting  Assistance Level: Contact guard assist;Minimal assistance  Skilled Clinical Factors: In sitting , req VC  with lat WS to advance L hip to the EOS  Balance  Sitting Balance: Supervision (S recommended 2/2 to pt's decreased L side attention and difficulty with grading movements on that side.  VC for L side attention is req)  Standing Balance: Minimal assistance  Standing Balance  Comments: Pt is able to stand with RUE support to the R of midline. Pt stood on this date to urinate, Pt req A for urinal placement  Transfers  Surface: Wheelchair;From bed; To bed  Additional Factors: Verbal cues; Hand placement cues; Increased time to complete  Sit to Stand  Assistance Level: Minimal assistance;Contact guard assist  Skilled Clinical Factors: VC for sequencing the task with focus on the set up to make the transition successful. This included pt's hand/ foot placement, scooting to the EOS and ant WS. PT  cont to provide family training to pt's brother. Stand to Sit  Assistance Level: Contact guard assist  Skilled Clinical Factors: VC for hand placement and sequencing the task. Pt intermittently demo decreased eccentric control  Bed To/From Chair  Technique: Stand pivot (Modified SPT leading wi/ L side)  Assistance Level: Minimal assistance;Contact guard assist (w/c <> bed leading with the R side. Mostly CGA with VC for sequence)  Skilled Clinical Factors: w/c<>bed with CGA for a modified stand pivot transf, req VC / TC for pacing the transition. Instructed pt to lead with R side only when performing family training to optimize safety  Car Transfer  Assistance Level: Minimal assistance  Skilled Clinical Factors: Cont with step by step VC for sequencing with min A for placement of the LLE to put into the car. Pt able to maneuver the LLE out of the car. Car set up with seat positioned back as far as possible and the the back of the seat reclined to assist w/ transitional movement      Environmental Mobility  Stairs  Stair Height: 6''  Device: One handrail (HR on the R)  Number of Stairs: 6 (3+3)  Additional Factors: Verbal cues; Non-reciprocal going down;Non-reciprocal going up; Increased time to complete;Left AFO; Hand placement cues  Assistance Level: Requires x 2 assistance; Moderate assistance  Skilled Clinical Factors: Mod/LLE guidance including the placement of the L foot for both ascension / descension. Req VC for sequencing amb on steps. Note< PT instructed pt not to talk during stair training to focus on the instructions of the CG. The initial 3 steps, pt able to complete with mod/ max A x1 with PT providing all the cues. The 2nd set of 3 steps, were dependent. Pt's brother instructing pt and  pt consistently interjecting throughout the process which appeared to make pt more anxious. As anxiety increased , LLE tone increased making placement of LLE more difficult. Therapy recommends that pt consistently has 2 people present whenever performing gt training on steps  Wheelchair  Surface: Level surface  Device: Standard wheelchair;Pressure Relieving Cushion (22\" w/c traded to an 18\" w/c to providing closer boundaries for  the L side. Cushion was also adjusted using a towel/rolled sheet to provide a wedge with ant build up. This minimized pt's tendency to ext hips for sliding ant in w/c)  Additional Factors: Verbal cues; Increased time to complete (VCs for L attention and avoiding objects in wilder)  Assistance Required to Manage Parts: All wheelchair parts;Brakes (Brake extension placed on the L with white table to assist pt with visually being able to locate it . Pt req Intermittent VC/ TC to locate the R brake)  Assistance Level for Propulsion: Supervision (Occasional assist for obstacle negotiation in hallway when objects are located on L of midline)  Propulsion Method: Right upper extremity;Right lower extremity  Propulsion Quality: Short strokes; Veers left  Propulsion Distance: 250' x1 . 175'  Skilled Clinical Factors: VCs for L attention and staying on R side of hallwayas a compensatory technique to avoid environmental barriers. Pt's brother , David Riggs ( pt's wife )  present and has been educated on A pt when pt is propelling w/c on the unit. Second Session: Pt sitting in w/c when PT arrived. Pt agreeable to therapy. Pt needed to urinate. Pt stood with RUE support to facilitate R WS.  Pt urinated into urinal with A req to place the urinal effectively. Transf ( remain the same as stated above. Ambulation  Surface: Level surface  Device: Rolling walker (with L hand adaptor)  Distance: 22'  Additional Factors: Verbal cues; Increased time to complete;Hand placement cues; Left AFO (Prefabricated L AFO)  Assistance Level: Requires x 2 assistance;Dependent (Pt 's LLE with increased tone on this date making L foot placement very difficult. Pt able to advance BLES . Min A for placement of L hand and to help grade the advancement of the RW, Min/ mod A of another for WS to facilitate a step through gt pattern.)  Gait Deviations: Decreased trunk rotation;Path deviations;Decreased step length bilateral;Decreased weight shift left;Decreased step length right; Unsteady gait  Skilled Clinical Factors: Therapy instructed pt with a step through gt pattern with focus on a consistent heel strike. Pt demo increased anxiety on this date with gt training   Therapy utilized an TAE RW as a training tool Pt still dependent  req less A for a step through gt pattern with decreased L foot placement. Pt patricia ~100' with improved gt quality  Pt returned to room via w/c with chair alarm intact. Pt's brother accompanying him. ( Pt is allowed to be untethered from wall in w/c            ASSESSMENT/PROGRESS TOWARDS GOALS       Assessment  Assessment: Pt has progressed well from initial eval but remains very limited with functional mobility. Pt uses w/c effectively with occasional difficulty claering the L side from environmental barriers. Pt 's L extrem have increased muscle tone in both but pt presents with difficulty with controlled movements enabling improved functional mobility consistently. Pt has achieved 4/8 previously set goals. Pt remains highly motivated with a very strong support system   Pt is still  well below baseline and would benefit from continued therapy to maximize potential and increase functional mobility towards Ind.   Activity Tolerance: Patient limited by fatigue;Treatment limited secondary to decreased cognition;Patient limited by endurance; Patient limited by pain  Discharge Recommendations: 24 hour supervision or assist;Home with Home health PT  PT Equipment Recommendations  Equipment Needed: Yes  Other: 16 \"light weight w/c with flip back arm rests and standard leg rests. LUE arm trough also recommended. Pressure releiving cushion also recommended    Goals  Patient Goals   Patient Goals : to go home  Short Term Goals  Time Frame for Short Term Goals: 2 weeks  Short Term Goal 1: Pt will complete bed mobility with moderate assist Goal achieved  Short Term Goal 2: Pt will complete sit<>stand transfer wtih moderate assist. Goal achieved  Short Term Goal 3: Pt will propel self in manual w/c for 150' with minimal assist.- met 2/2  Short Term Goal 4: Pt will ambulate 10' with moderate assist.Goal not achieved  Long Term Goals  Time Frame for Long Term Goals : 4 weeks (all ongoing)  Long Term Goal 1: Pt will complete bed mobility with CGA. Goal not achieved  Long Term Goal 2: Pt will complete sit<>Stand transfers with CGA. Goal not achieved  Long Term Goal 3: Pt will propel self in manual w/c for 250' with suprvision. Goal achieved  Long Term Goal 4: Pt will ambulate 48' with CGA & LRAD. Goal not achieved    PLAN OF CARE/SAFETY  Physcial Therapy Plan  Additional Comments: Tentatively sched for d/c to home with family  tomorrow. HH to cont to optimize pt's potential.  Safety Devices  Type of Devices: Call light within reach;Nurse notified;Gait belt;Patient at risk for falls; Chair alarm in place; Left in chair  Restraints  Restraints Initially in Place: No    EDUCATION  Education  Education Given To: Patient  Education Provided: Mobility Training;Transfer Training;Energy Conservation; Fall Prevention Strategies  Education Provided Comments: PT reviewed  family training with pt's brother Syeda Whitt) on applying the gt belt, handling w/ gt belt , transf ( w/c <>bed )consistently leading with the R, and on all bed mobility skills.   Education Method: Verbal;Teach Back;Demonstration  Barriers to Learning: Cognition  Education Outcome: Verbalized understanding;Continued education needed        Therapy Time   Individual Concurrent Group Co-treatment   Time In 1100         Time Out 1200         Minutes 61         Second Session Therapy Time:   Individual Concurrent Group Co-treatment   Time In 3573         Time Out 1315         Minutes 30           Timed Code Treatment Minutes:  60+30    Total Treatment Minutes:   [id number] 91, PT, 02/23/23 at 2:47 PM

## 2023-02-23 NOTE — PROGRESS NOTES
ACUTE REHAB UNIT  SPEECH/LANGUAGE PATHOLOGY      [x] Daily  [x] Weekly Care Conference Note  [x] Discharge    Patient:Jason Buck      :1955  WSA:[id number]  Rehab Dx/Hx: Acute CVA (cerebrovascular accident) ([facility] [address] [I63.9]    Precautions: [x] Aspiration  [x] Fall risk  [] Sternal  [] Seizure [] Hip  [] Weight Bearing [] Other  ST Dx: [] Aphasia  [x] Dysarthria  [] Apraxia   [x] Oropharyngeal dysphagia [x] Cognitive Impairment  [] Other:   Date of Admit: 2023  Room #: 3102/3102-01  Date: 2023          Current Diet Order:ADULT ORAL NUTRITION SUPPLEMENT; Breakfast, Dinner; Standard High Calorie/High Protein Oral Supplement  ADULT DIET; Dysphagia - Soft and Bite Sized   Recommended Form of Meds: Meds in puree  Compensatory Swallowing Strategies : Neutral head positioning, placement of solid PO on right, one bite at a time, check for pocketing and oral residue. Benefits from use of mirror. Limit distractions during meal time (only 1 person in room at a time)  Previous MBS: 23  Oral Phase  Mild-moderate dysfunction characterized by incomplete labial seal with anterior spillage, slowed/reduced mastication, mild stasis. Pharyngeal Phase  Moderate dysfunction characterized by impairments in timing, strength and coordination with premature bolus loss, reduced base of tongue retraction, delayed/reduced hyolaryngeal mechanics, and reduced pharyngeal constriction. Resulted in vallecular/pyriform/pharyngeal residue, as well as compromised airway protection with penetration and gross tracheal aspiration of thin and mildly thick liquids; strong reactive cough present but did not fully clear. Chin tuck strategy did not eliminate aspiration, however cued head turn LEFT with small straw sips was effective . Double swallow helped clear residual. Of note, throughout study pt with tendency to tilt head posteriorly which further exacerbated bolus control; benefited from cues for neutral positioning.   Upper Esophageal Screen  Indirectly assessed; appeared unremarkable.    Repeat MBS: 2/13/23  Pt demonstrates a mild/moderate oropharyngeal dysphagia. Oral phase with reduced bolus cohesion resulting in head of bolus in pyriform sinus with tail of bolus in oral cavity with liquids without cues. Pt demo'd ability to complete oral hold of thin via tsp with cues but loss to floor of mouth. Pt with persistent delayed swallow initiation (inconsistent) to pyriform sinus with liquids, pudding spilling over top of epiglottis (did not fill vallecular space). Pt continues to demonstrate mildly reduced hyolaryngeal excursion initially with absent epiglottic distention progressing to full distention and there is mildly reduced tongue base retraction. Pt demonstrated mild residue in valleculae and pyriform sinus with x1 instance of residue from vallecula to posterior pharyngeal wall. Residue does increased with viscosity. Use of cued liquid rinse and cued effortful swallows reduced pharyngeal residue mildly. There was no aspiration or penetration during study. Attempted mendelsohn maneuver following study with model, tactile cues and verbal cues; pt not stimulable this date.     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 - [employer]  Leisure & Hobbies: projects around house, music, [employer]    Dentition: Adequate  Vision  Vision:  (not wearing glasses due to nose injury)  Vision Exceptions: Wears glasses at all times  Hearing  Hearing: Within functional limits  Barriers toward progress: Severity of impairments      Date: 2/23/2023        Tx session 1 Tx session 2 Tx session 3 Weekly Summary/Discharge    Total Timed Code Min 12 0 25    Total Treatment Minutes 12 15 50    Individual Treatment Minutes 12 15 50    Group Treatment Minutes 0 0 0    Co-Treat Minutes 0 0 0    Brief  Exception: Pt requesting to use restroom during initial part of session. Pt called for assistance to be cleaned up N/A    Pain None indicated  None indicated None indicated    Pain Intervention: [] RN notified  [] Repositioned  [] Intervention offered and patient declined  [x] N/A  [] Other: [] RN notified  [] Repositioned  [] Intervention offered and patient declined  [x] N/A  [] Other: [] RN notified  [] Repositioned  [] Intervention offered and patient declined  [x] N/A  [] Other:    Subjective:     Pt seated upright in wheelchair agreeable to session at this time. Brother present for session. Pt seated upright in wheelchair agreeable to be seen. Brother present during session. Pt seated upright in wheelchair agreeable to final session. Brother present at start of session. Objective / Goals:       New goal s/p repeat MBS: Pt will demonstrate self feeding without labial residue across mealtime assessment without cues. Not directly targeted. Pt demonstrated no anterior loss. Labial seals against resistance: x10 GOAL MET   New goal s/p repeat MBS: Pt will tolerate trials of advanced textures with adequate oral clearance as evidenced by <min oral residue and no reports of globus sensation across two mealtime assessments Not directly targeted. Only trace residue remaining with soft and bite sized solids during portion of afternoon meal. No globus sensation reported Not targeted this session. GOAL MET   Goal 4: Pt will demonstrate safe feeding behavior as evidenced by small bolus size / appropriate rate without cues. Not directly targeted. Pt independently cutting up larger bites into appropriate small bolus size for safety. Not targeted this session. GOAL MET   Goal 5: Pt will tolerate least restrictive diet without respiratory decline. Pt expressed swallow strategies to implement during meals. No difficulty across entire meal. WBC and Neutrophils absolute WNL.   CTAR: x3 sets of x15; Pt with reduced strength due to fatigue. GOAL MET    Goal 2: The patient will demonstrate sustained attention to task for 2 minutes with <2 prompts. Pt discussed home eval completed yesterday and tasks targeted. Pt with adequate attention to conversation for <2 minutes. No tangential comments noted. Adequate attention during meal. Targeted via visual scanning task and symbol cancellation. Adequate overall sustained attention to specific task. Pt completed with 66% accuracy. Continued left side visual inattention with x4 cues required to find the edge. GOAL MET   Goal 3: The patient will demonstrate improved inhibition as evidenced by <3 prompts for impulsivity during a task. Not directly targeted. Not targeted this session. No noted impulsivity with tasks completed this session. GOAL MET   Goal 4: The patient will demonstrate improved self monitoring/correcting for basic tasks with <mod cues. Not directly targeted. Not targeted this session. >3 prompts required for pt to identify errors with symbol cancellation task. Completed information transfer task with 83%% accuracy. Mod cues required for pt to visually scan to the left side to complete task with increased accuracy GOAL MET   Goal 5: The patient will tolerate ongoing cognitive linguistic assessment. Not directly targeted. Not targeted this session. Not targeted this session. GOAL MET   Goal 6: The patient will demonstrate comprehensibility in multiple communication environments without cues. 100% comprehensibility across multiple speaking environments. Not targeted this session. Not targeted this session. GOAL MET   Other areas targeted:       Education:   Educated re: tasks target Educated re: swallow strategies Educated re: Skills targeted this session. Provided pt with diet level (IDDSI) handout with information regarding how to prepare PO correctly, how to measure/test, and foods to avoid.  SLP discussed continuing trials of regular solids with family present under direct supervision. Re-iterated importance of swallow strategies and continued use of exercises for improved swallow function/overall safety. Safety Devices: [x] Call light within reach  [x] Chair alarm activated and connected to nurse call light system  [] Bed alarm activated   [x] Other: brother present [x] Call light within reach  [x] Chair alarm activated and connected to nurse call light system  [] Bed alarm activated   [x] Other: brother present [x] Call light within reach  [x] Chair alarm activated and connected to nurse call light system  [] Bed alarm activated   [x] Other: brother present    Assessment: Pt with mild, yet improving, oropharyngeal dysphagia per MBS. Difficulties with sustained attention continue to pose as a barrier to safe swallowing. Continued left side inattention noted, pt benefits from cues to visually scan to complete tasks with accuracy. Improved sustained attention to tabletop tasks and in discussions, and self monitoring given increased wait time. Plan: Discharge from SLP services. Pt set to DC home tomorrow. Interventions used this date:  [] Speech/Language Treatment  [] Instruction in HEP  [x] Dysphagia Treatment [x] Cognitive Treatment   [] Other:    Discharge recommendations:  [] Home independently  [x] Home with assistance []  24 hour supervision  [] ECF [] Other  Continued Tx Upon Discharge: ? [x] Yes    [] No    [] TBD based on progress while on ARU     [] Vital Stim indicated     [] Other:   Estimated discharge date: February 24th, 2023    Electronically signed by:  William Shipman, [address] [place] [address] Language Pathology      Apolonia Solorzano M.A., [address] [place] [address]  Speech-Language Pathologist    The speech-language pathologist was present, directed the patient's care, made skilled judgment and was responsible for assessment and treatment.

## 2023-02-23 NOTE — PLAN OF CARE
Problem: Discharge Planning  Goal: Discharge to home or other facility with appropriate resources  Outcome: Progressing  Flowsheets (Taken 2/23/2023 0830)  Discharge to home or other facility with appropriate resources: Identify barriers to discharge with patient and caregiver     Problem: Skin/Tissue Integrity  Goal: Absence of new skin breakdown  Description: 1. Monitor for areas of redness and/or skin breakdown  2. Assess vascular access sites hourly  3. Every 4-6 hours minimum:  Change oxygen saturation probe site  4. Every 4-6 hours:  If on nasal continuous positive airway pressure, respiratory therapy assess nares and determine need for appliance change or resting period.   Outcome: Progressing     Problem: ABCDS Injury Assessment  Goal: Absence of physical injury  Outcome: Progressing     Problem: Safety - Adult  Goal: Free from fall injury  Outcome: Progressing     Problem: Pain  Goal: Verbalizes/displays adequate comfort level or baseline comfort level  Outcome: Progressing     Problem: Nutrition Deficit:  Goal: Optimize nutritional status  Outcome: Progressing     Problem: Chronic Conditions and Co-morbidities  Goal: Patient's chronic conditions and co-morbidity symptoms are monitored and maintained or improved  Outcome: Progressing  Flowsheets (Taken 2/23/2023 0830)  Care Plan - Patient's Chronic Conditions and Co-Morbidity Symptoms are Monitored and Maintained or Improved: Monitor and assess patient's chronic conditions and comorbid symptoms for stability, deterioration, or improvement

## 2023-02-23 NOTE — PROGRESS NOTES
Department of Physical Medicine & Rehabilitation  Progress Note    Patient Identification:  Blanca Wayne  [id number]  : 1955  Admit date: 2023    Chief Complaint: Acute CVA (cerebrovascular accident) [facility])    Subjective:   Doing well today. Plan for discharge home tomorrow. Seen in the shower today performing ADLs. He is very motivated to continue therapy. Continues to show improvement daily. No new pain today. He is working on stretching again today. Team conference today. ROS: No f/c, n/v, cp     Objective:  Patient Vitals for the past 24 hrs:   BP Temp Temp src Pulse Resp SpO2   23 0830 112/76 97.9 °F (36.6 °C) Oral 69 16 99 %   23 119/72 97.3 °F (36.3 °C) Oral 77 16 96 %   23 1215 111/71 97.8 °F (36.6 °C) Oral 73 16 96 %       Const: Alert. No distress, pleasant. HEENT: Normocephalic, atraumatic. Normal sclera/conjunctiva. MMM. CV: Regular rate and rhythm. Resp: No respiratory distress. Lungs CTAB. Abd: Soft, nontender, nondistended, NABS+   Ext: No edema. Neuro: Alert, oriented, appropriately interactive. Psych: Cooperative, appropriate mood and affect    Laboratory data: Available via EMR. Last 24 hour lab  No results found for this or any previous visit (from the past 24 hour(s)). Therapy progress:  PT  Position Activity Restriction  Other position/activity restrictions: up as tolerated, up in chair  Objective     Sit to Stand: Maximum Assistance, 2 Person Assistance  Stand to Sit: 2 Person Assistance, Maximum Assistance     OT  PT Equipment Recommendations  Equipment Needed: Yes  Mobility Devices: Wheelchair  Other: 16 \"light weight w/c with flip back arm rests and standard leg rests. LUE arm trough also recommended.  Pressure releiving cushion also recommended  Toilet - Technique:  (via rolling shower chair)  Equipment Used:  (rolling shower chair)  Toilet Transfers Comments: pt did not have urge to void, however rolled pt in bathroom via rolling shower chair. Max A x 2 to pivot from bed to rolling shower chair. Assessment        SLP          Body mass index is 22.4 kg/m². 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 Patient is on aspirin at home        HTN  Patient is on lisinopril 5mg daily at home  - monitor      HLD:  Patient is on Crestor 20mg and ezetimibe 10mg at home daily  -Continue home meds     Anxiety  Per chart, patient is on Valium 2mg at home. Possible could be contributor to his chest pain yesterday, notes he felt anxious. Denies taking Valium at home, however it is listed as a home med. Ativan 0.5mg once overnight   - prozac      Sleep disturbance  - Trazodone PRN  - benadryl PRN  -improving     Add low dose tizanidine for spasticity   - improving left leg tightness   - increase to 4mg q6h    Start Ritalin for inattention. - increase dose to 10mg   - monitor effects       Extended stay in ARU before discharge home     Xray left wrist- negative   - Voltaren gel left wrist     - stretch left wrist and shoulder - TID    - add bengay       Team conference was held today on the patient and discussed directly with the patient utilizing their entire treatment team. Please see separate team note for details. Total treatment time for today's care >50 min. >50% of time spent counseling with patient and coordinating care.        Rehab Progress: Improving  Anticipated Dispo: home  Services/DME: [place]  ELOS: 2/24            Mohamud Mason D.O. M.P.H  PM&R  2/23/2023  10:20 AM

## 2023-02-23 NOTE — PLAN OF CARE
Problem: Discharge Planning  Goal: Discharge to home or other facility with appropriate resources  Outcome: Progressing       Problem: Skin/Tissue Integrity  Goal: Absence of new skin breakdown  Description: 1. Monitor for areas of redness and/or skin breakdown  2. Assess vascular access sites hourly  3. Every 4-6 hours minimum:  Change oxygen saturation probe site  4. Every 4-6 hours:  If on nasal continuous positive airway pressure, respiratory therapy assess nares and determine need for appliance change or resting period.   Outcome: Progressing       Problem: ABCDS Injury Assessment  Goal: Absence of physical injury  Outcome: Progressing       Problem: Safety - Adult  Goal: Free from fall injury  Outcome: Progressing       Problem: Pain  Goal: Verbalizes/displays adequate comfort level or baseline comfort level  Verbalizes/displays adequate comfort level or baseline comfort level: Encourage patient to monitor pain and request assistance  Outcome: Progressing     Problem: Nutrition Deficit:  Goal: Optimize nutritional status  Outcome: Progressing       Problem: Chronic Conditions and Co-morbidities  Goal: Patient's chronic conditions and co-morbidity symptoms are monitored and maintained or improved  Outcome: Progressing

## 2023-02-23 NOTE — PROGRESS NOTES
Occupational Therapy  Facility/Department: [facility] UNIT  Rehabilitation Occupational Therapy Daily Treatment Note/Discharge Summary    Date: 23  Patient Name: Bridget Singh       Room: 3528/1879-53  MRN: [id number]  Account: [de-identified]   : 1955  (68 y.o.) Gender: male                    Past Medical History:  has a past medical history of Anxiety, Chest pain, Depression, Encounter for imaging to screen for metal prior to MRI, Hyperlipidemia, Hypertension, and Wears glasses. Past Surgical History:   has a past surgical history that includes [place] tooth extraction; Colonoscopy (2009); Cardiac catheterization (2008); Finger trigger release (3-); and Coronary angioplasty with stent. Restrictions  Restrictions/Precautions: Fall Risk;Up as Tolerated    Subjective  Subjective: Pt seated in w/c upon OT arrival w/ brother present. Restrictions/Precautions: Fall Risk;Up as Tolerated             Objective     Cognition  Arousal/Alertness: Appropriate responses to stimuli  Following Commands: Follows one step commands with repetition; Follows one step commands with increased time  Attention Span: Attends with cues to redirect; Difficulty attending to directions; Difficulty dividing attention  Safety Judgement: Decreased awareness of need for assistance;Decreased awareness of need for safety  Problem Solving: Assistance required to generate solutions;Assistance required to identify errors made;Assistance required to implement solutions  Insights: Decreased awareness of deficits  Initiation: Requires cues for some  Sequencing: Requires cues for some         ADL  Feeding  Assistance Level: Supervision  Skilled Clinical Factors: SPV for safety and to recall swallowing strategies. Grooming/Oral Hygiene  Assistance Level: Supervision  Skilled Clinical Factors: SPV for attention to task and sequencing.   Upper Extremity Bathing  Assistance Level: Minimal assistance  Skilled Clinical Factors: Assist to wash RUE. Lower Extremity Bathing  Assistance Level: Moderate assistance  Skilled Clinical Factors: Pt washed [place]-area and B upper thighs. Assist to wash/dry B lower legs/feet and buttocks. Upper Extremity Dressing  Assistance Level: Minimal assistance  Skilled Clinical Factors: Pt doffed shirt w/ cue to pull from back. Assist to thread LUE into shirt. Pt slightly impulsive requiring cues for pacing. Assist to pull down on L side over trunk. Lower Extremity Dressing  Assistance Level: Moderate assistance  Skilled Clinical Factors: Pt in standing, assist to pull brief/pants down. Assist to remove brief. Pt unthreaded BLE from pants. Assist to thread LLE into brief. Pt threaded RLE. Pt able to thread BLE into shorts. Pt stood at elevated bed rail while therapist pulled both over hips. Putting On/Taking Off Footwear  Assistance Level: Maximum assistance  Skilled Clinical Factors: Assist to doff B shoes/AFO. Pt doffed socks. Assist to don socks, shoes, AFO. Attempted florence dressing technique to don R sock. Toileting  Assistance Level: Moderate assistance  Skilled Clinical Factors: Pt able to complete hygiene in seated w/ cues. Assist to manage pants on L side. Toilet Transfers  Technique: To the left; To the right  Equipment: Beside commode  Additional Factors: Increased time to complete;Cues for hand placement;Verbal cues  Assistance Level: Moderate assistance  Skilled Clinical Factors: Pt t/f w/c <-> BSC placed in shower w/ mod A.  Tub/Shower Transfers  Type: Shower  Transfer From: Wheelchair  Transfer To:  [facility])  Additional Factors: Verbal cues; Without handrails  Assistance Level: Moderate assistance  Skilled Clinical Factors: Pt t/f w/c <-> BSC placed in shower w/ mod A. Assessment  Assessment  Assessment: Pt tolerated ADL well this date. During ARU stay, pt met 5 STG indicating progression towards self care independence.  Pt also demo's continued improvement in LUE AROM, specifically improvement w/ activation of bicep and tricep. At this time, pt is safe to d/c from ARU. Family training and home evaluation completed. Pt would benefit from continued home health OT services at d/c. Activity Tolerance: Patient tolerated treatment well;Patient tolerated evaluation without incident  Discharge Recommendations: 24 hour supervision or assist;Home with Home health OT  Safety Devices  Safety Devices in place: Yes  Type of devices: Chair alarm in place;Call light within reach; Left in chair    Patient Education  Education  Education Given To: Patient; Family  Education Provided: Role of Therapy;Plan of Care;Family Education;Mobility Training;Transfer Training; Safety; Fall Prevention Strategies; Home Exercise Program  Education Method: Demonstration;Verbal  Barriers to Learning: Cognition  Education Outcome: Verbalized understanding;Demonstrated understanding    Plan  Occupational Therapy Plan  Times Per Week: 5 days per week, 90 mins  Current Treatment Recommendations: Strengthening;ROM;Balance training;Functional mobility training; Endurance training;Self-Care / ADL; Patient/Caregiver education & training;Neuromuscular re-education;Cognitive/Perceptual training; Safety education & training    Goals  Patient Goals   Patient goals : go home with my wife  Short Term Goals  Time Frame for Short Term Goals: By 2 weeks - all goals ongoing  Short Term Goal 1: Pt will complete LB dressing with mod A - goal met 2/23/23  Short Term Goal 2: Pt will complete toileting with mod A - goal met 2/23/23  Short Term Goal 3: Pt will complete toilet and functional transfers with mod A - goal met 2/23/23  Short Term Goal 4: Pt will complete UB dressing with min A - goal met 2/23/23  Short Term Goal 5: Pt will complete UE HEP x 10 reps for improved R UE coordination for ADLs - goal met 2/23/23 (pt completes independently outside of therapy)  Long Term Goals  Time Frame for Long Term Goals : By 4 weeks  Long Term Goal 1: Pt will complete LB dressing with CGA - not met  Long Term Goal 2: Pt will complete toileting with CGA - not met  Long Term Goal 3: Pt will complete toilet and functional transfers with CGA. - not met  Long Term Goal 4: Pt will complete UB dressing with SPV.  - not met  Long Term Goal 5: Pt will complete simple IADL with LRAD at mod I - not met             Therapy Time   Individual Concurrent Group Co-treatment   Time In 0900         Time Out 1030         Minutes 90            Timed Code Treatment Minutes:  90 min     Total Treatment Minutes: 90 min        Maya Damon, OT

## 2023-02-23 NOTE — CARE COORDINATION
CM following: KADE spoke with [place] [phone] with [address] has obtained the 16\" lightweight wheelchair w/ cushion and will attempt to deliver to pt's room today, if not today then by tomorrow morning at latest. CM will continue to follow for discharge planning. Electronically signed by MIRA Clifford on 2/23/2023 at 3:58 PM  [phone]    UPDATE: CM met with pt and wife at bedside. Discharge plan is for pt to return home with spouse. HHC to be provided by [facility]. Pt and wife updated that 16\" lightweight wheelchair should be delivered by tomorrow morning and were informed that a LUE arm trough was not available through the hospital DME provider. Discussed looking at [place] and [employer]. CM answered questions and pt excited to return home tomorrow.   Electronically signed by MIRA Clifford on 2/23/2023 at 4:30 PM  [phone]

## 2023-02-23 NOTE — PROGRESS NOTES
Shift assessment complete. VSS. A&Ox4. Pain being managed with PRN medications. Non-pharm measures of rest, repositioning, and distraction encouraged throughout shift. Fall precautions in place. Patient up to chair between therapy sessions throughout shift, returned to bed after therapy. Bed/chair alarms utilized throughout shift, call light within reach.      Vitals:    02/23/23 0830   BP: 112/76   Pulse: 69   Resp: 16   Temp: 97.9 °F (36.6 °C)   SpO2: 99%

## 2023-02-24 VITALS
HEART RATE: 69 BPM | SYSTOLIC BLOOD PRESSURE: 117 MMHG | OXYGEN SATURATION: 96 % | HEIGHT: 73 IN | DIASTOLIC BLOOD PRESSURE: 79 MMHG | RESPIRATION RATE: 16 BRPM | BODY MASS INDEX: 22.5 KG/M2 | TEMPERATURE: 97.6 F | WEIGHT: 169.75 LBS

## 2023-02-24 PROCEDURE — 6370000000 HC RX 637 (ALT 250 FOR IP): Performed by: PHYSICAL MEDICINE & REHABILITATION

## 2023-02-24 PROCEDURE — 97116 GAIT TRAINING THERAPY: CPT

## 2023-02-24 PROCEDURE — 97530 THERAPEUTIC ACTIVITIES: CPT

## 2023-02-24 PROCEDURE — 99239 HOSP IP/OBS DSCHRG MGMT >30: CPT | Performed by: PHYSICAL MEDICINE & REHABILITATION

## 2023-02-24 PROCEDURE — 6360000002 HC RX W HCPCS: Performed by: PHYSICAL MEDICINE & REHABILITATION

## 2023-02-24 PROCEDURE — 97535 SELF CARE MNGMENT TRAINING: CPT

## 2023-02-24 RX ORDER — FLUOXETINE HYDROCHLORIDE 20 MG/1
20 CAPSULE ORAL DAILY
Qty: 30 CAPSULE | Refills: 3 | Status: SHIPPED | OUTPATIENT
Start: 2023-02-24

## 2023-02-24 RX ORDER — POLYVINYL ALCOHOL 14 MG/ML
1 SOLUTION/ DROPS OPHTHALMIC PRN
Qty: 1 EACH | Refills: 4 | Status: SHIPPED | OUTPATIENT
Start: 2023-02-24 | End: 2023-03-26

## 2023-02-24 RX ORDER — TIZANIDINE 4 MG/1
4 TABLET ORAL EVERY 6 HOURS PRN
Qty: 90 TABLET | Refills: 1 | Status: SHIPPED | OUTPATIENT
Start: 2023-02-24

## 2023-02-24 RX ORDER — SENNA AND DOCUSATE SODIUM 50; 8.6 MG/1; MG/1
2 TABLET, FILM COATED ORAL 2 TIMES DAILY
Qty: 90 TABLET | Refills: 0 | Status: SHIPPED | OUTPATIENT
Start: 2023-02-24

## 2023-02-24 RX ORDER — TRAZODONE HYDROCHLORIDE 50 MG/1
50 TABLET ORAL NIGHTLY PRN
Qty: 60 TABLET | Refills: 1 | Status: SHIPPED | OUTPATIENT
Start: 2023-02-24

## 2023-02-24 RX ORDER — TRAMADOL HYDROCHLORIDE 50 MG/1
25 TABLET ORAL EVERY 6 HOURS PRN
Qty: 30 TABLET | Refills: 0 | Status: SHIPPED | OUTPATIENT
Start: 2023-02-24 | End: 2023-02-27

## 2023-02-24 RX ORDER — METHYLPHENIDATE HYDROCHLORIDE 10 MG/1
10 TABLET ORAL EVERY MORNING
Qty: 60 TABLET | Refills: 0 | Status: SHIPPED | OUTPATIENT
Start: 2023-02-25 | End: 2023-03-27

## 2023-02-24 RX ORDER — MECOBALAMIN 5000 MCG
5 TABLET,DISINTEGRATING ORAL NIGHTLY
Qty: 60 TABLET | Refills: 1 | Status: SHIPPED | OUTPATIENT
Start: 2023-02-24

## 2023-02-24 RX ORDER — PANTOPRAZOLE SODIUM 40 MG/1
40 TABLET, DELAYED RELEASE ORAL
Qty: 30 TABLET | Refills: 3 | Status: SHIPPED | OUTPATIENT
Start: 2023-02-25

## 2023-02-24 RX ORDER — DIPHENHYDRAMINE HCL 25 MG
25 TABLET ORAL EVERY 6 HOURS PRN
Qty: 60 TABLET | Refills: 0 | Status: SHIPPED | OUTPATIENT
Start: 2023-02-24 | End: 2023-03-26

## 2023-02-24 RX ORDER — POLYETHYLENE GLYCOL 3350 17 G/17G
17 POWDER, FOR SOLUTION ORAL DAILY PRN
Qty: 527 G | Refills: 1 | Status: SHIPPED | OUTPATIENT
Start: 2023-02-24 | End: 2023-03-26

## 2023-02-24 RX ADMIN — PANTOPRAZOLE SODIUM 40 MG: 40 TABLET, DELAYED RELEASE ORAL at 06:45

## 2023-02-24 RX ADMIN — METHYLPHENIDATE HYDROCHLORIDE 10 MG: 10 TABLET ORAL at 06:45

## 2023-02-24 RX ADMIN — FLUOXETINE 20 MG: 20 CAPSULE ORAL at 08:58

## 2023-02-24 RX ADMIN — ACETAMINOPHEN 650 MG: 325 TABLET, FILM COATED ORAL at 03:46

## 2023-02-24 RX ADMIN — DICLOFENAC SODIUM 4 G: 10 GEL TOPICAL at 08:59

## 2023-02-24 RX ADMIN — EZETIMIBE 10 MG: 10 TABLET ORAL at 08:58

## 2023-02-24 RX ADMIN — ENOXAPARIN SODIUM 40 MG: 100 INJECTION SUBCUTANEOUS at 08:58

## 2023-02-24 RX ADMIN — ROSUVASTATIN CALCIUM 40 MG: 20 TABLET, FILM COATED ORAL at 08:58

## 2023-02-24 RX ADMIN — ASPIRIN 81 MG 81 MG: 81 TABLET ORAL at 08:58

## 2023-02-24 RX ADMIN — SENNOSIDES AND DOCUSATE SODIUM 2 TABLET: 50; 8.6 TABLET ORAL at 08:58

## 2023-02-24 RX ADMIN — NYSTATIN 500000 UNITS: 100000 SUSPENSION ORAL at 08:58

## 2023-02-24 ASSESSMENT — PAIN DESCRIPTION - ORIENTATION: ORIENTATION: LEFT

## 2023-02-24 ASSESSMENT — PAIN DESCRIPTION - PAIN TYPE: TYPE: ACUTE PAIN

## 2023-02-24 ASSESSMENT — PAIN DESCRIPTION - DESCRIPTORS: DESCRIPTORS: ACHING;THROBBING

## 2023-02-24 ASSESSMENT — PAIN SCALES - GENERAL
PAINLEVEL_OUTOF10: 0
PAINLEVEL_OUTOF10: 7

## 2023-02-24 ASSESSMENT — PAIN DESCRIPTION - ONSET: ONSET: AWAKENED FROM SLEEP

## 2023-02-24 ASSESSMENT — PAIN DESCRIPTION - FREQUENCY: FREQUENCY: INTERMITTENT

## 2023-02-24 ASSESSMENT — PAIN DESCRIPTION - LOCATION: LOCATION: WRIST

## 2023-02-24 NOTE — PROGRESS NOTES
Physical Therapy  Facility/Department: [facility]  Rehabilitation Physical Therapy Treatment Note    NAME: Jarek Mojica  : 1955 (74 y.o.)  MRN: [id number]  CODE STATUS: Full Code    Date of Service: 23       Restrictions:  Restrictions/Precautions: Fall Risk;Up as Tolerated  Position Activity Restriction  Other position/activity restrictions: up as tolerated, up in chair     SUBJECTIVE  Subjective  Subjective: pt supine in bed and agreeable to PT  Pain: no c/o           OBJECTIVE  Cognition  Arousal/Alertness: Appropriate responses to stimuli  Following Commands: Follows one step commands with repetition; Follows one step commands with increased time  Attention Span: Attends with cues to redirect; Difficulty attending to directions; Difficulty dividing attention  Safety Judgement: Decreased awareness of need for assistance;Decreased awareness of need for safety  Problem Solving: Assistance required to generate solutions;Assistance required to identify errors made;Assistance required to implement solutions  Insights: Decreased awareness of deficits  Initiation: Requires cues for some  Sequencing: Requires cues for some  Cognition Comment: Pt cont  req  VC/ TC to stay focused on task. Pt benefits from controlled environment with min distractions especially with auditory distractions. Deescalation skills used as a means to control his environment. (  one person giving commands, decreased volume of voice, simple directions as examples) Pt req VC for sequencing tasks  Orientation  Overall Orientation Status: Within Functional Limits  Orientation Level: Oriented X4    Functional Mobility  Bed Mobility  Additional Factors: Verbal cues; Increased time to complete; Head of bed flat; Without handrails (Flat bed and no bedrails to simulate home situation)  Bridging  Assistance Level: Contact guard assist  Skilled Clinical Factors: Req A for stabilizing  the LLE to complete bridge when performing scooting in lat direction  Roll Left  Assistance Level: Supervision  Skilled Clinical Factors: VC for sequencing and controlling the pace of the transition. Therapy instructed with positioning LUE in an abd position with elbow extended)  in which RUE supported L wrist while positioning R thumb in the L hand to maintain the arch of the hand.)  Sit to Supine  Assistance Level: Minimal assistance  Skilled Clinical Factors: VC for sequencing and controlling the pace of the transition Min A   to be sure that LLE has cleared the bed and is in correct alignment. Pt demo increased difficulty with transitioning on this date 2/2 to increased tone in LLE noted  Scooting  Assistance Level: Contact guard assist  Skilled Clinical Factors: In sitting , req VC  with lat WS to advance L hip to the EOS  Balance  Sitting Balance: Supervision (S recommended 2/2 to pt's decreased L side attention and difficulty with grading movements on that side. VC for L side attention is req)  Standing Balance: Minimal assistance  Transfers  Surface: From bed; Wheelchair;From chair with arms; To chair without arms; To mat;From mat  Additional Factors: Verbal cues; Hand placement cues; Increased time to complete  Sit to Stand  Assistance Level: Minimal assistance;Contact guard assist (from wc to staircase, and at wc with HHA)  Skilled Clinical Factors: VC for sequencing the task with focus on the set up to make the transition successful. This included pt's hand/ foot placement, scooting to the EOS and ant WS. PT  cont to provide family training to pt's brother. Stand to Sit  Assistance Level: Contact guard assist  Skilled Clinical Factors: VC for hand placement and sequencing the task.  Pt intermittently demo decreased eccentric control  Bed To/From Chair  Technique: Stand pivot (Modified SPT leading wi/ L side)  Assistance Level: Minimal assistance;Contact guard assist  Skilled Clinical Factors: bed>wc to R, wc> chair with arms to R, chair with arms >wc to R (wife performing to/from chair), wc>mat to R, mat >wc to L via scoot pivot focusing on going to weaker side if by chance pt unable to go to R  Floor Transfer  Skilled Clinical Factors: pt educated on possible technique if the situation would arise, unable to trial 2/2 not having 2nd therapist      Environmental Mobility  Stairs  Stair Height: 6''  Device: One handrail (HR on the R)  Number of Stairs: 3  Additional Factors: Verbal cues; Non-reciprocal going down;Non-reciprocal going up; Increased time to complete;Left AFO; Hand placement cues  Assistance Level: Requires x 2 assistance; Moderate assistance  Skilled Clinical Factors: Mod/LLE guidance including the placement of the L foot for both ascension / descension. Req VC for sequencing amb on steps. Note< PT instructed pt not to talk during stair training to focus on the instructions of the CG. LLE tone increased making placement of LLE more difficult. Therapy recommends that pt consistently has 2 people present whenever performing gt training on steps. wife assisted in front on steps giving proper cues to pt  Skilled Clinical Factors - Comments: further education provided on set up of steps at home and techniques to provide ease                    ASSESSMENT/PROGRESS TOWARDS GOALS       Assessment  Assessment: Pt has progressed well from initial eval but remains very limited with functional mobility. Pt uses w/c effectively with occasional difficulty clearing the L side from environmental barriers. Pt 's L extremeties have increased muscle tone in both but pt presents with difficulty with controlled movements enabling improved functional mobility consistently. Pt remains highly motivated with a very strong support system. Wife and pt educated on any concerns for returning home, training focused on allowing wife to perform transfers and negotiation of steps with wife to increase comfort for returning home.  Pt is still  well below baseline and would benefit from continued therapy to maximize potential and increase functional mobility towards Ind. Activity Tolerance: Patient limited by fatigue;Treatment limited secondary to decreased cognition;Patient limited by endurance; Patient limited by pain  Discharge Recommendations: 24 hour supervision or assist;Home with Home health PT  PT Equipment Recommendations  Equipment Needed: Yes  Other: 16 \"light weight w/c with flip back arm rests and standard leg rests. LUE arm trough also recommended. Pressure releiving cushion also recommended    Goals  Patient Goals   Patient Goals : to go home  Short Term Goals  Time Frame for Short Term Goals: 2 weeks  Short Term Goal 1: Pt will complete bed mobility with moderate assist Goal achieved  Short Term Goal 2: Pt will complete sit<>stand transfer wtih moderate assist. Goal achieved  Short Term Goal 3: Pt will propel self in manual w/c for 150' with minimal assist.- met 2/2  Short Term Goal 4: Pt will ambulate 10' with moderate assist.Goal not achieved  Long Term Goals  Time Frame for Long Term Goals : 4 weeks (all ongoing)  Long Term Goal 1: Pt will complete bed mobility with CGA. Goal not achieved  Long Term Goal 2: Pt will complete sit<>Stand transfers with CGA. Goal not achieved  Long Term Goal 3: Pt will propel self in manual w/c for 250' with suprvision. Goal achieved  Long Term Goal 4: Pt will ambulate 48' with CGA & LRAD. Goal not achieved    PLAN OF CARE/SAFETY  Physcial Therapy Plan  Days Per Week: 5 Days  Hours Per Day: 1 hour  Current Treatment Recommendations: Strengthening;Balance training;ROM; Functional mobility training;Neuromuscular re-education; Safety education & training;Patient/Caregiver education & training;Transfer training; Endurance training;Home exercise program;Gait training;Equipment evaluation, education, & procurement;Positioning; Therapeutic activities  Safety Devices  Type of Devices: Call light within reach;Nurse notified;Gait belt;Patient at risk for falls; Chair alarm in place; Left in chair  Restraints  Restraints Initially in Place: No    EDUCATION  Education  Education Given To: Patient  Education Provided: Mobility Training;Transfer Training;Energy Conservation; Fall Prevention Strategies  Education Provided Comments: PT reviewed  family training with pt's wife on applying the gt belt, handling w/ gt belt , transf ( w/c <>bed )consistently leading with the R, stair negotiation, and on all bed mobility skills.   Education Method: Verbal;Teach Back;Demonstration  Barriers to Learning: Cognition  Education Outcome: Verbalized understanding;Continued education needed        Therapy Time   Individual Concurrent Group Co-treatment   Time In 0730         Time Out 0825         Minutes 54                   Aftab Llamas PT, 02/24/23 at 12:00 PM

## 2023-02-24 NOTE — PLAN OF CARE
Patient states he was awakened with left wrist pain. He describes the pain as aching and throbbing pain. He rates the pain 7 on pain scale. Left arm is elevated on pillow. Patient medicated with Tylenol on request. He declined Tramadol. Ice pack applied to left wrist. Will continue to monitor.

## 2023-02-24 NOTE — PROGRESS NOTES
[facility] Discharge Assessment    Transportation  \"Has lack of transportation kept you from medical appointments, meetings, work, or from getting things needed for daily living? \"Check all that apply:  [] A.  Yes, it has kept me from medical appointments or from getting my medications  [] B.  Yes, it has kept me from non-medical meetings, appointments, work, or from getting things that I need  [x] C.  No  [] X. Patient unable to respond  [] Y. Patient declines to respond    Provision of Current Reconciled Medication List to Subsequent Provider at Discharge  [] No, current reconciled medication list not provided to the subsequent provider. [x] Yes, current reconciled medication list provided to the subsequent provider. (**Select route of transmission below**)   [x] [address] Record   [] [employer]  [x] Verbal (e.g. in person, telephone, video conferencing)  [x] Paper-based (e.g. fax, copies, printouts)   [] Other Methods (e.g. texting, email, CDs)    Provision of Current Reconciled Medication List to Patient at Discharge  [] No, current reconciled medication list not provided to the patient, family and/or caregiver.   [] Yes, current reconciled medication list provided to the patient, family and/or caregiver.  (**Select route of transmission below**)   [x] [address] Record (e.g., electronic access to patient portal)   [] [employer]  [x] Verbal (e.g. in person, telephone, video conferencing)  [x] Paper-based (e.g. fax, copies, printouts)   [] Other Methods (e.g. texting, email, CDs)    Health Literacy  \"How often do you need to have someone help you when you read instructions, pamphlets, or other written material from your doctor or pharmacy? \"  []  0. Never  [x]  1. Rarely  []  2. Sometimes  []  3. Often  []  4. Always  []  8.   Patient unable to respond    BIMS - **Must be completed in the flowsheet at discharge prior to proceeding with Delirium Assessment**  [x] BIMS completed in flowsheet at discharge  [] BIMS not completed due to patient unable to give appropriate related answers, see Staff Assessment in flowsheet    Signs and Symptoms of Delirium  A. Acute Onset Mental Status Change - Is there evidence of an acute change in mental status from the patient's baseline? [x] 0. No  [] 1. Yes    B. Inattention - Did the patient have difficulty focusing attention, for example being easily distractible or having difficulty keeping track of what was being said? [x]  0. Behavior not present  []  1. Behavior continuously present, does not fluctuate  []  2. Behavior present, fluctuates (comes and goes, changes in severity)    C. Disorganized thinking - Was the patient's thinking disorganized or incoherent (rambling or irrelevant conversation, unclear or illogical flow of ideas, or unpredictable switching from subject to subject)? [x]  0. Behavior not present  []  1. Behavior continuously present, does not fluctuate  []  2. Behavior present, fluctuates (comes and goes, changes in severity)    D. Altered level of consciousness - Did the patient have altered level of consciousness as indicated by any of the following criteria? Vigilant - startled easily to any sound or touch  Lethargic - repeatedly dozed off while being asked questions, but responded to voice or touch  Stuporous - very difficulty to arouse and keep aroused for the interview  Comatose - could not be aroused  [x]  0. Behavior not present  []  1. Behavior continuously present, does not fluctuate  []  2. Behavior present, fluctuates (comes and goes, changes in severity)    Mood    \"Over the last 2 weeks, have you been bothered by any of the following problems?\" 1. Symptom Presence    0 = No  1 = Yes  9 = No Response 2.  Symptom Frequency    0 = Never or 1 day  1 = 2-6 days (several days)  2 = 7-11 days (half or more of the days)  3 = 12-14 days (nearly every day)  **Leave blank if 'No Reponse'**      Enter scores in boxes    Column 1 Column 2   Little interest or pleasure in doing things   0 0   Feeling down, depressed, or hopeless   0 0   **If either A or B in column 2 is coded 2 or 3, CONTINUE asking the questions below. If not, END the interview. **     Trouble falling or staying asleep, or sleeping too much       Feeling tired or having little energy       Poor appetite or overeating       Feeling bad about yourself - or that you are a failure or have let yourself or your family down       Trouble concentrating on things, such as reading the newspaper or watching television       Moving or speaking so slowly that other people could have noticed. Or the opposite- being so fidgety or restless that you have been moving around a lot more than usual.       Thoughts that you would be better off dead, or of hurting yourself in some way. Total Severity: Add scores for all frequency responses in column 2 (possible score 0-27, or enter 99 if unable to complete (if symptom frequency (column 2) is blank for 3 or more items). Social Isolation  \"How often do you feel lonely or isolated from those around you? \"  [x] 0. Never  [] 1. Rarely  [] 2. Sometimes  [] 3. Often  [] 4. Always  [] 7. Patient declines to respond  [] 8. Patient unable to respond    Pain Effect on Sleep  \"Over the past 5 days, how much of the time has pain made it hard for you to sleep at night? \"  []  0. Does not apply - I have not had any pain or hurting in the past 5 days  []  1. Rarely or not at all  [x]  2. Occasionally  []  3. Frequently  []  4. Almost constantly  []  8. Unable to answer    **If the patient answers \"0. Does not apply\" to this question, skip the next two \"Pain Effect. [employer] Loreatha Press \" questions**    Pain Interference with Therapy Activities  \"Over the past 5 days, how often have you limited your participation in rehabilitation therapy sessions due to pain? \"  [x]  0.   Does not apply - I have not received rehabilitation therapy in the past 5 days  []  1. Rarely or not at all  []  2. Occasionally  []  3. Frequently  []  4. Almost constantly  []  8. Unable to answer    Pain Interference with Day-to-Day Activities: \"Over the past 5 days, how often have you limited your day-to-day activities (excluding rehabilitation therapy session)? \"  [x]  1. Rarely or not at all  []  2. Occasionally  []  3. Frequently  []  4. Almost constantly  []  8. Unable to answer    Nutritional Approaches  Last 7 Days - Check all of the following nutritional approaches that were received within the last 7 days:  []  A. Parenteral/IV feeding  []  B. Feeding tube (e.g., nasogastric or abdominal (PEG))  [x]  C. Mechanically altered diet - requires change in texture of food or liquids (e.g., pureed food, thickened liquids)  []  D. Therapeutic diet (e.g., low salt, diabetic, low cholesterol)  []  Z. None of the above    At time of Discharge - Check all of the following nutritional approaches that were being received at discharge:  []  A. Parenteral/IV feeding (including IV fluids if needed for hydration, but not as part of dialysis/chemo)  []  B. Feeding tube (e.g., nasogastric or abdominal (PEG))  [x]  C. Mechanically altered diet - requires change in texture of food or liquids (e.g., pureed food, thickened liquids)  []  D. Therapeutic diet (e.g., low salt, diabetic, low cholesterol  []  Z. None of the above    High Risk Drug Classes:  Use and Indication    Is taking: Check if the pt is taking any medications by pharmacological classification, not how it is used, in the following classes  Indication noted:  If column 1 is checked, check if there is an indication noted for all meds in the drug class Is taking  (check all that apply) Indication noted (check all that apply)   Antipsychotic [] []   Anticoagulant [x] [x]   Antibiotic [] []   Opioid [x] [x]   Antiplatelet [] []   Hypoglycemic (including insulin) [] []   None of the above []     Special Treatments, Procedures, and Programs    Check all of the following treatments, procedures, and programs that apply at discharge. At Discharge (check all that apply)   Cancer Treatments   A1. Chemotherapy []           A2. IV []           A3. Oral []           A10. Other []   B1. Radiation []   Respiratory Therapies   C1. Oxygen Therapy [x]           C2. Continuous (continuously for at least 14 hours per day) []           C3. Intermittent [x]           C4. High-concentration []   D1. Suctioning (Does not include oral suctioning) []           D2. Scheduled []           D3. As needed []   E1. Tracheostomy Care []   F1. Invasive Mechanical Ventilator (ventilator or respirator) []   G1. Non-invasive Mechanical Ventilator []           G2. BiPAP []           G3. CPAP []   Other   H1. IV Medications (Do not include sub Q pumps, flushes, Dextrose 50% or lactated ringers) []           H2. Vasoactive medications []           H3. Antibiotics []           H4. Anticoagulation []           H10. Other []   I1. Transfusions []   J1. Dialysis []           J2. Hemodialysis []           J3. Peritoneal dialysis []   O1. IV access (including a catheter in a vein) []           O2. Peripheral []           O3. Midline []           O4.  Central (PICC, tunneled, port) []      None of the above (select if no Cancer, Respiratory, or Other boxes are checked) []

## 2023-02-24 NOTE — CARE COORDINATION
[facility]    Patient aware and agreeable to services.  Faxed orders to [facility] for [facility] by 2/26    Naun Gibson LPN  Care Transition Nurse  [address]  [phone]

## 2023-02-24 NOTE — PLAN OF CARE
Problem: Discharge Planning  Goal: Discharge to home or other facility with appropriate resources  2/24/2023 0018 by Camron Ellis RN  Outcome: Progressing  Flowsheets (Taken 2/24/2023 0018)  Discharge to home or other facility with appropriate resources:   Identify barriers to discharge with patient and caregiver   Identify discharge learning needs (meds, wound care, etc)     Problem: Skin/Tissue Integrity  Goal: Absence of new skin breakdown  2/24/2023 0018 by Camron Ellis RN  Outcome: Progressing  Note: Skin warm and dry. Scattered small abrasions and ecchymotic areas. Occasional incontinence. Patient assisted with repositioning. Specialty bed in use. Problem: Safety - Adult  Goal: Free from fall injury  2/24/2023 0018 by Camron Ellis RN  Outcome: Progressing  Note: Fall precautions in place. Bed is in low and locked position. Bed alarm set. Call light and bedside table within reach. Frequent visual checks made. Patient has called appropriately for assistance with his needs.      Problem: Pain  Goal: Verbalizes/displays adequate comfort level or baseline comfort level  2/24/2023 0018 by Camron Ellis RN  Outcome: Progressing  Flowsheets (Taken 2/24/2023 0018)  Verbalizes/displays adequate comfort level or baseline comfort level:   Encourage patient to monitor pain and request assistance   Assess pain using appropriate pain scale   Administer analgesics based on type and severity of pain and evaluate response   Implement non-pharmacological measures as appropriate and evaluate response

## 2023-02-24 NOTE — PROGRESS NOTES
02/24/23 1216   Encounter Summary   Encounter Overview/Reason  Spiritual/Emotional Needs   Service Provided For: Patient and family together   Referral/Consult From: [facility]   Support System Spouse; Family members; [employer]   Last Encounter    (es 2/24)   Complexity of Encounter Moderate   Begin Time 1100   End Time  1120   Total Time Calculated 20 min   Assessment/Intervention/Outcome   Assessment Coping; Hopeful   Intervention Active listening;Discussed illness injury and its impact; Discussed meaning/purpose;Explored/Affirmed feelings, thoughts, concerns;Prayer (assurance of)/[place]   Outcome Coping;Engaged in conversation;Expressed Gratitude;Expressed feelings of Jennifer, Peace and/or Love;Optimistic;Receptive   Plan and Referrals   Plan/Referrals No future visits requested  (leaving hospital)

## 2023-02-24 NOTE — DISCHARGE SUMMARY
Physical Medicine & Rehabilitation  Discharge Summary     Patient Identification:  Collette Bari  : 1955  Admit date: 2023  Discharge date:  23  Attending provider: Valentino Staton DO        Primary care provider: Yue Dawson MD     Discharge Diagnoses:   Patient Active Problem List   Diagnosis    Chest pain    Cerebrovascular accident (CVA) due to embolism of right middle cerebral artery (HCC)    Elevated troponin    Coronary artery disease involving native coronary artery of native heart without angina pectoris    Acute CVA (cerebrovascular accident) [facility])       History of Present Illness/Acute Hospital Course:  Collette Bari is a 74year old male with a PMHx of anxiety, CAD (s/p stent ), HTN, HLD who presented to [facility] ED with complete left-sided paralysis, left-sided extinction, slurred speech following a fall. CTA showed right middle cerebral artery M1 segment severe stenosis/occlusion with some reconstitution of flow more distally. He underwent emergent cerebral angiogram which showed occlusion of the right middle cerebral artery origin with nearly complete restoration of flow after thrombectomy, but otherwise normal 3 vessel cerebral angiogram.     Inpatient Rehabilitation Course:   Collette Bari is a 74 y.o. male admitted to inpatient rehabilitation on 2023 with Acute CVA (cerebrovascular accident) ([address] . The patient participated in an aggressive multidisciplinary inpatient rehabilitation program involving 3 hours of therapy per day, at least 5 days per week. Impairments: Decreased functional mobility, hemiplegia     Medical Management:  R MCA Occlusion s/p TNK and thrombectomy  - PT/OT/SLP  -Keep SBP <160  - monitor neuro status   - Dysphagia- SLP eval    - start low dose prozac-10mg   - Improving in PT- leg extension today      CAD   S/p stent .  Patient is on aspirin at home        HTN  Patient is on lisinopril 5mg daily at home  - monitor HLD:  Patient is on Crestor 20mg and ezetimibe 10mg at home daily  -Continue home meds     Anxiety  Per chart, patient is on Valium 2mg at home. Possible could be contributor to his chest pain yesterday, notes he felt anxious. Denies taking Valium at home, however it is listed as a home med. Ativan 0.5mg once overnight   - prozac 20mg qd        Sleep disturbance  - Trazodone PRN  - benadryl PRN  -improving      Add low dose tizanidine for spasticity   - improving left leg tightness   - increase to 4mg q6h     Start Ritalin for inattention. - increase dose to 10mg   - monitor effects         Extended stay in ARU before discharge home      Xray left wrist- negative   - Voltaren gel left wrist      - stretch left wrist and shoulder - TID     - add bengay     Discharge Exam:  Constitutional: Alert, WDWN, Pleasant, no distress  Head: Normocephalic, atruamatic, MMM  Eyes: Conjunctiva noninjected, no icterus, no drainage  Pulm: CTA bilat. Respirations non-labored. CV: No murmurs noted. RRR. Abd: Soft, nontender. NABS+  Ext: No edema, no varicosities  Neuro: Alert, fully oriented, appropriate   MSK: No joint abnormalities noted       Discharge Functional Status:    Physical therapy:  Bed Mobility:    Transfers: Sit to Stand: Maximum Assistance, 2 Person Assistance  Stand to Sit: 2 Person Assistance, Maximum Assistance, Ambulation  More Ambulation?: No,    Mobility:  , PT Equipment Recommendations  Equipment Needed: Yes  Mobility Devices: Wheelchair  Other: 16 \"light weight w/c with flip back arm rests and standard leg rests. LUE arm trough also recommended. Pressure releiving cushion also recommended, Assessment: Patient presents to ARU following R MCA and fall. He is typically independent in his functional mobility, self care & participates in a bowling league. He is functioning well below his baseline & requiring 2 person assist to complete basic functional transfers (bed mobility, sit<>stand & pivot transfers).  He is limited 2/2 impaired motor control/coordination, strength & sensation of the L hemibody, L sided neglect & cognitive deficits. Pt will continue to benefit from skilled PT services (daily) to address the above stated deficits & to maximize his functional independence with a goal to return home with his family. Occupational therapy:  ,  , Assessment: Prior to admission pt was living at home with his wife, was independent with ADLs and IADLs. Pt now presents s/p CVA, now significantly below his baseline. Pt demonstrating max A x 2 for sit to stand transfers, and max A x 2 for LB dressing. Pt with significant lateral lean/ pushing, and posterior lean while seated EOB. Wife will be able to assist at discharge, pt is hopeful to discharge home. Pt would benefit from continued OT while in ARU, continue OT POC.     Speech therapy:         Significant Diagnostics:   Lab Results   Component Value Date    CREATININE 0.7 (L) 02/17/2023    BUN 12 02/17/2023     02/17/2023    K 4.2 02/17/2023     02/17/2023    CO2 26 02/17/2023       Lab Results   Component Value Date    WBC 6.0 02/17/2023    HGB 12.8 (L) 02/17/2023    HCT 38.0 (L) 02/17/2023    MCV 94.6 02/17/2023     02/17/2023       Disposition:  home    [address] [address]  DME:    Discharge Condition: Stable    Follow-up:  See after visit summary from hospitalization    Discharge Medications:     Medication List        START taking these medications      aspirin 81 MG chewable tablet     diclofenac sodium 1 % Gel  Commonly known as: VOLTAREN  Apply 4 g topically 2 times daily     diphenhydrAMINE 25 MG tablet  Commonly known as: BENADRYL  Take 1 tablet by mouth every 6 hours as needed for Sleep     FLUoxetine 20 MG capsule  Commonly known as: PROZAC  Take 1 capsule by mouth daily     melatonin 5 MG Tbdp disintegrating tablet  Take 1 tablet by mouth nightly     methylphenidate 10 MG tablet  Commonly known as: RITALIN  Take 1 tablet by mouth every morning for 30 days. Max Daily Amount: 10 mg  Start taking on: February 25, 2023     pantoprazole 40 MG tablet  Commonly known as: PROTONIX  Take 1 tablet by mouth every morning (before breakfast)  Start taking on: February 25, 2023     polyethylene glycol 17 g packet  Commonly known as: GLYCOLAX  Take 17 g by mouth daily as needed for Constipation     polyvinyl alcohol 1.4 % ophthalmic solution  Commonly known as: LIQUIFILM TEARS  Place 1 drop into both eyes as needed for Dry Eyes     sennosides-docusate sodium 8.6-50 MG tablet  Commonly known as: SENOKOT-S  Take 2 tablets by mouth 2 times daily     tiZANidine 4 MG tablet  Commonly known as: ZANAFLEX  Take 1 tablet by mouth every 6 hours as needed (spasticity)     traMADol 50 MG tablet  Commonly known as: ULTRAM  Take 0.5 tablets by mouth every 6 hours as needed for Pain for up to 3 days.  Max Daily Amount: 100 mg     traZODone 50 MG tablet  Commonly known as: DESYREL  Take 1 tablet by mouth nightly as needed for Sleep            CONTINUE taking these medications      ezetimibe 10 MG tablet  Commonly known as: ZETIA     rosuvastatin 20 MG tablet  Commonly known as: CRESTOR            STOP taking these medications      Ascorbic Acid  MG Cpcr     senna 8.6 MG tablet  Commonly known as: Senokot               Where to Get Your Medications        These medications were sent to [facility]/pharmacy [address]      Phone: [phone]   diclofenac sodium 1 % Gel  diphenhydrAMINE 25 MG tablet  FLUoxetine 20 MG capsule  melatonin 5 MG Tbdp disintegrating tablet  methylphenidate 10 MG tablet  pantoprazole 40 MG tablet  polyethylene glycol 17 g packet  polyvinyl alcohol 1.4 % ophthalmic solution  sennosides-docusate sodium 8.6-50 MG tablet  tiZANidine 4 MG tablet  traMADol 50 MG tablet  traZODone 50 MG tablet           I spent over 35 minutes on this discharge encounter between counseling, coordination of care, and medication reconciliation. To comply with [employer] bylaw R.II.4.1:   Discharge order placed in advance to facilitate patients discharge needs.       Ketan Polk, DO

## 2023-02-24 NOTE — PROGRESS NOTES
Patient discharged through main lobby via [facility] with spouse. Patient and spouse provided discharge teaching and paperwork. Patient/spouse report no questions at this time, provided unit phone number. Patient provided information on follow-up appointments and medication administration. Called in Nystatin swish/spit to patient's pharmacy per Dr. El Golden.

## 2023-02-24 NOTE — DISCHARGE INSTR - COC
Continuity of Care Form    Patient Name: Anabel Hooks   :  1955  MRN:  [id number]    Admit date:  2023  Discharge date:  2023    Code Status Order: Full Code   Advance Directives:     Admitting Physician:  Carly Hart DO  PCP: Na Bobo MD    Discharging Nurse: [facility] Unit/Room#: 7008/0354-72  Discharging Unit Phone Number: [phone]      Emergency Contact:   Extended Emergency Contact Information  Primary Emergency Contact: Dunia Hall  Address: [address] Phone: [phone]  Relation: Spouse  Secondary Emergency Contact: [place] [facility] [address] Phone: [phone]  Relation: Child    Past Surgical History:  Past Surgical History:   Procedure Laterality Date    CARDIAC CATHETERIZATION  2008    COLONOSCOPY  2009    CORONARY ANGIOPLASTY WITH STENT PLACEMENT      FINGER TRIGGER RELEASE  3-    left- long finger.     WISDOM TOOTH EXTRACTION         Immunization History:   Immunization History   Administered Date(s) Administered    COVID-19, [place] BLUE border, Primary or Immunocompromised, (age 12y+), IM, 100 mcg/0.5mL 2021, 2021    Tdap (Boostrix, Adacel) 2016       Active Problems:  Patient Active Problem List   Diagnosis Code    Chest pain R07.9    Cerebrovascular accident (CVA) due to embolism of right middle cerebral artery ([facility] [address] I63.411    Elevated troponin R77.8    Coronary artery disease involving native coronary artery of native heart without angina pectoris I25.10    Acute CVA (cerebrovascular accident) ([facility] [address] [facility] I63.9       Isolation/Infection:   Isolation            No Isolation          Patient Infection Status       Infection Onset Added Last Indicated Last Indicated By Review Planned Expiration Resolved Resolved By    None active    Resolved    COVID-19 (Rule Out) 22 COVID-19, Rapid (Ordered)   22 Rule-Out Test Resulted            Nurse Assessment:  Last Vital Signs: /74   Pulse 73   Temp 98.4 °F (36.9 °C) (Oral)   Resp 16   Ht 6' 1\" (1.854 m)   Wt 169 lb 12.1 oz (77 kg)   SpO2 97%   BMI 22.40 kg/m²     Last documented pain score (0-10 scale): Pain Level: 7  Last Weight:   Wt Readings from Last 1 Encounters:   02/20/23 169 lb 12.1 oz (77 kg)     Mental Status:  oriented, alert, coherent, thought processes intact, and able to concentrate and follow conversation    IV Access:  - None    Nursing Mobility/ADLs:  Walking   Assisted  Transfer  Dependent  Bathing  Dependent  Dressing  Dependent  Toileting  Dependent  Feeding  Assisted  Med Admin  Assisted  Med Delivery   crushed    Wound Care Documentation and Therapy:  Incision 03/14/11 Finger (Comment which one) (Active)   Number of days: 4350        Elimination:  Continence: Bowel: Yes  Bladder: Yes  Urinary Catheter: None   Colostomy/Ileostomy/Ileal Conduit: No       Date of Last BM: 2/24/2023      Intake/Output Summary (Last 24 hours) at 2/24/2023 0855  Last data filed at 2/24/2023 0648  Gross per 24 hour   Intake 240 ml   Output 875 ml   Net -635 ml     I/O last 3 completed shifts: In: 5 [P.O.:720]  Out: 1775 [Urine:1775]    Safety Concerns:     History of Falls (last 30 days) and At Risk for Falls    Impairments/Disabilities:      L side weakness    Nutrition Therapy:  Current Nutrition Therapy:   - Oral Diet:  Dysphagia - Soft and Bite Sized    Routes of Feeding: Oral  Liquids: Thin Liquids  Daily Fluid Restriction: no  Last Modified Barium Swallow with Video (Video Swallowing Test): done on  /unknown    Treatments at the Time of Hospital Discharge:   Respiratory Treatments: na  Oxygen Therapy:  is not on home oxygen therapy.   Ventilator:    - No ventilator support    Rehab Therapies: Physical Therapy, Occupational Therapy, and SN, MSW, HHA  Weight Bearing Status/Restrictions: No weight bearing restrictions  Other Medical Equipment (for information only, NOT a DME order):  wheelchair and walker  Other Treatments: na      Patient's personal belongings (please select all that are sent with patient):  Glasses, personal belongings with patient and spouse    RN SIGNATURE:  Electronically signed by Archie Lopez RN on 2/24/23 at 11:39 AM EST    CASE MANAGEMENT/SOCIAL WORK SECTION    Inpatient Status Date: ***    Readmission Risk Assessment Score:  Readmission Risk              Risk of Unplanned Readmission:  15           Discharging to Facility/ Agency   Name:   Address:  Phone:  Fax:    Dialysis Facility (if applicable)   Name:  Address:  Dialysis Schedule:  Phone:  Fax:    / signature: {Esignature:[id number]}    PHYSICIAN SECTION    Prognosis: Fair    Condition at Discharge: Stable    Rehab Potential (if transferring to Rehab): Good    Recommended Labs or Other Treatments After Discharge:     Physician Certification: I certify the above information and transfer of Jason Fontanez  is necessary for the continuing treatment of the diagnosis listed and that he requires Home Care for greater 30 days.     Update Admission H&P: No change in H&P    PHYSICIAN SIGNATURE:  Electronically signed by Ketan Polk DO on 2/24/23 at 8:55 AM EST

## 2023-02-24 NOTE — CARE COORDINATION
Case Management Assessment            Discharge Note                    Date / Time of Note: 2/24/2023 11:23 AM                  Discharge Note Completed by: MIRA Abdalla    Patient Name: Tosha Gaytankeeper   YOB: 1955  Diagnosis: Acute CVA (cerebrovascular accident) [facility]) [I63.9]   Date / Time: 1/29/2023  4:35 PM    Current PCP: Archie Argueta MD  Clinic patient: No    Hospitalization in the last 30 days: Yes    Advance Directives:  Code Status: Full Code  [place] DNR form completed and on chart: Not Indicated    Financial:  Payor: MEDICARE / Plan: MEDICARE PART A AND B / Product Type: *No Product type* /      Pharmacy:    [address] [place] [address], [address] [place] [address]  [address] [place] [address] [address]  Phone: [phone] Fax: [phone]      Assistance purchasing medications?: Potential Assistance Purchasing Medications: No  Assistance provided by Case Management: None at this time    Does patient want to participate in local refill/ meds to beds program?:      Meds To Beds General Rules:  1. Can ONLY be done Monday- Friday between 8:30am-5pm  2. Prescription(s) must be in pharmacy by 3pm to be filled same day  3. Copy of patient's insurance/ prescription drug card and patient face sheet must be sent along with the prescription(s)  4. Cost of Rx cannot be added to hospital bill. If financial assistance is needed, please contact unit  or ;  or  CANNOT provide pharmacy voucher for patients co-pays  5.  Patients can then  the prescription on their way out of the hospital at discharge, or pharmacy can deliver to the bedside if staff is available. (payment due at time of pick-up or delivery - cash, check, or card accepted)     Able to afford home medications/ co-pay costs: Yes    ADLS:  Current PT AM-PAC Score:   /24  Current OT AM-PAC Score:   /24      DISCHARGE Disposition: Home with [address]: [address]     LOC at discharge: Not Applicable  KAMERON Completed: Not Indicated    Notification completed in HENS/PAS?:  Not Applicable    IMM Completed:   No    Transportation:  Transportation PLAN for discharge: family   Mode of Transport: Private Car  Reason for medical transport: Not Applicable  Name of [address] [place] [address],[address]: Not Applicable  Time of Transport: n/a    Transport form completed: Not Indicated    Home Care:  [address] ordered at discharge: Yes  [address]: Akil Kinney  Phone: [phone]  Fax: [phone]  Orders faxed: Yes - Zenaida:  DME Provider: [address] obtained during hospitalization: wheelchair    Referrals made at [facility] for outpatient continued care:  Not Applicable    Additional CM Notes: CM met with pt and wife at bedside. Rene has delivered wheelchair to pt's room for home use. CM provided handicap placard Rx to pt's wife. [address] arranged for [place] [address] services. No other CM needs identified for discharge. The Plan for Transition of Care is related to the following treatment goals of Acute CVA (cerebrovascular accident) [place] [facility]) [I63.9]    The Patient and/or patient representative Paolo Mckeon and his family were provided with a choice of provider and agrees with the discharge plan Yes    Freedom of choice list was provided with basic dialogue that supports the patient's individualized plan of care/goals and shares the quality data associated with the providers.  Yes    Care Transitions patient: No    MIRA Gonzalez  [facility] [employer]  Case Management Department  Ph: [phone]  Fax: [phone]

## 2023-02-24 NOTE — PROGRESS NOTES
Occupational Therapy  Facility/Department: [facility] ACUTE REHAB UNIT  Rehabilitation Occupational Therapy Daily Treatment Note    Date: 23  Patient Name: Tosha        Room: [facility]7/0025-38  MRN: [id number]  Account: [de-identified]   : 1955  (68 y.o.) Gender: male                    Past Medical History:  has a past medical history of Anxiety, Chest pain, Depression, Encounter for imaging to screen for metal prior to MRI, Hyperlipidemia, Hypertension, and Wears glasses. Past Surgical History:   has a past surgical history that includes [place] tooth extraction; Colonoscopy (2009); Cardiac catheterization (2008); Finger trigger release (3-); and Coronary angioplasty with stent. Restrictions  Restrictions/Precautions: Fall Risk;Up as Tolerated    Subjective  Subjective: Pt supine in bed upon OT arrival w/ wife present. Restrictions/Precautions: Fall Risk;Up as Tolerated             Objective     Cognition  Arousal/Alertness: Appropriate responses to stimuli  Following Commands: Follows one step commands with repetition; Follows one step commands with increased time  Attention Span: Attends with cues to redirect; Difficulty attending to directions; Difficulty dividing attention  Safety Judgement: Decreased awareness of need for assistance;Decreased awareness of need for safety  Problem Solving: Assistance required to generate solutions;Assistance required to identify errors made;Assistance required to implement solutions  Insights: Decreased awareness of deficits  Initiation: Requires cues for some  Sequencing: Requires cues for some         ADL  Upper Extremity Dressing  Assistance Level: Minimal assistance  Skilled Clinical Factors: Pt doffed shirt w/ cue to pull from back. Assist to thread LUE into shirt. Pt slightly impulsive requiring cues for pacing. Assist to pull down on L side over trunk. Lower Extremity Dressing  Assistance Level:  Moderate assistance  Skilled Clinical Factors: Pt in standing, assist to pull brief/pants down. Assist to remove brief. Pt unthreaded BLE from pants. Assist to thread LLE into brief. Pt threaded RLE. Pt able to thread BLE into shorts. Pt stood at elevated bed rail while therapist pulled both over hips.  Putting On/Taking Off Footwear  Assistance Level: Maximum assistance  Skilled Clinical Factors: Pt doffed B socks. Assist to don B socks and shoes.        Assessment  Assessment  Assessment: Pt tolerated session well. Answered final questions regarding d/c. Wife demo'ed safety w/ transfer OOB and w/ ADLs in w/c. Denied further questions or concerns. Pt is safe to d/c home.  Activity Tolerance: Patient tolerated treatment well  Discharge Recommendations: 24 hour supervision or assist;Home with Home health OT  Safety Devices  Safety Devices in place: Yes  Type of devices: Nurse notified;Call light within reach    Patient Education  Education  Education Given To: Patient;Family  Education Provided: Role of Therapy;Plan of Care;Family Education;Mobility Training;Transfer Training;Safety;Fall Prevention Strategies;Home Exercise Program  Education Provided Comments: Therapist supervised wife transferring pt to w/c. Educated on dressing in w/c and standing at elevated surface as well as florence dressing techniques. Wife observed UE dressing and assisted w/ LE dressing. Verbalized understanding of all techniques taught.  Education Method: Demonstration;Verbal  Barriers to Learning: Cognition  Education Outcome: Verbalized understanding;Demonstrated understanding    Plan  Occupational Therapy Plan  Times Per Week: 5 days per week, 90 mins  Current Treatment Recommendations: Strengthening;ROM;Balance training;Functional mobility training;Endurance training;Self-Care / ADL;Patient/Caregiver education & training;Neuromuscular re-education;Cognitive/Perceptual training;Safety education & training    Goals  Patient Goals   Patient goals : go home with my wife  Short Term Goals  Time  Frame for Short Term Goals: By 2 weeks - all goals ongoing  Short Term Goal 1: Pt will complete LB dressing with mod A - goal met 2/23/23  Short Term Goal 2: Pt will complete toileting with mod A - goal met 2/23/23  Short Term Goal 3: Pt will complete toilet and functional transfers with mod A - goal met 2/23/23  Short Term Goal 4: Pt will complete UB dressing with min A - goal met 2/23/23  Short Term Goal 5: Pt will complete UE HEP x 10 reps for improved R UE coordination for ADLs - goal met 2/23/23 (pt completes independently outside of therapy)  Long Term Goals  Time Frame for Long Term Goals : By 4 weeks  Long Term Goal 1: Pt will complete LB dressing with CGA - not met  Long Term Goal 2: Pt will complete toileting with CGA - not met  Long Term Goal 3: Pt will complete toilet and functional transfers with CGA. - not met  Long Term Goal 4: Pt will complete UB dressing with SPV.  - not met  Long Term Goal 5: Pt will complete simple IADL with LRAD at mod I - not met          Therapy Time   Individual Concurrent Group Co-treatment   Time In 1107         Time Out 1147         Minutes 40             Timed Code Treatment Minutes:  40 min     Total Treatment Minutes: 36 min     Adell Barthel, OT

## 2023-02-26 PROBLEM — R77.8 ELEVATED TROPONIN: Status: RESOLVED | Noted: 2023-01-27 | Resolved: 2023-02-26

## 2023-02-26 PROBLEM — R79.89 ELEVATED TROPONIN: Status: RESOLVED | Noted: 2023-01-27 | Resolved: 2023-02-26

## 2023-03-07 DIAGNOSIS — I49.8 OTHER CARDIAC ARRHYTHMIA: Primary | ICD-10-CM

## 2023-03-07 PROCEDURE — 93248 EXT ECG>7D<15D REV&INTERPJ: CPT | Performed by: INTERNAL MEDICINE

## 2023-03-09 ENCOUNTER — TELEPHONE (OUTPATIENT)
Dept: CARDIOLOGY CLINIC | Age: 68
End: 2023-03-09

## 2023-04-15 PROBLEM — F41.1 GENERALIZED ANXIETY DISORDER: Status: ACTIVE | Noted: 2023-02-28

## 2023-04-15 PROBLEM — Z95.0 PRESENCE OF CARDIAC PACEMAKER: Status: ACTIVE | Noted: 2023-02-28

## 2023-05-13 DIAGNOSIS — U07.1 COVID-19: ICD-10-CM

## 2023-05-14 RX ORDER — CETIRIZINE HYDROCHLORIDE 10 MG/1
TABLET ORAL
Qty: 30 TABLET | Refills: 0 | OUTPATIENT
Start: 2023-05-14

## 2025-03-27 RX ORDER — TRAZODONE HYDROCHLORIDE 50 MG/1
TABLET ORAL
Qty: 30 TABLET | Refills: 3 | OUTPATIENT
Start: 2025-03-27

## 2025-04-08 ENCOUNTER — HOSPITAL ENCOUNTER (OUTPATIENT)
Dept: OCCUPATIONAL THERAPY | Age: 70
Setting detail: THERAPIES SERIES
Discharge: HOME | End: 2025-04-08
Payer: MEDICARE

## 2025-04-08 PROCEDURE — 97166 OT EVAL MOD COMPLEX 45 MIN: CPT

## 2025-04-08 PROCEDURE — 97535 SELF CARE MNGMENT TRAINING: CPT

## 2025-04-08 NOTE — PLAN OF CARE
[facility]- Outpatient Rehabilitation and Therapy [address] [place] [address] [place] [address] office: [phone] fax: [phone]     Occupational Therapy Certification      Dear Bernabe Omer MD,    We had the pleasure of evaluating the following patient for occupational therapy services at our [facility].  A summary of our findings can be found in the initial assessment below.  This includes our plan of care.  If you have any questions or concerns regarding these findings, please do not hesitate to contact me at the office phone number listed above.  Thank you for the referral.     Physician Signature:_______________________________Date:__________________  By signing above (or electronic signature), therapist’s plan is approved by physician          Patient: Jason Fontanez (69 y.o. male)   Examination Date: 2025   :  1955 MRN: [id number]   Visit #: 1   Insurance Allowable: BMN  Auth Needed: No   Insurance: Payor: MEDICARE / Plan: MEDICARE PART A AND B / Product Type: *No Product type* /   Insurance ID: [id number] - (Medicare)  Secondary Insurance (if applicable): [place]   Treatment Diagnosis:  M79.602 (ICD-10-CM) - Pain in left arm and R41.9 (ICD-10-CM) - Unspecified symptoms and signs involving cognitive functions and awarenessHemiplegia and hemiparesis following 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 side (HCC) [I69.954]   Medical Diagnosis:  Hemiplegia and hemiparesis following 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 side (HCC) [I69.954]   Referring Provider: Bernabe Omer MD  PCP: Sunshine Stallings MD       Plan of care signed: No (Sent on: 2025)    Date of Patient follow up with Physician:  2025    Progress Report/POC: EVAL today  POC update due: (OR 10 visits /OR AUTH LIMITS, whichever is less) -  2025  Recertification due: 2025

## 2025-04-10 ENCOUNTER — HOSPITAL ENCOUNTER (OUTPATIENT)
Dept: OCCUPATIONAL THERAPY | Age: 70
Setting detail: THERAPIES SERIES
Discharge: HOME | End: 2025-04-10
Payer: MEDICARE

## 2025-04-10 PROCEDURE — 97112 NEUROMUSCULAR REEDUCATION: CPT

## 2025-04-10 PROCEDURE — 97530 THERAPEUTIC ACTIVITIES: CPT

## 2025-04-10 PROCEDURE — 97140 MANUAL THERAPY 1/> REGIONS: CPT

## 2025-04-10 NOTE — FLOWSHEET NOTE
(27259): Provided manual therapy to mobilize UB for the purpose of modulating pain, promoting relaxation,  increasing ROM, reducing/eliminating soft tissue swelling/inflammation/restriction, improving soft tissue extensibility and allowing for proper ROM for normal function with self-care, functional mobility, transfers, reaching and lifting    TREATMENT PLAN     Plan: Cont per POC    Electronically Signed by Brandee Lerner OT  Date: 04/10/2025     Note: If patient does not return for scheduled/recommended follow up visits, this note will serve as a discharge from care along with the most recent update on progress.

## 2025-04-10 NOTE — PROGRESS NOTES
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 Pt would also benefit from outpatient Physical Therapy to address LE deficits and facilitate improved ambulation and balance.  Signing and returning this note can serve as a PT referral.     If you agree, please sign and fax this note to  [phone]    Thank You!    Electronically signed: Brandee Lerner OT, OTR/L        Physician signature_______________________ Date________________

## 2025-04-15 ENCOUNTER — HOSPITAL ENCOUNTER (OUTPATIENT)
Dept: OCCUPATIONAL THERAPY | Age: 70
Setting detail: THERAPIES SERIES
Discharge: HOME | End: 2025-04-15
Payer: MEDICARE

## 2025-04-15 PROCEDURE — 97112 NEUROMUSCULAR REEDUCATION: CPT

## 2025-04-15 NOTE — FLOWSHEET NOTE
[facility]- Outpatient Rehabilitation and Therapy  [facility] [place] [address] office: [phone] fax: [phone]         Patient: Jason Fontanez (69 y.o. male)   Examination Date: 04/15/2025   :  1955 MRN: [id number]   Visit #: 3   Insurance Allowable: BMN  Auth Needed: No   Insurance: Payor: MEDICARE / Plan: MEDICARE PART A AND B / Product Type: *No Product type* /   Insurance ID: [id number] - (Medicare)  Secondary Insurance (if applicable): [place]   Treatment Diagnosis:  M79.602 (ICD-10-CM) - Pain in left arm and R41.9 (ICD-10-CM) - Unspecified symptoms and signs involving cognitive functions and awarenessHemiplegia and hemiparesis following 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 side (HCC) [I69.954]   Medical Diagnosis:  Hemiplegia and hemiparesis following 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 side (HCC) [I69.954]   Referring Provider: Bernabe Omer MD  PCP: Sunshine Stallings MD       Plan of care signed: No (Sent on: 2025; 2025)    Date of Patient follow up with Physician:  2025    Progress Report/POC: NO  POC update due: (OR 10 visits /OR AUTH LIMITS, whichever is less) -  2025  Recertification due: 2025                                                              Precautions/ Contra-indications:   Latex allergy:   No  Pacemaker:     Yes - Loop recorder placed 2023  Other:  allergic to adhesive    Red Flags:  None      Suicide Screening:   The patient did not verbalize a primary behavioral concern, suicidal ideation, suicidal intent, or demonstrate suicidal behaviors.    Preferred Language for Healthcare: English    Review Of Systems (ROS):  [x] Performed Review of systems (Integumentary, CardioPulmonary, Neurological) by intake and observation. Intake form has been scanned into medical record. Patient has been instructed to contact their primary care physician regarding ROS issues if

## 2025-04-21 ENCOUNTER — HOSPITAL ENCOUNTER (OUTPATIENT)
Dept: OCCUPATIONAL THERAPY | Age: 70
Setting detail: THERAPIES SERIES
Discharge: HOME | End: 2025-04-21
Payer: MEDICARE

## 2025-04-21 PROCEDURE — 97140 MANUAL THERAPY 1/> REGIONS: CPT

## 2025-04-21 PROCEDURE — 97112 NEUROMUSCULAR REEDUCATION: CPT

## 2025-04-21 NOTE — FLOWSHEET NOTE
[facility]- Outpatient Rehabilitation and Therapy  [facility] [place] [address] office: [phone] fax: [phone]         Patient: Jason Fontanez (69 y.o. male)   Examination Date: 2025   :  1955 MRN: [id number]   Visit #: 4   Insurance Allowable: BMN  Auth Needed: No   Insurance: Payor: MEDICARE / Plan: MEDICARE PART A AND B / Product Type: *No Product type* /   Insurance ID: [id number] - (Medicare)  Secondary Insurance (if applicable): [place]   Treatment Diagnosis:  M79.602 (ICD-10-CM) - Pain in left arm and R41.9 (ICD-10-CM) - Unspecified symptoms and signs involving cognitive functions and awarenessHemiplegia and hemiparesis following 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 side (HCC) [I69.954]   Medical Diagnosis:  Hemiplegia and hemiparesis following 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 side (HCC) [I69.954]   Referring Provider: Bernabe Omer MD  PCP: Sunshine Stallings MD       Plan of care signed: No (Sent on: 2025; 2025)    Date of Patient follow up with Physician:  2025    Progress Report/POC: NO  POC update due: (OR 10 visits /OR AUTH LIMITS, whichever is less) -  2025  Recertification due: 2025                                                              Precautions/ Contra-indications:   Latex allergy:   No  Pacemaker:     Yes - Loop recorder placed 2023  Other:  allergic to adhesive    Red Flags:  None      Suicide Screening:   The patient did not verbalize a primary behavioral concern, suicidal ideation, suicidal intent, or demonstrate suicidal behaviors.    Preferred Language for Healthcare: English    Review Of Systems (ROS):  [x] Performed Review of systems (Integumentary, CardioPulmonary, Neurological) by intake and observation. Intake form has been scanned into medical record. Patient has been instructed to contact their primary care physician regarding ROS issues if

## 2025-04-23 ENCOUNTER — APPOINTMENT (OUTPATIENT)
Dept: OCCUPATIONAL THERAPY | Age: 70
End: 2025-04-23
Payer: MEDICARE

## 2025-04-24 ENCOUNTER — HOSPITAL ENCOUNTER (OUTPATIENT)
Dept: OCCUPATIONAL THERAPY | Age: 70
Setting detail: THERAPIES SERIES
Discharge: HOME | End: 2025-04-24
Payer: MEDICARE

## 2025-04-24 PROCEDURE — 97535 SELF CARE MNGMENT TRAINING: CPT

## 2025-04-24 PROCEDURE — 97140 MANUAL THERAPY 1/> REGIONS: CPT

## 2025-04-24 PROCEDURE — 97112 NEUROMUSCULAR REEDUCATION: CPT

## 2025-04-24 NOTE — FLOWSHEET NOTE
[facility]- Outpatient Rehabilitation and Therapy  [facility] [place] [address] office: [phone] fax: [phone]         Patient: Jason Fontanez (69 y.o. male)   Examination Date: 2025   :  1955 MRN: [id number]   Visit #: 5   Insurance Allowable: BMN  Auth Needed: No   Insurance: Payor: MEDICARE / Plan: MEDICARE PART A AND B / Product Type: *No Product type* /   Insurance ID: [id number] - (Medicare)  Secondary Insurance (if applicable[place]   Treatment Diagnosis:  M79.602 (ICD-10-CM) - Pain in left arm and R41.9 (ICD-10-CM) - Unspecified symptoms and signs involving cognitive functions and awarenessHemiplegia and hemiparesis following 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 side (HCC) [I69.954]   Medical Diagnosis:  Hemiplegia and hemiparesis following 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 side (HCC) [I69.954]   Referring Provider: Bernabe Omer MD  PCP: Sunshine Stallings MD       Plan of care signed: No (Sent on: 2025; 2025)    Date of Patient follow up with Physician:  2025    Progress Report/POC: NO  POC update due: (OR 10 visits /OR AUTH LIMITS, whichever is less) -  2025  Recertification due: 2025                                                              Precautions/ Contra-indications:   Latex allergy:   No  Pacemaker:     Yes - Loop recorder placed 2023  Other:  allergic to adhesive    Red Flags:  None      Suicide Screening:   The patient did not verbalize a primary behavioral concern, suicidal ideation, suicidal intent, or demonstrate suicidal behaviors.    Preferred Language for Healthcare: English    Review Of Systems (ROS):  [x] Performed Review of systems (Integumentary, CardioPulmonary, Neurological) by intake and observation. Intake form has been scanned into medical record. Patient has been instructed to contact their primary care physician regarding ROS issues if

## 2025-04-29 ENCOUNTER — HOSPITAL ENCOUNTER (OUTPATIENT)
Dept: PHYSICAL THERAPY | Age: 70
Setting detail: THERAPIES SERIES
Discharge: HOME | End: 2025-04-29
Payer: MEDICARE

## 2025-04-29 ENCOUNTER — HOSPITAL ENCOUNTER (OUTPATIENT)
Dept: OCCUPATIONAL THERAPY | Age: 70
Setting detail: THERAPIES SERIES
Discharge: HOME | End: 2025-04-29
Payer: MEDICARE

## 2025-04-29 DIAGNOSIS — R26.89 BALANCE PROBLEMS: Primary | ICD-10-CM

## 2025-04-29 DIAGNOSIS — I69.398 ABNORMALITY OF GAIT FOLLOWING CEREBROVASCULAR ACCIDENT: ICD-10-CM

## 2025-04-29 DIAGNOSIS — M21.372 LEFT FOOT DROP: ICD-10-CM

## 2025-04-29 DIAGNOSIS — Z74.09 IMPAIRED FUNCTIONAL MOBILITY, BALANCE, GAIT, AND ENDURANCE: ICD-10-CM

## 2025-04-29 DIAGNOSIS — R26.9 ABNORMALITY OF GAIT FOLLOWING CEREBROVASCULAR ACCIDENT: ICD-10-CM

## 2025-04-29 DIAGNOSIS — R26.81 UNSTEADINESS: ICD-10-CM

## 2025-04-29 PROCEDURE — 97530 THERAPEUTIC ACTIVITIES: CPT

## 2025-04-29 PROCEDURE — 97163 PT EVAL HIGH COMPLEX 45 MIN: CPT

## 2025-04-29 PROCEDURE — 97112 NEUROMUSCULAR REEDUCATION: CPT

## 2025-04-29 NOTE — FLOWSHEET NOTE
Adjusted    Pt will demo ability to oppose thumb to index and middle digits.    [] Progressing: [] Met: [] Not Met: [] Adjusted    Pt will demo ability to use LEFT hand as gross assist during bilateral BADL tasks.   [] Progressing: [] Met: [] Not Met: [] Adjusted    Pt will improve score on Fugl Tess to 15 (or better) in prep for Vivistim.   [] Progressing: [] Met: [] Not Met: [] Adjusted    Overall Progression Towards Functional goals/ Treatment Progress Update:  Plan just implemented, too soon (<30days) to assess goals progression     CHARGE CAPTURE     CHARGE GRID   CPT Code (Timed) Minutes # CPT Code (Untimed) #      Therex (01675)      Eval:MODERATE (89331 - Typically 30 minutes face-to-face)      Ther. Act (46957)     Re-Eval (47292)      Sensory Integration (75123)     Estim Unattended (34500)      Self Care/Home Manage (89155)     Parraffin (49543)      Cognitive Function (68019)     Fluidotherapy (99863)      Cognitive Function (81348): each    additional 15 minutes     Dry Needle 1-2 muscle (49105)      Neuromusc. Re-ed (32788) 40 3   Dry Needle 3+ muscle (16796)      Manual (12247)     VASO (79861)      Aquatic Therex (77223)     Group Therapy (17062)      Iontophoresis (68156)         Ultrasound (33267)           Estim Attended (84982)           Other:     Other:             Total Timed Code Tx Minutes 40        Total Treatment Minutes 40     Charge Justification:  NMR (30575): During functional activities/therapeutic exercises, provided facilitation of normal movement patterns and provided handling/verbal cues to promote postural alignment and stability during static and dynamic movement (sitting or standing). Activities may also include visual perceptual training, proprioception training, and balance retraining during functional activities    TREATMENT PLAN     Plan: Cont per POC    Electronically Signed by Brandee Lerner OT  Date: 04/29/2025     Note: If patient does not return for

## 2025-04-29 NOTE — PLAN OF CARE
lot fo walking. Fatigue in his legs makes. He does a step through pain.     Right after the CVA_ he had a few falls. Some were pretty severe- he wonders if he twisted through his back/hip. It has been over a year since his last fall. Falls were for different reasons- falling into the wall, not wearing AFO. The most recent falls were going sideways.     HPI: \"History of present illness: Pt with CVA in January 2023.  s/p IPR at [facility] and [facility], followed by OP therapy at [facility] in [facility].\"  \"Per 12/23 [facility]: In January 2023 patient was hospitalized for acute right middle cerebral artery thrombosis. Patient presented to [facility] with sudden onset of left sided paralysis after a nonsyncopal fall in the bathroom hitting his face against the bathtub. Patient with complete left-sided paralysis, left-sided extinction, slurred speech. In emergency room he received TNK after a negative CT of the head without contrast. CTA showed right middle cerebral artery M1 segment severe stenosis/occlusion. Patient had diagnostic cerebral angiogram and mechanical thrombectomy. Patient was started on aspirin and continued on zetia and crestor.Patient with right-sided weakness however able to walk with the help of cane. He speech appears to be fluent. He does have some mild cognitive impairment. Recently he had a testing by neuropsychologist. He is undergoing physical therapy. He is very compliant with his medication and he has been following with cardiologist who changed aspirin to Plavix and patient is currently on Crestor as lipid-lowering agent monotherapy. Patient does have a loop recorder assessing for possible atrial fibrillation however no such observation is made. \"     Test used Initial score  4/29/25 04/29/2025   Pain Summary VAS 0/10    Functional questionnaire Stroke Impact 16 54/80 = 68%    Other:              Stroke Impact Scale 16 scoring  16 questions worth five points each (80 total

## 2025-05-01 ENCOUNTER — HOSPITAL ENCOUNTER (OUTPATIENT)
Dept: PHYSICAL THERAPY | Age: 70
Setting detail: THERAPIES SERIES
Discharge: HOME | End: 2025-05-01
Payer: MEDICARE

## 2025-05-01 ENCOUNTER — HOSPITAL ENCOUNTER (OUTPATIENT)
Dept: OCCUPATIONAL THERAPY | Age: 70
Setting detail: THERAPIES SERIES
Discharge: HOME | End: 2025-05-01
Payer: MEDICARE

## 2025-05-01 PROCEDURE — 97530 THERAPEUTIC ACTIVITIES: CPT

## 2025-05-01 PROCEDURE — 97112 NEUROMUSCULAR REEDUCATION: CPT

## 2025-05-01 PROCEDURE — 97140 MANUAL THERAPY 1/> REGIONS: CPT

## 2025-05-01 NOTE — FLOWSHEET NOTE
Progressing: [] Met: [] Not Met: [] Adjusted    Pt will demonstrate left active wrist extension to 5 degrees or better, against gravity, for improved ROM for functional task performance.  [] Progressing: [] Met: [] Not Met: [] Adjusted    Pt will demo ability to oppose thumb to index and middle digits.    [] Progressing: [] Met: [] Not Met: [] Adjusted    Pt will demo ability to use LEFT hand as gross assist during bilateral BADL tasks.   [] Progressing: [] Met: [] Not Met: [] Adjusted    Pt will improve score on Fugl Tess to 15 (or better) in prep for Vivistim.   [] Progressing: [] Met: [] Not Met: [] Adjusted    Overall Progression Towards Functional goals/ Treatment Progress Update:  Plan just implemented, too soon (<30days) to assess goals progression     CHARGE CAPTURE     CHARGE GRID   CPT Code (Timed) Minutes # CPT Code (Untimed) #      Therex (41584)      Eval:MODERATE (84552 - Typically 30 minutes face-to-face)      Ther. Act (56089)     Re-Eval (79531)      Sensory Integration (14869)     Estim Unattended (70680)      Self Care/Home Manage (14363)     Parraffin (48955)      Cognitive Function (55183)     Fluidotherapy (66043)      Cognitive Function (77029): each    additional 15 minutes     Dry Needle 1-2 muscle (49198)      Neuromusc. Re-ed (29719) 30 1   Dry Needle 3+ muscle (80168)      Manual (98703) 10 1   VASO (56434)      Aquatic Therex (01722)     Group Therapy (65655)      Iontophoresis (47022)         Ultrasound (77415)           Estim Attended (06790)           Other:     Other:             Total Timed Code Tx Minutes 40        Total Treatment Minutes 40     Charge Justification:  NMR (80396): During functional activities/therapeutic exercises, provided facilitation of normal movement patterns and provided handling/verbal cues to promote postural alignment and stability during static and dynamic movement (sitting or standing). Activities may also include visual perceptual training,

## 2025-05-01 NOTE — FLOWSHEET NOTE
Progressing: [] Met: [] Not Met: [] Adjusted   Pt will complete STS from 18\" chair without UE support  Current: unable, needs UE supports  Previous: N/A  [] Progressing: [] Met: [] Not Met: [] Adjusted   Pt will demonstrate ability to walk short household distances without AD with no LOB or safety cueing  Current: does occasionally, usually furniture walks  Previous: N/A  [] Progressing: [] Met: [] Not Met: [] Adjusted   Pt will demonstrate approximately equal step length on each side with use of cane  Current: left step longer   Previous: N/A  [] Progressing: [] Met: [] Not Met: [] Adjusted   Pt will improve left knee flexion strength to 3+/5  Current: 3-/5  Previous: N/A  [] Progressing: [] Met: [] Not Met: [] Adjusted   Pt will improve left quad strength to 3/5 or full ROM/full knee ext  Current: 2+/5  [] Progressing: [] Met: [] Not Met: [] Adjusted   Pt will improe left hip flexion to 4/5  CLOF: 4-/5  [] Progressing: [] Met: [] Not Met: [] Adjusted   Pt will improve left hip abd to 3+/5  CLOF: 3-/5  [] Progressing: [] Met: [] Not Met: [] Adjusted       Overall Progression Towards Functional goals/ Treatment Progress Update:  [] Patient is progressing as expected towards functional goals listed.    [] Progression is slowed due to complexities/Impairments listed.  [] Progression has been slowed due to co-morbidities.  [x] Plan just implemented, too soon (<30days) to assess goals progression   [] Goals require adjustment due to lack of progress  [] Patient is not progressing as expected and requires additional follow up with physician  [] Other:     TREATMENT PLAN     Frequency/Duration: 2x/week for  12  weeks for the following treatment interventions:    Interventions:  Therapeutic Exercise (56820) including: strength training, ROM, and functional mobility  Therapeutic Activities (87320) including: functional mobility training and education.  Neuromuscular Re-education (66648) activation and proprioception,

## 2025-05-06 ENCOUNTER — HOSPITAL ENCOUNTER (OUTPATIENT)
Dept: OCCUPATIONAL THERAPY | Age: 70
Setting detail: THERAPIES SERIES
Discharge: HOME | End: 2025-05-06
Payer: MEDICARE

## 2025-05-06 PROCEDURE — 97530 THERAPEUTIC ACTIVITIES: CPT

## 2025-05-06 PROCEDURE — 97140 MANUAL THERAPY 1/> REGIONS: CPT

## 2025-05-06 PROCEDURE — 97112 NEUROMUSCULAR REEDUCATION: CPT

## 2025-05-06 NOTE — FLOWSHEET NOTE
[facility]- Outpatient Rehabilitation and Therapy  [facility] [place] [address] office: [phone] fax: [phone]         Patient: Jason Fontanez (69 y.o. male)   Examination Date: 2025   :  1955 MRN: [id number]   Visit #: 8   Insurance Allowable: BMN  Auth Needed: No   Insurance: Payor: MEDICARE / Plan: MEDICARE PART A AND B / Product Type: *No Product type* /   Insurance ID: [id number] - (Medicare)  Secondary Insurance (if applicable): [place]   Treatment Diagnosis:  M79.602 (ICD-10-CM) - Pain in left arm and R41.9 (ICD-10-CM) - Unspecified symptoms and signs involving cognitive functions and awarenessHemiplegia and hemiparesis following 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 side (HCC) [I69.954]   Medical Diagnosis:  Hemiplegia and hemiparesis following 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 side (HCC) [I69.954]   Referring Provider: Bernabe Omer MD  PCP: Sunshine Stallings MD   Plan of care signed: Yes (on: 2025)    Date of Patient follow up with Physician: IOANA    Progress Report/POC: YES, Date Range for this report: 2025 to 2025  POC update due: (OR 10 visits /OR AUTH LIMITS, whichever is less) -  6/3/2025  Recertification due: 2025                                                              Precautions/ Contra-indications:   Latex allergy:   No  Pacemaker:     Yes - Loop recorder placed 2023  Other:  allergic to adhesive    Red Flags:  None      Suicide Screening:   The patient did not verbalize a primary behavioral concern, suicidal ideation, suicidal intent, or demonstrate suicidal behaviors.    Preferred Language for Healthcare: English    Review Of Systems (ROS):  [x] Performed Review of systems (Integumentary, CardioPulmonary, Neurological) by intake and observation. Intake form has been scanned into medical record. Patient has been instructed to contact their primary care physician

## 2025-05-08 ENCOUNTER — APPOINTMENT (OUTPATIENT)
Dept: OCCUPATIONAL THERAPY | Age: 70
End: 2025-05-08
Payer: MEDICARE

## 2025-05-13 ENCOUNTER — HOSPITAL ENCOUNTER (OUTPATIENT)
Dept: OCCUPATIONAL THERAPY | Age: 70
Setting detail: THERAPIES SERIES
Discharge: HOME | End: 2025-05-13
Payer: MEDICARE

## 2025-05-13 ENCOUNTER — HOSPITAL ENCOUNTER (OUTPATIENT)
Dept: PHYSICAL THERAPY | Age: 70
Setting detail: THERAPIES SERIES
Discharge: HOME | End: 2025-05-13
Payer: MEDICARE

## 2025-05-13 PROCEDURE — 97112 NEUROMUSCULAR REEDUCATION: CPT

## 2025-05-13 PROCEDURE — 97530 THERAPEUTIC ACTIVITIES: CPT

## 2025-05-13 PROCEDURE — 97116 GAIT TRAINING THERAPY: CPT

## 2025-05-13 NOTE — FLOWSHEET NOTE
[facility]- Outpatient Rehabilitation and Therapy [address] [place] [address] [place] [address] office: [phone] fax: [phone]         Patient: Jason Fontanez (69 y.o. male)   Examination Date: 2025   :  1955 MRN: [id number]   Visit #: 9   Insurance Allowable: BMN  Auth Needed: No   Insurance: Payor: MEDICARE / Plan: MEDICARE PART A AND B / Product Type: *No Product type* /   Insurance ID: [id number] - (Medicare)  Secondary Insurance (if applicable): [place]   Treatment Diagnosis:  M79.602 (ICD-10-CM) - Pain in left arm and R41.9 (ICD-10-CM) - Unspecified symptoms and signs involving cognitive functions and awarenessHemiplegia and hemiparesis following 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 side (HCC) [I69.954]   Medical Diagnosis:  Hemiplegia and hemiparesis following 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 side (HCC) [I69.954]   Referring Provider: Bernabe Omer MD  PCP: Sunshine Stallings MD   Plan of care signed: Yes (on: 2025)    Date of Patient follow up with Physician: IOANA    Progress Report/POC: NO  POC update due: (OR 10 visits /OR AUTH LIMITS, whichever is less) -  6/3/2025  Recertification due: 2025                                                              Precautions/ Contra-indications:   Latex allergy:   No  Pacemaker:     Yes - Loop recorder placed 2023  Other:  allergic to adhesive    Red Flags:  None      Suicide Screening:   The patient did not verbalize a primary behavioral concern, suicidal ideation, suicidal intent, or demonstrate suicidal behaviors.    Preferred Language for Healthcare: English    Review Of Systems (ROS):  [x] Performed Review of systems (Integumentary, CardioPulmonary, Neurological) by intake and observation. Intake form has been scanned into medical record. Patient has been instructed to contact their primary care physician regarding ROS issues if not already being

## 2025-05-13 NOTE — FLOWSHEET NOTE
[facility] - Outpatient Rehabilitation and Therapy:  [facility] [place] [address] office: [phone] fax: [phone]      Physical Therapy: TREATMENT/PROGRESS NOTE   Patient: Jason Fontanez (69 y.o. male)   Examination Date: 2025   :  1955 MRN: [id number]   Visit #: 3   Insurance Allowable Auth Needed   BMN []Yes    []No    Insurance: Payor: MEDICARE / Plan: MEDICARE PART A AND B / Product Type: *No Product type* /   Insurance ID: [id number] - (Medicare)  Secondary Insurance (if applicable): [place]   Treatment Diagnosis:     ICD-10-CM    1. Balance problems  R26.89       2. Abnormality of gait following cerebrovascular accident  I69.398     R26.9       3. Impaired functional mobility, balance, gait, and endurance  Z74.09       4. Left foot drop  M21.372       5. Unsteadiness  R26.81          Medical Diagnosis:  Hemiplegia and hemiparesis following 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 side (HCC) [I69.954]   Referring Physician: Bernabe Omer MD  PCP: Sunshine Stallings MD     Plan of care signed (Y/N): y in media    Date of Patient follow up with Physician:      Plan of Care Report: NO  POC update due: (10 visits /OR AUTH LIMITS, whichever is less)   PN due 25 or 10 visits  POC due in 25 visits or 90 days Recert                                              Medical History:  Past Medical History:   Diagnosis Date    Anxiety     Chest pain     Depression     Encounter for imaging to screen for metal prior to MRI 2023    CT Head cleared for metal, no foreign body--ok for MRI per     Hyperlipidemia     Hypertension     Wears glasses    stent                                         Precautions/ Contra-indications:           Latex allergy:  NO  Pacemaker:     yes loop recorder placed 2023- checking for afib  Contraindications for Manipulation: None  Date of Surgery:   Other: allergic to adhesive, fall risk, on

## 2025-05-22 ENCOUNTER — HOSPITAL ENCOUNTER (OUTPATIENT)
Dept: PHYSICAL THERAPY | Age: 70
Setting detail: THERAPIES SERIES
Discharge: HOME | End: 2025-05-22
Payer: MEDICARE

## 2025-05-22 ENCOUNTER — HOSPITAL ENCOUNTER (OUTPATIENT)
Dept: OCCUPATIONAL THERAPY | Age: 70
Setting detail: THERAPIES SERIES
Discharge: HOME | End: 2025-05-22
Payer: MEDICARE

## 2025-05-22 PROCEDURE — 97530 THERAPEUTIC ACTIVITIES: CPT

## 2025-05-22 PROCEDURE — 97112 NEUROMUSCULAR REEDUCATION: CPT

## 2025-05-22 PROCEDURE — 97116 GAIT TRAINING THERAPY: CPT

## 2025-05-22 NOTE — FLOWSHEET NOTE
[facility] - Outpatient Rehabilitation and Therapy:  [facility] [place] [address] office: [phone] fax: [phone]      Physical Therapy: TREATMENT/PROGRESS NOTE   Patient: Jason Fontanez (69 y.o. male)   Examination Date: 2025   :  1955 MRN: [id number]   Visit #: 4   Insurance Allowable Auth Needed   BMN []Yes    []No    Insurance: Payor: MEDICARE / Plan: MEDICARE PART A AND B / Product Type: *No Product type* /   Insurance ID: [id number] - (Medicare)  Secondary Insurance (if applicable): [place]   Treatment Diagnosis:     ICD-10-CM    1. Balance problems  R26.89       2. Abnormality of gait following cerebrovascular accident  I69.398     R26.9       3. Impaired functional mobility, balance, gait, and endurance  Z74.09       4. Left foot drop  M21.372       5. Unsteadiness  R26.81          Medical Diagnosis:  Hemiplegia and hemiparesis following 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 side (HCC) [I69.954]   Referring Physician: Bernabe Omer MD  PCP: Sunshine Stallings MD     Plan of care signed (Y/N): y in media    Date of Patient follow up with Physician:      Plan of Care Report: NO PN soon  POC update due: (10 visits /OR AUTH LIMITS, whichever is less)   PN due 25 or 10 visits  POC due in 25 visits or 90 days Recert                                              Medical History:  Past Medical History:   Diagnosis Date    Anxiety     Chest pain     Depression     Encounter for imaging to screen for metal prior to MRI 2023    CT Head cleared for metal, no foreign body--ok for MRI per     Hyperlipidemia     Hypertension     Wears glasses    stent                                         Precautions/ Contra-indications:           Latex allergy:  NO  Pacemaker:     yes loop recorder placed 2023- checking for afib  Contraindications for Manipulation: None  Date of Surgery:   Other: allergic to adhesive, fall

## 2025-05-22 NOTE — FLOWSHEET NOTE
direct and significant impact on the need for therapy.  (Significantly impacts the rate of recovery and is associated with a primary condition.)     Prognosis for POC: Good    Patient requires continued skilled intervention: Yes    GOALS     Patient stated goal: Improve LUE function and increase Ind with ADLs  Status:  [] Progressing: [] Met: [] Not Met: [] Adjusted    Therapist goals for Patient:   Short Term Goals: To be achieved in: 4 weeks (by 5/6/2025)    Pt will participate in cont assessment of visual status by completing visual field assessment, visual tracking, and visual perception.  [] Progressing: [x] Met: [] Not Met: [] Adjusted    Pt and caregiver demonstrate compliance with proper positioning techniques of florence arm to ensure approximation of glenohumeral joint and decrease risk of complications from subluxation.   [] Progressing: [x] Met: [] Not Met: [] Adjusted    Pt will be Supervision with HEP/Home activities program to facilitate independence with carryover from sessions and to progress towards returning to PLOF  [x] Progressing: [] Met: [] Not Met: [] Adjusted    Pt will tolerate WBing on LUE, flat palm, for 10 min to allow for improve proprioceptive input for neuromuscular return.   [] Progressing: [x] Met: [] Not Met: [] Adjusted    Long Term Goals: To be achieved in: 12 weeks (by 7/1/2025)    Pt will demonstrate 30 degrees of LEFT active shoulder flexion against gravity, for improved ROM for functional task performance.  [x] Progressing: [] Met: [] Not Met: [] Adjusted    Pt will demonstrate 40 degrees of LEFT active elbow flexion against gravity, for improved ROM for functional task performance.  [] Progressing: [x] Met: [] Not Met: [] Adjusted    Pt will demonstrate left active wrist extension to 5 degrees or better, against gravity, for improved ROM for functional task performance.  [] Progressing: [] Met: [x] Not Met: [] Adjusted    Pt will demo ability to oppose thumb to index and middle

## 2025-05-27 ENCOUNTER — HOSPITAL ENCOUNTER (OUTPATIENT)
Dept: PHYSICAL THERAPY | Age: 70
Setting detail: THERAPIES SERIES
Discharge: HOME | End: 2025-05-27
Payer: MEDICARE

## 2025-05-27 ENCOUNTER — HOSPITAL ENCOUNTER (OUTPATIENT)
Dept: OCCUPATIONAL THERAPY | Age: 70
Setting detail: THERAPIES SERIES
Discharge: HOME | End: 2025-05-27
Payer: MEDICARE

## 2025-05-27 PROCEDURE — 97140 MANUAL THERAPY 1/> REGIONS: CPT

## 2025-05-27 PROCEDURE — 97112 NEUROMUSCULAR REEDUCATION: CPT

## 2025-05-27 PROCEDURE — 97116 GAIT TRAINING THERAPY: CPT

## 2025-05-27 NOTE — FLOWSHEET NOTE
then left extensor tone kicks in and unable to flex left knee without maxA and force. Difficulty with hand placement as well          Manual Intervention (47272) Time:    Manual hamstring, glut, ir/er stretch             Gait Training (20382)   Gait: 55' x 2. Cues for decr circumduction. Cues for longer right, shorter eft. He has a spring moment with his left foot causing extensor tone and circumduction.    Gait  treadmill biodex with harness  - pt very anxious and asking to stop several times. He needed left arm in a sling. Got up to 1.0 mph but he keeps saying \"something feels wrong\" tried about 4x     Stride stance in //  bars with dots. To improve right step length  Then gait immed after. Needed PT to hold down his left foot and explain about 10x   Cone runway two' apart to discourage circumduction but then it creates a new problem of a longer left step and non step through pattern with right    20',x 20   Gait discussion:   cues to march left hip flexion to increase clearance while ambulating, avoid circumduction, and take longer right step  -           Modalities:    No modalities applied this session    Education/Home Exercise Program: Patient HEP program created electronically.  Refer to [employer] access code: NA yet all handwritten  gait pattern marching left hip and instead of circumduction, taking longer right step, and also STS 3 x 10 a day  Partial sts  Stair lunge stretch for spasticity  Incline heel slides      ASSESSMENT     Today's Assessment:    Reviewed HEP from last time. He has a hard time isolating any muscles in his left leg and difficulty following instructions. Tried the gait  TM with harness today which was unsuccessful. Pt very anxious and uncomfortable despite max safety precautions and max education. Continued working on normalizing gait abnormalities as well.   See above      Initial Eval: Pt is a 69 y.o. male presenting to OP PT clinic with medical diagnosis of Hemiplegia

## 2025-05-27 NOTE — FLOWSHEET NOTE
[facility]- Outpatient Rehabilitation and Therapy  [facility] [place] [address] office: [phone] fax: [phone]         Patient: Jason Fontanez (69 y.o. male)   Examination Date: 2025   :  1955 MRN: [id number]   Visit #: 11   Insurance Allowable: BMN  Auth Needed: No   Insurance: Payor: MEDICARE / Plan: MEDICARE PART A AND B / Product Type: *No Product type* /   Insurance ID: [id number] - (Medicare)  Secondary Insurance (if applicable): [place]   Treatment Diagnosis:  M79.602 (ICD-10-CM) - Pain in left arm and R41.9 (ICD-10-CM) - Unspecified symptoms and signs involving cognitive functions and awarenessHemiplegia and hemiparesis following 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 side (HCC) [I69.954]   Medical Diagnosis:  Hemiplegia and hemiparesis following 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 side (HCC) [I69.954]   Referring Provider: Bernabe Omer MD  PCP: Sunshine Stallings MD   Plan of care signed: Yes (on: 2025)    Date of Patient follow up with Physician: IOANA    Progress Report/POC: NO  POC update due: (OR 10 visits /OR AUTH LIMITS, whichever is less) -  6/3/2025  Recertification due: 2025                                                              Precautions/ Contra-indications:   Latex allergy:   No  Pacemaker:     Yes - Loop recorder placed 2023  Other:  allergic to adhesive    Red Flags:  None      Suicide Screening:   The patient did not verbalize a primary behavioral concern, suicidal ideation, suicidal intent, or demonstrate suicidal behaviors.    Preferred Language for Healthcare: English    Review Of Systems (ROS):  [x] Performed Review of systems (Integumentary, CardioPulmonary, Neurological) by intake and observation. Intake form has been scanned into medical record. Patient has been instructed to contact their primary care physician regarding ROS issues if not already being

## 2025-05-29 ENCOUNTER — HOSPITAL ENCOUNTER (OUTPATIENT)
Dept: OCCUPATIONAL THERAPY | Age: 70
Setting detail: THERAPIES SERIES
Discharge: HOME | End: 2025-05-29
Payer: MEDICARE

## 2025-05-29 ENCOUNTER — HOSPITAL ENCOUNTER (OUTPATIENT)
Dept: PHYSICAL THERAPY | Age: 70
Setting detail: THERAPIES SERIES
Discharge: HOME | End: 2025-05-29
Payer: MEDICARE

## 2025-05-29 PROCEDURE — 97116 GAIT TRAINING THERAPY: CPT

## 2025-05-29 PROCEDURE — 97112 NEUROMUSCULAR REEDUCATION: CPT

## 2025-05-29 PROCEDURE — 97530 THERAPEUTIC ACTIVITIES: CPT

## 2025-05-29 PROCEDURE — 97140 MANUAL THERAPY 1/> REGIONS: CPT

## 2025-05-29 NOTE — FLOWSHEET NOTE
measurements   25 min  30 second sit to stand  Mmts  Discussion  tinetti   Cotreat quadruped GSB   Assist of 2-3. Successfully gets into right kneel on table but then left extensor tone kicks in and unable to flex left knee without maxA and force. Difficulty with hand placement as well          Manual Intervention (49097) Time:    Manual hamstring, glut, ir/er stretch             Gait Training (44795)   Gait: 55' x 2. Cues for decr circumduction. Cues for longer right, shorter eft. He has a spring moment with his left foot causing extensor tone and circumduction.    Gait  treadmill biodex with harness  - pt very anxious and asking to stop several times. He needed left arm in a sling. Got up to 1.0 mph but he keeps saying \"something feels wrong\" tried about 4x     TUG and tinetti PN   Stride stance in //  bars with dots. To improve right step length  Then gait immed after. Needed PT to hold down his left foot and explain about 10x   Error augmentation gait: use red tband around right ankle to encourage longer right step length 3 x 30' then remove it and got ok carryover but short on time will need to repeat in future   Cone runway: 2' apart to discourage circumduction but then it creates a new problem of a longer left step and non step through pattern with right. Shows some improvement with right step length with vc.     20',x 20   Gait discussion:   cues to march left hip flexion to increase clearance while ambulating, avoid circumduction, and take longer right step  -           Modalities:    No modalities applied this session    Education/Home Exercise Program: Patient HEP program created electronically.  Refer to [employer] access code: NA yet all handwritten  gait pattern marching left hip and instead of circumduction, taking longer right step, and also STS 3 x 10 a day  Partial sts  Stair lunge stretch for spasticity  Incline heel slides      ASSESSMENT     Today's Assessment:   Pt assessed for progress

## 2025-05-29 NOTE — FLOWSHEET NOTE
[facility]- Outpatient Rehabilitation and Therapy  [facility] [place] [address] office: [phone] fax: [phone]         Patient: Jason Fontanez (69 y.o. male)   Examination Date: 2025   :  1955 MRN: [id number]   Visit #: 12   Insurance Allowable: BMN  Auth Needed: No   Insurance: Payor: MEDICARE / Plan: MEDICARE PART A AND B / Product Type: *No Product type* /   Insurance ID: [id number] - (Medicare)  Secondary Insurance (if applicable): [place]   Treatment Diagnosis:  M79.602 (ICD-10-CM) - Pain in left arm and R41.9 (ICD-10-CM) - Unspecified symptoms and signs involving cognitive functions and awarenessHemiplegia and hemiparesis following 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 side (HCC) [I69.954]   Medical Diagnosis:  Hemiplegia and hemiparesis following 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 side (HCC) [I69.954]   Referring Provider: Bernabe Omer MD  PCP: Sunshine Stallings MD   Plan of care signed: Yes (on: 2025)    Date of Patient follow up with Physician: IOANA    Progress Report/POC: NO  POC update due: (OR 10 visits /OR AUTH LIMITS, whichever is less) -  6/3/2025  Recertification due: 2025                                                              Precautions/ Contra-indications:   Latex allergy:   No  Pacemaker:     Yes - Loop recorder placed 2023  Other:  allergic to adhesive    Red Flags:  None      Suicide Screening:   The patient did not verbalize a primary behavioral concern, suicidal ideation, suicidal intent, or demonstrate suicidal behaviors.    Preferred Language for Healthcare: English    Review Of Systems (ROS):  [x] Performed Review of systems (Integumentary, CardioPulmonary, Neurological) by intake and observation. Intake form has been scanned into medical record. Patient has been instructed to contact their primary care physician regarding ROS issues if not already being

## 2025-06-02 ENCOUNTER — HOSPITAL ENCOUNTER (OUTPATIENT)
Dept: PHYSICAL THERAPY | Age: 70
Setting detail: THERAPIES SERIES
Discharge: HOME | End: 2025-06-02
Payer: MEDICARE

## 2025-06-02 ENCOUNTER — HOSPITAL ENCOUNTER (OUTPATIENT)
Dept: OCCUPATIONAL THERAPY | Age: 70
Setting detail: THERAPIES SERIES
Discharge: HOME | End: 2025-06-02
Payer: MEDICARE

## 2025-06-02 PROCEDURE — 97112 NEUROMUSCULAR REEDUCATION: CPT

## 2025-06-02 PROCEDURE — 97530 THERAPEUTIC ACTIVITIES: CPT

## 2025-06-02 PROCEDURE — 97116 GAIT TRAINING THERAPY: CPT

## 2025-06-02 NOTE — FLOWSHEET NOTE
[facility] - Outpatient Rehabilitation and Therapy:  [facility] [place] [address] office: [phone] fax: [phone]      Physical Therapy: TREATMENT/PROGRESS NOTE   Patient: Jason Fontanez (70 y.o. male)   Examination Date: 2025   :  1955 MRN: [id number]   Visit #: 7   Insurance Allowable Auth Needed   BMN []Yes    []No    Insurance: Payor: MEDICARE / Plan: MEDICARE PART A AND B / Product Type: *No Product type* /   Insurance ID: [id number] - (Medicare)  Secondary Insurance (if applicable): [place]   Treatment Diagnosis:     ICD-10-CM    1. Balance problems  R26.89       2. Abnormality of gait following cerebrovascular accident  I69.398     R26.9       3. Impaired functional mobility, balance, gait, and endurance  Z74.09       4. Left foot drop  M21.372       5. Unsteadiness  R26.81          Medical Diagnosis:  Hemiplegia and hemiparesis following 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 side (HCC) [I69.954]   Referring Physician: Bernabe Omer MD  PCP: Sunshine Stallings MD     Plan of care signed (Y/N): y in media    Date of Patient follow up with Physician:      Plan of Care Report: NO   POC update due: (10 visits /OR AUTH LIMITS, whichever is less)   PN due 25 or 10 visits  POC due in 25 visits or 90 days Recert                                              Medical History:  Past Medical History:   Diagnosis Date    Anxiety     Chest pain     Depression     Encounter for imaging to screen for metal prior to MRI 2023    CT Head cleared for metal, no foreign body--ok for MRI per     Hyperlipidemia     Hypertension     Wears glasses    stent                                         Precautions/ Contra-indications:           Latex allergy:  NO  Pacemaker:     yes loop recorder placed 2023- checking for afib  Contraindications for Manipulation: None  Date of Surgery:   Other: allergic to adhesive, fall risk, on

## 2025-06-02 NOTE — FLOWSHEET NOTE
[facility]- Outpatient Rehabilitation and Therapy [address] [place] [address] [place] [address] office: [phone] fax: [phone]         Patient: Jason Fontanez (70 y.o. male)   Examination Date: 2025   :  1955 MRN: [id number]   Visit #: 13   Insurance Allowable: BMN  Auth Needed: No   Insurance: Payor: MEDICARE / Plan: MEDICARE PART A AND B / Product Type: *No Product type* /   Insurance ID: [id number] - (Medicare)  Secondary Insurance (if applicable): [place]   Treatment Diagnosis:  M79.602 (ICD-10-CM) - Pain in left arm and R41.9 (ICD-10-CM) - Unspecified symptoms and signs involving cognitive functions and awarenessHemiplegia and hemiparesis following 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 side (HCC) [I69.954]   Medical Diagnosis:  Hemiplegia and hemiparesis following 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 side (HCC) [I69.954]   Referring Provider: Bernabe Omer MD  PCP: Sunshine Stallings MD   Plan of care signed: Yes (on: 2025)    Date of Patient follow up with Physician: IOANA    Progress Report/POC: YES, Date Range for this report: 2025 to 2025  POC update due: (OR 10 visits /OR AUTH LIMITS, whichever is less) -  2025  Recertification due: 2025                                                              Precautions/ Contra-indications:   Latex allergy:   No  Pacemaker:     Yes - Loop recorder placed 2023  Other:  allergic to adhesive    Red Flags:  None      Suicide Screening:   The patient did not verbalize a primary behavioral concern, suicidal ideation, suicidal intent, or demonstrate suicidal behaviors.    Preferred Language for Healthcare: English    Review Of Systems (ROS):  [x] Performed Review of systems (Integumentary, CardioPulmonary, Neurological) by intake and observation. Intake form has been scanned into medical record. Patient has been instructed to contact their primary care

## 2025-06-03 ENCOUNTER — APPOINTMENT (OUTPATIENT)
Dept: PHYSICAL THERAPY | Age: 70
End: 2025-06-03
Payer: MEDICARE

## 2025-06-03 ENCOUNTER — APPOINTMENT (OUTPATIENT)
Dept: OCCUPATIONAL THERAPY | Age: 70
End: 2025-06-03
Payer: MEDICARE

## 2025-06-04 ENCOUNTER — HOSPITAL ENCOUNTER (OUTPATIENT)
Dept: PHYSICAL THERAPY | Age: 70
Setting detail: THERAPIES SERIES
Discharge: HOME | End: 2025-06-04
Payer: MEDICARE

## 2025-06-04 ENCOUNTER — HOSPITAL ENCOUNTER (OUTPATIENT)
Dept: OCCUPATIONAL THERAPY | Age: 70
Setting detail: THERAPIES SERIES
Discharge: HOME | End: 2025-06-04
Payer: MEDICARE

## 2025-06-04 PROCEDURE — 97032 APPL MODALITY 1+ESTIM EA 15: CPT

## 2025-06-04 PROCEDURE — 97112 NEUROMUSCULAR REEDUCATION: CPT

## 2025-06-04 PROCEDURE — 97116 GAIT TRAINING THERAPY: CPT

## 2025-06-04 NOTE — FLOWSHEET NOTE
cerebrovascular disease affecting left non-dominant side (HCC) [I69.954] following CVA in January 2023. He presents with left AFO. He presents with spastic hemiparesis of LLE and flaccidity of LUE. He ambulates with gait deviations including compensations such as left circumduction + right trunk lean, and partial step through pattern with the right foot, which may or may not be contribributing to left lower back and hip pain. His LLE spasticity is a limiting factor. Pt needing assist with many ADLs and IADLs and currently ambulates with a hurry cane. Pt is a bit impulsive and presents with left florence inattention/neglect. Pt presents today with the functional impairments and activity limitations listed below. Pt to benefit from continued OP PT to restore function and to improve participation and independence in functional activities.      Medical Necessity Documentation:  I certify that this patient meets the below criteria necessary for medical necessity for care and/or justification of therapy services:  The patient has functional impairments and/or activity limitations and would benefit from continued outpatient therapy services to address the deficits outlined in the patients goals    Prognosis/Rehab Potential: Fair    Patient requires continued skilled intervention: [x] Yes  [] No      CHARGE CAPTURE     PT CHARGE GRID   CPT Code (TIMED) minutes # CPT Code (UNTIMED) #     Therex (37714)     EVAL:HIGH (19384 - Typically 45 minutes face-to-face)     Neuromusc. Re-ed (42552) 25 2  Re-Eval (95477)     Manual (81347)    Estim Unattended (77248)     Ther. Act (97549) 5 0  Mech. Traction (91567)     Gait (30250) 25 2  Dry Needle 1-2 muscle (54731)     Aquatic Therex (11799)    Dry Needle 3+ muscle (20561)     Iontophoresis (31944)    VASO (38106)     Ultrasound (14814)    Group Therapy (89907)     Estim Attended (10911)    Canalith Repositioning (42315)     Physical Performance Test (23642)    Custom orthotic ()

## 2025-06-04 NOTE — FLOWSHEET NOTE
[facility]- Outpatient Rehabilitation and Therapy [address] [place] [address] [place] [address] office: [phone] fax: [phone]         Patient: Jason Fontanez (70 y.o. male)   Examination Date: 2025   :  1955 MRN: [id number]   Visit #: 14   Insurance Allowable: BMN  Auth Needed: No   Insurance: Payor: MEDICARE / Plan: MEDICARE PART A AND B / Product Type: *No Product type* /   Insurance ID: [id number] - (Medicare)  Secondary Insurance (if applicable): [place]   Treatment Diagnosis:  M79.602 (ICD-10-CM) - Pain in left arm and R41.9 (ICD-10-CM) - Unspecified symptoms and signs involving cognitive functions and awarenessHemiplegia and hemiparesis following 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 side (HCC) [I69.954]   Medical Diagnosis:  Hemiplegia and hemiparesis following 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 side (HCC) [I69.954]   Referring Provider: Bernabe Omer MD  PCP: Sunshine Stallings MD   Plan of care signed: Yes (on: 2025)    Date of Patient follow up with Physician: IOANA    Progress Report/POC: NO  POC update due: (OR 10 visits /OR AUTH LIMITS, whichever is less) -  2025  Recertification due: 2025                                                              Precautions/ Contra-indications:   Latex allergy:   No  Pacemaker:     Yes - Loop recorder placed 2023  Other:  allergic to adhesive    Red Flags:  None  \  Suicide Screening:   The patient did not verbalize a primary behavioral concern, suicidal ideation, suicidal intent, or demonstrate suicidal behaviors.    Preferred Language for Healthcare: English    Review Of Systems (ROS):  [x] Performed Review of systems (Integumentary, CardioPulmonary, Neurological) by intake and observation. Intake form has been scanned into medical record. Patient has been instructed to contact their primary care physician regarding ROS issues if not already being

## 2025-06-05 ENCOUNTER — APPOINTMENT (OUTPATIENT)
Dept: OCCUPATIONAL THERAPY | Age: 70
End: 2025-06-05
Payer: MEDICARE

## 2025-06-09 ENCOUNTER — HOSPITAL ENCOUNTER (OUTPATIENT)
Dept: OCCUPATIONAL THERAPY | Age: 70
Setting detail: THERAPIES SERIES
Discharge: HOME | End: 2025-06-09
Payer: MEDICARE

## 2025-06-09 PROCEDURE — 97112 NEUROMUSCULAR REEDUCATION: CPT

## 2025-06-09 PROCEDURE — 97032 APPL MODALITY 1+ESTIM EA 15: CPT

## 2025-06-09 NOTE — FLOWSHEET NOTE
- []Constant or []Intermittent.(Images split []Horizontally []Vertically)    Corrective Lenses  [] Contact lenses  [x]Prescription glasses: [x]Bifocals []For reading []For distance    [] Reading glasses   Magnification:       GENERAL APPEARANCE    Eye Position in primary gaze:   Right eye:  [x]Centered  []Deviated outward  []Deviated inward   Left eye:    [x]Centered  []Deviated outward  []Deviated inward    Head position in primary gaze:   []Neutral  []Tiled up   []Tilted down  []Turned right    [x]Turned left    Nystagmus: []Yes          [x]No    Visual Fields   RIGHT EYE LEFT EYE        Peripheral side 85 85   Nasal side 25 25        Comments:     EYE MOVEMENTS    Tracking   Right Eye  Left Eye    Stays on target  Stays on target   Vertical (up/down) [x]Yes          []No  [x]Yes          []No   Horizontal      Toward nose   [x]Yes          []No  [x]Yes          []No   Toward ear [x]Yes          []No  [x]Yes          []No   Diagonal      Upper left to lower right [x]Yes          []No  [x]Yes          []No   Lower right to upper left [x]Yes          []No  [x]Yes          []No   Upper right to lower left [x]Yes          []No  [x]Yes          []No   Lower left to upper right [x]Yes          []No  [x]Yes          []No                 Key Observations:   []Blinking, tearing, red rimmed eyes  [x]Fatigue  [] Squinting  [x]Leans back to view target  [x]Changes in head position with switching gaze  []Past pointing/reaching   []Images/words break apart, float, disappear with sustained focus               Self-Care/Home Management (02265)    Eating Using rocker knife  Mod ind to set-up   Grooming W/c level at sink  Occasional assist for thoroughness    Bathing Walk in shower with chair with back -no arms  Assist with back, under arm and feet.  Assist with drying, occasionally forgets on left side     Min/CGA with shower txfr   Dressing    UB: mod Assist with long sleeve shirts   Max a with button up and jackets  LB:pt able

## 2025-06-10 ENCOUNTER — APPOINTMENT (OUTPATIENT)
Dept: OCCUPATIONAL THERAPY | Age: 70
End: 2025-06-10
Payer: MEDICARE

## 2025-06-11 ENCOUNTER — HOSPITAL ENCOUNTER (OUTPATIENT)
Dept: OCCUPATIONAL THERAPY | Age: 70
Setting detail: THERAPIES SERIES
Discharge: HOME | End: 2025-06-11
Payer: MEDICARE

## 2025-06-11 PROCEDURE — 97112 NEUROMUSCULAR REEDUCATION: CPT

## 2025-06-11 PROCEDURE — 97032 APPL MODALITY 1+ESTIM EA 15: CPT

## 2025-06-11 NOTE — FLOWSHEET NOTE
[facility]- Outpatient Rehabilitation and Therapy [address] [place] [address] [place] [address] office: [phone] fax: [phone]         Patient: Jason Fontanez (70 y.o. male)   Examination Date: 2025   :  1955 MRN: [id number]   Visit #: 16   Insurance Allowable: BMN  Auth Needed: No   Insurance: Payor: MEDICARE / Plan: MEDICARE PART A AND B / Product Type: *No Product type* /   Insurance ID: [id number] - (Medicare)  Secondary Insurance (if applicable): [place]   Treatment Diagnosis:  M79.602 (ICD-10-CM) - Pain in left arm and R41.9 (ICD-10-CM) - Unspecified symptoms and signs involving cognitive functions and awarenessHemiplegia and hemiparesis following 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 side (HCC) [I69.954]   Medical Diagnosis:  Hemiplegia and hemiparesis following 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 side (HCC) [I69.954]   Referring Provider: Bernabe Omer MD  PCP: Sunshine Stallings MD   Plan of care signed: Yes (on: 2025)    Date of Patient follow up with Physician: IOANA    Progress Report/POC: NO  POC update due: (OR 10 visits /OR AUTH LIMITS, whichever is less) -  2025  Recertification due: 2025                                                              Precautions/ Contra-indications:   Latex allergy:   No  Pacemaker:     Yes - Loop recorder placed 2023  Other:  allergic to adhesive    Red Flags:  None  \  Suicide Screening:   The patient did not verbalize a primary behavioral concern, suicidal ideation, suicidal intent, or demonstrate suicidal behaviors.    Preferred Language for Healthcare: English    Review Of Systems (ROS):  [x] Performed Review of systems (Integumentary, CardioPulmonary, Neurological) by intake and observation. Intake form has been scanned into medical record. Patient has been instructed to contact their primary care physician regarding ROS issues if not already being

## 2025-06-12 ENCOUNTER — APPOINTMENT (OUTPATIENT)
Dept: OCCUPATIONAL THERAPY | Age: 70
End: 2025-06-12
Payer: MEDICARE

## 2025-06-13 ENCOUNTER — HOSPITAL ENCOUNTER (OUTPATIENT)
Dept: PHYSICAL THERAPY | Age: 70
Setting detail: THERAPIES SERIES
Discharge: HOME | End: 2025-06-13
Payer: MEDICARE

## 2025-06-13 PROCEDURE — 97530 THERAPEUTIC ACTIVITIES: CPT

## 2025-06-13 PROCEDURE — 97140 MANUAL THERAPY 1/> REGIONS: CPT

## 2025-06-13 PROCEDURE — 97112 NEUROMUSCULAR REEDUCATION: CPT

## 2025-06-13 NOTE — FLOWSHEET NOTE
load acceptance   20    Right prop gastroc stretch       Curb step w/cane  1 5 With two dots on the floor as a landing pad. Hits them nearly every time and goes much slower. Definitely improved.    6\" step to simulate curb up and down  1 10 Right side UE hold to simulate cane. He does the step down with mini squat then lifts left leg. But taught him to not bend knees until left foot is lifted   Progress note discussion, tests, & measurements   25 min  30 second sit to stand  Mmts  Discussion  tinetti   Cotreat quadruped GSB   Assist of 2-3. Successfully gets into right kneel on table but then left extensor tone kicks in and unable to flex left knee without maxA and force. Difficulty with hand placement as well          Manual Intervention (32254) Time: 8   Manual hamstring, glut, ir/er stretch   MFR/TPR to LLB             Gait Training (99946)   Gait: 55' x 2. Cues for decr circumduction. Cues for longer right, shorter eft. He has a spring moment with his left foot causing extensor tone and circumduction.    Ambulation on dots spread in a gait pattern with equalized step length on the floor  Then gait immediately after  - struggled at first but then able to get better. Showed decent carryover after.  Circumduction was worse during this activity.  2# ankle weight    Gait  treadmill biodex with harness  - pt very anxious and asking to stop several times. He needed left arm in a sling. Got up to 1.0 mph but he keeps saying \"something feels wrong\" tried about 4x     TUG and tinetti PN   Stride stance in //  bars with dots: To improve right step length  Needed PT to hold down his left foot and explain about 10x  Then gait immed after. 2 x 15' then 1 x 55'. Improved step length with continued Vcs.    Error augmentation gait: use red tband around right ankle to encourage longer right step length 3 x 30' then remove it and got ok carryover but short on time will need to repeat in future   Cone runway: 2' apart to

## 2025-06-17 ENCOUNTER — APPOINTMENT (OUTPATIENT)
Dept: OCCUPATIONAL THERAPY | Age: 70
End: 2025-06-17
Payer: MEDICARE

## 2025-06-18 ENCOUNTER — HOSPITAL ENCOUNTER (OUTPATIENT)
Dept: OCCUPATIONAL THERAPY | Age: 70
Setting detail: THERAPIES SERIES
Discharge: HOME | End: 2025-06-18
Payer: MEDICARE

## 2025-06-18 PROCEDURE — 97112 NEUROMUSCULAR REEDUCATION: CPT

## 2025-06-18 NOTE — FLOWSHEET NOTE
gravity, for improved ROM for functional task performance.  [x] Progressing: [] Met: [] Not Met: [] Adjusted    Pt will demonstrate 40 degrees of LEFT active elbow flexion against gravity, for improved ROM for functional task performance.  [] Progressing: [x] Met: [] Not Met: [] Adjusted    Pt will demonstrate left active wrist extension to 5 degrees or better, against gravity, for improved ROM for functional task performance.  [] Progressing: [] Met: [x] Not Met: [] Adjusted    Pt will demo ability to oppose thumb to index and middle digits.    [] Progressing: [] Met: [x] Not Met: [] Adjusted    Pt will demo ability to use LEFT hand as gross assist during bilateral BADL tasks.   [] Progressing: [] Met: [x] Not Met: [] Adjusted    Pt will improve score on Fugl Tess to 15 (or better) in prep for Vivistim.   [] Progressing: [] Met: [x] Not Met: [] Adjusted    Overall Progression Towards Functional goals/ Treatment Progress Update:  Progression is slowed due to complexities/Impairments listed.     CHARGE CAPTURE     CHARGE GRID   CPT Code (Timed) Minutes # CPT Code (Untimed) #      Therex (15110)      Eval:MODERATE (98215 - Typically 30 minutes face-to-face)      Ther. Act (34408)     Re-Eval (46454)      Sensory Integration (59227)     Estim Unattended (06516)      Self Care/Home Manage (68901)     Parraffin (79685)      Cognitive Function (00661)     Fluidotherapy (08740)      Cognitive Function (18045): each    additional 15 minutes     Dry Needle 1-2 muscle (93678)      Neuromusc. Re-ed (65704) 54 4   Dry Needle 3+ muscle (66868)      Manual (32672)     VASO (53091)      Aquatic Therex (11809)     Group Therapy (87820)      Iontophoresis (10334)         Ultrasound (45906)           Estim Attended (67135)           Other:     Other:             Total Timed Code Tx Minutes 54        Total Treatment Minutes 54     Charge Justification:  NMR (12680): During functional activities/therapeutic exercises, provided

## 2025-06-19 ENCOUNTER — APPOINTMENT (OUTPATIENT)
Dept: OCCUPATIONAL THERAPY | Age: 70
End: 2025-06-19
Payer: MEDICARE

## 2025-06-20 ENCOUNTER — HOSPITAL ENCOUNTER (OUTPATIENT)
Dept: PHYSICAL THERAPY | Age: 70
Setting detail: THERAPIES SERIES
Discharge: HOME | End: 2025-06-20
Payer: MEDICARE

## 2025-06-20 PROCEDURE — 97530 THERAPEUTIC ACTIVITIES: CPT

## 2025-06-20 PROCEDURE — 97112 NEUROMUSCULAR REEDUCATION: CPT

## 2025-06-20 NOTE — FLOWSHEET NOTE
intervention due to being higher risk     Exercises/Interventions     Therapeutic Ex (84648)  Resistance Sets/time Reps Notes/Cues/Progressions                                      NMR re-education (17869)       Manual HS and Gastroc/soleus stretching in various positions with pt/family education on home application  15 min supine    Airex heel lift/lateral weight shfit    Pt very fearful and not following instructions. Cues not to squat   Airex anterior posterior weight shifts       Airex bal:  DLS  Then NBOS   3 x 1 min     Step tap with cone dots for targets and left border of cones to avoid circumduction- aim is not to kick over cones on the left side 0 then added 2# ankle weight for last set.  3 12 Hard- if he loses focus he immediately kicks the cones down.    Partial sit to stand with UE support  2 10 Holding onto the back of a chair sitting in front of him  Sits on mat then stands about 1/2way withOUT extending knees, must maintain knee flexion.   FOR HAMSTRINGS   FWB Sit to stand, 2\" step under RLE for emphasis on LLE, in front of mirror with plum line, PT assist for left-sided pull to enable patient to use LLE 18 3 5    Quick sit to stands in front of mirror 18\" 2 10 2\" step under L foot   Heel slides left from hooklying position + slide sheet  3 10 Did 5 reps with both legs, then all single leg left  TO ISOLATE HAMSTRINGS   Heel slides on wedge with yoga blocks underneath to be about 45 deg  2 10 - single left leg first- still requiring up to mathew  - then did DL at the same time and he maintains contact very well!!! VC still needed however  TO ISOLATE HAMSTRINGS   Heel slides on slide board with prop underneath to be about 45 deg  1 10 - catches his heel in the holes  -needs assist to maintain contact   Cone corridor walk in front of mirror  10 laps  Narrow cone corridor with mirror to encourage anterior swing and reduce LLE circumduction                        Therapeutic Activity (85866)       Patient

## 2025-06-24 ENCOUNTER — HOSPITAL ENCOUNTER (OUTPATIENT)
Dept: OCCUPATIONAL THERAPY | Age: 70
Setting detail: THERAPIES SERIES
Discharge: HOME | End: 2025-06-24
Payer: MEDICARE

## 2025-06-24 ENCOUNTER — HOSPITAL ENCOUNTER (OUTPATIENT)
Dept: PHYSICAL THERAPY | Age: 70
Setting detail: THERAPIES SERIES
Discharge: HOME | End: 2025-06-24
Payer: MEDICARE

## 2025-06-24 PROCEDURE — 97116 GAIT TRAINING THERAPY: CPT

## 2025-06-24 PROCEDURE — 97112 NEUROMUSCULAR REEDUCATION: CPT

## 2025-06-24 NOTE — FLOWSHEET NOTE
[facility]- Outpatient Rehabilitation and Therapy  [facility] [place] [address] office: [phone] fax: [phone]         Patient: Jason Fontanez (70 y.o. male)   Examination Date: 2025   :  1955 MRN: [id number]   Visit #: 18   Insurance Allowable: BMN  Auth Needed: No   Insurance: Payor: MEDICARE / Plan: MEDICARE PART A AND B / Product Type: *No Product type* /   Insurance ID: [id number] - (Medicare)  Secondary Insurance (if applicable): [place]   Treatment Diagnosis:  M79.602 (ICD-10-CM) - Pain in left arm and R41.9 (ICD-10-CM) - Unspecified symptoms and signs involving cognitive functions and awarenessHemiplegia and hemiparesis following 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 side (HCC) [I69.954]   Medical Diagnosis:  Hemiplegia and hemiparesis following 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 side (HCC) [I69.954]   Referring Provider: Bernabe Omer MD  PCP: Sunshine Stallings MD   Plan of care signed: Yes (on: 2025)    Date of Patient follow up with Physician: IOANA    Progress Report/POC: NO  POC update due: (OR 10 visits /OR AUTH LIMITS, whichever is less) - 2025  Recertification due: 2025                                                              Precautions/ Contra-indications:   Latex allergy:   No  Pacemaker:     Yes - Loop recorder placed 2023  Other: allergic to adhesive    Red Flags:  None  \  Suicide Screening:   The patient did not verbalize a primary behavioral concern, suicidal ideation, suicidal intent, or demonstrate suicidal behaviors.    Preferred Language for Healthcare: English    Review Of Systems (ROS):  [x] Performed Review of systems (Integumentary, CardioPulmonary, Neurological) by intake and observation. Intake form has been scanned into medical record. Patient has been instructed to contact their primary care physician regarding ROS issues if not already being addressed

## 2025-06-24 NOTE — FLOWSHEET NOTE
Documentation:  I certify that this patient meets the below criteria necessary for medical necessity for care and/or justification of therapy services:  The patient has functional impairments and/or activity limitations and would benefit from continued outpatient therapy services to address the deficits outlined in the patients goals    Prognosis/Rehab Potential: Fair    Patient requires continued skilled intervention: [x] Yes  [] No      CHARGE CAPTURE     PT CHARGE GRID   CPT Code (TIMED) minutes # CPT Code (UNTIMED) #     Therex (08105)     EVAL:HIGH (25024 - Typically 45 minutes face-to-face)     Neuromusc. Re-ed (26662) 44 3  Re-Eval (14894)     Manual (28738)    Estim Unattended (29727)     Ther. Act (93173)    Mech. Traction (05638)     Gait (50044) 15 1  Dry Needle 1-2 muscle (56680)     Aquatic Therex (76953)    Dry Needle 3+ muscle (20561)     Iontophoresis (12820)    VASO (18250)     Ultrasound (17056)    Group Therapy (52458)     Estim Attended (26869)    Canalith Repositioning (64564)     Physical Performance Test (58767)    Custom orthotic ()     Other:    Other:    Total Timed Code Tx Minutes 59 4       Total Treatment Minutes 59        Charge Justification:  (26146) NEUROMUSCULAR RE-EDUCATION - Provided therapeutic procedure on activities related to neuromuscular reeducation of movement, balance, coordination, kinesthetic sense, posture, and/or proprioception for sitting and/or standing activities. Provided HEP review and/or progression.  (88330) GAIT RE-EDUCATION - Provided training and instruction to the patient for proper joint and muscle recruitment and positioning and eccentric body weight control with ambulation re-education including up and down stairs. Therapeutic procedure, one or more areas, each 15 minutes;     GOALS     Patient stated goals:  Get up from a chair without holding on to any supports  [] Progressing: [x] Met: [] Not Met: [] Adjusted  Get rid of the cane  [] Progressing:

## 2025-06-24 NOTE — PROGRESS NOTES
[facility] - Outpatient Rehabilitation and Therapy  [address] [place] [address]  Office: [phone]   Fax: [phone]           OCCUPATIONAL THERAPY     Referral Request        2025  To:       Bernabe Omer MD                                Patient: Jason Fontanez                  : 1955                      MRN: [id number]                                        Diagnosis Information: Hemiplegia and hemiparesis following 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 side (HCC) [I69.954]                       Dear Bernabe mOer MD              Jason Fontanez has been receiving outpatient occupational and physical therapy at [facility] since 2025. Pt may also benefit from outpatient Speech Therapy consult to address cognitive skills as pt demo difficulty with attention to task and following multi-step commands.     Signing and returning this note can serve as a SLP referral.      If you agree, please sign and fax this note to  [phone]     Thank You!     Electronically signed: Brandee Lerner OT, OTR/L           Physician signature_______________________ Date________________

## 2025-06-26 ENCOUNTER — HOSPITAL ENCOUNTER (OUTPATIENT)
Dept: PHYSICAL THERAPY | Age: 70
Setting detail: THERAPIES SERIES
Discharge: HOME | End: 2025-06-26
Payer: MEDICARE

## 2025-06-26 ENCOUNTER — HOSPITAL ENCOUNTER (OUTPATIENT)
Dept: OCCUPATIONAL THERAPY | Age: 70
Setting detail: THERAPIES SERIES
Discharge: HOME | End: 2025-06-26
Payer: MEDICARE

## 2025-06-26 PROCEDURE — 97112 NEUROMUSCULAR REEDUCATION: CPT

## 2025-06-26 PROCEDURE — 97530 THERAPEUTIC ACTIVITIES: CPT

## 2025-06-26 PROCEDURE — 97032 APPL MODALITY 1+ESTIM EA 15: CPT

## 2025-06-26 PROCEDURE — 97116 GAIT TRAINING THERAPY: CPT

## 2025-06-26 NOTE — FLOWSHEET NOTE
[facility] - Outpatient Rehabilitation and Therapy:  [facility] [place] [address] office: [phone] fax: [phone]      Physical Therapy: TREATMENT/PROGRESS NOTE   Patient: Jason Fontanez (70 y.o. male)   Examination Date: 2025   :  1955 MRN: [id number]   Visit #: 12   Insurance Allowable Auth Needed   BMN []Yes    []No    Insurance: Payor: MEDICARE / Plan: MEDICARE PART A AND B / Product Type: *No Product type* /   Insurance ID: [id number] - (Medicare)  Secondary Insurance (if applicable): [place]   Treatment Diagnosis:     ICD-10-CM    1. Balance problems  R26.89       2. Abnormality of gait following cerebrovascular accident  I69.398     R26.9       3. Impaired functional mobility, balance, gait, and endurance  Z74.09       4. Left foot drop  M21.372       5. Unsteadiness  R26.81          Medical Diagnosis:  Hemiplegia and hemiparesis following 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 side (HCC) [I69.954]   Referring Physician: Bernabe Omer MD  PCP: Sunshine Stallings MD     Plan of care signed (Y/N): y in media    Date of Patient follow up with Physician:      Plan of Care Report: NO PN SOON  POC update due: (10 visits /OR AUTH LIMITS, whichever is less)   PN due 25 or 10 visits  POC due in 25 visits or 90 days Recert                                              Medical History:  Past Medical History:   Diagnosis Date    Anxiety     Chest pain     Depression     Encounter for imaging to screen for metal prior to MRI 2023    CT Head cleared for metal, no foreign body--ok for MRI per     Hyperlipidemia     Hypertension     Wears glasses    stent                                         Precautions/ Contra-indications:           Latex allergy:  NO  Pacemaker:    yes loop recorder placed 2023- checking for afib  Contraindications for Manipulation: None  Date of Surgery:   Other: allergic to adhesive, fall

## 2025-06-26 NOTE — FLOWSHEET NOTE
5x  Mod cues to refrain from compensation by shoulder hiking     Mirror Therapy - reviewed for HEP, written instructions provided, pictures of set-up on pt's phone  Place full length mirror at midline between legs  Cover up left arm  Bicep curls, 10x  Karate chop 10x  Give yourself high 5, 10x  Table top mirror  Watch your right arm in the mirror as you:  Make a fist, 10x  Finger lift, 10x (wrist ext)  Thumb to each finger, 5x  Pint and touch mirror with each finger, 10x    Recommending 20 min 1-2x per day    Proprioception/ID location of sensory input  RUE: 100% accurate  LUE: 75% accurate (demo decreased accuracy with distal)  RLE: 100% accuracy  LLE: 90% accurate (difficulty with foot, also limited by AFO?)     Supine AAROM   Shld flexion, 8x   Shld abduction, 8x  Elbow flexion, 10x   Scap protraction, 5x  Supination, unable    AAROM - EOM  Elbow flexion, 10-12 reps   to isolate bicep d/t compensation with elbow ext prior to attempting with sEMG  5# wt in right hand  Visualizing weight lifting at gym and focus on bicep curls  Facilitating left bicep contraction with tapping  Improved ability to isolate bicep this date  Wife edu on techniques    Wrist ext  5x, trace contraction noted?  Attempted above technique for wrist ext- OT holding elbow in flexion - unable, pt pushing into elbow ext  Placed pt's arm on bedside table (shld at 90o abd and elbow at 90o), hand hanging off edge, pt leaning into elbow - then instructed to ext wrist - unable, pt pushing into elbow ext    Scap Squeeze  5x, with 3 sec hold  Vibration to facilitate     Therapeutic Activity (45064)    VISUAL ASSESSMENT  Patient complaints:  []Blurred vision  []Unable to keep objects in focus  [x]Difficulty reading or sustaining concentration during near tasks  []Dry itchy eyes  []Eye pain  [x]Eye fatigue  []Difficulty with balance  []Double vision - []Constant or []Intermittent.(Images split []Horizontally []Vertically)    Corrective Lenses  [] Contact

## 2025-07-02 ENCOUNTER — HOSPITAL ENCOUNTER (OUTPATIENT)
Dept: SPEECH THERAPY | Age: 70
Setting detail: THERAPIES SERIES
Discharge: HOME | End: 2025-07-02
Payer: MEDICARE

## 2025-07-02 ENCOUNTER — HOSPITAL ENCOUNTER (OUTPATIENT)
Dept: PHYSICAL THERAPY | Age: 70
Setting detail: THERAPIES SERIES
Discharge: HOME | End: 2025-07-02
Payer: MEDICARE

## 2025-07-02 ENCOUNTER — HOSPITAL ENCOUNTER (OUTPATIENT)
Dept: OCCUPATIONAL THERAPY | Age: 70
Setting detail: THERAPIES SERIES
Discharge: HOME | End: 2025-07-02
Payer: MEDICARE

## 2025-07-02 DIAGNOSIS — I69.319 COGNITIVE DEFICIT AS LATE EFFECT OF CEREBROVASCULAR ACCIDENT (CVA): Primary | ICD-10-CM

## 2025-07-02 PROCEDURE — 97530 THERAPEUTIC ACTIVITIES: CPT

## 2025-07-02 PROCEDURE — 97112 NEUROMUSCULAR REEDUCATION: CPT

## 2025-07-02 PROCEDURE — 97116 GAIT TRAINING THERAPY: CPT

## 2025-07-02 PROCEDURE — 92523 SPEECH SOUND LANG COMPREHEN: CPT

## 2025-07-02 NOTE — PLAN OF CARE
[facility] - Outpatient Rehabilitation and Therapy:  [facility] [place] [address] office: [phone] fax: [phone]     Speech Therapy Xcknvw-Vhiznwuv-Txjzghxnc Evaluation/Certification      Dear Bernabe Omer MD,     We had the pleasure of evaluating the following patient for speech therapy services at [facility].  A summary of our findings can be found in the initial assessment below.  This includes our plan of care.  If you have any questions or concerns regarding these findings, please do not hesitate to contact me at the office phone number listed above.  Thank you for the referral.     Physician Signature:_______________________________Date:__________________  By signing above (or electronic signature), therapist’s plan is approved by physician       Speech Therapy: TREATMENT/PROGRESS NOTE   Patient: Jason Fontanez (70 y.o. male)   Examination Date: 2025   :  1955 MRN: [id number]   Visit #: 1  Insurance Allowable Auth Needed   BMN []Yes    [x]No    Insurance: Payor: MEDICARE / Plan: MEDICARE PART A AND B / Product Type: *No Product type* /   Insurance ID: [id number] - (Medicare)  Secondary Insurance (if applicable): [place]   Treatment Diagnosis:    ICD-10-CM    1. Cognitive deficit as late effect of cerebrovascular accident (CVA)  I69.319          Medical Diagnosis:  Hemiplegia and hemiparesis following 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 side (HCC) [I69.954]   Referring Physician: Ketan Amado DO  PCP: Ketan Amado DO      Plan of care signed: NA  Re-certification signed: NA    Progress Report/POC: EVAL today  POC update due: (10 visits /OR 30 days /OR duration of POC, whichever is less): 2025     Medical History:  Comorbidities:  Hypertension  Relevant Medical History:   Anxiety     Chest pain    Depression    Hyperlipidemia    Hypertension    Wears glasses   Previous smoker

## 2025-07-02 NOTE — FLOWSHEET NOTE
Progressing: [] Met: [x] Not Met: [] Adjusted   Pt will improve left hip abd to 3+/5  CLOF:3/5  Prev: 3-/5  [x] Progressing: [] Met: [] Not Met: [] Adjusted   Pt will improve TINETTI score to 21  Current: 18, balance score improving. Gait same  Prev: 16 (note different PT testing each time)- balance score did improve by 2 points, gait worsened.  [x] Progressing: [] Met: [] Not Met: [] Adjusted   30 second sit to stand test score will be 7  CLOF:7 sts using UE support  Prev: 5 rue assist  [] Progressing: [x] Met: [] Not Met: [] Adjusted   TUG test score will improve to to <25 sec  Current 2 sec with hurrycane  Prev: 28 sec  [] Progressing: [x] Met: [] Not Met: [] Adjusted   Pt will improve self-reported confidence with walking with cane to 95%  CLOF: same  PLOF: pt reports 80-90% confidence due to difficulty with swinging out left leg  [] Progressing: [] Met: [x] Not Met: [] Adjusted   Pt will report no new falls since last assessment on 5/29/25  CLOF: no falls reported  PLOF: no recent falls  but one large LOB  [] Progressing: [x] Met: [] Not Met: [] Adjusted   NEW: TUG score will be <20 seconds with hurrycane  (and able to use UE supports on the chair)  CLOF: 25.5 second average with brown chair using UE support and hurrycane  [] Progressing: [] Met: [] Not Met: [] Adjusted   NEW: Pt will hold tandem stance ea side for 15 seconds without UE support  CLOF: <10 sec each side, very difficult.   [] Progressing: [] Met: [] Not Met: [] Adjusted       Overall Progression Towards Functional goals/ Treatment Progress Update:  [x] Patient is progressing as expected towards functional goals listed.    [] Progression is slowed due to complexities/Impairments listed.  [x] Progression has been slowed due to co-morbidities (mental health).  [] Plan just implemented, too soon (<30days) to assess goals progression   [] Goals require adjustment due to lack of progress  [] Patient is not progressing as expected and requires

## 2025-07-02 NOTE — PLAN OF CARE
assist/facilitation for OT at elbow  Pt participating in functional reach task with RUE, reaching across midline, to increase proprioceptive input   Tolerated  8-10 min       Ball roll - for lateral weight shifts and stretching lower back  Pt EOM, both hands on large therapy ball in front of pt  OT assisting with maintaining left palm flat and elbow ext  Pt rolling forward/back, 10x  Rolling diagonals toward R, 10x  Rolling diagonals toward L, 10x    Lateral leans, WB on left elbow  Pt demo max resistance to leaning left onto 2 pillows  Required mod facilitation/encouragement   Tolerate for 10 sec x2    Quadriped  Attempted quadriped with green therapy ball at belly  Max x2 to get 2 knees on mat  Pt demo LLE ext  Difficulty WBing on LUE  Unable to achieve full quadriped.      NMES (Empi)   Bicep/Tricep - alternating  For sensory stim to facilitate improved awareness of location of muscle groups  For facilitation of bicep contraction>active assist elbow flex  Pt able to correctly ID which group was firing ~40% of the time, pt often saying  both were firing  Pt performed active assist elbow flexion with NMES and visual feed back from mirror while completing elbow flexion on unaffected side as well  During active assisted elbow flex, pt also demo scap elevation and IR    Intensity: 12  Time: 20 min  7on/7off  - - - -   sEMG - to facilitate increased awareness of muscle contraction and isolating muscle groups.     Elbow Flex - Bicep  Mod/max difficulty isolating bicep  Able to isolate bicep ~60-70% of the time (improved when eyes closed and visualizing)    Elbow ext - Tricep  Attempted to edu on biofeedback and cue pt to relax this muscle group - pt demo decreased attn to screen to observe biofeedback     Wrist Ext  Unable to isolate wrist ext muscles for biofeedback  Cont to demo elbow ext with attempted at active movement  LUE placed in 90o shld abduction on bedside table to attempt to reduce compensation  Encouraging

## 2025-07-07 ENCOUNTER — APPOINTMENT (OUTPATIENT)
Dept: PHYSICAL THERAPY | Age: 70
End: 2025-07-07
Payer: MEDICARE

## 2025-07-07 ENCOUNTER — APPOINTMENT (OUTPATIENT)
Dept: OCCUPATIONAL THERAPY | Age: 70
End: 2025-07-07
Payer: MEDICARE

## 2025-07-08 ENCOUNTER — APPOINTMENT (OUTPATIENT)
Dept: PHYSICAL THERAPY | Age: 70
End: 2025-07-08
Payer: MEDICARE

## 2025-07-08 ENCOUNTER — APPOINTMENT (OUTPATIENT)
Dept: OCCUPATIONAL THERAPY | Age: 70
End: 2025-07-08
Payer: MEDICARE

## 2025-07-09 ENCOUNTER — HOSPITAL ENCOUNTER (OUTPATIENT)
Dept: OCCUPATIONAL THERAPY | Age: 70
Setting detail: THERAPIES SERIES
Discharge: HOME | End: 2025-07-09
Payer: MEDICARE

## 2025-07-09 ENCOUNTER — HOSPITAL ENCOUNTER (OUTPATIENT)
Dept: PHYSICAL THERAPY | Age: 70
Setting detail: THERAPIES SERIES
Discharge: HOME | End: 2025-07-09
Payer: MEDICARE

## 2025-07-09 ENCOUNTER — HOSPITAL ENCOUNTER (OUTPATIENT)
Dept: SPEECH THERAPY | Age: 70
Setting detail: THERAPIES SERIES
Discharge: HOME | End: 2025-07-09
Payer: MEDICARE

## 2025-07-09 DIAGNOSIS — R49.8 OTHER VOICE AND RESONANCE DISORDERS: ICD-10-CM

## 2025-07-09 DIAGNOSIS — I69.319 COGNITIVE DEFICIT STATUS POST CEREBROVASCULAR ACCIDENT: Primary | ICD-10-CM

## 2025-07-09 PROCEDURE — 97110 THERAPEUTIC EXERCISES: CPT

## 2025-07-09 PROCEDURE — 97129 THER IVNTJ 1ST 15 MIN: CPT

## 2025-07-09 PROCEDURE — 97130 THER IVNTJ EA ADDL 15 MIN: CPT

## 2025-07-09 PROCEDURE — 97112 NEUROMUSCULAR REEDUCATION: CPT

## 2025-07-09 PROCEDURE — 97032 APPL MODALITY 1+ESTIM EA 15: CPT

## 2025-07-09 PROCEDURE — 97116 GAIT TRAINING THERAPY: CPT

## 2025-07-09 NOTE — FLOWSHEET NOTE
[facility]- Outpatient Rehabilitation and Therapy [address] [place] [address] [place] [address] office: [phone] fax: [phone]         Patient: Jason Fontanez (70 y.o. male)   Examination Date: 2025   :  1955 MRN: [id number]   Visit #: 21   Insurance Allowable: BMN  Auth Needed: No   Insurance: Payor: MEDICARE / Plan: MEDICARE PART A AND B / Product Type: *No Product type* /   Insurance ID: [id number] - (Medicare)  Secondary Insurance (if applicable): [place]   Treatment Diagnosis:  M79.602 (ICD-10-CM) - Pain in left arm and R41.9 (ICD-10-CM) - Unspecified symptoms and signs involving cognitive functions and awarenessHemiplegia and hemiparesis following 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 side (HCC) [I69.954]   Medical Diagnosis:  Hemiplegia and hemiparesis following 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 side (HCC) [I69.954]   Referring Provider: Bernabe Omer MD  PCP: Ketan Amdao DO   Plan of care signed: Yes (on: 2025)    Date of Patient follow up with Physician: IOANA    Progress Report/POC: NO  POC update due: (OR 10 visits /OR AUTH LIMITS, whichever is less) - 2025  Recertification due: 2025                                                              Precautions/ Contra-indications:   Latex allergy:   No  Pacemaker:     Yes - Loop recorder placed 2023  Other: allergic to adhesive    Red Flags:  None  \  Suicide Screening:   The patient did not verbalize a primary behavioral concern, suicidal ideation, suicidal intent, or demonstrate suicidal behaviors.    Preferred Language for Healthcare: English    Review Of Systems (ROS):  [x] Performed Review of systems (Integumentary, CardioPulmonary, Neurological) by intake and observation. Intake form has been scanned into medical record. Patient has been instructed to contact their primary care physician regarding ROS issues if not already being addressed

## 2025-07-09 NOTE — FLOWSHEET NOTE
[] Met: [] Not Met: [] Adjusted    Patient will successfully shift between 2 different structured cognitive tasks (e.g., naming items by category to answering orientation questions) with minimal cues,with 80% accuracy to improve adaptability in daily activities.   [x] Progressing: [] Met: [] Not Met: [] Adjusted    Patient will implement diaphragmatic breathing to increase MPT from an average of 3 seconds to an average of 15 to increase use of breath support to promote vocal function. (New Goal 07/09/2025)  [] Progressing: [] Met: [] Not Met: [] Adjusted    Long Term Goals:  Patient will demonstrate improved cognitive-linguistic function to highest functional level as demonstrated by improved score on Neuro-QOL.    [] Progressing: [] Met: [] Not Met: [] Adjusted    Patient will improve vocal quality to baseline as subjectively rated by SLP, pt, and family.   [] Progressing: [] Met: [] Not Met: [] Adjusted    GOALS MET:   Patient will complete CAPE-V voice assessment with goals to be added as applicable. (07/09/2025)  [] Progressing: [x] Met: [] Not Met: [] Adjusted    Overall Progression Towards Functional goals/ Treatment Progress Update:  [x] Patient is progressing as expected towards functional goals listed.    [] Progression is slowed due to complexities/Impairments listed.  [] Progression has been slowed due to co-morbidities.  [] Plan just implemented, too soon (<30days) to assess goals progression   [] Goals require adjustment due to lack of progress  [] Patient is not progressing as expected and requires additional follow up with physician  [] Other:     TREATMENT PLAN     Frequency/Duration: 2x/week for 10 weeks for the following treatment interventions:    Therapeutic Interventions:  Speech-Language Evaluation/Treatment  Cognitive-Linguistic Skills Development  Voice Evaluation and Treatment      Plan: POC initiated as per evaluation    Electronically Signed by TIGRE Carvajal  Date: 07/09/2025

## 2025-07-09 NOTE — FLOWSHEET NOTE
[facility] - Outpatient Rehabilitation and Therapy:  [facility] [place] [address] office: [phone] fax: [phone]      Physical Therapy: TREATMENT/PROGRESS NOTE   Patient: Jason Fontanez (70 y.o. male)   Examination Date: 2025   :  1955 MRN: [id number]   Visit #: 14   Insurance Allowable Auth Needed   BMN []Yes    []No    Insurance: Payor: MEDICARE / Plan: MEDICARE PART A AND B / Product Type: *No Product type* /   Insurance ID: [id number] - (Medicare)  Secondary Insurance (if applicable): [place]   Treatment Diagnosis:     ICD-10-CM    1. Balance problems  R26.89       2. Abnormality of gait following cerebrovascular accident  I69.398     R26.9       3. Impaired functional mobility, balance, gait, and endurance  Z74.09       4. Left foot drop  M21.372       5. Unsteadiness  R26.81          Medical Diagnosis:  Hemiplegia and hemiparesis following 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 side (HCC) [I69.954]   Referring Physician: Bernabe Omer MD  PCP: Ketan Amado DO     Plan of care signed (Y/N): y in media    Date of Patient follow up with Physician:      Plan of Care Report: NO   POC update due: (10 visits /OR AUTH LIMITS, whichever is less)   PN due 25 or 10 visits  POC due in 25 visits or 90 days Recert                                              Medical History:  Past Medical History:   Diagnosis Date    Anxiety     Chest pain     Depression     Encounter for imaging to screen for metal prior to MRI 2023    CT Head cleared for metal, no foreign body--ok for MRI per     Hyperlipidemia     Hypertension     Wears glasses    stent                                         Precautions/ Contra-indications:           Latex allergy:  NO  Pacemaker:    yes loop recorder placed 2023- checking for afib  Contraindications for Manipulation: None  Date of Surgery:   Other: allergic to adhesive, fall risk, on

## 2025-07-10 ENCOUNTER — APPOINTMENT (OUTPATIENT)
Dept: OCCUPATIONAL THERAPY | Age: 70
End: 2025-07-10
Payer: MEDICARE

## 2025-07-10 ENCOUNTER — APPOINTMENT (OUTPATIENT)
Dept: PHYSICAL THERAPY | Age: 70
End: 2025-07-10
Payer: MEDICARE

## 2025-07-14 ENCOUNTER — HOSPITAL ENCOUNTER (OUTPATIENT)
Dept: SPEECH THERAPY | Age: 70
Setting detail: THERAPIES SERIES
Discharge: HOME | End: 2025-07-14
Payer: MEDICARE

## 2025-07-14 ENCOUNTER — HOSPITAL ENCOUNTER (OUTPATIENT)
Dept: OCCUPATIONAL THERAPY | Age: 70
Setting detail: THERAPIES SERIES
End: 2025-07-14
Payer: MEDICARE

## 2025-07-14 ENCOUNTER — HOSPITAL ENCOUNTER (OUTPATIENT)
Dept: PHYSICAL THERAPY | Age: 70
Setting detail: THERAPIES SERIES
Discharge: HOME | End: 2025-07-14
Payer: MEDICARE

## 2025-07-14 PROCEDURE — 97116 GAIT TRAINING THERAPY: CPT

## 2025-07-14 PROCEDURE — 97140 MANUAL THERAPY 1/> REGIONS: CPT

## 2025-07-14 PROCEDURE — 97129 THER IVNTJ 1ST 15 MIN: CPT

## 2025-07-14 PROCEDURE — 97130 THER IVNTJ EA ADDL 15 MIN: CPT

## 2025-07-14 NOTE — FLOWSHEET NOTE
[facility] - Outpatient Rehabilitation and Therapy:  [facility] [place] [address] office: [phone] fax: [phone]     Speech Therapy         Speech Therapy: TREATMENT/PROGRESS NOTE   Patient: Jason Fontanez (70 y.o. male)   Examination Date: 2025   :  1955 MRN: [id number]   Visit #: 3  Insurance Allowable Auth Needed   BMN []Yes    [x]No    Insurance: Payor: MEDICARE / Plan: MEDICARE PART A AND B / Product Type: *No Product type* /   Insurance ID: [id number] - (Medicare)  Secondary Insurance (if applicable): [place]   Treatment Diagnosis:  1. Cognitive deficit status post cerebrovascular accident  I69.319         2. Other voice and resonance disorders  R49.8              Medical Diagnosis:  Hemiplegia and hemiparesis following 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 side (HCC) [I69.954]   Referring Physician: Ketan Amado DO  PCP: Ketan Amado DO      Plan of care signed: NO  Re-certification signed: NA    Progress Report/POC: NO  POC update due: (10 visits /OR 30 days /OR duration of POC, whichever is less): 2025     Medical History:  Comorbidities:  Hypertension  Relevant Medical History:   Anxiety     Chest pain    Depression    Hyperlipidemia    Hypertension    Wears glasses   Previous smoker     Precautions/ Contra-indications:   Latex allergy: Yes  Pacemaker: No  Other: Hearing aids (minimal compliance at this time)    Red Flags:  None    Suicide Screening:   The patient did not verbalize a primary behavioral concern, suicidal ideation, suicidal intent, or demonstrate suicidal behaviors.    Falls Risk Assessment (30 days):  [x] Deferred to PT/OT as part of multidisciplinary program.  [] Falls Risk assessed and no intervention required.  [] Falls risk assessed (via clinical reasoning) and patient requires intervention due to being higher risk for falls  [] Falls education provided, including PT referral, safety awareness, fall

## 2025-07-14 NOTE — FLOWSHEET NOTE
Adjusted   Pt will improve left knee flexion strength to 3+/5  Current: same  Previous: 3-/5  [] Progressing: [] Met: [x] Not Met: [] Adjusted   Pt will improve left quad strength to 3/5 or full ROM/full knee ext  Current: same  Prev: 2+/5 (incomplete ROM although can accept resistance at about 150 deg)  [] Progressing: [] Met: [x] Not Met: [] Adjusted   Pt will improe left hip flexion to 4/5  CLOF: same  Prv: 4-/5  [] Progressing: [] Met: [x] Not Met: [] Adjusted   Pt will improve left hip abd to 3+/5  CLOF:3/5  Prev: 3-/5  [x] Progressing: [] Met: [] Not Met: [] Adjusted   Pt will improve TINETTI score to 21  Current: 18, balance score improving. Gait same  Prev: 16 (note different PT testing each time)- balance score did improve by 2 points, gait worsened.  [x] Progressing: [] Met: [] Not Met: [] Adjusted   30 second sit to stand test score will be 7  CLOF:7 sts using UE support  Prev: 5 rue assist  [] Progressing: [x] Met: [] Not Met: [] Adjusted   TUG test score will improve to to <25 sec  Current 2 sec with hurrycane  Prev: 28 sec  [] Progressing: [x] Met: [] Not Met: [] Adjusted   Pt will improve self-reported confidence with walking with cane to 95%  CLOF: same  PLOF: pt reports 80-90% confidence due to difficulty with swinging out left leg  [] Progressing: [] Met: [x] Not Met: [] Adjusted   Pt will report no new falls since last assessment on 5/29/25  CLOF: no falls reported  PLOF: no recent falls  but one large LOB  [] Progressing: [x] Met: [] Not Met: [] Adjusted   NEW: TUG score will be <20 seconds with hurrycane  (and able to use UE supports on the chair)  CLOF: 25.5 second average with brown chair using UE support and hurrycane  [] Progressing: [] Met: [] Not Met: [] Adjusted   NEW: Pt will hold tandem stance ea side for 15 seconds without UE support  CLOF: <10 sec each side, very difficult.   [] Progressing: [] Met: [] Not Met: [] Adjusted       Overall Progression Towards Functional goals/ Treatment

## 2025-07-15 ENCOUNTER — APPOINTMENT (OUTPATIENT)
Dept: OCCUPATIONAL THERAPY | Age: 70
End: 2025-07-15
Payer: MEDICARE

## 2025-07-15 ENCOUNTER — APPOINTMENT (OUTPATIENT)
Dept: PHYSICAL THERAPY | Age: 70
End: 2025-07-15
Payer: MEDICARE

## 2025-07-16 ENCOUNTER — HOSPITAL ENCOUNTER (OUTPATIENT)
Dept: OCCUPATIONAL THERAPY | Age: 70
Setting detail: THERAPIES SERIES
Discharge: HOME | End: 2025-07-16
Payer: MEDICARE

## 2025-07-16 ENCOUNTER — HOSPITAL ENCOUNTER (OUTPATIENT)
Dept: SPEECH THERAPY | Age: 70
Setting detail: THERAPIES SERIES
Discharge: HOME | End: 2025-07-16
Payer: MEDICARE

## 2025-07-16 ENCOUNTER — HOSPITAL ENCOUNTER (OUTPATIENT)
Dept: PHYSICAL THERAPY | Age: 70
Setting detail: THERAPIES SERIES
Discharge: HOME | End: 2025-07-16
Payer: MEDICARE

## 2025-07-16 PROCEDURE — 97112 NEUROMUSCULAR REEDUCATION: CPT

## 2025-07-16 PROCEDURE — 97530 THERAPEUTIC ACTIVITIES: CPT

## 2025-07-16 PROCEDURE — 97130 THER IVNTJ EA ADDL 15 MIN: CPT

## 2025-07-16 PROCEDURE — 97116 GAIT TRAINING THERAPY: CPT

## 2025-07-16 PROCEDURE — 97129 THER IVNTJ 1ST 15 MIN: CPT

## 2025-07-16 NOTE — FLOWSHEET NOTE
[facility] - Outpatient Rehabilitation and Therapy:  [facility] [place] [address] office: [phone] fax: [phone]      Physical Therapy: TREATMENT/PROGRESS NOTE   Patient: Jason Fontanez (70 y.o. male)   Examination Date: 2025   :  1955 MRN: [id number]   Visit #: 16   Insurance Allowable Auth Needed   BMN []Yes    []No    Insurance: Payor: MEDICARE / Plan: MEDICARE PART A AND B / Product Type: *No Product type* /   Insurance ID: [id number] - (Medicare)  Secondary Insurance (if applicable): [place]   Treatment Diagnosis:     ICD-10-CM    1. Balance problems  R26.89       2. Abnormality of gait following cerebrovascular accident  I69.398     R26.9       3. Impaired functional mobility, balance, gait, and endurance  Z74.09       4. Left foot drop  M21.372       5. Unsteadiness  R26.81          Medical Diagnosis:  Hemiplegia and hemiparesis following 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 side (HCC) [I69.954]   Referring Physician: Bernabe Omer MD  PCP: Ketan Amado DO     Plan of care signed (Y/N): y in media    Date of Patient follow up with Physician:      Plan of Care Report: NO   POC update due: (10 visits /OR AUTH LIMITS, whichever is less)   PN due 25 or 10 visits  POC due in 25 visits or 90 days Recert                                              Medical History:  Past Medical History:   Diagnosis Date    Anxiety     Chest pain     Depression     Encounter for imaging to screen for metal prior to MRI 2023    CT Head cleared for metal, no foreign body--ok for MRI per     Hyperlipidemia     Hypertension     Wears glasses    stent                                         Precautions/ Contra-indications:           Latex allergy:  NO  Pacemaker:    yes loop recorder placed 2023- checking for afib  Contraindications for Manipulation: None  Date of Surgery:   Other: allergic to adhesive, fall risk, on

## 2025-07-16 NOTE — FLOWSHEET NOTE
[facility]- Outpatient Rehabilitation and Therapy [address] [place] [address] [place] [address] office: [phone] fax: [phone]         Patient: Jason Fontanez (70 y.o. male)   Examination Date: 2025   :  1955 MRN: [id number]   Visit #: 22   Insurance Allowable: BMN  Auth Needed: No   Insurance: Payor: MEDICARE / Plan: MEDICARE PART A AND B / Product Type: *No Product type* /   Insurance ID: [id number] - (Medicare)  Secondary Insurance (if applicable): [place]   Treatment Diagnosis:  M79.602 (ICD-10-CM) - Pain in left arm and R41.9 (ICD-10-CM) - Unspecified symptoms and signs involving cognitive functions and awarenessHemiplegia and hemiparesis following 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 side (HCC) [I69.954]   Medical Diagnosis:  Hemiplegia and hemiparesis following 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 side (HCC) [I69.954]   Referring Provider: Bernabe Omer MD  PCP: Ketan Amado DO   Plan of care signed: Yes (on: 2025)    Date of Patient follow up with Physician: IOANA    Progress Report/POC: NO  POC update due: (OR 10 visits /OR AUTH LIMITS, whichever is less) - 2025  Recertification due: 2025                                                              Precautions/ Contra-indications:   Latex allergy:   No  Pacemaker:     Yes - Loop recorder placed 2023  Other: allergic to adhesive    Red Flags:  None  \  Suicide Screening:   The patient did not verbalize a primary behavioral concern, suicidal ideation, suicidal intent, or demonstrate suicidal behaviors.    Preferred Language for Healthcare: English    Review Of Systems (ROS):  [x] Performed Review of systems (Integumentary, CardioPulmonary, Neurological) by intake and observation. Intake form has been scanned into medical record. Patient has been instructed to contact their primary care physician regarding ROS issues if not already being addressed

## 2025-07-16 NOTE — FLOWSHEET NOTE
[facility] - Outpatient Rehabilitation and Therapy:  [facility] [place] [address] office: [phone] fax: [phone]     Speech Therapy         Speech Therapy: TREATMENT/PROGRESS NOTE   Patient: Jason Fontanez (70 y.o. male)   Examination Date: 2025   :  1955 MRN: [id number]   Visit #:   Insurance Allowable Auth Needed   BMN []Yes    [x]No    Insurance: Payor: MEDICARE / Plan: MEDICARE PART A AND B / Product Type: *No Product type* /   Insurance ID: [id number] - (Medicare)  Secondary Insurance (if applicable): [place]   Treatment Diagnosis:  1. Cognitive deficit status post cerebrovascular accident  I69.319         2. Other voice and resonance disorders  R49.8              Medical Diagnosis:  Hemiplegia and hemiparesis following 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 side (HCC) [I69.954]   Referring Physician: Ketan Amado DO  PCP: Ketan Amado DO      Plan of care signed: NO  Re-certification signed: NA    Progress Report/POC: NO  POC update due: (10 visits /OR 30 days /OR duration of POC, whichever is less): 2025     Medical History:  Comorbidities:  Hypertension  Relevant Medical History:   Anxiety     Chest pain    Depression    Hyperlipidemia    Hypertension    Wears glasses   Previous smoker     Precautions/ Contra-indications:   Latex allergy: Yes  Pacemaker: No  Other: Hearing aids (minimal compliance at this time)    Red Flags:  None    Suicide Screening:   The patient did not verbalize a primary behavioral concern, suicidal ideation, suicidal intent, or demonstrate suicidal behaviors.    Falls Risk Assessment (30 days):  [x] Deferred to PT/OT as part of multidisciplinary program.  [] Falls Risk assessed and no intervention required.  [] Falls risk assessed (via clinical reasoning) and patient requires intervention due to being higher risk for falls  [] Falls education provided, including PT referral, safety awareness,

## 2025-07-17 ENCOUNTER — APPOINTMENT (OUTPATIENT)
Dept: OCCUPATIONAL THERAPY | Age: 70
End: 2025-07-17
Payer: MEDICARE

## 2025-07-17 ENCOUNTER — APPOINTMENT (OUTPATIENT)
Dept: PHYSICAL THERAPY | Age: 70
End: 2025-07-17
Payer: MEDICARE

## 2025-07-21 ENCOUNTER — APPOINTMENT (OUTPATIENT)
Dept: OCCUPATIONAL THERAPY | Age: 70
End: 2025-07-21
Payer: MEDICARE

## 2025-07-21 ENCOUNTER — APPOINTMENT (OUTPATIENT)
Dept: PHYSICAL THERAPY | Age: 70
End: 2025-07-21
Payer: MEDICARE

## 2025-07-23 ENCOUNTER — APPOINTMENT (OUTPATIENT)
Dept: PHYSICAL THERAPY | Age: 70
End: 2025-07-23
Payer: MEDICARE

## 2025-07-23 ENCOUNTER — APPOINTMENT (OUTPATIENT)
Dept: OCCUPATIONAL THERAPY | Age: 70
End: 2025-07-23
Payer: MEDICARE

## 2025-07-28 ENCOUNTER — APPOINTMENT (OUTPATIENT)
Dept: OCCUPATIONAL THERAPY | Age: 70
End: 2025-07-28
Payer: MEDICARE

## 2025-07-28 ENCOUNTER — APPOINTMENT (OUTPATIENT)
Dept: PHYSICAL THERAPY | Age: 70
End: 2025-07-28
Payer: MEDICARE

## 2025-07-29 ENCOUNTER — APPOINTMENT (OUTPATIENT)
Dept: OCCUPATIONAL THERAPY | Age: 70
End: 2025-07-29
Payer: MEDICARE

## 2025-07-29 ENCOUNTER — APPOINTMENT (OUTPATIENT)
Dept: PHYSICAL THERAPY | Age: 70
End: 2025-07-29
Payer: MEDICARE

## 2025-07-30 ENCOUNTER — HOSPITAL ENCOUNTER (OUTPATIENT)
Dept: PHYSICAL THERAPY | Age: 70
Setting detail: THERAPIES SERIES
Discharge: HOME | End: 2025-07-30
Payer: MEDICARE

## 2025-07-30 ENCOUNTER — HOSPITAL ENCOUNTER (OUTPATIENT)
Dept: OCCUPATIONAL THERAPY | Age: 70
Setting detail: THERAPIES SERIES
Discharge: HOME | End: 2025-07-30
Payer: MEDICARE

## 2025-07-30 ENCOUNTER — HOSPITAL ENCOUNTER (OUTPATIENT)
Dept: SPEECH THERAPY | Age: 70
Setting detail: THERAPIES SERIES
Discharge: HOME | End: 2025-07-30
Payer: MEDICARE

## 2025-07-30 PROCEDURE — 97140 MANUAL THERAPY 1/> REGIONS: CPT

## 2025-07-30 PROCEDURE — 97110 THERAPEUTIC EXERCISES: CPT

## 2025-07-30 PROCEDURE — 97112 NEUROMUSCULAR REEDUCATION: CPT

## 2025-07-30 NOTE — FLOWSHEET NOTE
synergy patterns- unable to get full rnage without kneebend  Very hard   Glut bridge left leg    Needs assist for left leg   Airex anterior posterior weight shifts       Airex bal:  DLS  Then NBOS   3 x 1 min     Step tap with cone dots for targets and left border of cones to avoid circumduction- aim is not to kick over cones on the left side 0 then added 2# ankle weight for last set.  3 12 Hard- if he loses focus he immediately kicks the cones down.    Partial sit to stand with UE support  2 10 Holding onto the back of a chair sitting in front of him  Sits on mat then stands about 1/2way withOUT extending knees, must maintain knee flexion.   FOR HAMSTRINGS   FWB Sit to stand, 2\" step under RLE for emphasis on LLE, with a bolster on left side that he is supposed to touch up against for an external tactile cue to improve left weight shift.   3 5 Improved with reps.    Quick sit to stands in front of mirror 18\" 2 10 2\" step under L foot   Heel slides left from hooklying position + slide sheet  3 10 Did 5 reps with both legs, then all single leg left  TO ISOLATE HAMSTRINGS   Heel slides on wedge with yoga blocks underneath to be about 45 deg  2 10 - single left leg first- still requiring up to mathew  - then did DL at the same time and he maintains contact very well!!! VC still needed however  TO ISOLATE HAMSTRINGS   Heel slides (no full extension- flexed to slightly less flexed) on slide board with prop underneath to be about 45 deg  2 10 Active assisted at first   Cone corridor walk in front of mirror  10 laps  Narrow cone corridor with mirror to encourage anterior swing and reduce LLE circumduction                        Therapeutic Activity (47894)       Patient Education: Regarding exam findings, POC, HEP      Discussed adapted hiking  Discussed AFO and potentially locking it into more DF  Discussed gait mechanics   Sts from raised EOM    3 x 10 at home   1st stair lunge stretch for LLE spasticity    1  1   1'  10

## 2025-07-30 NOTE — FLOWSHEET NOTE
safety awareness, fall prevention strategies, and use of AD  [] Pt currently completing physical therapy    Preferred Language for Healthcare: English    Review Of Systems (ROS):   [x] Performed Review of systems (Integumentary, CardioPulmonary, Neurological) by intake and observation. Intake form is in the medical record. Patient has been instructed to contact their primary care physician regarding ROS issues if not already being addressed at this time.    [x] Patient history, allergies, meds reviewed. Medical chart reviewed. See intake form.     SUBJECTIVE EXAMINATION     Patient stated complaint/comment:Pt     ONSET: 2023 right parietal lobe CVA     Functional Outcome:   Neuro-QOL Scale v1.0 - Communication - Short Form (Erica 2014): 19/40 with lower scores indicating worse perception of cognitive function.       OBJECTIVE EXAMINATION     Cognitive Function (30935 & 16231) Accuracy Cues Notes   Orientation        Attention           Sustained Pt scanned lists of letters and numbers to identify the correct target. Pt was 100% accurate at identifying the correct target (did not select another target) however pt was  25% accurate identifying all targets with several missed due to disorganized search pattern  Moderate cues to use an anchor to scan, use a piece of paper to block other lines to aid in limiting impulsivity        Selective         Alternating Pt demonstrated increased ability to face a distraction and immediately return to his visual scanning         Divided      Memory          Immediate/Working Recalled what category to name based off verbal stimuli (pink=woman's name blue=a city) with 100% accuracy throughout 40 min session         Short term         Long term         Prospective      Problem Solving       Visuospatial       Executive Function       Other:         Pt. Education:  Pt educated on diagnosis, prognosis and expectations for rehab  All pt questions were answered  Pt verbalized

## 2025-07-30 NOTE — FLOWSHEET NOTE
During functional activities/therapeutic exercises, provided facilitation of normal movement patterns and provided handling/verbal cues to promote postural alignment and stability during static and dynamic movement (sitting or standing). Activities may also include visual perceptual training, proprioception training, and balance retraining during functional activities  Manual Therapy (94999): Provided manual therapy to mobilize UB for the purpose of modulating pain, promoting relaxation,  increasing ROM, reducing/eliminating soft tissue swelling/inflammation/restriction, improving soft tissue extensibility and allowing for proper ROM for normal function with self-care, functional mobility, transfers, reaching and lifting    TREATMENT PLAN     Plan: Cont per updated POC    Electronically Signed by Brandee Lerner, OT  Date: 07/30/2025     Note: If patient does not return for scheduled/recommended follow up visits, this note will serve as a discharge from care along with the most recent update on progress.

## 2025-07-31 ENCOUNTER — APPOINTMENT (OUTPATIENT)
Dept: PHYSICAL THERAPY | Age: 70
End: 2025-07-31
Payer: MEDICARE

## 2025-08-01 ENCOUNTER — APPOINTMENT (OUTPATIENT)
Dept: PHYSICAL THERAPY | Age: 70
End: 2025-08-01
Payer: MEDICARE

## 2025-08-04 ENCOUNTER — HOSPITAL ENCOUNTER (OUTPATIENT)
Dept: OCCUPATIONAL THERAPY | Age: 70
Setting detail: THERAPIES SERIES
End: 2025-08-04
Payer: MEDICARE

## 2025-08-04 ENCOUNTER — APPOINTMENT (OUTPATIENT)
Dept: PHYSICAL THERAPY | Age: 70
End: 2025-08-04
Payer: MEDICARE

## 2025-08-04 ENCOUNTER — HOSPITAL ENCOUNTER (OUTPATIENT)
Dept: SPEECH THERAPY | Age: 70
Setting detail: THERAPIES SERIES
Discharge: HOME | End: 2025-08-04
Payer: MEDICARE

## 2025-08-04 PROCEDURE — 97130 THER IVNTJ EA ADDL 15 MIN: CPT

## 2025-08-04 PROCEDURE — 97129 THER IVNTJ 1ST 15 MIN: CPT

## 2025-08-06 ENCOUNTER — HOSPITAL ENCOUNTER (OUTPATIENT)
Dept: PHYSICAL THERAPY | Age: 70
Setting detail: THERAPIES SERIES
Discharge: HOME | End: 2025-08-06
Payer: MEDICARE

## 2025-08-06 ENCOUNTER — HOSPITAL ENCOUNTER (OUTPATIENT)
Dept: OCCUPATIONAL THERAPY | Age: 70
Setting detail: THERAPIES SERIES
Discharge: HOME | End: 2025-08-06
Payer: MEDICARE

## 2025-08-06 ENCOUNTER — HOSPITAL ENCOUNTER (OUTPATIENT)
Dept: SPEECH THERAPY | Age: 70
Setting detail: THERAPIES SERIES
Discharge: HOME | End: 2025-08-06
Payer: MEDICARE

## 2025-08-06 PROCEDURE — 97129 THER IVNTJ 1ST 15 MIN: CPT

## 2025-08-06 PROCEDURE — 97112 NEUROMUSCULAR REEDUCATION: CPT

## 2025-08-06 PROCEDURE — 97530 THERAPEUTIC ACTIVITIES: CPT

## 2025-08-06 PROCEDURE — 97130 THER IVNTJ EA ADDL 15 MIN: CPT

## 2025-08-06 PROCEDURE — 97116 GAIT TRAINING THERAPY: CPT

## 2025-08-11 ENCOUNTER — APPOINTMENT (OUTPATIENT)
Dept: OCCUPATIONAL THERAPY | Age: 70
End: 2025-08-11
Payer: MEDICARE

## 2025-08-11 ENCOUNTER — APPOINTMENT (OUTPATIENT)
Dept: PHYSICAL THERAPY | Age: 70
End: 2025-08-11
Payer: MEDICARE

## 2025-08-11 ENCOUNTER — APPOINTMENT (OUTPATIENT)
Dept: SPEECH THERAPY | Age: 70
End: 2025-08-11
Payer: MEDICARE

## 2025-08-13 ENCOUNTER — HOSPITAL ENCOUNTER (OUTPATIENT)
Dept: SPEECH THERAPY | Age: 70
Setting detail: THERAPIES SERIES
Discharge: HOME | End: 2025-08-13
Payer: MEDICARE

## 2025-08-13 ENCOUNTER — APPOINTMENT (OUTPATIENT)
Dept: OCCUPATIONAL THERAPY | Age: 70
End: 2025-08-13
Payer: MEDICARE

## 2025-08-13 ENCOUNTER — HOSPITAL ENCOUNTER (OUTPATIENT)
Dept: PHYSICAL THERAPY | Age: 70
Setting detail: THERAPIES SERIES
End: 2025-08-13
Payer: MEDICARE

## 2025-08-13 PROCEDURE — 97129 THER IVNTJ 1ST 15 MIN: CPT

## 2025-08-13 PROCEDURE — 97130 THER IVNTJ EA ADDL 15 MIN: CPT

## 2025-08-18 ENCOUNTER — APPOINTMENT (OUTPATIENT)
Dept: OCCUPATIONAL THERAPY | Age: 70
End: 2025-08-18
Payer: MEDICARE

## 2025-08-18 ENCOUNTER — HOSPITAL ENCOUNTER (OUTPATIENT)
Dept: PHYSICAL THERAPY | Age: 70
Setting detail: THERAPIES SERIES
Discharge: HOME | End: 2025-08-18
Payer: MEDICARE

## 2025-08-18 ENCOUNTER — HOSPITAL ENCOUNTER (OUTPATIENT)
Dept: SPEECH THERAPY | Age: 70
Setting detail: THERAPIES SERIES
Discharge: HOME | End: 2025-08-18
Payer: MEDICARE

## 2025-08-18 PROCEDURE — 97129 THER IVNTJ 1ST 15 MIN: CPT

## 2025-08-18 PROCEDURE — 97130 THER IVNTJ EA ADDL 15 MIN: CPT

## 2025-08-18 PROCEDURE — 97112 NEUROMUSCULAR REEDUCATION: CPT

## 2025-08-20 ENCOUNTER — HOSPITAL ENCOUNTER (OUTPATIENT)
Dept: PHYSICAL THERAPY | Age: 70
Setting detail: THERAPIES SERIES
Discharge: HOME | End: 2025-08-20
Payer: MEDICARE

## 2025-08-20 ENCOUNTER — HOSPITAL ENCOUNTER (OUTPATIENT)
Dept: SPEECH THERAPY | Age: 70
Setting detail: THERAPIES SERIES
Discharge: HOME | End: 2025-08-20
Payer: MEDICARE

## 2025-08-20 ENCOUNTER — APPOINTMENT (OUTPATIENT)
Dept: OCCUPATIONAL THERAPY | Age: 70
End: 2025-08-20
Payer: MEDICARE

## 2025-08-20 PROCEDURE — 97112 NEUROMUSCULAR REEDUCATION: CPT

## 2025-08-20 PROCEDURE — 97130 THER IVNTJ EA ADDL 15 MIN: CPT

## 2025-08-20 PROCEDURE — 97129 THER IVNTJ 1ST 15 MIN: CPT

## 2025-08-25 ENCOUNTER — APPOINTMENT (OUTPATIENT)
Dept: SPEECH THERAPY | Age: 70
End: 2025-08-25
Payer: MEDICARE

## 2025-08-25 ENCOUNTER — HOSPITAL ENCOUNTER (OUTPATIENT)
Dept: PHYSICAL THERAPY | Age: 70
Setting detail: THERAPIES SERIES
End: 2025-08-25
Payer: MEDICARE

## 2025-08-27 ENCOUNTER — APPOINTMENT (OUTPATIENT)
Dept: SPEECH THERAPY | Age: 70
End: 2025-08-27
Payer: MEDICARE

## 2025-08-27 ENCOUNTER — APPOINTMENT (OUTPATIENT)
Dept: PHYSICAL THERAPY | Age: 70
End: 2025-08-27
Payer: MEDICARE